# Patient Record
Sex: FEMALE | Race: WHITE | NOT HISPANIC OR LATINO | Employment: FULL TIME | RURAL
[De-identification: names, ages, dates, MRNs, and addresses within clinical notes are randomized per-mention and may not be internally consistent; named-entity substitution may affect disease eponyms.]

---

## 2021-07-14 DIAGNOSIS — D22.9 NEVUS: Primary | ICD-10-CM

## 2021-09-17 ENCOUNTER — OFFICE VISIT (OUTPATIENT)
Dept: DERMATOLOGY | Facility: CLINIC | Age: 56
End: 2021-09-17
Payer: COMMERCIAL

## 2021-09-17 VITALS — WEIGHT: 150 LBS | BODY MASS INDEX: 26.58 KG/M2 | HEIGHT: 63 IN | RESPIRATION RATE: 18 BRPM

## 2021-09-17 DIAGNOSIS — L82.1 SEBORRHEIC KERATOSES: ICD-10-CM

## 2021-09-17 DIAGNOSIS — D48.9 NEOPLASM OF UNCERTAIN BEHAVIOR: Primary | ICD-10-CM

## 2021-09-17 DIAGNOSIS — R21 RASH: ICD-10-CM

## 2021-09-17 DIAGNOSIS — D22.9 NEVUS: ICD-10-CM

## 2021-09-17 PROCEDURE — 3008F PR BODY MASS INDEX (BMI) DOCUMENTED: ICD-10-PCS | Mod: ,,, | Performed by: STUDENT IN AN ORGANIZED HEALTH CARE EDUCATION/TRAINING PROGRAM

## 2021-09-17 PROCEDURE — 4010F ACE/ARB THERAPY RXD/TAKEN: CPT | Mod: ,,, | Performed by: STUDENT IN AN ORGANIZED HEALTH CARE EDUCATION/TRAINING PROGRAM

## 2021-09-17 PROCEDURE — 4010F PR ACE/ARB THEARPY RXD/TAKEN: ICD-10-PCS | Mod: ,,, | Performed by: STUDENT IN AN ORGANIZED HEALTH CARE EDUCATION/TRAINING PROGRAM

## 2021-09-17 PROCEDURE — 11200 RMVL SKIN TAGS UP TO&INC 15: CPT | Mod: ,,, | Performed by: STUDENT IN AN ORGANIZED HEALTH CARE EDUCATION/TRAINING PROGRAM

## 2021-09-17 PROCEDURE — 1159F PR MEDICATION LIST DOCUMENTED IN MEDICAL RECORD: ICD-10-PCS | Mod: ,,, | Performed by: STUDENT IN AN ORGANIZED HEALTH CARE EDUCATION/TRAINING PROGRAM

## 2021-09-17 PROCEDURE — 88304 TISSUE EXAM BY PATHOLOGIST: CPT | Mod: 26,,, | Performed by: PATHOLOGY

## 2021-09-17 PROCEDURE — 87220 TISSUE EXAM FOR FUNGI: CPT | Mod: ,,, | Performed by: STUDENT IN AN ORGANIZED HEALTH CARE EDUCATION/TRAINING PROGRAM

## 2021-09-17 PROCEDURE — 99203 OFFICE O/P NEW LOW 30 MIN: CPT | Mod: 25,,, | Performed by: STUDENT IN AN ORGANIZED HEALTH CARE EDUCATION/TRAINING PROGRAM

## 2021-09-17 PROCEDURE — 87220 PR  TISSUE EXAM BY KOH: ICD-10-PCS | Mod: ,,, | Performed by: STUDENT IN AN ORGANIZED HEALTH CARE EDUCATION/TRAINING PROGRAM

## 2021-09-17 PROCEDURE — 88304 PATHOLOGY, DERMATOLOGY: ICD-10-PCS | Mod: 26,,, | Performed by: PATHOLOGY

## 2021-09-17 PROCEDURE — 11200 PR REMOVAL OF SKIN TAGS, UP TO 15: ICD-10-PCS | Mod: ,,, | Performed by: STUDENT IN AN ORGANIZED HEALTH CARE EDUCATION/TRAINING PROGRAM

## 2021-09-17 PROCEDURE — 3008F BODY MASS INDEX DOCD: CPT | Mod: ,,, | Performed by: STUDENT IN AN ORGANIZED HEALTH CARE EDUCATION/TRAINING PROGRAM

## 2021-09-17 PROCEDURE — 88304 TISSUE EXAM BY PATHOLOGIST: CPT | Mod: SUR | Performed by: STUDENT IN AN ORGANIZED HEALTH CARE EDUCATION/TRAINING PROGRAM

## 2021-09-17 PROCEDURE — 1159F MED LIST DOCD IN RCRD: CPT | Mod: ,,, | Performed by: STUDENT IN AN ORGANIZED HEALTH CARE EDUCATION/TRAINING PROGRAM

## 2021-09-17 PROCEDURE — 99203 PR OFFICE/OUTPT VISIT, NEW, LEVL III, 30-44 MIN: ICD-10-PCS | Mod: 25,,, | Performed by: STUDENT IN AN ORGANIZED HEALTH CARE EDUCATION/TRAINING PROGRAM

## 2021-09-17 PROCEDURE — 1160F PR REVIEW ALL MEDS BY PRESCRIBER/CLIN PHARMACIST DOCUMENTED: ICD-10-PCS | Mod: ,,, | Performed by: STUDENT IN AN ORGANIZED HEALTH CARE EDUCATION/TRAINING PROGRAM

## 2021-09-17 PROCEDURE — 1160F RVW MEDS BY RX/DR IN RCRD: CPT | Mod: ,,, | Performed by: STUDENT IN AN ORGANIZED HEALTH CARE EDUCATION/TRAINING PROGRAM

## 2021-09-17 RX ORDER — PROGESTERONE 100 MG/1
CAPSULE ORAL
COMMUNITY
Start: 2021-09-02 | End: 2022-05-05

## 2021-09-17 RX ORDER — MONTELUKAST SODIUM 10 MG/1
10 TABLET ORAL DAILY
COMMUNITY
Start: 2021-07-27

## 2021-09-17 RX ORDER — ZOLPIDEM TARTRATE 10 MG/1
TABLET ORAL
Status: ON HOLD | COMMUNITY
Start: 2021-09-14 | End: 2023-02-09 | Stop reason: HOSPADM

## 2021-09-17 RX ORDER — HYDROCORTISONE 25 MG/G
CREAM TOPICAL
COMMUNITY
Start: 2021-08-24 | End: 2023-04-24

## 2021-09-17 RX ORDER — TRIAMCINOLONE ACETONIDE 1 MG/G
OINTMENT TOPICAL 2 TIMES DAILY
Qty: 80 G | Refills: 5 | Status: ON HOLD | OUTPATIENT
Start: 2021-09-17 | End: 2023-02-09 | Stop reason: HOSPADM

## 2021-09-17 RX ORDER — PROPRANOLOL HYDROCHLORIDE 10 MG/1
TABLET ORAL
COMMUNITY
Start: 2021-09-13 | End: 2022-04-27

## 2021-09-17 RX ORDER — LISINOPRIL 30 MG/1
TABLET ORAL
COMMUNITY
Start: 2021-09-12 | End: 2022-05-26 | Stop reason: SDUPTHER

## 2021-09-17 RX ORDER — ESTRADIOL 0.5 MG/1
0.5 TABLET ORAL DAILY
COMMUNITY
Start: 2021-07-27 | End: 2022-05-05

## 2021-09-17 RX ORDER — HYDROCHLOROTHIAZIDE 12.5 MG/1
CAPSULE ORAL
COMMUNITY
Start: 2021-09-12 | End: 2022-05-05

## 2021-09-17 RX ORDER — GALCANEZUMAB 120 MG/ML
INJECTION, SOLUTION SUBCUTANEOUS
COMMUNITY
Start: 2021-09-02 | End: 2023-03-06

## 2021-09-17 RX ORDER — ONDANSETRON 4 MG/1
TABLET, ORALLY DISINTEGRATING ORAL
COMMUNITY
Start: 2021-06-08 | End: 2023-03-01 | Stop reason: SDUPTHER

## 2021-09-17 RX ORDER — RIMEGEPANT SULFATE 75 MG/75MG
TABLET, ORALLY DISINTEGRATING ORAL
COMMUNITY
Start: 2021-06-11 | End: 2023-03-06

## 2021-09-17 RX ORDER — DULOXETIN HYDROCHLORIDE 60 MG/1
CAPSULE, DELAYED RELEASE ORAL
COMMUNITY
Start: 2021-09-13

## 2021-09-17 RX ORDER — CETIRIZINE HYDROCHLORIDE 10 MG/1
10 TABLET ORAL DAILY
COMMUNITY
Start: 2021-08-24 | End: 2023-02-21 | Stop reason: SDUPTHER

## 2021-09-17 RX ORDER — ALPRAZOLAM 1 MG/1
TABLET ORAL
COMMUNITY
Start: 2021-09-14

## 2021-09-17 RX ORDER — ATENOLOL 100 MG/1
100 TABLET ORAL DAILY
COMMUNITY
Start: 2021-06-16 | End: 2022-04-27

## 2021-09-21 LAB
ESTROGEN SERPL-MCNC: NORMAL PG/ML
LAB AP GROSS DESCRIPTION: NORMAL
LAB AP LABORATORY NOTES: NORMAL
LAB AP SPEC A DDX: NORMAL
LAB AP SPEC A MORPHOLOGY: NORMAL
LAB AP SPEC A PROCEDURE: NORMAL
T3RU NFR SERPL: NORMAL %

## 2022-04-22 ENCOUNTER — HOSPITAL ENCOUNTER (EMERGENCY)
Facility: HOSPITAL | Age: 57
Discharge: ANOTHER HOSPITAL | End: 2022-04-23
Attending: FAMILY MEDICINE
Payer: COMMERCIAL

## 2022-04-22 DIAGNOSIS — R55 SYNCOPE: ICD-10-CM

## 2022-04-22 DIAGNOSIS — R79.89 ELEVATED TROPONIN: Primary | ICD-10-CM

## 2022-04-22 DIAGNOSIS — R07.9 CHEST PAIN: ICD-10-CM

## 2022-04-22 LAB
ALBUMIN SERPL BCP-MCNC: 3.9 G/DL (ref 3.5–5)
ALBUMIN/GLOB SERPL: 1.2 {RATIO}
ALP SERPL-CCNC: 82 U/L (ref 46–118)
ALT SERPL W P-5'-P-CCNC: 18 U/L (ref 13–56)
ANION GAP SERPL CALCULATED.3IONS-SCNC: 14 MMOL/L (ref 7–16)
AST SERPL W P-5'-P-CCNC: 15 U/L (ref 15–37)
BACTERIA #/AREA URNS HPF: ABNORMAL /HPF
BASOPHILS # BLD AUTO: 0.05 K/UL (ref 0–0.2)
BASOPHILS NFR BLD AUTO: 0.4 % (ref 0–1)
BILIRUB SERPL-MCNC: 0.1 MG/DL (ref 0–1.2)
BILIRUB UR QL STRIP: NEGATIVE
BUN SERPL-MCNC: 16 MG/DL (ref 7–18)
BUN/CREAT SERPL: 17 (ref 6–20)
CALCIUM SERPL-MCNC: 7.9 MG/DL (ref 8.5–10.1)
CHLORIDE SERPL-SCNC: 88 MMOL/L (ref 98–107)
CLARITY UR: CLEAR
CO2 SERPL-SCNC: 27 MMOL/L (ref 21–32)
COLOR UR: YELLOW
CREAT SERPL-MCNC: 0.96 MG/DL (ref 0.55–1.02)
DIFFERENTIAL METHOD BLD: ABNORMAL
EOSINOPHIL # BLD AUTO: 0.28 K/UL (ref 0–0.5)
EOSINOPHIL NFR BLD AUTO: 2.5 % (ref 1–4)
ERYTHROCYTE [DISTWIDTH] IN BLOOD BY AUTOMATED COUNT: 12.4 % (ref 11.5–14.5)
GLOBULIN SER-MCNC: 3.2 G/DL (ref 2–4)
GLUCOSE SERPL-MCNC: 100 MG/DL (ref 74–106)
GLUCOSE UR STRIP-MCNC: NEGATIVE MG/DL
HCT VFR BLD AUTO: 30.2 % (ref 38–47)
HGB BLD-MCNC: 10.4 G/DL (ref 12–16)
KETONES UR STRIP-SCNC: NEGATIVE MG/DL
LEUKOCYTE ESTERASE UR QL STRIP: ABNORMAL
LYMPHOCYTES # BLD AUTO: 3.37 K/UL (ref 1–4.8)
LYMPHOCYTES NFR BLD AUTO: 29.7 % (ref 27–41)
MCH RBC QN AUTO: 27.6 PG (ref 27–31)
MCHC RBC AUTO-ENTMCNC: 34.4 G/DL (ref 32–36)
MCV RBC AUTO: 80.1 FL (ref 80–96)
MONOCYTES # BLD AUTO: 0.82 K/UL (ref 0–0.8)
MONOCYTES NFR BLD AUTO: 7.2 % (ref 2–6)
MPC BLD CALC-MCNC: 8.8 FL (ref 9.4–12.4)
NEUTROPHILS # BLD AUTO: 6.82 K/UL (ref 1.8–7.7)
NEUTROPHILS NFR BLD AUTO: 60.2 % (ref 53–65)
NITRITE UR QL STRIP: NEGATIVE
NT-PROBNP SERPL-MCNC: 196 PG/ML (ref 1–125)
PH UR STRIP: 6 PH UNITS
PLATELET # BLD AUTO: 519 K/UL (ref 150–400)
POTASSIUM SERPL-SCNC: 3.5 MMOL/L (ref 3.5–5.1)
PROT SERPL-MCNC: 7.1 G/DL (ref 6.4–8.2)
PROT UR QL STRIP: ABNORMAL
RBC # BLD AUTO: 3.77 M/UL (ref 4.2–5.4)
RBC # UR STRIP: ABNORMAL /UL
RBC #/AREA URNS HPF: ABNORMAL /HPF
SODIUM SERPL-SCNC: 125 MMOL/L (ref 136–145)
SP GR UR STRIP: <=1.005
SQUAMOUS #/AREA URNS LPF: ABNORMAL /LPF
TROPONIN I SERPL HS-MCNC: 24.3 PG/ML
UROBILINOGEN UR STRIP-ACNC: 0.2 MG/DL
WBC # BLD AUTO: 11.34 K/UL (ref 4.5–11)
WBC #/AREA URNS HPF: ABNORMAL /HPF

## 2022-04-22 PROCEDURE — 93010 ELECTROCARDIOGRAM REPORT: CPT | Mod: ,,, | Performed by: INTERNAL MEDICINE

## 2022-04-22 PROCEDURE — 99285 PR EMERGENCY DEPT VISIT,LEVEL V: ICD-10-PCS | Mod: ,,, | Performed by: REGISTERED NURSE

## 2022-04-22 PROCEDURE — 99285 EMERGENCY DEPT VISIT HI MDM: CPT | Mod: ,,, | Performed by: REGISTERED NURSE

## 2022-04-22 PROCEDURE — 80053 COMPREHEN METABOLIC PANEL: CPT | Performed by: REGISTERED NURSE

## 2022-04-22 PROCEDURE — 84484 ASSAY OF TROPONIN QUANT: CPT | Performed by: REGISTERED NURSE

## 2022-04-22 PROCEDURE — 93005 ELECTROCARDIOGRAM TRACING: CPT

## 2022-04-22 PROCEDURE — 25000003 PHARM REV CODE 250: Performed by: REGISTERED NURSE

## 2022-04-22 PROCEDURE — 93010 EKG 12-LEAD: ICD-10-PCS | Mod: ,,, | Performed by: INTERNAL MEDICINE

## 2022-04-22 PROCEDURE — 81001 URINALYSIS AUTO W/SCOPE: CPT | Performed by: REGISTERED NURSE

## 2022-04-22 PROCEDURE — 96360 HYDRATION IV INFUSION INIT: CPT

## 2022-04-22 PROCEDURE — 85025 COMPLETE CBC W/AUTO DIFF WBC: CPT | Performed by: REGISTERED NURSE

## 2022-04-22 PROCEDURE — 36415 COLL VENOUS BLD VENIPUNCTURE: CPT | Performed by: REGISTERED NURSE

## 2022-04-22 PROCEDURE — 83880 ASSAY OF NATRIURETIC PEPTIDE: CPT | Performed by: REGISTERED NURSE

## 2022-04-22 PROCEDURE — 96372 THER/PROPH/DIAG INJ SC/IM: CPT

## 2022-04-22 PROCEDURE — 99285 EMERGENCY DEPT VISIT HI MDM: CPT | Mod: 25

## 2022-04-22 RX ADMIN — SODIUM CHLORIDE 500 ML: 9 INJECTION, SOLUTION INTRAVENOUS at 10:04

## 2022-04-23 ENCOUNTER — HOSPITAL ENCOUNTER (INPATIENT)
Facility: HOSPITAL | Age: 57
LOS: 2 days | Discharge: HOME | DRG: 287 | End: 2022-04-27
Attending: EMERGENCY MEDICINE | Admitting: INTERNAL MEDICINE
Payer: COMMERCIAL

## 2022-04-23 VITALS
HEIGHT: 63 IN | SYSTOLIC BLOOD PRESSURE: 139 MMHG | DIASTOLIC BLOOD PRESSURE: 97 MMHG | HEART RATE: 96 BPM | TEMPERATURE: 98 F | RESPIRATION RATE: 13 BRPM | WEIGHT: 140 LBS | BODY MASS INDEX: 24.8 KG/M2 | OXYGEN SATURATION: 100 %

## 2022-04-23 DIAGNOSIS — R07.9 CHEST PAIN: ICD-10-CM

## 2022-04-23 DIAGNOSIS — F41.9 ANXIETY DISORDER, UNSPECIFIED: ICD-10-CM

## 2022-04-23 DIAGNOSIS — R55 SYNCOPE AND COLLAPSE: ICD-10-CM

## 2022-04-23 DIAGNOSIS — R79.89 ELEVATED TROPONIN: ICD-10-CM

## 2022-04-23 DIAGNOSIS — I10 HYPERTENSION: ICD-10-CM

## 2022-04-23 DIAGNOSIS — R55 SYNCOPE, UNSPECIFIED SYNCOPE TYPE: Primary | ICD-10-CM

## 2022-04-23 LAB
AMPHET UR QL SCN: NEGATIVE
APTT PPP: 36.1 SECONDS (ref 25.2–37.3)
APTT PPP: >200 SECONDS (ref 25.2–37.3)
BARBITURATES UR QL SCN: NEGATIVE
BASOPHILS # BLD AUTO: 0.08 K/UL (ref 0–0.2)
BASOPHILS NFR BLD AUTO: 0.6 % (ref 0–1)
BENZODIAZ METAB UR QL SCN: NEGATIVE
CANNABINOIDS UR QL SCN: POSITIVE
CHOLEST SERPL-MCNC: 197 MG/DL (ref 0–200)
CHOLEST/HDLC SERPL: 2.3 {RATIO}
CK SERPL-CCNC: 582 U/L (ref 26–192)
COCAINE UR QL SCN: NEGATIVE
D DIMER PPP FEU-MCNC: 0.46 ΜG/ML (ref 0–0.47)
DIFFERENTIAL METHOD BLD: ABNORMAL
EOSINOPHIL # BLD AUTO: 0.12 K/UL (ref 0–0.5)
EOSINOPHIL NFR BLD AUTO: 0.9 % (ref 1–4)
ERYTHROCYTE [DISTWIDTH] IN BLOOD BY AUTOMATED COUNT: 12.5 % (ref 11.5–14.5)
FLUAV AG UPPER RESP QL IA.RAPID: NEGATIVE
FLUBV AG UPPER RESP QL IA.RAPID: NEGATIVE
GLUCOSE SERPL-MCNC: 116 MG/DL (ref 70–105)
HCT VFR BLD AUTO: 34.6 % (ref 38–47)
HDLC SERPL-MCNC: 85 MG/DL (ref 40–60)
HGB BLD-MCNC: 11.8 G/DL (ref 12–16)
IMM GRANULOCYTES # BLD AUTO: 0.06 K/UL (ref 0–0.04)
IMM GRANULOCYTES NFR BLD: 0.4 % (ref 0–0.4)
INR BLD: 0.99 (ref 0.9–1.1)
LACTATE SERPL-SCNC: 0.8 MMOL/L (ref 0.4–2)
LACTATE SERPL-SCNC: 3.5 MMOL/L (ref 0.4–2)
LDLC SERPL CALC-MCNC: 96 MG/DL
LDLC/HDLC SERPL: 1.1 {RATIO}
LYMPHOCYTES # BLD AUTO: 3.01 K/UL (ref 1–4.8)
LYMPHOCYTES NFR BLD AUTO: 21.5 % (ref 27–41)
MCH RBC QN AUTO: 27.4 PG (ref 27–31)
MCHC RBC AUTO-ENTMCNC: 34.1 G/DL (ref 32–36)
MCV RBC AUTO: 80.5 FL (ref 80–96)
MONOCYTES # BLD AUTO: 0.98 K/UL (ref 0–0.8)
MONOCYTES NFR BLD AUTO: 7 % (ref 2–6)
MPC BLD CALC-MCNC: 8.8 FL (ref 9.4–12.4)
NEUTROPHILS # BLD AUTO: 9.74 K/UL (ref 1.8–7.7)
NEUTROPHILS NFR BLD AUTO: 69.6 % (ref 53–65)
NONHDLC SERPL-MCNC: 112 MG/DL
NRBC # BLD AUTO: 0 X10E3/UL
NRBC, AUTO (.00): 0 %
NT-PROBNP SERPL-MCNC: 773 PG/ML (ref 1–125)
OPIATES UR QL SCN: NEGATIVE
PCP UR QL SCN: NEGATIVE
PLATELET # BLD AUTO: 584 K/UL (ref 150–400)
PROLACTIN SERPL-MCNC: 12.3 NG/ML
PROTHROMBIN TIME: 13.1 SECONDS (ref 11.7–14.7)
RBC # BLD AUTO: 4.3 M/UL (ref 4.2–5.4)
SARS-COV+SARS-COV-2 AG RESP QL IA.RAPID: NEGATIVE
TRIGL SERPL-MCNC: 82 MG/DL (ref 35–150)
TROPONIN I SERPL HS-MCNC: 71.6 PG/ML
TROPONIN I SERPL HS-MCNC: 80.1 PG/ML
TROPONIN I SERPL HS-MCNC: 84.2 PG/ML
VLDLC SERPL-MCNC: 16 MG/DL
WBC # BLD AUTO: 13.99 K/UL (ref 4.5–11)

## 2022-04-23 PROCEDURE — 96376 TX/PRO/DX INJ SAME DRUG ADON: CPT

## 2022-04-23 PROCEDURE — 80307 DRUG TEST PRSMV CHEM ANLYZR: CPT | Performed by: REGISTERED NURSE

## 2022-04-23 PROCEDURE — 63600175 PHARM REV CODE 636 W HCPCS: Performed by: REGISTERED NURSE

## 2022-04-23 PROCEDURE — 93005 ELECTROCARDIOGRAM TRACING: CPT

## 2022-04-23 PROCEDURE — G0378 HOSPITAL OBSERVATION PER HR: HCPCS

## 2022-04-23 PROCEDURE — 36415 COLL VENOUS BLD VENIPUNCTURE: CPT | Performed by: REGISTERED NURSE

## 2022-04-23 PROCEDURE — 63600175 PHARM REV CODE 636 W HCPCS

## 2022-04-23 PROCEDURE — 85025 COMPLETE CBC W/AUTO DIFF WBC: CPT

## 2022-04-23 PROCEDURE — 93010 ELECTROCARDIOGRAM REPORT: CPT | Mod: 77,,, | Performed by: HOSPITALIST

## 2022-04-23 PROCEDURE — 99283 EMERGENCY DEPT VISIT LOW MDM: CPT | Mod: ,,, | Performed by: EMERGENCY MEDICINE

## 2022-04-23 PROCEDURE — 25000003 PHARM REV CODE 250

## 2022-04-23 PROCEDURE — 82550 ASSAY OF CK (CPK): CPT

## 2022-04-23 PROCEDURE — 93010 EKG 12-LEAD: ICD-10-PCS | Mod: 77,,, | Performed by: INTERNAL MEDICINE

## 2022-04-23 PROCEDURE — 85378 FIBRIN DEGRADE SEMIQUANT: CPT

## 2022-04-23 PROCEDURE — 87428 SARSCOV & INF VIR A&B AG IA: CPT | Performed by: REGISTERED NURSE

## 2022-04-23 PROCEDURE — 25000003 PHARM REV CODE 250: Performed by: REGISTERED NURSE

## 2022-04-23 PROCEDURE — 25000003 PHARM REV CODE 250: Performed by: INTERNAL MEDICINE

## 2022-04-23 PROCEDURE — 80061 LIPID PANEL: CPT

## 2022-04-23 PROCEDURE — 96374 THER/PROPH/DIAG INJ IV PUSH: CPT

## 2022-04-23 PROCEDURE — 93010 EKG 12-LEAD: ICD-10-PCS | Mod: 77,,, | Performed by: HOSPITALIST

## 2022-04-23 PROCEDURE — 99223 PR INITIAL HOSPITAL CARE,LEVL III: ICD-10-PCS | Mod: GC,,, | Performed by: INTERNAL MEDICINE

## 2022-04-23 PROCEDURE — 83605 ASSAY OF LACTIC ACID: CPT | Mod: 91 | Performed by: INTERNAL MEDICINE

## 2022-04-23 PROCEDURE — 93010 ELECTROCARDIOGRAM REPORT: CPT | Mod: 77,,, | Performed by: INTERNAL MEDICINE

## 2022-04-23 PROCEDURE — 99283 PR EMERGENCY DEPT VISIT,LEVEL III: ICD-10-PCS | Mod: ,,, | Performed by: EMERGENCY MEDICINE

## 2022-04-23 PROCEDURE — 93010 ELECTROCARDIOGRAM REPORT: CPT | Mod: ,,, | Performed by: INTERNAL MEDICINE

## 2022-04-23 PROCEDURE — 83880 ASSAY OF NATRIURETIC PEPTIDE: CPT

## 2022-04-23 PROCEDURE — 93010 EKG 12-LEAD: ICD-10-PCS | Mod: ,,, | Performed by: INTERNAL MEDICINE

## 2022-04-23 PROCEDURE — 84484 ASSAY OF TROPONIN QUANT: CPT | Performed by: REGISTERED NURSE

## 2022-04-23 PROCEDURE — 85610 PROTHROMBIN TIME: CPT

## 2022-04-23 PROCEDURE — 99285 EMERGENCY DEPT VISIT HI MDM: CPT | Mod: 25 | Performed by: EMERGENCY MEDICINE

## 2022-04-23 PROCEDURE — 82962 GLUCOSE BLOOD TEST: CPT

## 2022-04-23 PROCEDURE — 99223 1ST HOSP IP/OBS HIGH 75: CPT | Mod: GC,,, | Performed by: INTERNAL MEDICINE

## 2022-04-23 PROCEDURE — 96375 TX/PRO/DX INJ NEW DRUG ADDON: CPT

## 2022-04-23 PROCEDURE — 84146 ASSAY OF PROLACTIN: CPT

## 2022-04-23 PROCEDURE — 85730 THROMBOPLASTIN TIME PARTIAL: CPT | Mod: 91 | Performed by: INTERNAL MEDICINE

## 2022-04-23 PROCEDURE — 85730 THROMBOPLASTIN TIME PARTIAL: CPT

## 2022-04-23 RX ORDER — CETIRIZINE HYDROCHLORIDE 10 MG/1
10 TABLET ORAL DAILY
Status: DISCONTINUED | OUTPATIENT
Start: 2022-04-23 | End: 2022-04-27 | Stop reason: HOSPADM

## 2022-04-23 RX ORDER — ASPIRIN 325 MG
325 TABLET ORAL
Status: COMPLETED | OUTPATIENT
Start: 2022-04-23 | End: 2022-04-23

## 2022-04-23 RX ORDER — METOPROLOL TARTRATE 25 MG/1
25 TABLET, FILM COATED ORAL EVERY 6 HOURS
Status: DISCONTINUED | OUTPATIENT
Start: 2022-04-23 | End: 2022-04-23

## 2022-04-23 RX ORDER — ZOLPIDEM TARTRATE 5 MG/1
10 TABLET ORAL NIGHTLY PRN
Status: DISCONTINUED | OUTPATIENT
Start: 2022-04-23 | End: 2022-04-27 | Stop reason: HOSPADM

## 2022-04-23 RX ORDER — ONDANSETRON 4 MG/1
8 TABLET, ORALLY DISINTEGRATING ORAL EVERY 8 HOURS PRN
Status: DISCONTINUED | OUTPATIENT
Start: 2022-04-23 | End: 2022-04-25

## 2022-04-23 RX ORDER — ENOXAPARIN SODIUM 100 MG/ML
1 INJECTION SUBCUTANEOUS ONCE
Status: COMPLETED | OUTPATIENT
Start: 2022-04-23 | End: 2022-04-23

## 2022-04-23 RX ORDER — HYDROCHLOROTHIAZIDE 12.5 MG/1
12.5 TABLET ORAL DAILY
Status: DISCONTINUED | OUTPATIENT
Start: 2022-04-23 | End: 2022-04-24

## 2022-04-23 RX ORDER — HEPARIN SODIUM,PORCINE/D5W 25000/250
0-40 INTRAVENOUS SOLUTION INTRAVENOUS CONTINUOUS
Status: DISCONTINUED | OUTPATIENT
Start: 2022-04-23 | End: 2022-04-25

## 2022-04-23 RX ORDER — ACETAMINOPHEN 500 MG
1000 TABLET ORAL EVERY 8 HOURS PRN
Status: DISCONTINUED | OUTPATIENT
Start: 2022-04-23 | End: 2022-04-25

## 2022-04-23 RX ORDER — IBUPROFEN 200 MG
16 TABLET ORAL
Status: DISCONTINUED | OUTPATIENT
Start: 2022-04-23 | End: 2022-04-27 | Stop reason: HOSPADM

## 2022-04-23 RX ORDER — GLUCAGON 1 MG
1 KIT INJECTION
Status: DISCONTINUED | OUTPATIENT
Start: 2022-04-23 | End: 2022-04-27 | Stop reason: HOSPADM

## 2022-04-23 RX ORDER — MORPHINE SULFATE 2 MG/ML
2 INJECTION, SOLUTION INTRAMUSCULAR; INTRAVENOUS EVERY 6 HOURS PRN
Status: DISCONTINUED | OUTPATIENT
Start: 2022-04-23 | End: 2022-04-27 | Stop reason: HOSPADM

## 2022-04-23 RX ORDER — ESTRADIOL 0.5 MG/1
0.5 TABLET ORAL DAILY
Status: DISCONTINUED | OUTPATIENT
Start: 2022-04-23 | End: 2022-04-27 | Stop reason: HOSPADM

## 2022-04-23 RX ORDER — LORAZEPAM 2 MG/ML
1 INJECTION INTRAMUSCULAR ONCE AS NEEDED
Status: COMPLETED | OUTPATIENT
Start: 2022-04-23 | End: 2022-04-23

## 2022-04-23 RX ORDER — IBUPROFEN 200 MG
24 TABLET ORAL
Status: DISCONTINUED | OUTPATIENT
Start: 2022-04-23 | End: 2022-04-27 | Stop reason: HOSPADM

## 2022-04-23 RX ORDER — LEVETIRACETAM 500 MG/5ML
1500 INJECTION, SOLUTION, CONCENTRATE INTRAVENOUS EVERY 12 HOURS
Status: DISCONTINUED | OUTPATIENT
Start: 2022-04-23 | End: 2022-04-24

## 2022-04-23 RX ORDER — PROGESTERONE 100 MG/1
100 CAPSULE ORAL DAILY
Status: DISCONTINUED | OUTPATIENT
Start: 2022-04-23 | End: 2022-04-27 | Stop reason: HOSPADM

## 2022-04-23 RX ORDER — SODIUM CHLORIDE 0.9 % (FLUSH) 0.9 %
10 SYRINGE (ML) INJECTION EVERY 12 HOURS PRN
Status: DISCONTINUED | OUTPATIENT
Start: 2022-04-23 | End: 2022-04-27 | Stop reason: HOSPADM

## 2022-04-23 RX ORDER — NAPROXEN SODIUM 220 MG/1
81 TABLET, FILM COATED ORAL DAILY
Status: DISCONTINUED | OUTPATIENT
Start: 2022-04-23 | End: 2022-04-27 | Stop reason: HOSPADM

## 2022-04-23 RX ORDER — DULOXETIN HYDROCHLORIDE 30 MG/1
60 CAPSULE, DELAYED RELEASE ORAL DAILY
Status: DISCONTINUED | OUTPATIENT
Start: 2022-04-23 | End: 2022-04-27 | Stop reason: HOSPADM

## 2022-04-23 RX ORDER — METOPROLOL TARTRATE 25 MG/1
25 TABLET, FILM COATED ORAL 2 TIMES DAILY
Status: DISCONTINUED | OUTPATIENT
Start: 2022-04-23 | End: 2022-04-24

## 2022-04-23 RX ORDER — ALPRAZOLAM 0.5 MG/1
1 TABLET ORAL NIGHTLY PRN
Status: DISCONTINUED | OUTPATIENT
Start: 2022-04-23 | End: 2022-04-25

## 2022-04-23 RX ORDER — NALOXONE HCL 0.4 MG/ML
0.02 VIAL (ML) INJECTION
Status: DISCONTINUED | OUTPATIENT
Start: 2022-04-23 | End: 2022-04-27 | Stop reason: HOSPADM

## 2022-04-23 RX ORDER — EZETIMIBE 10 MG/1
10 TABLET ORAL NIGHTLY
Status: DISCONTINUED | OUTPATIENT
Start: 2022-04-23 | End: 2022-04-27 | Stop reason: HOSPADM

## 2022-04-23 RX ORDER — MONTELUKAST SODIUM 10 MG/1
10 TABLET ORAL DAILY
Status: DISCONTINUED | OUTPATIENT
Start: 2022-04-23 | End: 2022-04-27 | Stop reason: HOSPADM

## 2022-04-23 RX ADMIN — HEPARIN SODIUM AND DEXTROSE 12 UNITS/KG/HR: 10000; 5 INJECTION INTRAVENOUS at 02:04

## 2022-04-23 RX ADMIN — HEPARIN SODIUM AND DEXTROSE 8 UNITS/KG/HR: 10000; 5 INJECTION INTRAVENOUS at 09:04

## 2022-04-23 RX ADMIN — ALPRAZOLAM 1 MG: 0.5 TABLET ORAL at 09:04

## 2022-04-23 RX ADMIN — ACETAMINOPHEN 1000 MG: 500 TABLET ORAL at 09:04

## 2022-04-23 RX ADMIN — ASPIRIN 325 MG ORAL TABLET 325 MG: 325 PILL ORAL at 06:04

## 2022-04-23 RX ADMIN — LORAZEPAM 1 MG: 2 INJECTION INTRAMUSCULAR; INTRAVENOUS at 05:04

## 2022-04-23 RX ADMIN — EZETIMIBE 10 MG: 10 TABLET ORAL at 09:04

## 2022-04-23 RX ADMIN — METOPROLOL TARTRATE 25 MG: 25 TABLET, FILM COATED ORAL at 09:04

## 2022-04-23 RX ADMIN — ENOXAPARIN SODIUM 60 MG: 60 INJECTION SUBCUTANEOUS at 01:04

## 2022-04-23 RX ADMIN — ASPIRIN 81 MG 81 MG: 81 TABLET ORAL at 06:04

## 2022-04-23 RX ADMIN — LEVETIRACETAM 1500 MG: 100 INJECTION, SOLUTION INTRAVENOUS at 09:04

## 2022-04-23 NOTE — ASSESSMENT & PLAN NOTE
- Trop I trend shows 24.3, 80.1, 84.2, 71.6 respectively  - Start Heparin drip with recurring aPTT  - Start Metoprolol 25mg q6h po  - Start aspirin 81mg qd  - Morphine 2mg IV q6h PRN chest pain  - Cardiology on board, appreciate their expertise -- Currently recommending medical management with Heparin, will follow up.  - Continuous telemetry   - Orthostatics pending  - O2 PRN  -- Patient reports allergy to statin, will inquire about reaction and add if indicated.

## 2022-04-23 NOTE — ASSESSMENT & PLAN NOTE
Patient reports 1 recent syncopal episode yesterday, and 1 prior approximately 1 year ago  - Carotid doppler pending  - D-dimer pending

## 2022-04-23 NOTE — SUBJECTIVE & OBJECTIVE
Past Medical History:   Diagnosis Date    Anxiety disorder, unspecified     Depression     Hypertension     Insomnia        History reviewed. No pertinent surgical history.    Review of patient's allergies indicates:   Allergen Reactions    Eggs [egg derived]     Mobic [meloxicam]     Penicillins     Statins-hmg-coa reductase inhibitors        Current Facility-Administered Medications on File Prior to Encounter   Medication    [COMPLETED] aspirin tablet 325 mg    [COMPLETED] enoxaparin injection 60 mg    [COMPLETED] sodium chloride 0.9% bolus 500 mL     Current Outpatient Medications on File Prior to Encounter   Medication Sig    ALPRAZolam (XANAX) 1 MG tablet     atenoloL (TENORMIN) 100 MG tablet Take 100 mg by mouth once daily. for 90 days    cetirizine (ZYRTEC) 10 MG tablet Take 10 mg by mouth once daily.    DULoxetine (CYMBALTA) 60 MG capsule     EMGALITY SYRINGE 120 mg/mL Syrg INJECT SUBCUTANEOUS 2 ML THE FIRST MONTH AND THEN 1 ML ONCE MONTHLLY    estradioL (ESTRACE) 0.5 MG tablet Take 0.5 mg by mouth once daily. for 90 days    hydroCHLOROthiazide (MICROZIDE) 12.5 mg capsule     hydrocortisone 2.5 % cream APPLY CREAM TO AFFECTED AREA TO AFFECTED AREA ONCE DAILY FOR 5 DAYS TO UPPER BILATERAL EXTREMITIES    lisinopriL (PRINIVIL,ZESTRIL) 30 MG tablet     montelukast (SINGULAIR) 10 mg tablet Take 10 mg by mouth once daily.    NURTEC 75 mg odt DISSOLVE 1 TABLET BY MOUTH ONCE DAILY AS NEEDED FOR HEADACHE    ondansetron (ZOFRAN-ODT) 4 MG TbDL DISSOLVE 1 TABLET ON TONGUE ONCE DAILY    progesterone (PROMETRIUM) 100 MG capsule TAKE 1 CAPSULE BY MOUTH ONCE DAILY AT BEDTIME EVERY CYCLE FOR 12 DAYS    propranoloL (INDERAL) 10 MG tablet     triamcinolone acetonide 0.1% (KENALOG) 0.1 % ointment Apply topically 2 (two) times daily.    zolpidem (AMBIEN) 10 mg Tab      Family History       Problem Relation (Age of Onset)    Heart disease Father    Melanoma Maternal Uncle          Tobacco Use    Smoking status: Never Smoker     Smokeless tobacco: Never Used   Substance and Sexual Activity    Alcohol use: Never    Drug use: Never    Sexual activity: Never     Review of Systems   Constitutional:  Negative for appetite change, chills, diaphoresis, fatigue and fever.   Respiratory:  Negative for cough, chest tightness, shortness of breath and wheezing.    Cardiovascular:  Negative for chest pain, palpitations and leg swelling.   Gastrointestinal:  Negative for abdominal distention, abdominal pain, constipation, diarrhea, nausea and vomiting.   Neurological:  Positive for dizziness, syncope and light-headedness. Negative for weakness.   Objective:     Vital Signs (Most Recent):  Temp: 98.4 °F (36.9 °C) (04/23/22 1114)  Pulse: 99 (04/23/22 1144)  Resp: 12 (04/23/22 1144)  BP: 135/89 (04/23/22 1144)  SpO2: 99 % (04/23/22 1114) Vital Signs (24h Range):  Temp:  [98 °F (36.7 °C)-98.4 °F (36.9 °C)] 98.4 °F (36.9 °C)  Pulse:  [] 99  Resp:  [12-20] 12  SpO2:  [95 %-100 %] 99 %  BP: (112-157)/() 135/89     Weight: 63.5 kg (140 lb)  Body mass index is 24.8 kg/m².    Physical Exam  Vitals and nursing note reviewed.   Constitutional:       General: She is not in acute distress.     Appearance: Normal appearance. She is not ill-appearing, toxic-appearing or diaphoretic.   HENT:      Head: Normocephalic and atraumatic.      Nose: Nose normal.      Mouth/Throat:      Mouth: Mucous membranes are moist.      Pharynx: Oropharynx is clear.   Eyes:      General: No scleral icterus.     Extraocular Movements: Extraocular movements intact.      Conjunctiva/sclera: Conjunctivae normal.      Pupils: Pupils are equal, round, and reactive to light.   Cardiovascular:      Rate and Rhythm: Normal rate and regular rhythm.      Pulses: Normal pulses.      Heart sounds: Normal heart sounds. No murmur heard.    No friction rub. No gallop.   Pulmonary:      Effort: Pulmonary effort is normal. No respiratory distress.      Breath sounds: Normal breath sounds. No  wheezing, rhonchi or rales.   Abdominal:      General: Abdomen is flat. Bowel sounds are normal. There is no distension.      Palpations: Abdomen is soft.      Tenderness: no abdominal tenderness There is no guarding or rebound.   Musculoskeletal:         General: Normal range of motion.      Right lower leg: No edema.      Left lower leg: No edema.   Skin:     General: Skin is warm and dry.      Coloration: Skin is not jaundiced.   Neurological:      General: No focal deficit present.      Mental Status: She is alert and oriented to person, place, and time. Mental status is at baseline.   Psychiatric:         Mood and Affect: Mood normal.         Behavior: Behavior normal.         CRANIAL NERVES     CN III, IV, VI   Pupils are equal, round, and reactive to light.     Significant Labs: All pertinent labs within the past 24 hours have been reviewed.  Recent Lab Results  (Last 5 results in the past 24 hours)        04/23/22  0617   04/23/22  0309   04/23/22  0203   04/23/22  0158   04/22/22  2350        Influenza B, Molecular     Negative           Benzodiazepines       Negative         Cocaine       Negative         BARBITURATES       Negative         Amphetamine       Negative         Cannabinoid Scrn, Ur       Positive         COVID-19 Ag     Negative           Influenza A     Negative           Opiate Scrn, Ur       Negative         Phencyclidine       Negative         Troponin I High Sensitivity 71.6   84.2       80.1                              Significant Imaging: I have reviewed all pertinent imaging results/findings within the past 24 hours.

## 2022-04-23 NOTE — H&P
"[facility] - Emergency Department  Hospital Medicine  History & Physical    Patient Name: Flory Chacon  MRN: [id number]  Patient Class: OP- Observation  Admission Date: 4/23/2022  Attending Physician: Prince Burgos MD   Primary Care Provider: Primary Doctor No         Patient information was obtained from patient, relative(s) and ER records.     Subjective:     Principal Problem:Syncope    Chief Complaint:   Chief Complaint   Patient presents with    Abnormal labs     Pt transferred from [facility] ER for elevated Troponin         HPI: Pt is a 55 yo female who was transferred from [place] ED to [place] ED with a cc of "elevated troponin." Pt reports a syncopal episode yesterday that occurred while she was at home. She states that she drove home from work, she was walking from her kitchen to another room when she turned around and had the syncopal episode. Patient states that she did not hit her head and that the fall was witnessed by a family member. She was subsequently taken to [place] ED where it was found that she had elevated troponins. Patient currently has no acute complaints, and denies and CP. SOB, or discomfort. Pt states that she has a negative personal cardiac history, but does have a positive family history of CAD and heart attack.     Significant findings in the ED include:  - Labs: WBC 11.34, Platelets 915, Na 125, proBNP 196. Trop I trend shows 24.3, 80.1, 84.2, 71.6 respectively.  - Images (as per radiology): CXR: Heart size normal. Lungs clear. No pneumothorax or pleural effusion.  - EKG shows: LBBB     Patient was subsequently admitted to the family medicine service for further evaluation and management.      Past Medical History:   Diagnosis Date    Anxiety disorder, unspecified     Depression     Hypertension     Insomnia        History reviewed. No pertinent surgical history.    Review of patient's allergies indicates:   Allergen Reactions    Eggs [egg derived]     Mobic [meloxicam]     " Penicillins     Statins-hmg-coa reductase inhibitors        Current Facility-Administered Medications on File Prior to Encounter   Medication    [COMPLETED] aspirin tablet 325 mg    [COMPLETED] enoxaparin injection 60 mg    [COMPLETED] sodium chloride 0.9% bolus 500 mL     Current Outpatient Medications on File Prior to Encounter   Medication Sig    ALPRAZolam (XANAX) 1 MG tablet     atenoloL (TENORMIN) 100 MG tablet Take 100 mg by mouth once daily. for 90 days    cetirizine (ZYRTEC) 10 MG tablet Take 10 mg by mouth once daily.    DULoxetine (CYMBALTA) 60 MG capsule     EMGALITY SYRINGE 120 mg/mL Syrg INJECT SUBCUTANEOUS 2 ML THE FIRST MONTH AND THEN 1 ML ONCE MONTHLLY    estradioL (ESTRACE) 0.5 MG tablet Take 0.5 mg by mouth once daily. for 90 days    hydroCHLOROthiazide (MICROZIDE) 12.5 mg capsule     hydrocortisone 2.5 % cream APPLY CREAM TO AFFECTED AREA TO AFFECTED AREA ONCE DAILY FOR 5 DAYS TO UPPER BILATERAL EXTREMITIES    lisinopriL (PRINIVIL,ZESTRIL) 30 MG tablet     montelukast (SINGULAIR) 10 mg tablet Take 10 mg by mouth once daily.    NURTEC 75 mg odt DISSOLVE 1 TABLET BY MOUTH ONCE DAILY AS NEEDED FOR HEADACHE    ondansetron (ZOFRAN-ODT) 4 MG TbDL DISSOLVE 1 TABLET ON TONGUE ONCE DAILY    progesterone (PROMETRIUM) 100 MG capsule TAKE 1 CAPSULE BY MOUTH ONCE DAILY AT BEDTIME EVERY CYCLE FOR 12 DAYS    propranoloL (INDERAL) 10 MG tablet     triamcinolone acetonide 0.1% (KENALOG) 0.1 % ointment Apply topically 2 (two) times daily.    zolpidem (AMBIEN) 10 mg Tab      Family History       Problem Relation (Age of Onset)    Heart disease Father    Melanoma Maternal Uncle          Tobacco Use    Smoking status: Never Smoker    Smokeless tobacco: Never Used   Substance and Sexual Activity    Alcohol use: Never    Drug use: Never    Sexual activity: Never     Review of Systems   Constitutional:  Negative for appetite change, chills, diaphoresis, fatigue and fever.   Respiratory:   Negative for cough, chest tightness, shortness of breath and wheezing.    Cardiovascular:  Negative for chest pain, palpitations and leg swelling.   Gastrointestinal:  Negative for abdominal distention, abdominal pain, constipation, diarrhea, nausea and vomiting.   Neurological:  Positive for dizziness, syncope and light-headedness. Negative for weakness.   Objective:     Vital Signs (Most Recent):  Temp: 98.4 °F (36.9 °C) (04/23/22 1114)  Pulse: 99 (04/23/22 1144)  Resp: 12 (04/23/22 1144)  BP: 135/89 (04/23/22 1144)  SpO2: 99 % (04/23/22 1114) Vital Signs (24h Range):  Temp:  [98 °F (36.7 °C)-98.4 °F (36.9 °C)] 98.4 °F (36.9 °C)  Pulse:  [] 99  Resp:  [12-20] 12  SpO2:  [95 %-100 %] 99 %  BP: (112-157)/() 135/89     Weight: 63.5 kg (140 lb)  Body mass index is 24.8 kg/m².    Physical Exam  Vitals and nursing note reviewed.   Constitutional:       General: She is not in acute distress.     Appearance: Normal appearance. She is not ill-appearing, toxic-appearing or diaphoretic.   HENT:      Head: Normocephalic and atraumatic.      Nose: Nose normal.      Mouth/Throat:      Mouth: Mucous membranes are moist.      Pharynx: Oropharynx is clear.   Eyes:      General: No scleral icterus.     Extraocular Movements: Extraocular movements intact.      Conjunctiva/sclera: Conjunctivae normal.      Pupils: Pupils are equal, round, and reactive to light.   Cardiovascular:      Rate and Rhythm: Normal rate and regular rhythm.      Pulses: Normal pulses.      Heart sounds: Normal heart sounds. No murmur heard.    No friction rub. No gallop.   Pulmonary:      Effort: Pulmonary effort is normal. No respiratory distress.      Breath sounds: Normal breath sounds. No wheezing, rhonchi or rales.   Abdominal:      General: Abdomen is flat. Bowel sounds are normal. There is no distension.      Palpations: Abdomen is soft.      Tenderness: no abdominal tenderness There is no guarding or rebound.   Musculoskeletal:          General: Normal range of motion.      Right lower leg: No edema.      Left lower leg: No edema.   Skin:     General: Skin is warm and dry.      Coloration: Skin is not jaundiced.   Neurological:      General: No focal deficit present.      Mental Status: She is alert and oriented to person, place, and time. Mental status is at baseline.   Psychiatric:         Mood and Affect: Mood normal.         Behavior: Behavior normal.         CRANIAL NERVES     CN III, IV, VI   Pupils are equal, round, and reactive to light.     Significant Labs: All pertinent labs within the past 24 hours have been reviewed.  Recent Lab Results  (Last 5 results in the past 24 hours)        04/23/22  0617   04/23/22  0309   04/23/22  0203   04/23/22  0158   04/22/22  2350        Influenza B, Molecular     Negative           Benzodiazepines       Negative         Cocaine       Negative         BARBITURATES       Negative         Amphetamine       Negative         Cannabinoid Scrn, Ur       Positive         COVID-19 Ag     Negative           Influenza A     Negative           Opiate Scrn, Ur       Negative         Phencyclidine       Negative         Troponin I High Sensitivity 71.6   84.2       80.1                              Significant Imaging: I have reviewed all pertinent imaging results/findings within the past 24 hours.    Assessment/Plan:     Anxiety disorder, unspecified  - Continue home Cymbalta 60mg po qd  - Continue home xanex 1mg po qhs prn  - Continue home ambien 10mg po qhs prn    Hypertension  Current BP is 135/89  - Continue home lisinopril 30mg po qd  - Continue home HCTZ 12.5 po qd  - Continue to monitor and reassess.      Syncope  Patient reports 1 recent syncopal episode yesterday, and 1 prior approximately 1 year ago  - Carotid doppler pending  - D-dimer pending        Elevated troponin  - Trop I trend shows 24.3, 80.1, 84.2, 71.6 respectively  - Start Heparin drip with recurring aPTT  - Start Metoprolol 25mg q6h po  -  Start aspirin 81mg qd  - Morphine 2mg IV q6h PRN chest pain  - Cardiology on board, appreciate their expertise -- Currently recommending medical management with Heparin, will follow up.  - Continuous telemetry   - Orthostatics pending  - O2 PRN  -- Patient reports allergy to statin, will inquire about reaction and add if indicated.      VTE Risk Mitigation (From admission, onward)         Ordered     heparin 25,000 units in dextrose 5% 250 mL (100 units/mL) infusion LOW INTENSITY nomogram - [facility]  Continuous        Question:  Begin at (in units/kg/hr)  Answer:  12    04/23/22 1327     heparin 25,000 units in dextrose 5% (100 units/ml) IV bolus from bag INITIAL BOLUS (max bolus 4000 units)  As needed (PRN)        Question:  Heparin Infusion Adjustment (DO NOT MODIFY ANSWER)  Answer:  \\[email or web address]\epic\Images\Pharmacy\HeparinInfusions\heparin LOW INTENSITY nomogram for [facility] [id number].pdf    04/23/22 1327     heparin 25,000 units in dextrose 5% (100 units/ml) IV bolus from bag - ADDITIONAL PRN BOLUS - 30 units/kg (max bolus 4000 units)  As needed (PRN)        Question:  Heparin Infusion Adjustment (DO NOT MODIFY ANSWER)  Answer:  \\[email or web address]\epic\Images\Pharmacy\HeparinInfusions\heparin LOW INTENSITY nomogram for [facility] [id number].pdf    04/23/22 1327     IP VTE LOW RISK PATIENT  Once         04/23/22 1237     Place sequential compression device  Until discontinued         04/23/22 1237                   Dung Chavez MD  Department of Hospital Medicine   [facility] - Emergency Department

## 2022-04-23 NOTE — CARE UPDATE
MRI head negative for acute processes. Last aPTT >200, Repeat aPTT at 10pm, restart heparin if indicated.

## 2022-04-23 NOTE — Clinical Note
35 ml of contrast were injected throughout the case. 65 mL of contrast was the total wasted during the case. 100 mL was the total amount used during the case.

## 2022-04-23 NOTE — ED PROVIDER NOTES
Encounter Date: 4/23/2022    SCRIBE #1 NOTE: I, Tiffany Murrieta, am scribing for, and in the presence of,  Prince Burgos MD. I have scribed the entire note.       History     Chief Complaint   Patient presents with    Abnormal labs     Pt transferred from [facility] ER for elevated Troponin      Patient is a 56 year old female who has been transferred to the emergency department from [facility] ED due to elevated troponin. Patient explains that she was mowing her lawn yesterday when she started to feel thirsty and lost consciousness. Patient assumed she was dehydrated and reported to [facility] ED for evaluation. Patient's troponin levels were found to be elevated, a second troponin was completed and evaluated as well. Patient has underwent 1 stress test in the past, denies any other cardiac related history. Patient has been transfer to our facilty for further evaluation and hospital admission.     The history is provided by the patient. No  was used.     Review of patient's allergies indicates:   Allergen Reactions    Eggs [egg derived]     Mobic [meloxicam]     Penicillins     Statins-hmg-coa reductase inhibitors      Past Medical History:   Diagnosis Date    Anxiety disorder, unspecified     Depression     Hypertension     Insomnia      History reviewed. No pertinent surgical history.  Family History   Problem Relation Age of Onset    Melanoma Maternal Uncle      Social History     Tobacco Use    Smoking status: Never Smoker    Smokeless tobacco: Never Used   Substance Use Topics    Alcohol use: Never    Drug use: Never     Review of Systems   Constitutional: Negative.    HENT: Negative.    Eyes: Negative.    Respiratory: Negative.    Cardiovascular: Negative.  Negative for chest pain.   Gastrointestinal: Negative.    Endocrine: Negative.    Genitourinary: Negative.    Musculoskeletal: Negative.    Skin: Negative.    Allergic/Immunologic: Negative.    Neurological: Positive for syncope.    Hematological: Negative.    Psychiatric/Behavioral: Negative.    All other systems reviewed and are negative.      Physical Exam     Initial Vitals [04/23/22 1114]   BP Pulse Resp Temp SpO2   (!) 148/113 105 16 98.4 °F (36.9 °C) 99 %      MAP       --         Physical Exam    Nursing note and vitals reviewed.  HENT:   Head: Normocephalic and atraumatic.   Mouth/Throat: Oropharynx is clear and moist.   Eyes: Pupils are equal, round, and reactive to light.   Neck: Neck supple.   Normal range of motion.  Cardiovascular: Normal rate and regular rhythm.   Pulmonary/Chest: Effort normal and breath sounds normal.   Abdominal: Abdomen is soft. She exhibits no distension.   Musculoskeletal:         General: Normal range of motion.      Cervical back: Normal range of motion and neck supple.     Neurological: She is alert.   Skin: Skin is warm. Capillary refill takes less than 2 seconds.   Psychiatric: She has a normal mood and affect.         ED Course   Procedures  Labs Reviewed - No data to display       Imaging Results    None          Medications - No data to display             Attending Attestation:           Physician Attestation for Scribe:  Physician Attestation Statement for Scribe #1: I, Prince Burgos MD, reviewed documentation, as scribed by Tiffany Murrieta in my presence, and it is both accurate and complete.                      Clinical Impression:   Final diagnoses:  [R55] Syncope, unspecified syncope type (Primary)  [R77.8] Elevated troponin          ED Disposition Condition    Observation               Prince Burgos MD  04/23/22 2410

## 2022-04-23 NOTE — ASSESSMENT & PLAN NOTE
- Continue home Cymbalta 60mg po qd  - Continue home xanex 1mg po qhs prn  - Continue home ambien 10mg po qhs prn

## 2022-04-23 NOTE — ED TRIAGE NOTES
55 YO FE TO ER WITH C/O SYNCOPAL EPISODE.  EMS WAS NOTIFIED  BY FAMILY MEMBER.  PT REPORTS SHE HAS BEEN FEELING WEAK ALL DAY AND ATTRIBUTED THAT TO WORKING A LOT THIS WEEK.  EMS REPORTS PT WAS ONLY OUT FOR A COUPLE OF MINUTES AND BY THE TIME THE GOT FLUIDS STARTED SHE WAS AWAKE AND FEELING BETTER.  PT IS ORIENTED TO P, P, AND TIME.  DENIES C/O OF PAIN OR DISCOMFORT.  RESP REG AND AND UNLABORED.

## 2022-04-23 NOTE — ED NOTES
"PT STATES THAT " I FEEL BETTER AND MY HEADACHE IS BETTER TOO." THE PATIENT WAS ASSISTED TO AND FROM BATHROOM AND NO C/O DIZZINESS.  "

## 2022-04-23 NOTE — ED PROVIDER NOTES
Encounter Date: 4/22/2022       History     Chief Complaint   Patient presents with    Loss of Consciousness     Flory Chacon is a 57 yo WF that presents per [facility] with c/o syncopal episode PTA.  Pt states she has worked a 55 hour week and she was talking to her mother and had a syncopal episode.  The last thing she remembered was talking to her mother and then woke up to the paramedic waking her up.  She denies hitting her head.  She denies SOB or CP.    The history is provided by the patient and a friend.     Review of patient's allergies indicates:   Allergen Reactions    Eggs [egg derived]     Mobic [meloxicam]     Penicillins     Statins-hmg-coa reductase inhibitors      Past Medical History:   Diagnosis Date    Anxiety disorder, unspecified     Depression     Hypertension     Insomnia      History reviewed. No pertinent surgical history.  Family History   Problem Relation Age of Onset    Melanoma Maternal Uncle      Social History     Tobacco Use    Smoking status: Never Smoker    Smokeless tobacco: Never Used   Substance Use Topics    Alcohol use: Never    Drug use: Never     Review of Systems   Constitutional: Positive for fatigue. Negative for diaphoresis.   HENT: Negative.    Eyes: Negative.    Respiratory: Negative for cough, chest tightness and shortness of breath.    Cardiovascular: Negative for chest pain, palpitations and leg swelling.   Gastrointestinal: Negative.    Endocrine: Negative.    Genitourinary: Negative.    Musculoskeletal: Negative.    Skin: Negative.    Neurological: Positive for dizziness, syncope and light-headedness. Negative for tremors, seizures, facial asymmetry, speech difficulty, weakness, numbness and headaches.   Hematological: Negative.    Psychiatric/Behavioral: Negative.        Physical Exam     Initial Vitals [04/22/22 2046]   BP Pulse Resp Temp SpO2   (!) 135/103 103 18 98 °F (36.7 °C) 100 %      MAP       --         Physical Exam    Constitutional:  She appears well-developed and well-nourished. She is not diaphoretic. No distress.   HENT:   Head: Normocephalic and atraumatic.   Nose: Nose normal.   Eyes: Conjunctivae and EOM are normal. Pupils are equal, round, and reactive to light. No scleral icterus.   Neck: Neck supple.   Normal range of motion.  Cardiovascular: Regular rhythm, normal heart sounds and intact distal pulses.   Pulmonary/Chest: Breath sounds normal.   Abdominal: Abdomen is soft. Bowel sounds are normal.   Musculoskeletal:         General: No tenderness or edema. Normal range of motion.      Cervical back: Normal range of motion and neck supple.     Neurological: She is alert and oriented to person, place, and time. GCS score is 15. GCS eye subscore is 4. GCS verbal subscore is 5. GCS motor subscore is 6.   Skin: Skin is warm and dry. Capillary refill takes less than 2 seconds.   Psychiatric: She has a normal mood and affect. Her behavior is normal. Judgment and thought content normal.         Medical Screening Exam   See Full Note    ED Course   Procedures  Labs Reviewed   COMPREHENSIVE METABOLIC PANEL - Abnormal; Notable for the following components:       Result Value    Sodium 125 (*)     Chloride 88 (*)     Calcium 7.9 (*)     All other components within normal limits   NT-PRO NATRIURETIC PEPTIDE - Abnormal; Notable for the following components:    ProBNP 196 (*)     All other components within normal limits   URINALYSIS, REFLEX TO URINE CULTURE - Abnormal; Notable for the following components:    Leukocytes, UA Trace (*)     Protein, UA Trace (*)     Blood, UA Trace-Intact (*)     All other components within normal limits   CBC WITH DIFFERENTIAL - Abnormal; Notable for the following components:    WBC 11.34 (*)     RBC 3.77 (*)     Hemoglobin 10.4 (*)     Hematocrit 30.2 (*)     Platelet Count 519 (*)     MPV 8.8 (*)     Monocytes % 7.2 (*)     Monocytes, Absolute 0.82 (*)     All other components within normal limits   URINALYSIS,  MICROSCOPIC - Abnormal; Notable for the following components:    Bacteria, UA Occasional (*)     Squamous Epithelial Cells, UA Few (*)     All other components within normal limits   TROPONIN I - Abnormal; Notable for the following components:    Troponin I High Sensitivity 80.1 (*)     All other components within normal limits   DRUG SCREEN, URINE (BEAKER) - Abnormal; Notable for the following components:    Cannabinoid, Urine Positive (*)     All other components within normal limits    Narrative:     The results of screening tests should be considered presumptive. Confirmatory testing is available upon request.    Cutoff Points:  PCP:         25ng/mL  AMPH:        500ng/mL  SKYE:        200ng/mL  STEPHAN:        200ng/mL  THC:         50ng/mL  JEREMY:         300ng/mL  OPI:         2000ng/mL   TROPONIN I - Abnormal; Notable for the following components:    Troponin I High Sensitivity 84.2 (*)     All other components within normal limits   TROPONIN I - Abnormal; Notable for the following components:    Troponin I High Sensitivity 71.6 (*)     All other components within normal limits   TROPONIN I - Normal   SARS-COV2 (COVID) W/ FLU ANTIGEN - Normal    Narrative:     Negative SARS-CoV results should not be used as the sole basis for treatment or patient management decisions; negative results should be considered in the context of a patient's recent exposures, history and the presene of clinical signs and symptoms consistent with COVID-19.  Negative results should be treated as presumptive and confirmed by molecular assay, if necessary for patient management.   CBC W/ AUTO DIFFERENTIAL    Narrative:     The following orders were created for panel order CBC auto differential.  Procedure                               Abnormality         Status                     ---------                               -----------         ------                     CBC with Differential[[id number]]        Abnormal            Final result                  Please view results for these tests on the individual orders.        ECG Results          EKG 12-lead (In process)  Result time 04/23/22 06:12:58    In process by Interface, Lab In [facility] (04/23/22 06:12:58)                 Narrative:    Test Reason : R77.8,    Vent. Rate : 098 BPM     Atrial Rate : 098 BPM     P-R Int : 180 ms          QRS Dur : 136 ms      QT Int : 378 ms       P-R-T Axes : 063 -17 122 degrees     QTc Int : 482 ms    Normal sinus rhythm  Nonspecific intraventricular block  T wave abnormality, consider lateral ischemia  Abnormal ECG  When compared with ECG of 23-APR-2022 03:00,  No significant change was found    Referred By: [email or web address]   SELF           Confirmed By:                              EKG 12-lead (In process)  Result time 04/23/22 03:03:02    In process by Interface, Lab In [facility] (04/23/22 03:03:02)                 Narrative:    Test Reason : R77.8,    Vent. Rate : 098 BPM     Atrial Rate : 098 BPM     P-R Int : 186 ms          QRS Dur : 138 ms      QT Int : 378 ms       P-R-T Axes : 056 -22 116 degrees     QTc Int : 482 ms    Normal sinus rhythm  Left bundle branch block  Abnormal ECG  When compared with ECG of 23-APR-2022 00:04,  Premature ventricular complexes are no longer Present    Referred By: [email or web address]   SELF           Confirmed By:                              EKG 12-lead (In process)  Result time 04/23/22 00:10:13    In process by Interface, Lab In [facility] (04/23/22 00:10:13)                 Narrative:    Test Reason : R55,    Vent. Rate : 101 BPM     Atrial Rate : 101 BPM     P-R Int : 186 ms          QRS Dur : 138 ms      QT Int : 372 ms       P-R-T Axes : 059 -43 101 degrees     QTc Int : 482 ms    Sinus tachycardia with occasional Premature ventricular complexes  Left axis deviation  Nonspecific intraventricular block  Cannot rule out Septal infarct ,age undetermined  Abnormal ECG  When compared with ECG of 22-APR-2022 20:55,  Premature ventricular complexes  are now Present    Referred By: [email or web address]   SELF           Confirmed By:                              EKG 12-lead (In process)  Result time 04/22/22 21:08:23    In process by Interface, Lab In [facility] (04/22/22 21:08:23)                 Narrative:    Test Reason : R07.9,    Vent. Rate : 101 BPM     Atrial Rate : 101 BPM     P-R Int : 182 ms          QRS Dur : 142 ms      QT Int : 376 ms       P-R-T Axes : 050 -30 115 degrees     QTc Int : 487 ms    Sinus tachycardia  Possible Left atrial enlargement  Left axis deviation  Left bundle branch block  Abnormal ECG  No previous ECGs available    Referred By: [email or web address]   SELF           Confirmed By:                             Imaging Results          X-Ray Chest AP Portable (Final result)  Result time 04/22/22 21:02:29    Final result by Herbert Fitzgerald MD (04/22/22 21:02:29)                 Impression:      No acute findings.      Electronically signed by: Herbert Fitzgerald  Date:    04/22/2022  Time:    21:02             Narrative:    EXAMINATION:  XR CHEST AP PORTABLE    CLINICAL HISTORY:  Chest Pain;    TECHNIQUE:  Single frontal view of the chest was performed.    COMPARISON:  None    FINDINGS:  Heart size normal. Lungs clear. No pneumothorax or pleural effusion.                                 Medications   sodium chloride 0.9% bolus 500 mL (0 mLs Intravenous Stopped 4/22/22 2314)   enoxaparin injection 60 mg (60 mg Subcutaneous Given 4/23/22 0148)   aspirin tablet 325 mg (325 mg Oral Given 4/23/22 0620)     Medical Decision Making:   ED Management:  -0020:  Spoke with Dr. Thibodeaux regarding pts case, labs, and EKG.  She agrees pt needs cardiology services.  They are holding pts in [place] ED.  Dr. Thibodeaux suggests to place a PFC, repeat a troponin and EKG in 3 hours, to give Lovenox 1mg/kg SQ, and to hold pt at [place] ED until a bed becomes available at [facility].  If pts condition changes will notify her.  -0026:  Pt requests to go to [place] due to her other providers being at [place].   Pt agrees that if [facility] has no beds she will go to [facility].  PFC placed for [facility].    -0256:  No beds available at [facility].  Pt is agreeable to go to [facility].  -0615:  Care transferred to Dr. Montoya    Addendum by Dr. Kenton Montoya:  I discussed this case with Dr. Hilton at [facility] who agreed to accept the patient.  Patient will be transferred to [facility] by EMS in stable condition.                   Clinical Impression:   Final diagnoses:  [R07.9] Chest pain  [R55] Syncope  [R77.8] Elevated troponin (Primary)          ED Disposition Condition    Transfer to Another Facility Stable              CHARANJIT Fonseca  04/22/22 2206       CHARANJIT Fonseca  04/23/22 0035       CHARANJIT Fonseca  04/23/22 0619       Kenton Montoya,   04/23/22 0910

## 2022-04-23 NOTE — ASSESSMENT & PLAN NOTE
Current BP is 135/89  - Continue home lisinopril 30mg po qd  - Continue home HCTZ 12.5 po qd  - Continue to monitor and reassess.

## 2022-04-23 NOTE — HPI
"Pt is a 57 yo female who was transferred from [place] ED to [place] ED with a cc of "elevated troponin." Pt reports a syncopal episode yesterday that occurred while she was at home. She states that she drove home from work, she was walking from her kitchen to another room when she turned around and had the syncopal episode. Patient states that she did not hit her head and that the fall was witnessed by a family member. She was subsequently taken to [place] ED where it was found that she had elevated troponins. Patient currently has no acute complaints, and denies and CP. SOB, or discomfort. Pt states that she has a negative personal cardiac history, but does have a positive family history of CAD and heart attack.     Significant findings in the ED include:  - Labs: WBC 11.34, Platelets 915, Na 125, proBNP 196. Trop I trend shows 24.3, 80.1, 84.2, 71.6 respectively.  - Images (as per radiology): CXR: Heart size normal. Lungs clear. No pneumothorax or pleural effusion.  - EKG shows: LBBB     Patient was subsequently admitted to the family medicine service for further evaluation and management.  "

## 2022-04-24 LAB
ALBUMIN SERPL BCP-MCNC: 4 G/DL (ref 3.5–5)
ALBUMIN/GLOB SERPL: 1.4 {RATIO}
ALP SERPL-CCNC: 106 U/L (ref 46–118)
ALT SERPL W P-5'-P-CCNC: 22 U/L (ref 13–56)
ANION GAP SERPL CALCULATED.3IONS-SCNC: 17 MMOL/L (ref 7–16)
APTT PPP: 35.4 SECONDS (ref 25.2–37.3)
APTT PPP: 37.7 SECONDS (ref 25.2–37.3)
APTT PPP: 38.9 SECONDS (ref 25.2–37.3)
AST SERPL W P-5'-P-CCNC: 25 U/L (ref 15–37)
AV INDEX (PROSTH): 0.67
AV VALVE AREA: 1.9 CM2
BASOPHILS # BLD AUTO: 0.12 K/UL (ref 0–0.2)
BASOPHILS NFR BLD AUTO: 1 % (ref 0–1)
BILIRUB SERPL-MCNC: 0.4 MG/DL (ref 0–1.2)
BSA FOR ECHO PROCEDURE: 1.65 M2
BUN SERPL-MCNC: 9 MG/DL (ref 7–18)
BUN/CREAT SERPL: 11 (ref 6–20)
CALCIUM SERPL-MCNC: 8.4 MG/DL (ref 8.5–10.1)
CHLORIDE SERPL-SCNC: 89 MMOL/L (ref 98–107)
CO2 SERPL-SCNC: 24 MMOL/L (ref 21–32)
CREAT SERPL-MCNC: 0.82 MG/DL (ref 0.55–1.02)
CV ECHO LV RWT: 0.47 CM
DIFFERENTIAL METHOD BLD: ABNORMAL
DOP CALC AO VTI: 26.82 CM
DOP CALC LVOT AREA: 2.8 CM2
DOP CALC LVOT DIAMETER: 1.9 CM
DOP CALC LVOT STROKE VOLUME: 51.07 CM3
DOP CALCLVOT PEAK VEL VTI: 18.02 CM
ECHO LV POSTERIOR WALL: 0.9 CM (ref 0.6–1.1)
EJECTION FRACTION: 40 %
EOSINOPHIL # BLD AUTO: 0.39 K/UL (ref 0–0.5)
EOSINOPHIL NFR BLD AUTO: 3.3 % (ref 1–4)
ERYTHROCYTE [DISTWIDTH] IN BLOOD BY AUTOMATED COUNT: 12.6 % (ref 11.5–14.5)
FRACTIONAL SHORTENING: 34 % (ref 28–44)
GLOBULIN SER-MCNC: 2.8 G/DL (ref 2–4)
GLUCOSE SERPL-MCNC: 77 MG/DL (ref 74–106)
HCT VFR BLD AUTO: 33.4 % (ref 38–47)
HGB BLD-MCNC: 10.9 G/DL (ref 12–16)
IMM GRANULOCYTES # BLD AUTO: 0.05 K/UL (ref 0–0.04)
IMM GRANULOCYTES NFR BLD: 0.4 % (ref 0–0.4)
INTERVENTRICULAR SEPTUM: 1.1 CM (ref 0.6–1.1)
IVC DIAMETER: 2.1 CM
LEFT ATRIUM SIZE: 3.6 CM
LEFT INTERNAL DIMENSION IN SYSTOLE: 2.5 CM (ref 2.1–4)
LEFT VENTRICLE MASS INDEX: 71 G/M2
LEFT VENTRICULAR INTERNAL DIMENSION IN DIASTOLE: 3.8 CM (ref 3.5–6)
LEFT VENTRICULAR MASS: 117.28 G
LYMPHOCYTES # BLD AUTO: 4.03 K/UL (ref 1–4.8)
LYMPHOCYTES NFR BLD AUTO: 33.8 % (ref 27–41)
MCH RBC QN AUTO: 26.5 PG (ref 27–31)
MCHC RBC AUTO-ENTMCNC: 32.6 G/DL (ref 32–36)
MCV RBC AUTO: 81.3 FL (ref 80–96)
MONOCYTES # BLD AUTO: 0.86 K/UL (ref 0–0.8)
MONOCYTES NFR BLD AUTO: 7.2 % (ref 2–6)
MPC BLD CALC-MCNC: 9.4 FL (ref 9.4–12.4)
NEUTROPHILS # BLD AUTO: 6.47 K/UL (ref 1.8–7.7)
NEUTROPHILS NFR BLD AUTO: 54.3 % (ref 53–65)
NRBC # BLD AUTO: 0 X10E3/UL
NRBC, AUTO (.00): 0 %
PLATELET # BLD AUTO: 535 K/UL (ref 150–400)
POTASSIUM SERPL-SCNC: 4.4 MMOL/L (ref 3.5–5.1)
PROT SERPL-MCNC: 6.8 G/DL (ref 6.4–8.2)
RA PRESSURE: 3 MMHG
RBC # BLD AUTO: 4.11 M/UL (ref 4.2–5.4)
SODIUM SERPL-SCNC: 126 MMOL/L (ref 136–145)
WBC # BLD AUTO: 11.92 K/UL (ref 4.5–11)

## 2022-04-24 PROCEDURE — 85730 THROMBOPLASTIN TIME PARTIAL: CPT

## 2022-04-24 PROCEDURE — 99232 SBSQ HOSP IP/OBS MODERATE 35: CPT | Mod: GC,,, | Performed by: INTERNAL MEDICINE

## 2022-04-24 PROCEDURE — 25500020 PHARM REV CODE 255: Performed by: INTERNAL MEDICINE

## 2022-04-24 PROCEDURE — 99223 1ST HOSP IP/OBS HIGH 75: CPT | Mod: 25,,, | Performed by: INTERNAL MEDICINE

## 2022-04-24 PROCEDURE — G0378 HOSPITAL OBSERVATION PER HR: HCPCS

## 2022-04-24 PROCEDURE — 94761 N-INVAS EAR/PLS OXIMETRY MLT: CPT

## 2022-04-24 PROCEDURE — 85730 THROMBOPLASTIN TIME PARTIAL: CPT | Mod: 91 | Performed by: INTERNAL MEDICINE

## 2022-04-24 PROCEDURE — 99223 PR INITIAL HOSPITAL CARE,LEVL III: ICD-10-PCS | Mod: 25,,, | Performed by: INTERNAL MEDICINE

## 2022-04-24 PROCEDURE — 96376 TX/PRO/DX INJ SAME DRUG ADON: CPT

## 2022-04-24 PROCEDURE — 85025 COMPLETE CBC W/AUTO DIFF WBC: CPT

## 2022-04-24 PROCEDURE — 36415 COLL VENOUS BLD VENIPUNCTURE: CPT | Performed by: INTERNAL MEDICINE

## 2022-04-24 PROCEDURE — 36415 COLL VENOUS BLD VENIPUNCTURE: CPT

## 2022-04-24 PROCEDURE — 99232 PR SUBSEQUENT HOSPITAL CARE,LEVL II: ICD-10-PCS | Mod: GC,,, | Performed by: INTERNAL MEDICINE

## 2022-04-24 PROCEDURE — 25000003 PHARM REV CODE 250: Performed by: INTERNAL MEDICINE

## 2022-04-24 PROCEDURE — 25000003 PHARM REV CODE 250

## 2022-04-24 PROCEDURE — 80053 COMPREHEN METABOLIC PANEL: CPT

## 2022-04-24 PROCEDURE — 63600175 PHARM REV CODE 636 W HCPCS

## 2022-04-24 RX ORDER — LISINOPRIL 40 MG/1
40 TABLET ORAL DAILY
Status: DISCONTINUED | OUTPATIENT
Start: 2022-04-25 | End: 2022-04-27 | Stop reason: HOSPADM

## 2022-04-24 RX ORDER — LISINOPRIL 10 MG/1
10 TABLET ORAL ONCE
Status: DISCONTINUED | OUTPATIENT
Start: 2022-04-24 | End: 2022-04-24

## 2022-04-24 RX ORDER — METOPROLOL SUCCINATE 50 MG/1
50 TABLET, EXTENDED RELEASE ORAL DAILY
Status: DISCONTINUED | OUTPATIENT
Start: 2022-04-24 | End: 2022-04-27 | Stop reason: HOSPADM

## 2022-04-24 RX ORDER — METOPROLOL TARTRATE 50 MG/1
50 TABLET ORAL 2 TIMES DAILY
Status: DISCONTINUED | OUTPATIENT
Start: 2022-04-24 | End: 2022-04-24

## 2022-04-24 RX ORDER — SODIUM CHLORIDE 9 MG/ML
INJECTION, SOLUTION INTRAVENOUS CONTINUOUS
Status: DISCONTINUED | OUTPATIENT
Start: 2022-04-24 | End: 2022-04-25

## 2022-04-24 RX ADMIN — EZETIMIBE 10 MG: 10 TABLET ORAL at 08:04

## 2022-04-24 RX ADMIN — ACETAMINOPHEN 1000 MG: 500 TABLET ORAL at 10:04

## 2022-04-24 RX ADMIN — SODIUM CHLORIDE: 9 INJECTION, SOLUTION INTRAVENOUS at 11:04

## 2022-04-24 RX ADMIN — ASPIRIN 81 MG 81 MG: 81 TABLET ORAL at 08:04

## 2022-04-24 RX ADMIN — MONTELUKAST 10 MG: 10 TABLET, FILM COATED ORAL at 08:04

## 2022-04-24 RX ADMIN — ESTRADIOL 0.5 MG: 0.5 TABLET ORAL at 08:04

## 2022-04-24 RX ADMIN — METOPROLOL SUCCINATE 50 MG: 50 TABLET, FILM COATED, EXTENDED RELEASE ORAL at 03:04

## 2022-04-24 RX ADMIN — HEPARIN SODIUM AND DEXTROSE 14 UNITS/KG/HR: 10000; 5 INJECTION INTRAVENOUS at 08:04

## 2022-04-24 RX ADMIN — HEPARIN SODIUM AND DEXTROSE 14 UNITS/KG/HR: 10000; 5 INJECTION INTRAVENOUS at 10:04

## 2022-04-24 RX ADMIN — ALPRAZOLAM 1 MG: 0.5 TABLET ORAL at 10:04

## 2022-04-24 RX ADMIN — DULOXETINE HYDROCHLORIDE 60 MG: 30 CAPSULE, DELAYED RELEASE ORAL at 08:04

## 2022-04-24 RX ADMIN — METOPROLOL TARTRATE 25 MG: 25 TABLET, FILM COATED ORAL at 08:04

## 2022-04-24 RX ADMIN — HEPARIN SODIUM AND DEXTROSE 11 UNITS/KG/HR: 10000; 5 INJECTION INTRAVENOUS at 06:04

## 2022-04-24 RX ADMIN — PROGESTERONE 100 MG: 100 CAPSULE ORAL at 08:04

## 2022-04-24 RX ADMIN — LISINOPRIL 30 MG: 20 TABLET ORAL at 08:04

## 2022-04-24 RX ADMIN — CETIRIZINE HYDROCHLORIDE 10 MG: 10 TABLET, FILM COATED ORAL at 08:04

## 2022-04-24 RX ADMIN — SODIUM CHLORIDE: 9 INJECTION, SOLUTION INTRAVENOUS at 07:04

## 2022-04-24 RX ADMIN — LEVETIRACETAM 1500 MG: 100 INJECTION, SOLUTION INTRAVENOUS at 08:04

## 2022-04-24 RX ADMIN — PERFLUTREN 1.5 ML: 6.52 INJECTION, SUSPENSION INTRAVENOUS at 10:04

## 2022-04-24 NOTE — NURSING
Orthostatic B/P done   0830 b/p115/81 p76 lying  0835 b/p 108/87 p79 sitting  0840 b/p 117/87 p83 standing

## 2022-04-24 NOTE — ASSESSMENT & PLAN NOTE
Trop I trend shows 24.3, 80.1, 84.2, 71.6 respectively  - Continue Heparin drip with recurring aPTT  - Continue Metoprolol 25mg q6h po  - Continue aspirin 81mg qd  - Continue ezetimibe 10mg po qd  - Morphine 2mg IV q6h PRN chest pain  - Cardiology on board, appreciate their expertise -- Currently recommending medical management with Heparin, will follow up.  - Continuous telemetry   - Orthostatics pending  - O2 PRN

## 2022-04-24 NOTE — PROGRESS NOTES
"[address] Medicine  Progress Note    Patient Name: Flory Chacon  MRN: [id number]  Patient Class: OP- Observation   Admission Date: 4/23/2022  Length of Stay: 0 days  Attending Physician: Jaida Huynh MD  Primary Care Provider: Primary Doctor No        Subjective:     Principal Problem:Syncope        HPI:  Pt is a 57 yo female who was transferred from [place] ED to [place] ED with a cc of "elevated troponin." Pt reports a syncopal episode yesterday that occurred while she was at home. She states that she drove home from work, she was walking from her kitchen to another room when she turned around and had the syncopal episode. Patient states that she did not hit her head and that the fall was witnessed by a family member. She was subsequently taken to [place] ED where it was found that she had elevated troponins. Patient currently has no acute complaints, and denies and CP. SOB, or discomfort. Pt states that she has a negative personal cardiac history, but does have a positive family history of CAD and heart attack.     Significant findings in the ED include:  - Labs: WBC 11.34, Platelets 915, Na 125, proBNP 196. Trop I trend shows 24.3, 80.1, 84.2, 71.6 respectively.  - Images (as per radiology): CXR: Heart size normal. Lungs clear. No pneumothorax or pleural effusion.  - EKG shows: LBBB     Patient was subsequently admitted to the family medicine service for further evaluation and management.      Overview/Hospital Course:  No notes on file    Interval History: Pt is awake, resting in bed. No acute complaints, no overnight events. MRI head and carotid dopplers yesterday grossly negative. EEG planned for today. Cardiology to see today, awaiting recommendations. HCTZ stopped 2/2 hyponatremia of 129. NaCl @ 125 cc/hr started.    Review of Systems   Constitutional:  Negative for appetite change, chills, diaphoresis, fatigue and fever.   Respiratory:  Negative for cough, chest " tightness, shortness of breath and wheezing.    Cardiovascular:  Negative for chest pain, palpitations and leg swelling.   Gastrointestinal:  Negative for abdominal distention, abdominal pain, constipation, diarrhea, nausea and vomiting.   Neurological:  Negative for dizziness, syncope, weakness and light-headedness.   Objective:     Vital Signs (Most Recent):  Temp: (!) 95.7 °F (35.4 °C) (04/24/22 0703)  Pulse: 76 (04/24/22 0703)  Resp: 18 (04/24/22 0703)  BP: (!) 148/96 (04/24/22 0703)  SpO2: 99 % (04/24/22 0703)   Vital Signs (24h Range):  Temp:  [95.7 °F (35.4 °C)-98.4 °F (36.9 °C)] 95.7 °F (35.4 °C)  Pulse:  [] 76  Resp:  [12-18] 18  SpO2:  [97 %-100 %] 99 %  BP: (105-149)/() 148/96     Weight: 62.2 kg (137 lb 2 oz)  Body mass index is 24.29 kg/m².  No intake or output data in the 24 hours ending 04/24/22 0843   Physical Exam  Vitals and nursing note reviewed.   Constitutional:       General: She is not in acute distress.     Appearance: Normal appearance. She is not ill-appearing, toxic-appearing or diaphoretic.   HENT:      Head: Normocephalic and atraumatic.      Nose: Nose normal.      Mouth/Throat:      Mouth: Mucous membranes are moist.      Pharynx: Oropharynx is clear.   Eyes:      General: No scleral icterus.     Extraocular Movements: Extraocular movements intact.      Conjunctiva/sclera: Conjunctivae normal.      Pupils: Pupils are equal, round, and reactive to light.   Cardiovascular:      Rate and Rhythm: Normal rate and regular rhythm.      Pulses: Normal pulses.      Heart sounds: Normal heart sounds. No murmur heard.    No friction rub. No gallop.   Pulmonary:      Effort: Pulmonary effort is normal. No respiratory distress.      Breath sounds: Normal breath sounds. No wheezing, rhonchi or rales.   Abdominal:      General: Abdomen is flat. Bowel sounds are normal. There is no distension.      Palpations: Abdomen is soft.      Tenderness: There is no abdominal tenderness. There is no  guarding or rebound.   Musculoskeletal:         General: Normal range of motion.      Right lower leg: No edema.      Left lower leg: No edema.   Skin:     General: Skin is warm and dry.      Coloration: Skin is not jaundiced.   Neurological:      General: No focal deficit present.      Mental Status: She is alert and oriented to person, place, and time. Mental status is at baseline.   Psychiatric:         Mood and Affect: Mood normal.         Behavior: Behavior normal.       Significant Labs: All pertinent labs within the past 24 hours have been reviewed.  Recent Lab Results  (Last 5 results in the past 24 hours)        04/24/22  0329   04/23/22 2056 04/23/22 2022 04/23/22 2006 04/23/22  1902        Albumin/Globulin Ratio 1.4               Albumin 4.0               Alkaline Phosphatase 106               ALT 22               Anion Gap 17               aPTT 37.7     36.1           AST 25               Baso # 0.12               Basophil % 1.0               BILIRUBIN TOTAL 0.4               BUN 9               BUN/CREAT RATIO 11               Calcium 8.4               Chloride 89               CO2 24               Creatinine 0.82               Differential Type Auto               eGFR if non  76               Eos # 0.39               Eosinophil % 3.3               Globulin, Total 2.8               Glucose 77               Hematocrit 33.4               Hemoglobin 10.9               Immature Grans (Abs) 0.05               Immature Granulocytes 0.4               Lactate, Jose F   0.8       3.5  Comment: A repeat order for Lactic Acid has been placed for collection in 2 hours.       Lymph # 4.03               Lymph % 33.8               MCH 26.5               MCHC 32.6               MCV 81.3               Mono # 0.86               Mono % 7.2               MPV 9.4               Neutrophils, Abs 6.47               Neutrophils Relative 54.3               nRBC 0.0               NUCLEATED RBC ABSOLUTE  0.00               Platelets 535               POC Glucose       116         Potassium 4.4               Prolactin         12.30  Comment: Men: 2.5 - 17.4 ng/mL    Women:   Non-Pregnant:  2.2 - 30.3 ng/mL  Pregnant:  8.1 - 347.6 ng/mL  Post-Menopausal: 0.7 - 17.4ng/mL       PROTEIN TOTAL 6.8               RBC 4.11               RDW 12.6               Sodium 126               WBC 11.92                                      Significant Imaging: I have reviewed all pertinent imaging results/findings within the past 24 hours.      Assessment/Plan:      * Syncope  Patient reports 1 recent syncopal episode yesterday, and 1 prior approximately 1 year ago  - Carotid doppler grossly negative  - D-dimer wnl  - MRI Head negative for acute processes        Anxiety disorder, unspecified  - Continue home Cymbalta 60mg po qd  - Continue home xanex 1mg po qhs prn  - Continue home ambien 10mg po qhs prn    Hypertension  Current BP is 148/96  - increase lisinopril to 40mg po qd  - Stop HCTZ 2/2 hyponatremia  - Continue to monitor and reassess.      Elevated troponin  Trop I trend shows 24.3, 80.1, 84.2, 71.6 respectively  - Continue Heparin drip with recurring aPTT  - Continue Metoprolol 25mg q6h po  - Continue aspirin 81mg qd  - Continue ezetimibe 10mg po qd  - Morphine 2mg IV q6h PRN chest pain  - Cardiology on board, appreciate their expertise -- Currently recommending medical management with Heparin, will follow up.  - Continuous telemetry   - Orthostatics pending  - O2 PRN      VTE Risk Mitigation (From admission, onward)         Ordered     heparin 25,000 units in dextrose 5% 250 mL (100 units/mL) infusion LOW INTENSITY nomogram - [facility]  Continuous        Question:  Begin at (in units/kg/hr)  Answer:  12    04/23/22 1327     heparin 25,000 units in dextrose 5% (100 units/ml) IV bolus from bag INITIAL BOLUS (max bolus 4000 units)  As needed (PRN)        Question:  Heparin Infusion Adjustment (DO NOT MODIFY ANSWER)  Answer:   [email or web address]\Images\Pharmacy\HeparinInfusions\heparin LOW INTENSITY nomogram for [facility] [id number]    04/23/22 1327     heparin 25,000 units in dextrose 5% (100 units/ml) IV bolus from bag - ADDITIONAL PRN BOLUS - 30 units/kg (max bolus 4000 units)  As needed (PRN)        Question:  Heparin Infusion Adjustment (DO NOT MODIFY ANSWER)  Answer:  [email or web address]\Images\Pharmacy\HeparinInfusions\heparin LOW INTENSITY nomogram for [facility] [id number]    04/23/22 1327     IP VTE LOW RISK PATIENT  Once         04/23/22 1237     Place sequential compression device  Until discontinued         04/23/22 1237                Discharge Planning   ESME:      Code Status: Full Code   Is the patient medically ready for discharge?:     Reason for patient still in hospital (select all that apply): Treatment                     Dung Chavez MD  Department of Hospital Medicine   [address]

## 2022-04-24 NOTE — ASSESSMENT & PLAN NOTE
Current BP is 148/96  - increase lisinopril to 40mg po qd  - Stop HCTZ 2/2 hyponatremia  - Continue to monitor and reassess.

## 2022-04-24 NOTE — ASSESSMENT & PLAN NOTE
Patient reports 1 recent syncopal episode yesterday, and 1 prior approximately 1 year ago  - Carotid doppler grossly negative  - D-dimer wnl  - MRI Head negative for acute processes       meconium staining

## 2022-04-24 NOTE — CONSULTS
[address] Telemetry  Cardiology  Consult Note    Patient Name: Flory Chacon  MRN: [id number]  Admission Date: 4/23/2022  Hospital Length of Stay: 0 days  Code Status: Full Code   Attending Provider: Jaida Huynh MD   Consulting Provider: Merlin Epperson MD  Primary Care Physician: Primary Doctor No  Principal Problem:Syncope    Patient information was obtained from patient and ER records.     Consults  Subjective:     Chief Complaint:  Syncope     HPI:   56-year-old female smoker with history of hypertension, amily history of premature CAD who presents to the hospital after an episode of non prodromal syncope.  Troponin was mildly elevated at 84, EKG showed left bundle-branch block.    Patient denies any preceding symptoms prior to her syncope.  She got out of her shower and she is walking to her mother's room and collapsed without any other symptoms, denies any chest pains, dyspnea or palpitations.  EMS was called and no report of any arrhythmias.    Echocardiogram done today shows mildly depressed LVEF of 40-45% with anterior wall hypokinesis.    Tele was reviewed which shows a 3 minute run of tachycardia at 150 beats per minute yesterday afternoon.  It can be ventricular tachycardia versus supraventricular tachycardia, unable to distinguish as she has an underlying left bundle-branch block.      Past Medical History:   Diagnosis Date    Anxiety disorder, unspecified     Depression     Hypertension     Insomnia        History reviewed. No pertinent surgical history.    Review of patient's allergies indicates:   Allergen Reactions    Eggs [egg derived]     Mobic [meloxicam]     Penicillins     Statins-hmg-coa reductase inhibitors        No current facility-administered medications on file prior to encounter.     Current Outpatient Medications on File Prior to Encounter   Medication Sig    ALPRAZolam (XANAX) 1 MG tablet     cetirizine (ZYRTEC) 10 MG tablet Take 10 mg by mouth  once daily.    estradioL (ESTRACE) 0.5 MG tablet Take 0.5 mg by mouth once daily. for 90 days    NURTEC 75 mg odt DISSOLVE 1 TABLET BY MOUTH ONCE DAILY AS NEEDED FOR HEADACHE    ondansetron (ZOFRAN-ODT) 4 MG TbDL DISSOLVE 1 TABLET ON TONGUE ONCE DAILY    progesterone (PROMETRIUM) 100 MG capsule TAKE 1 CAPSULE BY MOUTH ONCE DAILY AT BEDTIME EVERY CYCLE FOR 12 DAYS    atenoloL (TENORMIN) 100 MG tablet Take 100 mg by mouth once daily. for 90 days    DULoxetine (CYMBALTA) 60 MG capsule     EMGALITY SYRINGE 120 mg/mL Syrg INJECT SUBCUTANEOUS 2 ML THE FIRST MONTH AND THEN 1 ML ONCE MONTHLLY    hydroCHLOROthiazide (MICROZIDE) 12.5 mg capsule     hydrocortisone 2.5 % cream APPLY CREAM TO AFFECTED AREA TO AFFECTED AREA ONCE DAILY FOR 5 DAYS TO UPPER BILATERAL EXTREMITIES    lisinopriL (PRINIVIL,ZESTRIL) 30 MG tablet     montelukast (SINGULAIR) 10 mg tablet Take 10 mg by mouth once daily.    propranoloL (INDERAL) 10 MG tablet     triamcinolone acetonide 0.1% (KENALOG) 0.1 % ointment Apply topically 2 (two) times daily.    zolpidem (AMBIEN) 10 mg Tab      Family History     Problem Relation (Age of Onset)    Heart disease Father    Melanoma Maternal Uncle        Tobacco Use    Smoking status: Never Smoker    Smokeless tobacco: Never Used   Substance and Sexual Activity    Alcohol use: Never    Drug use: Never    Sexual activity: Never     ROS  Objective:     Vital Signs (Most Recent):  Temp: (!) 95.7 °F (35.4 °C) (04/24/22 0703)  Pulse: 83 (04/24/22 0840)  Resp: 18 (04/24/22 0703)  BP: 117/87 (04/24/22 0840)  SpO2: 99 % (04/24/22 0703) Vital Signs (24h Range):  Temp:  [95.7 °F (35.4 °C)-97.7 °F (36.5 °C)] 95.7 °F (35.4 °C)  Pulse:  [] 83  Resp:  [16-18] 18  SpO2:  [97 %-99 %] 99 %  BP: (105-149)/() 117/87     Weight: 62.2 kg (137 lb 2 oz)  Body mass index is 24.29 kg/m².    SpO2: 99 %  O2 Device (Oxygen Therapy): room air    No intake or output data in the 24 hours ending 04/24/22  1232    Lines/Drains/Airways     Peripheral Intravenous Line  Duration                Peripheral IV - Single Lumen 04/22/22 2050 20 G Anterior;Proximal;Right Forearm 1 day                Physical Exam   No murmurs  Lungs are clear to auscultate  No lower extremity edema    Significant Labs:   CMP   Recent Labs   Lab 04/22/22 2104 04/24/22  0329   * 126*   K 3.5 4.4   CL 88* 89*   CO2 27 24    77   BUN 16 9   CREATININE 0.96 0.82   CALCIUM 7.9* 8.4*   PROT 7.1 6.8   ALBUMIN 3.9 4.0   BILITOT 0.1 0.4   ALKPHOS 82 106   AST 15 25   ALT 18 22   ANIONGAP 14 17*   EGFRNONAA 63 76    and CBC   Recent Labs   Lab 04/22/22 2104 04/23/22  1543 04/24/22  0329   WBC 11.34* 13.99* 11.92*   HGB 10.4* 11.8* 10.9*   HCT 30.2* 34.6* 33.4*   * 584* 535*       Significant Imaging: Echocardiogram:   Transthoracic echo (TTE) complete ([facility] Only):   Results for orders placed or performed during the hospital encounter of 04/23/22   Echo   Result Value Ref Range    BSA 1.65 m2    IVC diameter 2.1 cm    EF 40 %    LVIDd 3.80 3.5 - 6.0 cm    IVS 1.10 0.6 - 1.1 cm    Posterior Wall 0.90 0.6 - 1.1 cm    LVIDs 2.50 2.1 - 4.0 cm    FS 34 28 - 44 %    LV mass 117.28 g    LA size 3.60 cm    Left Ventricle Relative Wall Thickness 0.47 cm    AV valve area 1.90 cm2    AV index (prosthetic) 0.67     LVOT diameter 1.90 cm    LVOT area 2.8 cm2    LVOT peak VTI 18.02 cm    Ao VTI 26.82 cm    LVOT stroke volume 51.07 cm3    LV Mass Index 71 g/m2    Right Atrial Pressure (from IVC) 3 mmHg    Narrative    · The left ventricle is normal in size with mild eccentric hypertrophy and   mildly decreased systolic function.  · The estimated ejection fraction is 40-45%. Anteroseptal wall is   moderately hypokinetic.  · Normal right ventricular size with normal right ventricular systolic   function.  · Mild mitral regurgitation.  · Normal central venous pressure (3 mmHg).        Assessment and Plan:     Active Diagnoses:    Diagnosis Date Noted  POA    PRINCIPAL PROBLEM:  Syncope [R55] 04/23/2022 Yes    Elevated troponin [R77.8] 04/23/2022 Yes    Hypertension [I10]  Unknown    Anxiety disorder, unspecified [F41.9]  Unknown      Problems Resolved During this Admission:       VTE Risk Mitigation (From admission, onward)         Ordered     heparin 25,000 units in dextrose 5% 250 mL (100 units/mL) infusion LOW INTENSITY nomogram - [facility]  Continuous        Question:  Begin at (in units/kg/hr)  Answer:  12    04/23/22 1327     heparin 25,000 units in dextrose 5% (100 units/ml) IV bolus from bag INITIAL BOLUS (max bolus 4000 units)  As needed (PRN)        Question:  Heparin Infusion Adjustment (DO NOT MODIFY ANSWER)  Answer:  \\[email or web address]\epic\Images\Pharmacy\HeparinInfusions\heparin LOW INTENSITY nomogram for [facility] [id number].pdf    04/23/22 1327     heparin 25,000 units in dextrose 5% (100 units/ml) IV bolus from bag - ADDITIONAL PRN BOLUS - 30 units/kg (max bolus 4000 units)  As needed (PRN)        Question:  Heparin Infusion Adjustment (DO NOT MODIFY ANSWER)  Answer:  \[email or web address]\epic\Images\Pharmacy\HeparinInfusions\heparin LOW INTENSITY nomogram for [facility] [id number].pdf    04/23/22 1327     IP VTE LOW RISK PATIENT  Once         04/23/22 1237     Place sequential compression device  Until discontinued         04/23/22 1237              # NSTEMI  Denies any chest pains, presented with a syncopal episode, non prodromal concerning for arrhythmic event.  Telemetry shows a 3 minute run of ventricular tachycardia versus SVT yesterday afternoon.  Heart rate was 150 beats per minute.  EKG shows left bundle branch block.  Troponin peaked at 84  Echocardiogram shows EF of 40-45% with mild anterior wall hypokinesis.    Recommend left heart catheterization for ischemic evaluation.  Continue heparin for now  Aspirin 81 mg daily  Switch to Metoprolol XL 50 mg daily    # syncope  Non prodromal  Telemetry shows arrhythmia, SVT versus VT    Recommend LifeVest  Will refer her to  EP as outpatient for further workup of arrhythmia, rule out inducible ventricular tachycardia versus SVT with left bundle ana block.    Thank you for your consult. I will follow-up with patient. Please contact us if you have any additional questions.    Merlin Epperson MD  Cardiology   [facility] - [address] [place] [address]

## 2022-04-24 NOTE — SUBJECTIVE & OBJECTIVE
Interval History: Pt is awake, resting in bed. No acute complaints, no overnight events. MRI head and carotid dopplers yesterday grossly negative. EEG planned for today. Cardiology to see today, awaiting recommendations. HCTZ stopped 2/2 hyponatremia of 129. NaCl @ 125 cc/hr started.    Review of Systems   Constitutional:  Negative for appetite change, chills, diaphoresis, fatigue and fever.   Respiratory:  Negative for cough, chest tightness, shortness of breath and wheezing.    Cardiovascular:  Negative for chest pain, palpitations and leg swelling.   Gastrointestinal:  Negative for abdominal distention, abdominal pain, constipation, diarrhea, nausea and vomiting.   Neurological:  Negative for dizziness, syncope, weakness and light-headedness.   Objective:     Vital Signs (Most Recent):  Temp: (!) 95.7 °F (35.4 °C) (04/24/22 0703)  Pulse: 76 (04/24/22 0703)  Resp: 18 (04/24/22 0703)  BP: (!) 148/96 (04/24/22 0703)  SpO2: 99 % (04/24/22 0703)   Vital Signs (24h Range):  Temp:  [95.7 °F (35.4 °C)-98.4 °F (36.9 °C)] 95.7 °F (35.4 °C)  Pulse:  [] 76  Resp:  [12-18] 18  SpO2:  [97 %-100 %] 99 %  BP: (105-149)/() 148/96     Weight: 62.2 kg (137 lb 2 oz)  Body mass index is 24.29 kg/m².  No intake or output data in the 24 hours ending 04/24/22 0843   Physical Exam  Vitals and nursing note reviewed.   Constitutional:       General: She is not in acute distress.     Appearance: Normal appearance. She is not ill-appearing, toxic-appearing or diaphoretic.   HENT:      Head: Normocephalic and atraumatic.      Nose: Nose normal.      Mouth/Throat:      Mouth: Mucous membranes are moist.      Pharynx: Oropharynx is clear.   Eyes:      General: No scleral icterus.     Extraocular Movements: Extraocular movements intact.      Conjunctiva/sclera: Conjunctivae normal.      Pupils: Pupils are equal, round, and reactive to light.   Cardiovascular:      Rate and Rhythm: Normal rate and regular rhythm.      Pulses: Normal  pulses.      Heart sounds: Normal heart sounds. No murmur heard.    No friction rub. No gallop.   Pulmonary:      Effort: Pulmonary effort is normal. No respiratory distress.      Breath sounds: Normal breath sounds. No wheezing, rhonchi or rales.   Abdominal:      General: Abdomen is flat. Bowel sounds are normal. There is no distension.      Palpations: Abdomen is soft.      Tenderness: There is no abdominal tenderness. There is no guarding or rebound.   Musculoskeletal:         General: Normal range of motion.      Right lower leg: No edema.      Left lower leg: No edema.   Skin:     General: Skin is warm and dry.      Coloration: Skin is not jaundiced.   Neurological:      General: No focal deficit present.      Mental Status: She is alert and oriented to person, place, and time. Mental status is at baseline.   Psychiatric:         Mood and Affect: Mood normal.         Behavior: Behavior normal.       Significant Labs: All pertinent labs within the past 24 hours have been reviewed.  Recent Lab Results  (Last 5 results in the past 24 hours)        04/24/22  0329   04/23/22 2056 04/23/22 2022 04/23/22 2006 04/23/22  1902        Albumin/Globulin Ratio 1.4               Albumin 4.0               Alkaline Phosphatase 106               ALT 22               Anion Gap 17               aPTT 37.7     36.1           AST 25               Baso # 0.12               Basophil % 1.0               BILIRUBIN TOTAL 0.4               BUN 9               BUN/CREAT RATIO 11               Calcium 8.4               Chloride 89               CO2 24               Creatinine 0.82               Differential Type Auto               eGFR if non  76               Eos # 0.39               Eosinophil % 3.3               Globulin, Total 2.8               Glucose 77               Hematocrit 33.4               Hemoglobin 10.9               Immature Grans (Abs) 0.05               Immature Granulocytes 0.4                Lactate, Jose F   0.8       3.5  Comment: A repeat order for Lactic Acid has been placed for collection in 2 hours.       Lymph # 4.03               Lymph % 33.8               MCH 26.5               MCHC 32.6               MCV 81.3               Mono # 0.86               Mono % 7.2               MPV 9.4               Neutrophils, Abs 6.47               Neutrophils Relative 54.3               nRBC 0.0               NUCLEATED RBC ABSOLUTE 0.00               Platelets 535               POC Glucose       116         Potassium 4.4               Prolactin         12.30  Comment: Men: 2.5 - 17.4 ng/mL    Women:   Non-Pregnant:  2.2 - 30.3 ng/mL  Pregnant:  8.1 - 347.6 ng/mL  Post-Menopausal: 0.7 - 17.4ng/mL       PROTEIN TOTAL 6.8               RBC 4.11               RDW 12.6               Sodium 126               WBC 11.92                                      Significant Imaging: I have reviewed all pertinent imaging results/findings within the past 24 hours.

## 2022-04-24 NOTE — H&P (VIEW-ONLY)
[address] Telemetry  Cardiology  Consult Note    Patient Name: Flory Chacon  MRN: [id number]  Admission Date: 4/23/2022  Hospital Length of Stay: 0 days  Code Status: Full Code   Attending Provider: Jaida Huynh MD   Consulting Provider: Merlin Epperson MD  Primary Care Physician: Primary Doctor No  Principal Problem:Syncope    Patient information was obtained from patient and ER records.     Consults  Subjective:     Chief Complaint:  Syncope     HPI:   56-year-old female smoker with history of hypertension, amily history of premature CAD who presents to the hospital after an episode of non prodromal syncope.  Troponin was mildly elevated at 84, EKG showed left bundle-branch block.    Patient denies any preceding symptoms prior to her syncope.  She got out of her shower and she is walking to her mother's room and collapsed without any other symptoms, denies any chest pains, dyspnea or palpitations.  EMS was called and no report of any arrhythmias.    Echocardiogram done today shows mildly depressed LVEF of 40-45% with anterior wall hypokinesis.    Tele was reviewed which shows a 3 minute run of tachycardia at 150 beats per minute yesterday afternoon.  It can be ventricular tachycardia versus supraventricular tachycardia, unable to distinguish as she has an underlying left bundle-branch block.      Past Medical History:   Diagnosis Date    Anxiety disorder, unspecified     Depression     Hypertension     Insomnia        History reviewed. No pertinent surgical history.    Review of patient's allergies indicates:   Allergen Reactions    Eggs [egg derived]     Mobic [meloxicam]     Penicillins     Statins-hmg-coa reductase inhibitors        No current facility-administered medications on file prior to encounter.     Current Outpatient Medications on File Prior to Encounter   Medication Sig    ALPRAZolam (XANAX) 1 MG tablet     cetirizine (ZYRTEC) 10 MG tablet Take 10 mg by mouth  once daily.    estradioL (ESTRACE) 0.5 MG tablet Take 0.5 mg by mouth once daily. for 90 days    NURTEC 75 mg odt DISSOLVE 1 TABLET BY MOUTH ONCE DAILY AS NEEDED FOR HEADACHE    ondansetron (ZOFRAN-ODT) 4 MG TbDL DISSOLVE 1 TABLET ON TONGUE ONCE DAILY    progesterone (PROMETRIUM) 100 MG capsule TAKE 1 CAPSULE BY MOUTH ONCE DAILY AT BEDTIME EVERY CYCLE FOR 12 DAYS    atenoloL (TENORMIN) 100 MG tablet Take 100 mg by mouth once daily. for 90 days    DULoxetine (CYMBALTA) 60 MG capsule     EMGALITY SYRINGE 120 mg/mL Syrg INJECT SUBCUTANEOUS 2 ML THE FIRST MONTH AND THEN 1 ML ONCE MONTHLLY    hydroCHLOROthiazide (MICROZIDE) 12.5 mg capsule     hydrocortisone 2.5 % cream APPLY CREAM TO AFFECTED AREA TO AFFECTED AREA ONCE DAILY FOR 5 DAYS TO UPPER BILATERAL EXTREMITIES    lisinopriL (PRINIVIL,ZESTRIL) 30 MG tablet     montelukast (SINGULAIR) 10 mg tablet Take 10 mg by mouth once daily.    propranoloL (INDERAL) 10 MG tablet     triamcinolone acetonide 0.1% (KENALOG) 0.1 % ointment Apply topically 2 (two) times daily.    zolpidem (AMBIEN) 10 mg Tab      Family History     Problem Relation (Age of Onset)    Heart disease Father    Melanoma Maternal Uncle        Tobacco Use    Smoking status: Never Smoker    Smokeless tobacco: Never Used   Substance and Sexual Activity    Alcohol use: Never    Drug use: Never    Sexual activity: Never     ROS  Objective:     Vital Signs (Most Recent):  Temp: (!) 95.7 °F (35.4 °C) (04/24/22 0703)  Pulse: 83 (04/24/22 0840)  Resp: 18 (04/24/22 0703)  BP: 117/87 (04/24/22 0840)  SpO2: 99 % (04/24/22 0703) Vital Signs (24h Range):  Temp:  [95.7 °F (35.4 °C)-97.7 °F (36.5 °C)] 95.7 °F (35.4 °C)  Pulse:  [] 83  Resp:  [16-18] 18  SpO2:  [97 %-99 %] 99 %  BP: (105-149)/() 117/87     Weight: 62.2 kg (137 lb 2 oz)  Body mass index is 24.29 kg/m².    SpO2: 99 %  O2 Device (Oxygen Therapy): room air    No intake or output data in the 24 hours ending 04/24/22  1232    Lines/Drains/Airways     Peripheral Intravenous Line  Duration                Peripheral IV - Single Lumen 04/22/22 2050 20 G Anterior;Proximal;Right Forearm 1 day                Physical Exam   No murmurs  Lungs are clear to auscultate  No lower extremity edema    Significant Labs:   CMP   Recent Labs   Lab 04/22/22 2104 04/24/22  0329   * 126*   K 3.5 4.4   CL 88* 89*   CO2 27 24    77   BUN 16 9   CREATININE 0.96 0.82   CALCIUM 7.9* 8.4*   PROT 7.1 6.8   ALBUMIN 3.9 4.0   BILITOT 0.1 0.4   ALKPHOS 82 106   AST 15 25   ALT 18 22   ANIONGAP 14 17*   EGFRNONAA 63 76    and CBC   Recent Labs   Lab 04/22/22 2104 04/23/22  1543 04/24/22  0329   WBC 11.34* 13.99* 11.92*   HGB 10.4* 11.8* 10.9*   HCT 30.2* 34.6* 33.4*   * 584* 535*       Significant Imaging: Echocardiogram:   Transthoracic echo (TTE) complete ([facility] Only):   Results for orders placed or performed during the hospital encounter of 04/23/22   Echo   Result Value Ref Range    BSA 1.65 m2    IVC diameter 2.1 cm    EF 40 %    LVIDd 3.80 3.5 - 6.0 cm    IVS 1.10 0.6 - 1.1 cm    Posterior Wall 0.90 0.6 - 1.1 cm    LVIDs 2.50 2.1 - 4.0 cm    FS 34 28 - 44 %    LV mass 117.28 g    LA size 3.60 cm    Left Ventricle Relative Wall Thickness 0.47 cm    AV valve area 1.90 cm2    AV index (prosthetic) 0.67     LVOT diameter 1.90 cm    LVOT area 2.8 cm2    LVOT peak VTI 18.02 cm    Ao VTI 26.82 cm    LVOT stroke volume 51.07 cm3    LV Mass Index 71 g/m2    Right Atrial Pressure (from IVC) 3 mmHg    Narrative    · The left ventricle is normal in size with mild eccentric hypertrophy and   mildly decreased systolic function.  · The estimated ejection fraction is 40-45%. Anteroseptal wall is   moderately hypokinetic.  · Normal right ventricular size with normal right ventricular systolic   function.  · Mild mitral regurgitation.  · Normal central venous pressure (3 mmHg).        Assessment and Plan:     Active Diagnoses:    Diagnosis Date Noted  POA    PRINCIPAL PROBLEM:  Syncope [R55] 04/23/2022 Yes    Elevated troponin [R77.8] 04/23/2022 Yes    Hypertension [I10]  Unknown    Anxiety disorder, unspecified [F41.9]  Unknown      Problems Resolved During this Admission:       VTE Risk Mitigation (From admission, onward)         Ordered     heparin 25,000 units in dextrose 5% 250 mL (100 units/mL) infusion LOW INTENSITY nomogram - [facility]  Continuous        Question:  Begin at (in units/kg/hr)  Answer:  12    04/23/22 1327     heparin 25,000 units in dextrose 5% (100 units/ml) IV bolus from bag INITIAL BOLUS (max bolus 4000 units)  As needed (PRN)        Question:  Heparin Infusion Adjustment (DO NOT MODIFY ANSWER)  Answer:  [email or web address]\epic\Images\Pharmacy\HeparinInfusions\heparin LOW INTENSITY nomogram for [facility] [id number].pdf    04/23/22 1327     heparin 25,000 units in dextrose 5% (100 units/ml) IV bolus from bag - ADDITIONAL PRN BOLUS - 30 units/kg (max bolus 4000 units)  As needed (PRN)        Question:  Heparin Infusion Adjustment (DO NOT MODIFY ANSWER)  Answer:  \[email or web address]\epic\Images\Pharmacy\HeparinInfusions\heparin LOW INTENSITY nomogram for [facility] [id number]    04/23/22 1327     IP VTE LOW RISK PATIENT  Once         04/23/22 1237     Place sequential compression device  Until discontinued         04/23/22 1237              # NSTEMI  Denies any chest pains, presented with a syncopal episode, non prodromal concerning for arrhythmic event.  Telemetry shows a 3 minute run of ventricular tachycardia versus SVT yesterday afternoon.  Heart rate was 150 beats per minute.  EKG shows left bundle branch block.  Troponin peaked at 84  Echocardiogram shows EF of 40-45% with mild anterior wall hypokinesis.    Recommend left heart catheterization for ischemic evaluation.  Continue heparin for now  Aspirin 81 mg daily  Switch to Metoprolol XL 50 mg daily    # syncope  Non prodromal  Telemetry shows arrhythmia, SVT versus VT    Recommend LifeVest  Will refer her to  EP as outpatient for further workup of arrhythmia, rule out inducible ventricular tachycardia versus SVT with left bundle ana block.    Thank you for your consult. I will follow-up with patient. Please contact us if you have any additional questions.    Merlin Epperson MD  Cardiology   [facility] - [address] [place] [address]

## 2022-04-25 LAB
ALBUMIN SERPL BCP-MCNC: 3.5 G/DL (ref 3.5–5)
ALBUMIN/GLOB SERPL: 1.5 {RATIO}
ALP SERPL-CCNC: 83 U/L (ref 46–118)
ALT SERPL W P-5'-P-CCNC: 20 U/L (ref 13–56)
ANION GAP SERPL CALCULATED.3IONS-SCNC: 18 MMOL/L (ref 7–16)
ANISOCYTOSIS BLD QL SMEAR: ABNORMAL
APTT PPP: 150.7 SECONDS (ref 25.2–37.3)
APTT PPP: 47.9 SECONDS (ref 25.2–37.3)
AST SERPL W P-5'-P-CCNC: 23 U/L (ref 15–37)
ATYPICAL LYMPHOCYTES: ABNORMAL
BASOPHILS # BLD AUTO: 0.11 K/UL (ref 0–0.2)
BASOPHILS NFR BLD AUTO: 1.1 % (ref 0–1)
BILIRUB SERPL-MCNC: 0.2 MG/DL (ref 0–1.2)
BUN SERPL-MCNC: 10 MG/DL (ref 7–18)
BUN/CREAT SERPL: 11 (ref 6–20)
CALCIUM SERPL-MCNC: 8.2 MG/DL (ref 8.5–10.1)
CHLORIDE SERPL-SCNC: 95 MMOL/L (ref 98–107)
CO2 SERPL-SCNC: 21 MMOL/L (ref 21–32)
CREAT SERPL-MCNC: 0.93 MG/DL (ref 0.55–1.02)
CRENATED CELLS: ABNORMAL
DIFFERENTIAL METHOD BLD: ABNORMAL
EOSINOPHIL # BLD AUTO: 0.45 K/UL (ref 0–0.5)
EOSINOPHIL NFR BLD AUTO: 4.3 % (ref 1–4)
EOSINOPHIL NFR BLD MANUAL: 5 % (ref 1–4)
ERYTHROCYTE [DISTWIDTH] IN BLOOD BY AUTOMATED COUNT: 12.5 % (ref 11.5–14.5)
GLOBULIN SER-MCNC: 2.3 G/DL (ref 2–4)
GLUCOSE SERPL-MCNC: 75 MG/DL (ref 74–106)
HCT VFR BLD AUTO: 28.9 % (ref 38–47)
HGB BLD-MCNC: 9.7 G/DL (ref 12–16)
IMM GRANULOCYTES # BLD AUTO: 0.03 K/UL (ref 0–0.04)
IMM GRANULOCYTES NFR BLD: 0.3 % (ref 0–0.4)
LYMPHOCYTES # BLD AUTO: 4.77 K/UL (ref 1–4.8)
LYMPHOCYTES NFR BLD AUTO: 46 % (ref 27–41)
LYMPHOCYTES NFR BLD MANUAL: 44 % (ref 27–41)
MCH RBC QN AUTO: 27.6 PG (ref 27–31)
MCHC RBC AUTO-ENTMCNC: 33.6 G/DL (ref 32–36)
MCV RBC AUTO: 82.3 FL (ref 80–96)
MONOCYTES # BLD AUTO: 0.63 K/UL (ref 0–0.8)
MONOCYTES NFR BLD AUTO: 6.1 % (ref 2–6)
MONOCYTES NFR BLD MANUAL: 3 % (ref 2–6)
MPC BLD CALC-MCNC: 9.6 FL (ref 9.4–12.4)
NEUTROPHILS # BLD AUTO: 4.39 K/UL (ref 1.8–7.7)
NEUTROPHILS NFR BLD AUTO: 42.2 % (ref 53–65)
NEUTS BAND NFR BLD MANUAL: 2 % (ref 1–5)
NEUTS SEG NFR BLD MANUAL: 46 % (ref 50–62)
NRBC # BLD AUTO: 0 X10E3/UL
NRBC, AUTO (.00): 0 %
PLATELET # BLD AUTO: 505 K/UL (ref 150–400)
PLATELET MORPHOLOGY: ABNORMAL
POLYCHROMASIA BLD QL SMEAR: ABNORMAL
POTASSIUM SERPL-SCNC: 4.2 MMOL/L (ref 3.5–5.1)
PROT SERPL-MCNC: 5.8 G/DL (ref 6.4–8.2)
RBC # BLD AUTO: 3.51 M/UL (ref 4.2–5.4)
SODIUM SERPL-SCNC: 130 MMOL/L (ref 136–145)
WBC # BLD AUTO: 10.38 K/UL (ref 4.5–11)

## 2022-04-25 PROCEDURE — 25000003 PHARM REV CODE 250: Performed by: INTERNAL MEDICINE

## 2022-04-25 PROCEDURE — 96376 TX/PRO/DX INJ SAME DRUG ADON: CPT

## 2022-04-25 PROCEDURE — 11000001 HC ACUTE MED/SURG PRIVATE ROOM

## 2022-04-25 PROCEDURE — 36415 COLL VENOUS BLD VENIPUNCTURE: CPT

## 2022-04-25 PROCEDURE — 93458 L HRT ARTERY/VENTRICLE ANGIO: CPT | Mod: 26,,, | Performed by: INTERNAL MEDICINE

## 2022-04-25 PROCEDURE — 36415 COLL VENOUS BLD VENIPUNCTURE: CPT | Performed by: INTERNAL MEDICINE

## 2022-04-25 PROCEDURE — 85025 COMPLETE CBC W/AUTO DIFF WBC: CPT

## 2022-04-25 PROCEDURE — 85730 THROMBOPLASTIN TIME PARTIAL: CPT

## 2022-04-25 PROCEDURE — 99232 PR SUBSEQUENT HOSPITAL CARE,LEVL II: ICD-10-PCS | Mod: GC,,, | Performed by: INTERNAL MEDICINE

## 2022-04-25 PROCEDURE — G0378 HOSPITAL OBSERVATION PER HR: HCPCS

## 2022-04-25 PROCEDURE — C1894 INTRO/SHEATH, NON-LASER: HCPCS | Performed by: INTERNAL MEDICINE

## 2022-04-25 PROCEDURE — 93458 L HRT ARTERY/VENTRICLE ANGIO: CPT | Performed by: INTERNAL MEDICINE

## 2022-04-25 PROCEDURE — C1760 CLOSURE DEV, VASC: HCPCS | Performed by: INTERNAL MEDICINE

## 2022-04-25 PROCEDURE — 93458 PR CATH PLACE/CORON ANGIO, IMG SUPER/INTERP,W LEFT HEART VENTRICULOGRAPHY: ICD-10-PCS | Mod: 26,,, | Performed by: INTERNAL MEDICINE

## 2022-04-25 PROCEDURE — 94761 N-INVAS EAR/PLS OXIMETRY MLT: CPT

## 2022-04-25 PROCEDURE — 27000080 OPTIME MED/SURG SUP & DEVICES GENERAL CLASSIFICATION: Performed by: INTERNAL MEDICINE

## 2022-04-25 PROCEDURE — 25500020 PHARM REV CODE 255: Performed by: INTERNAL MEDICINE

## 2022-04-25 PROCEDURE — 99232 SBSQ HOSP IP/OBS MODERATE 35: CPT | Mod: GC,,, | Performed by: INTERNAL MEDICINE

## 2022-04-25 PROCEDURE — 96361 HYDRATE IV INFUSION ADD-ON: CPT

## 2022-04-25 PROCEDURE — 63600175 PHARM REV CODE 636 W HCPCS

## 2022-04-25 PROCEDURE — 80053 COMPREHEN METABOLIC PANEL: CPT

## 2022-04-25 PROCEDURE — 27201423 OPTIME MED/SURG SUP & DEVICES STERILE SUPPLY: Performed by: INTERNAL MEDICINE

## 2022-04-25 PROCEDURE — 63600175 PHARM REV CODE 636 W HCPCS: Performed by: INTERNAL MEDICINE

## 2022-04-25 PROCEDURE — 99152 MOD SED SAME PHYS/QHP 5/>YRS: CPT | Performed by: INTERNAL MEDICINE

## 2022-04-25 PROCEDURE — 27100168 OPTIME MED/SURG SUP & DEVICES NON-STERILE SUPPLY: Performed by: INTERNAL MEDICINE

## 2022-04-25 PROCEDURE — 25000003 PHARM REV CODE 250

## 2022-04-25 PROCEDURE — 27800903 OPTIME MED/SURG SUP & DEVICES OTHER IMPLANTS: Performed by: INTERNAL MEDICINE

## 2022-04-25 PROCEDURE — 85730 THROMBOPLASTIN TIME PARTIAL: CPT | Performed by: INTERNAL MEDICINE

## 2022-04-25 RX ORDER — MIDAZOLAM HYDROCHLORIDE 5 MG/ML
INJECTION INTRAMUSCULAR; INTRAVENOUS
Status: DISCONTINUED | OUTPATIENT
Start: 2022-04-25 | End: 2022-04-25 | Stop reason: HOSPADM

## 2022-04-25 RX ORDER — HEPARIN SOD,PORCINE/0.9 % NACL 1000/500ML
INTRAVENOUS SOLUTION INTRAVENOUS
Status: DISCONTINUED | OUTPATIENT
Start: 2022-04-25 | End: 2022-04-25 | Stop reason: HOSPADM

## 2022-04-25 RX ORDER — FENTANYL CITRATE 50 UG/ML
INJECTION, SOLUTION INTRAMUSCULAR; INTRAVENOUS
Status: DISCONTINUED | OUTPATIENT
Start: 2022-04-25 | End: 2022-04-25 | Stop reason: HOSPADM

## 2022-04-25 RX ORDER — ONDANSETRON 4 MG/1
8 TABLET, ORALLY DISINTEGRATING ORAL EVERY 8 HOURS PRN
Status: DISCONTINUED | OUTPATIENT
Start: 2022-04-25 | End: 2022-04-27 | Stop reason: HOSPADM

## 2022-04-25 RX ORDER — LIDOCAINE HYDROCHLORIDE 10 MG/ML
INJECTION INFILTRATION; PERINEURAL
Status: DISCONTINUED | OUTPATIENT
Start: 2022-04-25 | End: 2022-04-25 | Stop reason: HOSPADM

## 2022-04-25 RX ORDER — LORAZEPAM 1 MG/1
1 TABLET ORAL EVERY 6 HOURS PRN
Status: DISCONTINUED | OUTPATIENT
Start: 2022-04-25 | End: 2022-04-27 | Stop reason: HOSPADM

## 2022-04-25 RX ORDER — ACETAMINOPHEN 325 MG/1
650 TABLET ORAL EVERY 4 HOURS PRN
Status: DISCONTINUED | OUTPATIENT
Start: 2022-04-25 | End: 2022-04-27 | Stop reason: HOSPADM

## 2022-04-25 RX ORDER — SODIUM CHLORIDE 450 MG/100ML
125 INJECTION, SOLUTION INTRAVENOUS CONTINUOUS
Status: DISPENSED | OUTPATIENT
Start: 2022-04-25 | End: 2022-04-25

## 2022-04-25 RX ADMIN — EZETIMIBE 10 MG: 10 TABLET ORAL at 09:04

## 2022-04-25 RX ADMIN — ZOLPIDEM TARTRATE 10 MG: 5 TABLET, COATED ORAL at 09:04

## 2022-04-25 RX ADMIN — DULOXETINE HYDROCHLORIDE 60 MG: 30 CAPSULE, DELAYED RELEASE ORAL at 11:04

## 2022-04-25 RX ADMIN — HEPARIN SODIUM AND DEXTROSE 17 UNITS/KG/HR: 10000; 5 INJECTION INTRAVENOUS at 06:04

## 2022-04-25 RX ADMIN — ESTRADIOL 0.5 MG: 0.5 TABLET ORAL at 11:04

## 2022-04-25 RX ADMIN — MONTELUKAST 10 MG: 10 TABLET, FILM COATED ORAL at 11:04

## 2022-04-25 RX ADMIN — PROGESTERONE 100 MG: 100 CAPSULE ORAL at 11:04

## 2022-04-25 RX ADMIN — SODIUM CHLORIDE: 9 INJECTION, SOLUTION INTRAVENOUS at 03:04

## 2022-04-25 RX ADMIN — CETIRIZINE HYDROCHLORIDE 10 MG: 10 TABLET, FILM COATED ORAL at 11:04

## 2022-04-25 NOTE — ASSESSMENT & PLAN NOTE
Patient reports 1 recent syncopal episode yesterday, and 1 prior approximately 1 year ago  - Carotid doppler grossly negative  - D-dimer wnl  - MRI Head negative for acute processes  - outpatient EP test  - outpatient neurologist referral to ensure no underlying seizure disorder

## 2022-04-25 NOTE — PLAN OF CARE
Problem: Adult Inpatient Plan of Care  Goal: Plan of Care Review  Outcome: Ongoing, Progressing  Goal: Patient-Specific Goal (Individualized)  Outcome: Ongoing, Progressing  Goal: Absence of Hospital-Acquired Illness or Injury  Outcome: Ongoing, Progressing  Goal: Optimal Comfort and Wellbeing  Outcome: Ongoing, Progressing  Goal: Readiness for Transition of Care  Outcome: Ongoing, Progressing     Problem: Fall Injury Risk  Goal: Absence of Fall and Fall-Related Injury  Outcome: Ongoing, Progressing     Problem: Anxiety  Goal: Anxiety Reduction or Resolution  Outcome: Ongoing, Progressing  Intervention: Promote Anxiety Reduction  Flowsheets (Taken 4/25/2022 9003)  Supportive Measures:   active listening utilized   self-care encouraged   relaxation techniques promoted

## 2022-04-25 NOTE — ASSESSMENT & PLAN NOTE
Trop I trend shows 24.3, 80.1, 84.2, 71.6 respectively  - [facility] today   - Continue Heparin drip with recurring aPTT  - Continue Metoprolol 25mg q6h po  - Continue aspirin 81mg qd  - Continue ezetimibe 10mg po qd  - Morphine 2mg IV q6h PRN chest pain  - Continuous telemetry   - Orthostatics normal  - O2 PRN

## 2022-04-25 NOTE — PLAN OF CARE
[facility] - [facility] Telemetry  Initial Discharge Assessment       Primary Care Provider: Primary Doctor No    Admission Diagnosis: Syncope and collapse [R55]  Elevated troponin [R77.8]  Chest pain [R07.9]  Syncope, unspecified syncope type [R55]    Admission Date: 4/23/2022  Expected Discharge Date:     Discharge Barriers Identified: None    Payor: BLUE CROSS [employer] / Plan: BC[employer] OF [employer] / Product Type: Commercial /     Extended Emergency Contact Information  Primary Emergency Contact: JUAN CARLOS THOMAS  Mobile Phone: [phone]  Relation: Mother  Preferred language: English   needed? No  Secondary Emergency Contact: MERCEDESMAMI  Mobile Phone: [phone]  Relation: Significant other  Preferred language: English   needed? No    Discharge Plan A: Home  Discharge Plan B: Home      [facility] [address] [place] [address], [place] - [address]  [address]  [place] [address]  Phone: [phone] Fax: [phone]      Initial Assessment (most recent)     Adult Discharge Assessment - 04/25/22 1509        Discharge Assessment    Assessment Type Discharge Planning Assessment     Source of Information patient     Lives With parent(s)     Do you expect to return to your current living situation? Yes     Current cognitive status: Alert/Oriented     Walking or Climbing Stairs Difficulty none     Dressing/Bathing Difficulty none     Equipment Currently Used at Home none     Do you currently have service(s) that help you manage your care at home? No     How do you get to doctors appointments? car, drives self     Are you on dialysis? No     Discharge Plan A Home     Discharge Plan B Home     DME Needed Upon Discharge  none     Discharge Plan discussed with: Patient     Discharge Barriers Identified None        Relationship/Environment    Name(s) of Who Lives With Patient juan carlos thomas, mother               SS rec'd consult for cardiac rehab and LifeVest. Spoke with pt who states  she lives with parents. Not current with HH and uses no DME. Plan at d/c to return home. Choice obtained for [facility] cardiac rehab and [facility] for LifeVest. Will fax referrals when all cardiac procedures documents available. SS following for d/c needs.

## 2022-04-25 NOTE — PLAN OF CARE
Problem: Adult Inpatient Plan of Care  Goal: Plan of Care Review  Outcome: Ongoing, Progressing  Goal: Patient-Specific Goal (Individualized)  Outcome: Ongoing, Progressing  Goal: Absence of Hospital-Acquired Illness or Injury  Outcome: Ongoing, Progressing  Goal: Optimal Comfort and Wellbeing  Outcome: Ongoing, Progressing  Goal: Readiness for Transition of Care  Outcome: Ongoing, Progressing     Problem: Fall Injury Risk  Goal: Absence of Fall and Fall-Related Injury  Outcome: Ongoing, Progressing     Problem: Anxiety  Goal: Anxiety Reduction or Resolution  Outcome: Ongoing, Progressing

## 2022-04-25 NOTE — INTERVAL H&P NOTE
The patient has been examined and the H&P has been reviewed:    I concur with the findings and no changes have occurred since H&P was written.    Procedure risks, benefits and alternative options discussed and understood by patient/family.          Active Hospital Problems    Diagnosis  POA    *Syncope [R55]  Yes    Elevated troponin [R77.8]  Yes    Hypertension [I10]  Unknown    Anxiety disorder, unspecified [F41.9]  Unknown      Resolved Hospital Problems   No resolved problems to display.

## 2022-04-25 NOTE — SUBJECTIVE & OBJECTIVE
Interval History: Patient resting comfortably in bed with no acute complaints. Due to get [facility] today.  consult put in for patint to gert life vest due to ventricular arrhythmia (sustained for at least 30 seconds) seen in hospital. Patient will need EP test and to see an outpatient neurologist once discharged to ensure no underlying seizure disorder.      Review of Systems   Constitutional:  Negative for appetite change, chills, diaphoresis, fatigue and fever.   Respiratory:  Negative for cough, chest tightness, shortness of breath and wheezing.    Cardiovascular:  Negative for chest pain, palpitations and leg swelling.   Gastrointestinal:  Negative for abdominal distention, abdominal pain, constipation, diarrhea, nausea and vomiting.   Neurological:  Negative for dizziness, syncope, weakness and light-headedness.   Objective:     Vital Signs (Most Recent):  Temp: 98.2 °F (36.8 °C) (04/25/22 0700)  Pulse: (!) 56 (04/25/22 0752)  Resp: 18 (04/25/22 0752)  BP: 135/84 (04/25/22 0700)  SpO2: 100 % (04/25/22 0752)   Vital Signs (24h Range):  Temp:  [97.6 °F (36.4 °C)-98.2 °F (36.8 °C)] 98.2 °F (36.8 °C)  Pulse:  [56-85] 56  Resp:  [16-18] 18  SpO2:  [97 %-100 %] 100 %  BP: (108-135)/(73-91) 135/84     Weight: 62.2 kg (137 lb 2 oz)  Body mass index is 24.29 kg/m².    Intake/Output Summary (Last 24 hours) at 4/25/2022 1052  Last data filed at 4/24/2022 1900  Gross per 24 hour   Intake 987.5 ml   Output --   Net 987.5 ml      Physical Exam  Vitals and nursing note reviewed.   Constitutional:       General: She is not in acute distress.     Appearance: Normal appearance. She is not ill-appearing, toxic-appearing or diaphoretic.   HENT:      Head: Normocephalic and atraumatic.      Nose: Nose normal.      Mouth/Throat:      Mouth: Mucous membranes are moist.      Pharynx: Oropharynx is clear.   Eyes:      General: No scleral icterus.     Extraocular Movements: Extraocular movements intact.      Conjunctiva/sclera:  Conjunctivae normal.      Pupils: Pupils are equal, round, and reactive to light.   Cardiovascular:      Rate and Rhythm: Normal rate and regular rhythm.      Pulses: Normal pulses.      Heart sounds: Normal heart sounds. No murmur heard.    No friction rub. No gallop.   Pulmonary:      Effort: Pulmonary effort is normal. No respiratory distress.      Breath sounds: Normal breath sounds. No wheezing, rhonchi or rales.   Abdominal:      General: Abdomen is flat. Bowel sounds are normal. There is no distension.      Palpations: Abdomen is soft.      Tenderness: There is no abdominal tenderness. There is no guarding or rebound.   Musculoskeletal:         General: Normal range of motion.      Right lower leg: No edema.      Left lower leg: No edema.   Skin:     General: Skin is warm and dry.      Coloration: Skin is not jaundiced.   Neurological:      General: No focal deficit present.      Mental Status: She is alert and oriented to person, place, and time. Mental status is at baseline.   Psychiatric:         Mood and Affect: Mood normal.         Behavior: Behavior normal.       Significant Labs: All pertinent labs within the past 24 hours have been reviewed.    Significant Imaging: I have reviewed all pertinent imaging results/findings within the past 24 hours.

## 2022-04-25 NOTE — PROGRESS NOTES
"[address] Medicine  Progress Note    Patient Name: Flory Chacon  MRN: [id number]  Patient Class: IP- Inpatient   Admission Date: 4/23/2022  Length of Stay: 0 days  Attending Physician: Jaida Huynh MD  Primary Care Provider: Primary Doctor No        Subjective:     Principal Problem:Syncope        HPI:  Pt is a 55 yo female who was transferred from [place] ED to [place] ED with a cc of "elevated troponin." Pt reports a syncopal episode yesterday that occurred while she was at home. She states that she drove home from work, she was walking from her kitchen to another room when she turned around and had the syncopal episode. Patient states that she did not hit her head and that the fall was witnessed by a family member. She was subsequently taken to [place] ED where it was found that she had elevated troponins. Patient currently has no acute complaints, and denies and CP. SOB, or discomfort. Pt states that she has a negative personal cardiac history, but does have a positive family history of CAD and heart attack.     Significant findings in the ED include:  - Labs: WBC 11.34, Platelets 915, Na 125, proBNP 196. Trop I trend shows 24.3, 80.1, 84.2, 71.6 respectively.  - Images (as per radiology): CXR: Heart size normal. Lungs clear. No pneumothorax or pleural effusion.  - EKG shows: LBBB     Patient was subsequently admitted to the family medicine service for further evaluation and management.      Overview/Hospital Course:  No notes on file    Interval History: Patient resting comfortably in bed with no acute complaints. Due to get [facility] today.  consult put in for patint to gert life vest due to ventricular arrhythmia (sustained for at least 30 seconds) seen in hospital. Patient will need EP test and to see an outpatient neurologist once discharged to ensure no underlying seizure disorder.      Review of Systems   Constitutional:  Negative for appetite change, chills, " diaphoresis, fatigue and fever.   Respiratory:  Negative for cough, chest tightness, shortness of breath and wheezing.    Cardiovascular:  Negative for chest pain, palpitations and leg swelling.   Gastrointestinal:  Negative for abdominal distention, abdominal pain, constipation, diarrhea, nausea and vomiting.   Neurological:  Negative for dizziness, syncope, weakness and light-headedness.   Objective:     Vital Signs (Most Recent):  Temp: 98.2 °F (36.8 °C) (04/25/22 0700)  Pulse: (!) 56 (04/25/22 0752)  Resp: 18 (04/25/22 0752)  BP: 135/84 (04/25/22 0700)  SpO2: 100 % (04/25/22 0752)   Vital Signs (24h Range):  Temp:  [97.6 °F (36.4 °C)-98.2 °F (36.8 °C)] 98.2 °F (36.8 °C)  Pulse:  [56-85] 56  Resp:  [16-18] 18  SpO2:  [97 %-100 %] 100 %  BP: (108-135)/(73-91) 135/84     Weight: 62.2 kg (137 lb 2 oz)  Body mass index is 24.29 kg/m².    Intake/Output Summary (Last 24 hours) at 4/25/2022 1052  Last data filed at 4/24/2022 1900  Gross per 24 hour   Intake 987.5 ml   Output --   Net 987.5 ml      Physical Exam  Vitals and nursing note reviewed.   Constitutional:       General: She is not in acute distress.     Appearance: Normal appearance. She is not ill-appearing, toxic-appearing or diaphoretic.   HENT:      Head: Normocephalic and atraumatic.      Nose: Nose normal.      Mouth/Throat:      Mouth: Mucous membranes are moist.      Pharynx: Oropharynx is clear.   Eyes:      General: No scleral icterus.     Extraocular Movements: Extraocular movements intact.      Conjunctiva/sclera: Conjunctivae normal.      Pupils: Pupils are equal, round, and reactive to light.   Cardiovascular:      Rate and Rhythm: Normal rate and regular rhythm.      Pulses: Normal pulses.      Heart sounds: Normal heart sounds. No murmur heard.    No friction rub. No gallop.   Pulmonary:      Effort: Pulmonary effort is normal. No respiratory distress.      Breath sounds: Normal breath sounds. No wheezing, rhonchi or rales.   Abdominal:       General: Abdomen is flat. Bowel sounds are normal. There is no distension.      Palpations: Abdomen is soft.      Tenderness: There is no abdominal tenderness. There is no guarding or rebound.   Musculoskeletal:         General: Normal range of motion.      Right lower leg: No edema.      Left lower leg: No edema.   Skin:     General: Skin is warm and dry.      Coloration: Skin is not jaundiced.   Neurological:      General: No focal deficit present.      Mental Status: She is alert and oriented to person, place, and time. Mental status is at baseline.   Psychiatric:         Mood and Affect: Mood normal.         Behavior: Behavior normal.       Significant Labs: All pertinent labs within the past 24 hours have been reviewed.    Significant Imaging: I have reviewed all pertinent imaging results/findings within the past 24 hours.      Assessment/Plan:      * Syncope  Patient reports 1 recent syncopal episode yesterday, and 1 prior approximately 1 year ago  - Carotid doppler grossly negative  - D-dimer wnl  - MRI Head negative for acute processes  - outpatient EP test  - outpatient neurologist referral to ensure no underlying seizure disorder         Elevated troponin  Trop I trend shows 24.3, 80.1, 84.2, 71.6 respectively  - [facility] today   - Continue Heparin drip with recurring aPTT  - Continue Metoprolol 25mg q6h po  - Continue aspirin 81mg qd  - Continue ezetimibe 10mg po qd  - Morphine 2mg IV q6h PRN chest pain  - Continuous telemetry   - Orthostatics normal  - O2 PRN    Anxiety disorder, unspecified  - Continue home Cymbalta 60mg po qd  - Continue home xanex 1mg po qhs prn  - Continue home ambien 10mg po qhs prn    Hypertension    - continue lisinopril to 40mg po qd        VTE Risk Mitigation (From admission, onward)         Ordered     heparin 25,000 units in dextrose 5% 250 mL (100 units/mL) infusion LOW INTENSITY nomogram - [facility]  Continuous        Question:  Begin at (in units/kg/hr)  Answer:  12 04/23/22 6559      heparin 25,000 units in dextrose 5% (100 units/ml) IV bolus from bag INITIAL BOLUS (max bolus 4000 units)  As needed (PRN)        Question:  Heparin Infusion Adjustment (DO NOT MODIFY ANSWER)  Answer:  \\[email or web address]\epic\Images\Pharmacy\HeparinInfusions\heparin LOW INTENSITY nomogram for [facility] [id number].pdf    04/23/22 1327     heparin 25,000 units in dextrose 5% (100 units/ml) IV bolus from bag - ADDITIONAL PRN BOLUS - 30 units/kg (max bolus 4000 units)  As needed (PRN)        Question:  Heparin Infusion Adjustment (DO NOT MODIFY ANSWER)  Answer:  \\[email or web address]\epic\Images\Pharmacy\HeparinInfusions\heparin LOW INTENSITY nomogram for [facility] [id number].pdf    04/23/22 1327     IP VTE LOW RISK PATIENT  Once         04/23/22 1237     Place sequential compression device  Until discontinued         04/23/22 1237                Discharge Planning   ESME:      Code Status: Full Code   Is the patient medically ready for discharge?:     Reason for patient still in hospital (select all that apply): Treatment                     Tami Campuzano MD  Department of Hospital Medicine   [address]

## 2022-04-26 LAB
ALBUMIN SERPL BCP-MCNC: 3.4 G/DL (ref 3.5–5)
ALBUMIN/GLOB SERPL: 1.1 {RATIO}
ALP SERPL-CCNC: 93 U/L (ref 46–118)
ALT SERPL W P-5'-P-CCNC: 19 U/L (ref 13–56)
ANION GAP SERPL CALCULATED.3IONS-SCNC: 16 MMOL/L (ref 7–16)
AST SERPL W P-5'-P-CCNC: 21 U/L (ref 15–37)
BASOPHILS # BLD AUTO: 0.1 K/UL (ref 0–0.2)
BASOPHILS NFR BLD AUTO: 1.1 % (ref 0–1)
BILIRUB SERPL-MCNC: 0.2 MG/DL (ref 0–1.2)
BUN SERPL-MCNC: 8 MG/DL (ref 7–18)
BUN/CREAT SERPL: 11 (ref 6–20)
CALCIUM SERPL-MCNC: 8.5 MG/DL (ref 8.5–10.1)
CHLORIDE SERPL-SCNC: 100 MMOL/L (ref 98–107)
CO2 SERPL-SCNC: 22 MMOL/L (ref 21–32)
CREAT SERPL-MCNC: 0.76 MG/DL (ref 0.55–1.02)
DIFFERENTIAL METHOD BLD: ABNORMAL
EOSINOPHIL # BLD AUTO: 0.5 K/UL (ref 0–0.5)
EOSINOPHIL NFR BLD AUTO: 5.4 % (ref 1–4)
ERYTHROCYTE [DISTWIDTH] IN BLOOD BY AUTOMATED COUNT: 12.7 % (ref 11.5–14.5)
GLOBULIN SER-MCNC: 3 G/DL (ref 2–4)
GLUCOSE SERPL-MCNC: 79 MG/DL (ref 74–106)
HCT VFR BLD AUTO: 28.3 % (ref 38–47)
HGB BLD-MCNC: 9.8 G/DL (ref 12–16)
IMM GRANULOCYTES # BLD AUTO: 0.02 K/UL (ref 0–0.04)
IMM GRANULOCYTES NFR BLD: 0.2 % (ref 0–0.4)
LYMPHOCYTES # BLD AUTO: 3.02 K/UL (ref 1–4.8)
LYMPHOCYTES NFR BLD AUTO: 32.9 % (ref 27–41)
MCH RBC QN AUTO: 28.1 PG (ref 27–31)
MCHC RBC AUTO-ENTMCNC: 34.6 G/DL (ref 32–36)
MCV RBC AUTO: 81.1 FL (ref 80–96)
MONOCYTES # BLD AUTO: 0.62 K/UL (ref 0–0.8)
MONOCYTES NFR BLD AUTO: 6.8 % (ref 2–6)
MPC BLD CALC-MCNC: 9.7 FL (ref 9.4–12.4)
NEUTROPHILS # BLD AUTO: 4.92 K/UL (ref 1.8–7.7)
NEUTROPHILS NFR BLD AUTO: 53.6 % (ref 53–65)
NRBC # BLD AUTO: 0 X10E3/UL
NRBC, AUTO (.00): 0 %
PLATELET # BLD AUTO: 484 K/UL (ref 150–400)
POTASSIUM SERPL-SCNC: 4.5 MMOL/L (ref 3.5–5.1)
PROT SERPL-MCNC: 6.4 G/DL (ref 6.4–8.2)
RBC # BLD AUTO: 3.49 M/UL (ref 4.2–5.4)
SODIUM SERPL-SCNC: 133 MMOL/L (ref 136–145)
WBC # BLD AUTO: 9.18 K/UL (ref 4.5–11)

## 2022-04-26 PROCEDURE — 95813 EEG EXTND MNTR 61-119 MIN: CPT

## 2022-04-26 PROCEDURE — 25000003 PHARM REV CODE 250: Performed by: INTERNAL MEDICINE

## 2022-04-26 PROCEDURE — 25000003 PHARM REV CODE 250

## 2022-04-26 PROCEDURE — 99232 SBSQ HOSP IP/OBS MODERATE 35: CPT | Mod: ,,, | Performed by: INTERNAL MEDICINE

## 2022-04-26 PROCEDURE — 99232 PR SUBSEQUENT HOSPITAL CARE,LEVL II: ICD-10-PCS | Mod: ,,, | Performed by: INTERNAL MEDICINE

## 2022-04-26 PROCEDURE — 80053 COMPREHEN METABOLIC PANEL: CPT

## 2022-04-26 PROCEDURE — 85025 COMPLETE CBC W/AUTO DIFF WBC: CPT

## 2022-04-26 PROCEDURE — 11000001 HC ACUTE MED/SURG PRIVATE ROOM

## 2022-04-26 PROCEDURE — 94761 N-INVAS EAR/PLS OXIMETRY MLT: CPT

## 2022-04-26 PROCEDURE — 36415 COLL VENOUS BLD VENIPUNCTURE: CPT

## 2022-04-26 PROCEDURE — 99900035 HC TECH TIME PER 15 MIN (STAT)

## 2022-04-26 RX ORDER — HYDROCODONE BITARTRATE AND ACETAMINOPHEN 5; 325 MG/1; MG/1
1 TABLET ORAL ONCE
Status: COMPLETED | OUTPATIENT
Start: 2022-04-26 | End: 2022-04-26

## 2022-04-26 RX ADMIN — METOPROLOL SUCCINATE 50 MG: 50 TABLET, FILM COATED, EXTENDED RELEASE ORAL at 08:04

## 2022-04-26 RX ADMIN — EZETIMIBE 10 MG: 10 TABLET ORAL at 08:04

## 2022-04-26 RX ADMIN — MONTELUKAST 10 MG: 10 TABLET, FILM COATED ORAL at 08:04

## 2022-04-26 RX ADMIN — CETIRIZINE HYDROCHLORIDE 10 MG: 10 TABLET, FILM COATED ORAL at 08:04

## 2022-04-26 RX ADMIN — ESTRADIOL 0.5 MG: 0.5 TABLET ORAL at 08:04

## 2022-04-26 RX ADMIN — LORAZEPAM 1 MG: 1 TABLET ORAL at 10:04

## 2022-04-26 RX ADMIN — DULOXETINE HYDROCHLORIDE 60 MG: 30 CAPSULE, DELAYED RELEASE ORAL at 08:04

## 2022-04-26 RX ADMIN — PROGESTERONE 100 MG: 100 CAPSULE ORAL at 08:04

## 2022-04-26 RX ADMIN — ACETAMINOPHEN 650 MG: 325 TABLET ORAL at 08:04

## 2022-04-26 RX ADMIN — HYDROCODONE BITARTRATE AND ACETAMINOPHEN 1 TABLET: 5; 325 TABLET ORAL at 11:04

## 2022-04-26 RX ADMIN — ASPIRIN 81 MG 81 MG: 81 TABLET ORAL at 08:04

## 2022-04-26 RX ADMIN — LISINOPRIL 40 MG: 40 TABLET ORAL at 08:04

## 2022-04-26 RX ADMIN — ZOLPIDEM TARTRATE 10 MG: 5 TABLET, COATED ORAL at 08:04

## 2022-04-26 NOTE — NURSING
Life place on pt by Rep and she explained to pt. Pt appears calm and relaxed no display of anxiety.

## 2022-04-26 NOTE — NURSING
Pt sitting up in bed ion cell phone appears happy , smiling no distress noted currently in the room alone will continue to monitor

## 2022-04-26 NOTE — NURSING
"Making rounds to check on pt and pt darlyn was in the room going through her purse. Introduced myself and informed him that pt was on the unit for an EEG.i left the room and within 5 min he was at the nursing station inquiring about the EEG and " how long it takes and the purpose of it". Then pt asked if I could come to the room he had something he wanted to "show" me. Pt had a small tin container that was about the size if a contacts lense case, which in it he had 4 pills two which he stated were Xanax and the other two he wasn't sure what they were and he wanted to leave them in the room. I informed him that he could not leave the medication in the room in an container without the original bottle to identify the medication and that the pt cannot take any medication with out the dr approval while in the hospital. He was understanding.and put the medication in his pocket.   "

## 2022-04-26 NOTE — PROGRESS NOTES
"[address] Medicine  Progress Note    Patient Name: Flory Chacon  MRN: [id number]  Patient Class: IP- Inpatient   Admission Date: 4/23/2022  Length of Stay: 1 days  Attending Physician: Jaida Huynh MD  Primary Care Provider: Primary Doctor No        Subjective:     Principal Problem:Syncope        HPI:  Pt is a 57 yo female who was transferred from [place] ED to [place] ED with a cc of "elevated troponin." Pt reports a syncopal episode yesterday that occurred while she was at home. She states that she drove home from work, she was walking from her kitchen to another room when she turned around and had the syncopal episode. Patient states that she did not hit her head and that the fall was witnessed by a family member. She was subsequently taken to [place] ED where it was found that she had elevated troponins. Patient currently has no acute complaints, and denies and CP. SOB, or discomfort. Pt states that she has a negative personal cardiac history, but does have a positive family history of CAD and heart attack.     Significant findings in the ED include:  - Labs: WBC 11.34, Platelets 915, Na 125, proBNP 196. Trop I trend shows 24.3, 80.1, 84.2, 71.6 respectively.  - Images (as per radiology): CXR: Heart size normal. Lungs clear. No pneumothorax or pleural effusion.  - EKG shows: LBBB     Patient was subsequently admitted to the family medicine service for further evaluation and management.      Overview/Hospital Course:  No notes on file    Interval History: Patient up and walking around room with no acute complaints. No overnight events reported. LHC yesterday revealed no coronary artery blockages, and maximizing medical management only was recommended. Patient has been ordered a LifeVest to wear in case of arrhythmia until outpatient Cardiology workout to investigate cardiac arrhythmias can take place.     Review of Systems   Constitutional:  Negative for appetite change, " chills, diaphoresis, fatigue and fever.   Respiratory:  Negative for cough, chest tightness, shortness of breath and wheezing.    Cardiovascular:  Negative for chest pain, palpitations and leg swelling.   Gastrointestinal:  Negative for abdominal distention, abdominal pain, constipation, diarrhea, nausea and vomiting.   Neurological:  Negative for dizziness, syncope, weakness and light-headedness.   Objective:     Vital Signs (Most Recent):  Temp: 97.8 °F (36.6 °C) (04/26/22 0700)  Pulse: 104 (04/26/22 0700)  Resp: 18 (04/26/22 0700)  BP: 124/89 (04/26/22 0700)  SpO2: 98 % (04/26/22 0809) Vital Signs (24h Range):  Temp:  [97.8 °F (36.6 °C)-98.1 °F (36.7 °C)] 97.8 °F (36.6 °C)  Pulse:  [] 104  Resp:  [16-18] 18  SpO2:  [95 %-98 %] 98 %  BP: (124-146)/(81-93) 124/89     Weight: 66.1 kg (145 lb 11.6 oz)  Body mass index is 25.81 kg/m².    Intake/Output Summary (Last 24 hours) at 4/26/2022 1013  Last data filed at 4/25/2022 2351  Gross per 24 hour   Intake 420 ml   Output --   Net 420 ml      Physical Exam  Vitals and nursing note reviewed.   Constitutional:       General: She is not in acute distress.     Appearance: Normal appearance. She is not ill-appearing, toxic-appearing or diaphoretic.   HENT:      Head: Normocephalic and atraumatic.      Nose: Nose normal.      Mouth/Throat:      Mouth: Mucous membranes are moist.      Pharynx: Oropharynx is clear.   Eyes:      General: No scleral icterus.     Extraocular Movements: Extraocular movements intact.      Conjunctiva/sclera: Conjunctivae normal.      Pupils: Pupils are equal, round, and reactive to light.   Cardiovascular:      Rate and Rhythm: Normal rate and regular rhythm.      Pulses: Normal pulses.      Heart sounds: Normal heart sounds. No murmur heard.    No friction rub. No gallop.   Pulmonary:      Effort: Pulmonary effort is normal. No respiratory distress.      Breath sounds: Normal breath sounds. No wheezing, rhonchi or rales.   Abdominal:       General: Abdomen is flat. Bowel sounds are normal. There is no distension.      Palpations: Abdomen is soft.      Tenderness: There is no abdominal tenderness. There is no guarding or rebound.   Musculoskeletal:         General: Normal range of motion.      Right lower leg: No edema.      Left lower leg: No edema.   Skin:     General: Skin is warm and dry.      Coloration: Skin is not jaundiced.   Neurological:      General: No focal deficit present.      Mental Status: She is alert and oriented to person, place, and time. Mental status is at baseline.   Psychiatric:         Mood and Affect: Mood normal.         Behavior: Behavior normal.       Significant Labs: All pertinent labs within the past 24 hours have been reviewed.    Significant Imaging: I have reviewed all pertinent imaging results/findings within the past 24 hours.      Assessment/Plan:      * Syncope  Patient reports 1 recent syncopal episode prior to day of admission, and 1 prior approximately 1 year ago  - Carotid doppler grossly negative  - D-dimer wnl  - MRI Head negative for acute processes  - outpatient EP test  - outpatient neurologist referral to ensure no underlying seizure disorder         Elevated troponin  - Normal [facility] on 04/25  - Continue to maximize medical management   - continue current medications once discharged     Anxiety disorder, unspecified  - Continue home Cymbalta 60mg po qd  - Continue home xanex 1mg po qhs prn  - Continue home ambien 10mg po qhs prn    Hypertension    - continue lisinopril to 40mg po qd          VTE Risk Mitigation (From admission, onward)         Ordered     IP VTE LOW RISK PATIENT  Once         04/23/22 1237     Place sequential compression device  Until discontinued         04/23/22 1237                Discharge Planning   ESME:      Code Status: Full Code   Is the patient medically ready for discharge?:     Reason for patient still in hospital (select all that apply): Treatment  Discharge Plan A: Home                   Tami Campuzano MD  Department of Hospital Medicine   [facility] - [facility]

## 2022-04-26 NOTE — ASSESSMENT & PLAN NOTE
Patient reports 1 recent syncopal episode prior to day of admission, and 1 prior approximately 1 year ago  - Carotid doppler grossly negative  - D-dimer wnl  - MRI Head negative for acute processes  - outpatient EP test  - outpatient neurologist referral to ensure no underlying seizure disorder

## 2022-04-26 NOTE — ASSESSMENT & PLAN NOTE
- Normal LHC on 04/25  - Continue to maximize medical management   - continue current medications once discharged

## 2022-04-26 NOTE — SUBJECTIVE & OBJECTIVE
Interval History: Patient up and walking around room with no acute complaints. No overnight events reported. LHC yesterday revealed no coronary artery blockages, and maximizing medical management only was recommended. Patient has been ordered a LifeVest to wear in case of arrhythmia until outpatient Cardiology workout to investigate cardiac arrhythmias can take place.     Review of Systems   Constitutional:  Negative for appetite change, chills, diaphoresis, fatigue and fever.   Respiratory:  Negative for cough, chest tightness, shortness of breath and wheezing.    Cardiovascular:  Negative for chest pain, palpitations and leg swelling.   Gastrointestinal:  Negative for abdominal distention, abdominal pain, constipation, diarrhea, nausea and vomiting.   Neurological:  Negative for dizziness, syncope, weakness and light-headedness.   Objective:     Vital Signs (Most Recent):  Temp: 97.8 °F (36.6 °C) (04/26/22 0700)  Pulse: 104 (04/26/22 0700)  Resp: 18 (04/26/22 0700)  BP: 124/89 (04/26/22 0700)  SpO2: 98 % (04/26/22 0809) Vital Signs (24h Range):  Temp:  [97.8 °F (36.6 °C)-98.1 °F (36.7 °C)] 97.8 °F (36.6 °C)  Pulse:  [] 104  Resp:  [16-18] 18  SpO2:  [95 %-98 %] 98 %  BP: (124-146)/(81-93) 124/89     Weight: 66.1 kg (145 lb 11.6 oz)  Body mass index is 25.81 kg/m².    Intake/Output Summary (Last 24 hours) at 4/26/2022 1013  Last data filed at 4/25/2022 2351  Gross per 24 hour   Intake 420 ml   Output --   Net 420 ml      Physical Exam  Vitals and nursing note reviewed.   Constitutional:       General: She is not in acute distress.     Appearance: Normal appearance. She is not ill-appearing, toxic-appearing or diaphoretic.   HENT:      Head: Normocephalic and atraumatic.      Nose: Nose normal.      Mouth/Throat:      Mouth: Mucous membranes are moist.      Pharynx: Oropharynx is clear.   Eyes:      General: No scleral icterus.     Extraocular Movements: Extraocular movements intact.      Conjunctiva/sclera:  Conjunctivae normal.      Pupils: Pupils are equal, round, and reactive to light.   Cardiovascular:      Rate and Rhythm: Normal rate and regular rhythm.      Pulses: Normal pulses.      Heart sounds: Normal heart sounds. No murmur heard.    No friction rub. No gallop.   Pulmonary:      Effort: Pulmonary effort is normal. No respiratory distress.      Breath sounds: Normal breath sounds. No wheezing, rhonchi or rales.   Abdominal:      General: Abdomen is flat. Bowel sounds are normal. There is no distension.      Palpations: Abdomen is soft.      Tenderness: There is no abdominal tenderness. There is no guarding or rebound.   Musculoskeletal:         General: Normal range of motion.      Right lower leg: No edema.      Left lower leg: No edema.   Skin:     General: Skin is warm and dry.      Coloration: Skin is not jaundiced.   Neurological:      General: No focal deficit present.      Mental Status: She is alert and oriented to person, place, and time. Mental status is at baseline.   Psychiatric:         Mood and Affect: Mood normal.         Behavior: Behavior normal.       Significant Labs: All pertinent labs within the past 24 hours have been reviewed.    Significant Imaging: I have reviewed all pertinent imaging results/findings within the past 24 hours.

## 2022-04-26 NOTE — PROGRESS NOTES
Pt can be discharged from cardiology standpoint once Life Vest is placed. Pt to f/u with Dr. Jovani Cordova (electrophysiologist) in [place] for evaluation for SVT.

## 2022-04-26 NOTE — PLAN OF CARE
Order for [employer] LifeVest faxed and called to Bart with [employer] ([phone]). SS following.    Called Bart with [facility] to f/u on LifeVest order. Left message to call SS back.

## 2022-04-26 NOTE — NURSING
Walks in  Pt room while rounding and pt crying her eyes out., she stated that she was having a panic attack as her dad was in the hospital at [place] having a pacemaker placed. Pt was Given Ativan 1mg p.o and gave pt comforting words to help calm her. Left pt sitting semi fowlers in bed quiet no complaints will continue to monitor.

## 2022-04-26 NOTE — PLAN OF CARE
Problem: Adult Inpatient Plan of Care  Goal: Plan of Care Review  Outcome: Ongoing, Progressing  Flowsheets (Taken 4/26/2022 6838)  Plan of Care Reviewed With: patient  Goal: Absence of Hospital-Acquired Illness or Injury  Outcome: Ongoing, Progressing  Goal: Optimal Comfort and Wellbeing  Outcome: Ongoing, Progressing  Goal: Readiness for Transition of Care  Outcome: Ongoing, Progressing     Problem: Fall Injury Risk  Goal: Absence of Fall and Fall-Related Injury  Outcome: Ongoing, Progressing     Problem: Anxiety  Goal: Anxiety Reduction or Resolution  Outcome: Ongoing, Progressing   Pt cooperative with ongoing POC

## 2022-04-27 ENCOUNTER — TELEPHONE (OUTPATIENT)
Dept: CARDIOLOGY | Facility: CLINIC | Age: 57
End: 2022-04-27
Payer: COMMERCIAL

## 2022-04-27 VITALS
HEART RATE: 78 BPM | BODY MASS INDEX: 25.78 KG/M2 | SYSTOLIC BLOOD PRESSURE: 114 MMHG | RESPIRATION RATE: 18 BRPM | OXYGEN SATURATION: 100 % | DIASTOLIC BLOOD PRESSURE: 85 MMHG | WEIGHT: 145.5 LBS | HEIGHT: 63 IN | TEMPERATURE: 98 F

## 2022-04-27 PROBLEM — R55 SYNCOPE: Status: RESOLVED | Noted: 2022-04-23 | Resolved: 2022-04-27

## 2022-04-27 PROBLEM — R79.89 ELEVATED TROPONIN: Status: RESOLVED | Noted: 2022-04-23 | Resolved: 2022-04-27

## 2022-04-27 LAB
ALBUMIN SERPL BCP-MCNC: 3.6 G/DL (ref 3.5–5)
ALBUMIN/GLOB SERPL: 1.4 {RATIO}
ALP SERPL-CCNC: 93 U/L (ref 46–118)
ALT SERPL W P-5'-P-CCNC: 21 U/L (ref 13–56)
ANION GAP SERPL CALCULATED.3IONS-SCNC: 16 MMOL/L (ref 7–16)
AST SERPL W P-5'-P-CCNC: 17 U/L (ref 15–37)
BASOPHILS # BLD AUTO: 0.08 K/UL (ref 0–0.2)
BASOPHILS NFR BLD AUTO: 0.8 % (ref 0–1)
BILIRUB SERPL-MCNC: 0.2 MG/DL (ref 0–1.2)
BUN SERPL-MCNC: 11 MG/DL (ref 7–18)
BUN/CREAT SERPL: 11 (ref 6–20)
CALCIUM SERPL-MCNC: 8 MG/DL (ref 8.5–10.1)
CHLORIDE SERPL-SCNC: 94 MMOL/L (ref 98–107)
CO2 SERPL-SCNC: 24 MMOL/L (ref 21–32)
CREAT SERPL-MCNC: 1 MG/DL (ref 0.55–1.02)
DIFFERENTIAL METHOD BLD: ABNORMAL
EOSINOPHIL # BLD AUTO: 0.51 K/UL (ref 0–0.5)
EOSINOPHIL NFR BLD AUTO: 4.8 % (ref 1–4)
ERYTHROCYTE [DISTWIDTH] IN BLOOD BY AUTOMATED COUNT: 12.7 % (ref 11.5–14.5)
GLOBULIN SER-MCNC: 2.6 G/DL (ref 2–4)
GLUCOSE SERPL-MCNC: 82 MG/DL (ref 74–106)
HCT VFR BLD AUTO: 28.2 % (ref 38–47)
HGB BLD-MCNC: 9.3 G/DL (ref 12–16)
IMM GRANULOCYTES # BLD AUTO: 0.04 K/UL (ref 0–0.04)
IMM GRANULOCYTES NFR BLD: 0.4 % (ref 0–0.4)
LYMPHOCYTES # BLD AUTO: 3.6 K/UL (ref 1–4.8)
LYMPHOCYTES NFR BLD AUTO: 33.9 % (ref 27–41)
MCH RBC QN AUTO: 27.6 PG (ref 27–31)
MCHC RBC AUTO-ENTMCNC: 33 G/DL (ref 32–36)
MCV RBC AUTO: 83.7 FL (ref 80–96)
MONOCYTES # BLD AUTO: 0.71 K/UL (ref 0–0.8)
MONOCYTES NFR BLD AUTO: 6.7 % (ref 2–6)
MPC BLD CALC-MCNC: 9.4 FL (ref 9.4–12.4)
NEUTROPHILS # BLD AUTO: 5.68 K/UL (ref 1.8–7.7)
NEUTROPHILS NFR BLD AUTO: 53.4 % (ref 53–65)
NRBC # BLD AUTO: 0 X10E3/UL
NRBC, AUTO (.00): 0 %
PLATELET # BLD AUTO: 477 K/UL (ref 150–400)
POTASSIUM SERPL-SCNC: 4.3 MMOL/L (ref 3.5–5.1)
PROT SERPL-MCNC: 6.2 G/DL (ref 6.4–8.2)
RBC # BLD AUTO: 3.37 M/UL (ref 4.2–5.4)
SODIUM SERPL-SCNC: 130 MMOL/L (ref 136–145)
WBC # BLD AUTO: 10.62 K/UL (ref 4.5–11)

## 2022-04-27 PROCEDURE — 99239 HOSP IP/OBS DSCHRG MGMT >30: CPT | Mod: ,,, | Performed by: INTERNAL MEDICINE

## 2022-04-27 PROCEDURE — 36415 COLL VENOUS BLD VENIPUNCTURE: CPT

## 2022-04-27 PROCEDURE — 80053 COMPREHEN METABOLIC PANEL: CPT

## 2022-04-27 PROCEDURE — 99239 PR HOSPITAL DISCHARGE DAY,>30 MIN: ICD-10-PCS | Mod: ,,, | Performed by: INTERNAL MEDICINE

## 2022-04-27 PROCEDURE — 25000003 PHARM REV CODE 250

## 2022-04-27 PROCEDURE — 85025 COMPLETE CBC W/AUTO DIFF WBC: CPT

## 2022-04-27 RX ORDER — EZETIMIBE 10 MG/1
10 TABLET ORAL NIGHTLY
Qty: 90 TABLET | Refills: 3 | Status: SHIPPED | OUTPATIENT
Start: 2022-04-27 | End: 2023-02-21 | Stop reason: SDUPTHER

## 2022-04-27 RX ORDER — METOPROLOL SUCCINATE 50 MG/1
50 TABLET, EXTENDED RELEASE ORAL DAILY
Qty: 30 TABLET | Refills: 0 | Status: SHIPPED | OUTPATIENT
Start: 2022-04-28 | End: 2022-05-05

## 2022-04-27 RX ORDER — NAPROXEN SODIUM 220 MG/1
81 TABLET, FILM COATED ORAL DAILY
Qty: 30 TABLET | Refills: 0 | Status: SHIPPED | OUTPATIENT
Start: 2022-04-28 | End: 2023-12-04

## 2022-04-27 RX ADMIN — MONTELUKAST 10 MG: 10 TABLET, FILM COATED ORAL at 08:04

## 2022-04-27 RX ADMIN — ESTRADIOL 0.5 MG: 0.5 TABLET ORAL at 08:04

## 2022-04-27 RX ADMIN — METOPROLOL SUCCINATE 50 MG: 50 TABLET, FILM COATED, EXTENDED RELEASE ORAL at 08:04

## 2022-04-27 RX ADMIN — DULOXETINE HYDROCHLORIDE 60 MG: 30 CAPSULE, DELAYED RELEASE ORAL at 08:04

## 2022-04-27 RX ADMIN — LISINOPRIL 40 MG: 40 TABLET ORAL at 08:04

## 2022-04-27 RX ADMIN — ASPIRIN 81 MG 81 MG: 81 TABLET ORAL at 08:04

## 2022-04-27 RX ADMIN — CETIRIZINE HYDROCHLORIDE 10 MG: 10 TABLET, FILM COATED ORAL at 08:04

## 2022-04-27 RX ADMIN — PROGESTERONE 100 MG: 100 CAPSULE ORAL at 08:04

## 2022-04-27 NOTE — HOSPITAL COURSE
56-year-old female smoker with history of hypertension, family history of premature CAD presented to the hospital after an episode of non prodromal syncope.  Troponin was mildly elevated at 84, EKG showed left bundle-branch block. Patient denied any preceding symptoms prior to her syncope.  She got out of her shower was walking to her mother's room and collapsed without any other symptoms, denied any chest pains, dyspnea or palpitations. EMS was called and no report of any arrhythmias.  Echocardiogram done showed mildly depressed LVEF of 40-45% with anterior wall hypokinesis.  Tele was reviewed which showed a 3 minute run of tachycardia at 150 beats per minute yesterday afternoon.  It can be ventricular tachycardia versus supraventricular tachycardia, unable to distinguish as she has an underlying left bundle-branch block.  A LHC was performed on 04/25 which did not reveal any significant CAD and no vessel blockage. Cardiology recommended maximizing medical management. They did recommend that the patient wear a life vest until an underlying arrhythmia can be ruled out in an outpatient EP test. The patient will also see a outpatient Neurologist to rule out an underlying seizure disorder as there cause for her syncope.

## 2022-04-27 NOTE — PLAN OF CARE
Spoke with Bart at [facility] who states pt was fitted yesterday evening for LifeVest.

## 2022-04-27 NOTE — PLAN OF CARE
[facility] - [address] [place] [facility] Tele[facility]  Discharge Final Note    Primary Care Provider: Tami Campuzano MD    Expected Discharge Date: 4/27/2022    Final Discharge Note (most recent)     Final Note - 04/27/22 1247        Final Note    Assessment Type Final Discharge Note     Anticipated Discharge Disposition Home or Self Care        Post-Acute Status    Post-Acute Authorization HME;Other     HME Status Set-up Complete/Auth obtained     Other Status See Comments   cardiac rehab    Discharge Delays None known at this time                 Important Message from Medicare             Contact Info     Tami Campuzano MD   Specialty: Family Medicine   Relationship: PCP - General    [address] [facility] [address]   Phone: [phone]       Next Steps: Schedule an appointment as soon as possible for a visit    Instructions: As needed    Jovani Cordova MD   Specialty: Internal Medicine    [address] [address]   Phone: [phone]       Next Steps: Schedule an appointment as soon as possible for a visit    ALEA Mcconnell   Specialty: Neurology, Emergency Medicine    [address] [place] [id number]   Phone: [phone]       Next Steps: Follow up      pt d/c home with LifeVest from [facility] and cardiac rehab at [facility]. D/c summary faxed. No further needs.

## 2022-04-27 NOTE — PLAN OF CARE
Problem: Adult Inpatient Plan of Care  Goal: Plan of Care Review  Outcome: Met  Goal: Patient-Specific Goal (Individualized)  Outcome: Met  Goal: Absence of Hospital-Acquired Illness or Injury  Outcome: Met  Goal: Optimal Comfort and Wellbeing  Outcome: Met  Goal: Readiness for Transition of Care  Outcome: Met     Problem: Fall Injury Risk  Goal: Absence of Fall and Fall-Related Injury  Outcome: Met     Problem: Anxiety  Goal: Anxiety Reduction or Resolution  Outcome: Met

## 2022-05-05 ENCOUNTER — OFFICE VISIT (OUTPATIENT)
Dept: FAMILY MEDICINE | Facility: CLINIC | Age: 57
End: 2022-05-05
Payer: COMMERCIAL

## 2022-05-05 VITALS
DIASTOLIC BLOOD PRESSURE: 92 MMHG | OXYGEN SATURATION: 95 % | WEIGHT: 138 LBS | BODY MASS INDEX: 24.45 KG/M2 | TEMPERATURE: 98 F | SYSTOLIC BLOOD PRESSURE: 137 MMHG | HEART RATE: 94 BPM | HEIGHT: 63 IN

## 2022-05-05 DIAGNOSIS — N95.2 ATROPHIC VAGINITIS: ICD-10-CM

## 2022-05-05 DIAGNOSIS — I10 HYPERTENSION, UNSPECIFIED TYPE: Primary | ICD-10-CM

## 2022-05-05 DIAGNOSIS — G43.109 COMPLICATED MIGRAINE: ICD-10-CM

## 2022-05-05 DIAGNOSIS — F41.9 ANXIETY DISORDER, UNSPECIFIED TYPE: ICD-10-CM

## 2022-05-05 DIAGNOSIS — R55 SYNCOPE, UNSPECIFIED SYNCOPE TYPE: ICD-10-CM

## 2022-05-05 LAB
ALBUMIN SERPL BCP-MCNC: 4.4 G/DL (ref 3.5–5)
ALBUMIN/GLOB SERPL: 1.2 {RATIO}
ALP SERPL-CCNC: 134 U/L (ref 46–118)
ALT SERPL W P-5'-P-CCNC: 23 U/L (ref 13–56)
ANION GAP SERPL CALCULATED.3IONS-SCNC: 17 MMOL/L (ref 7–16)
AST SERPL W P-5'-P-CCNC: 18 U/L (ref 15–37)
BILIRUB SERPL-MCNC: 0.9 MG/DL (ref 0–1.2)
BUN SERPL-MCNC: 15 MG/DL (ref 7–18)
BUN/CREAT SERPL: 12 (ref 6–20)
CALCIUM SERPL-MCNC: 9.5 MG/DL (ref 8.5–10.1)
CHLORIDE SERPL-SCNC: 90 MMOL/L (ref 98–107)
CO2 SERPL-SCNC: 27 MMOL/L (ref 21–32)
CREAT SERPL-MCNC: 1.22 MG/DL (ref 0.55–1.02)
GLOBULIN SER-MCNC: 3.6 G/DL (ref 2–4)
GLUCOSE SERPL-MCNC: 89 MG/DL (ref 74–106)
POTASSIUM SERPL-SCNC: 5.6 MMOL/L (ref 3.5–5.1)
PROT SERPL-MCNC: 8 G/DL (ref 6.4–8.2)
SODIUM SERPL-SCNC: 128 MMOL/L (ref 136–145)

## 2022-05-05 PROCEDURE — 1111F DSCHRG MED/CURRENT MED MERGE: CPT | Mod: GC,,, | Performed by: FAMILY MEDICINE

## 2022-05-05 PROCEDURE — 80053 COMPREHEN METABOLIC PANEL: CPT | Mod: ,,, | Performed by: CLINICAL MEDICAL LABORATORY

## 2022-05-05 PROCEDURE — 99213 PR OFFICE/OUTPT VISIT, EST, LEVL III, 20-29 MIN: ICD-10-PCS | Mod: GC,,, | Performed by: FAMILY MEDICINE

## 2022-05-05 PROCEDURE — 1111F PR DISCHARGE MEDS RECONCILED W/ CURRENT OUTPATIENT MED LIST: ICD-10-PCS | Mod: GC,,, | Performed by: FAMILY MEDICINE

## 2022-05-05 PROCEDURE — 99213 OFFICE O/P EST LOW 20 MIN: CPT | Mod: GC,,, | Performed by: FAMILY MEDICINE

## 2022-05-05 PROCEDURE — 80053 COMPREHENSIVE METABOLIC PANEL: ICD-10-PCS | Mod: ,,, | Performed by: CLINICAL MEDICAL LABORATORY

## 2022-05-05 RX ORDER — ARIPIPRAZOLE 2 MG/1
2 TABLET ORAL DAILY
COMMUNITY
Start: 2022-04-29

## 2022-05-05 RX ORDER — METOPROLOL SUCCINATE 50 MG/1
50 TABLET, EXTENDED RELEASE ORAL DAILY
Qty: 30 TABLET | Refills: 11 | Status: SHIPPED | OUTPATIENT
Start: 2022-05-05 | End: 2022-06-13 | Stop reason: SDUPTHER

## 2022-05-05 RX ORDER — METOPROLOL SUCCINATE 100 MG/1
100 TABLET, EXTENDED RELEASE ORAL DAILY
Qty: 30 TABLET | Refills: 0 | Status: SHIPPED | OUTPATIENT
Start: 2022-05-05 | End: 2022-05-05

## 2022-05-05 NOTE — PROGRESS NOTES
Subjective:       Patient ID: Flory Chacon is a 56 y.o. female.    Chief Complaint: Hospital Follow Up, Neck Pain, and Migraine    Flory Chacon is a 56 y.o. female presenting to [facility] for post hospital follow up. Patient  has a past medical history of Anxiety disorder, unspecified, Syncope, Depression, Hypertension, and Insomnia. She was recently hospitalized at [facility] for a syncopal episode. A detailed cardiac work up revealed possible SVTs and patient has been referred to an Electrophysiologist in [place]. There was also concern for possible seizure   Patient has no acute complaints today. She is scheduled to see cardiology today and has an upcoming appointment for Neurological evaluations.   She has a Life Vest for systolic heart failure of EF 40-45%. Patient has not experienced any syncopal ep(isodes since discharge.         Current Outpatient Medications:     ABILIFY 2 mg Tab, Take 2 mg by mouth once daily., Disp: , Rfl:     ALPRAZolam (XANAX) 1 MG tablet, , Disp: , Rfl:     aspirin 81 MG Chew, Take 1 tablet (81 mg total) by mouth once daily., Disp: 30 tablet, Rfl: 0    cetirizine (ZYRTEC) 10 MG tablet, Take 10 mg by mouth once daily., Disp: , Rfl:     DULoxetine (CYMBALTA) 60 MG capsule, , Disp: , Rfl:     EMGALITY SYRINGE 120 mg/mL Syrg, INJECT SUBCUTANEOUS 2 ML THE FIRST MONTH AND THEN 1 ML ONCE MONTHLLY, Disp: , Rfl:     ezetimibe (ZETIA) 10 mg tablet, Take 1 tablet (10 mg total) by mouth every evening., Disp: 90 tablet, Rfl: 3    lisinopriL (PRINIVIL,ZESTRIL) 30 MG tablet, , Disp: , Rfl:     montelukast (SINGULAIR) 10 mg tablet, Take 10 mg by mouth once daily., Disp: , Rfl:     NURTEC 75 mg odt, DISSOLVE 1 TABLET BY MOUTH ONCE DAILY AS NEEDED FOR HEADACHE, Disp: , Rfl:     ondansetron (ZOFRAN-ODT) 4 MG TbDL, DISSOLVE 1 TABLET ON TONGUE ONCE DAILY, Disp: , Rfl:     zolpidem (AMBIEN) 10 mg Tab, , Disp: , Rfl:     estradioL (ESTRACE) 0.01 % (0.1 mg/gram) vaginal cream, Place 2 g vaginally  once daily., Disp: 60 g, Rfl: 11    hydrocortisone 2.5 % cream, APPLY CREAM TO AFFECTED AREA TO AFFECTED AREA ONCE DAILY FOR 5 DAYS TO UPPER BILATERAL EXTREMITIES, Disp: , Rfl:     metoprolol succinate (TOPROL-XL) 50 MG 24 hr tablet, Take 1 tablet (50 mg total) by mouth once daily., Disp: 30 tablet, Rfl: 11    triamcinolone acetonide 0.1% (KENALOG) 0.1 % ointment, Apply topically 2 (two) times daily. (Patient not taking: Reported on 5/5/2022), Disp: 80 g, Rfl: 5    Review of patient's allergies indicates:   Allergen Reactions    Eggs [egg derived]     Mobic [meloxicam]     Penicillins     Statins-hmg-coa reductase inhibitors        Past Medical History:   Diagnosis Date    Anxiety disorder, unspecified     Depression     Hypertension     Insomnia        Past Surgical History:   Procedure Laterality Date    LEFT HEART CATHETERIZATION Left 4/25/2022    Procedure: Left heart cath;  Surgeon: Malachi Cormier DO;  Location: [facility] CATH LAB;  Service: Cardiology;  Laterality: Left;       Family History   Problem Relation Age of Onset    Heart disease Father     Melanoma Maternal Uncle        Social History     Tobacco Use    Smoking status: Former Smoker    Smokeless tobacco: Never Used   Substance Use Topics    Alcohol use: Never    Drug use: Never       Review of Systems   Constitutional: Negative for activity change, appetite change, chills, diaphoresis, fatigue, fever and unexpected weight change.   HENT: Negative for nasal congestion, ear discharge, ear pain, postnasal drip and rhinorrhea.    Eyes: Negative for pain, discharge, redness and itching.   Respiratory: Negative for cough, chest tightness, shortness of breath and wheezing.    Cardiovascular: Negative for chest pain, palpitations, leg swelling and claudication.   Gastrointestinal: Negative for abdominal pain, blood in stool, change in bowel habit, constipation, diarrhea, nausea, vomiting and change in bowel habit.   Endocrine: Negative  for cold intolerance and heat intolerance.   Genitourinary: Negative for decreased urine volume, difficulty urinating, dysuria, enuresis, flank pain, frequency, hematuria and urgency.   Musculoskeletal: Negative for joint swelling, leg pain, myalgias and neck pain.   Integumentary:  Negative for color change and rash.   Neurological: Negative for dizziness, seizures, syncope, speech difficulty, weakness, light-headedness, numbness and headaches.   Psychiatric/Behavioral: Negative for agitation, behavioral problems, confusion, dysphoric mood and sleep disturbance. The patient is not nervous/anxious.          Current Medications:   Medication List with Changes/Refills   New Medications    ESTRADIOL (ESTRACE) 0.01 % (0.1 MG/GRAM) VAGINAL CREAM    Place 2 g vaginally once daily.       Start Date: 5/9/2022  End Date: 5/9/2023    METOPROLOL SUCCINATE (TOPROL-XL) 50 MG 24 HR TABLET    Take 1 tablet (50 mg total) by mouth once daily.       Start Date: 5/5/2022  End Date: 5/5/2023   Current Medications    ABILIFY 2 MG TAB    Take 2 mg by mouth once daily.       Start Date: 4/29/2022 End Date: --    ALPRAZOLAM (XANAX) 1 MG TABLET           Start Date: 9/14/2021 End Date: --    ASPIRIN 81 MG CHEW    Take 1 tablet (81 mg total) by mouth once daily.       Start Date: 4/28/2022 End Date: 5/28/2022    CETIRIZINE (ZYRTEC) 10 MG TABLET    Take 10 mg by mouth once daily.       Start Date: 8/24/2021 End Date: --    DULOXETINE (CYMBALTA) 60 MG CAPSULE           Start Date: 9/13/2021 End Date: --    EMGALITY SYRINGE 120 MG/ML SYRG    INJECT SUBCUTANEOUS 2 ML THE FIRST MONTH AND THEN 1 ML ONCE MONTHLLY       Start Date: 9/2/2021  End Date: --    EZETIMIBE (ZETIA) 10 MG TABLET    Take 1 tablet (10 mg total) by mouth every evening.       Start Date: 4/27/2022 End Date: 4/27/2023    HYDROCORTISONE 2.5 % CREAM    APPLY CREAM TO AFFECTED AREA TO AFFECTED AREA ONCE DAILY FOR 5 DAYS TO UPPER BILATERAL EXTREMITIES       Start Date: 8/24/2021 End  "Date: --    LISINOPRIL (PRINIVIL,ZESTRIL) 30 MG TABLET           Start Date: 9/12/2021 End Date: --    MONTELUKAST (SINGULAIR) 10 MG TABLET    Take 10 mg by mouth once daily.       Start Date: 7/27/2021 End Date: --    NURTEC 75 MG ODT    DISSOLVE 1 TABLET BY MOUTH ONCE DAILY AS NEEDED FOR HEADACHE       Start Date: 6/11/2021 End Date: --    ONDANSETRON (ZOFRAN-ODT) 4 MG TBDL    DISSOLVE 1 TABLET ON TONGUE ONCE DAILY       Start Date: 6/8/2021  End Date: --    TRIAMCINOLONE ACETONIDE 0.1% (KENALOG) 0.1 % OINTMENT    Apply topically 2 (two) times daily.       Start Date: 9/17/2021 End Date: --    ZOLPIDEM (AMBIEN) 10 MG TAB           Start Date: 9/14/2021 End Date: --   Discontinued Medications    ESTRADIOL (ESTRACE) 0.5 MG TABLET    Take 0.5 mg by mouth once daily. for 90 days       Start Date: 7/27/2021 End Date: 5/5/2022    HYDROCHLOROTHIAZIDE (MICROZIDE) 12.5 MG CAPSULE           Start Date: 9/12/2021 End Date: 5/5/2022    METOPROLOL SUCCINATE (TOPROL-XL) 50 MG 24 HR TABLET    Take 1 tablet (50 mg total) by mouth once daily.       Start Date: 4/28/2022 End Date: 5/5/2022    PROGESTERONE (PROMETRIUM) 100 MG CAPSULE    TAKE 1 CAPSULE BY MOUTH ONCE DAILY AT BEDTIME EVERY CYCLE FOR 12 DAYS       Start Date: 9/2/2021  End Date: 5/5/2022            Objective:        Vitals:    05/05/22 1327   BP: (!) 137/92   Pulse: 94   Temp: 97.7 °F (36.5 °C)   TempSrc: Temporal   SpO2: 95%   Weight: 62.6 kg (138 lb)   Height: 5' 3" (1.6 m)       Physical Exam  Vitals and nursing note reviewed.   Constitutional:       General: She is not in acute distress.     Appearance: Normal appearance. She is normal weight.   HENT:      Head: Normocephalic and atraumatic.      Right Ear: Ear canal and external ear normal. There is no impacted cerumen.      Left Ear: Ear canal and external ear normal. There is no impacted cerumen.      Nose: Nose normal. No congestion or rhinorrhea.      Mouth/Throat:      Mouth: Mucous membranes are moist.      " Pharynx: Oropharynx is clear. No oropharyngeal exudate or posterior oropharyngeal erythema.   Eyes:      General: No scleral icterus.        Right eye: No discharge.         Left eye: No discharge.      Conjunctiva/sclera: Conjunctivae normal.      Pupils: Pupils are equal, round, and reactive to light.   Cardiovascular:      Rate and Rhythm: Normal rate and regular rhythm.      Pulses: Normal pulses.      Heart sounds: Normal heart sounds. No murmur heard.    No gallop.   Pulmonary:      Effort: Pulmonary effort is normal. No respiratory distress.      Breath sounds: Normal breath sounds. No wheezing, rhonchi or rales.   Abdominal:      General: Abdomen is flat. Bowel sounds are normal. There is no distension.      Palpations: Abdomen is soft.      Tenderness: There is no abdominal tenderness. There is no guarding or rebound.   Musculoskeletal:         General: No deformity.      Cervical back: Normal range of motion and neck supple. No rigidity.      Right lower leg: No edema.      Left lower leg: No edema.   Lymphadenopathy:      Cervical: No cervical adenopathy.   Skin:     General: Skin is warm.      Coloration: Skin is not jaundiced or pale.      Findings: No bruising, erythema, lesion or rash.   Neurological:      General: No focal deficit present.      Mental Status: She is alert and oriented to person, place, and time. Mental status is at baseline.      Motor: No weakness.   Psychiatric:         Mood and Affect: Mood normal.         Behavior: Behavior normal.               Lab Results   Component Value Date    WBC 10.62 04/27/2022    HGB 9.3 (L) 04/27/2022    HCT 28.2 (L) 04/27/2022     (H) 04/27/2022    CHOL 197 04/23/2022    TRIG 82 04/23/2022    HDL 85 (H) 04/23/2022    ALT 23 05/05/2022    AST 18 05/05/2022     (L) 05/05/2022    K 5.6 (H) 05/05/2022    CL 90 (L) 05/05/2022    CREATININE 1.22 (H) 05/05/2022    BUN 15 05/05/2022    CO2 27 05/05/2022    INR 0.99 04/23/2022      Assessment:        1. Hypertension, unspecified type    2. Atrophic vaginitis    3. Anxiety disorder, unspecified type    4. Complicated migraine    5. Syncope, unspecified syncope type        Plan:         Problem List Items Addressed This Visit        Neuro    Complicated migraine     Wean off estradiol  Patient would like to stop progesterone as well. Explained patient might experience withdrawal bleeding with stopping progesterone.    Continue migraine therapy as prescribed              Psychiatric    Anxiety disorder, unspecified     Patient is on Cymbalta, xanax and Abilify per cecille              Cardiac/Vascular    Hypertension - Primary    Relevant Medications    metoprolol succinate (TOPROL-XL) 50 MG 24 hr tablet    Other Relevant Orders    Comprehensive Metabolic Panel (Completed)       Renal/    Atrophic vaginitis     Stop HRT for atrophic vaginitis   Start Topical estrogens           Relevant Medications    estradioL (ESTRACE) 0.01 % (0.1 mg/gram) vaginal cream       Other    Syncope     Acute work up wnl in the hospital besides episodes of SVT  Patient is scheduled to see Neurology and EP Cardiology for further evaluation.                   Follow up in about 3 months (around 8/5/2022).    Jade Epperson MD     Instructed patient that if symptoms fail to improve or worsen patient should seek immediate medical attention or report to the nearest emergency department. Patient expressed verbal agreement and understanding to this plan of care.

## 2022-05-09 PROBLEM — G43.109 COMPLICATED MIGRAINE: Status: ACTIVE | Noted: 2022-05-09

## 2022-05-09 RX ORDER — ESTRADIOL 0.1 MG/G
2 CREAM VAGINAL DAILY
Qty: 60 G | Refills: 11 | Status: SHIPPED | OUTPATIENT
Start: 2022-05-09 | End: 2024-03-04

## 2022-05-09 NOTE — ASSESSMENT & PLAN NOTE
Wean off estradiol  Patient would like to stop progesterone as well. Explained patient might experience withdrawal bleeding with stopping progesterone.    Continue migraine therapy as prescribed

## 2022-05-09 NOTE — ASSESSMENT & PLAN NOTE
Acute work up wnl in the hospital besides episodes of SVT  Patient is scheduled to see Neurology and EP Cardiology for further evaluation.

## 2022-05-26 DIAGNOSIS — F41.9 ANXIETY DISORDER, UNSPECIFIED TYPE: Primary | ICD-10-CM

## 2022-05-26 RX ORDER — LISINOPRIL 20 MG/1
20 TABLET ORAL DAILY
Qty: 90 TABLET | Refills: 3 | Status: ON HOLD | OUTPATIENT
Start: 2022-05-26 | End: 2023-02-09 | Stop reason: HOSPADM

## 2022-07-18 PROCEDURE — 85610 PROTHROMBIN TIME: CPT | Performed by: INTERNAL MEDICINE

## 2022-07-25 ENCOUNTER — OFFICE VISIT (OUTPATIENT)
Dept: NEUROLOGY | Facility: CLINIC | Age: 57
End: 2022-07-25
Payer: COMMERCIAL

## 2022-07-25 VITALS
BODY MASS INDEX: 24.45 KG/M2 | RESPIRATION RATE: 18 BRPM | OXYGEN SATURATION: 97 % | HEART RATE: 90 BPM | DIASTOLIC BLOOD PRESSURE: 72 MMHG | SYSTOLIC BLOOD PRESSURE: 136 MMHG | WEIGHT: 138 LBS | HEIGHT: 63 IN

## 2022-07-25 DIAGNOSIS — R55 SYNCOPE, UNSPECIFIED SYNCOPE TYPE: ICD-10-CM

## 2022-07-25 PROCEDURE — 1159F MED LIST DOCD IN RCRD: CPT | Mod: ,,, | Performed by: NURSE PRACTITIONER

## 2022-07-25 PROCEDURE — 4010F PR ACE/ARB THEARPY RXD/TAKEN: ICD-10-PCS | Mod: ,,, | Performed by: NURSE PRACTITIONER

## 2022-07-25 PROCEDURE — 4010F ACE/ARB THERAPY RXD/TAKEN: CPT | Mod: ,,, | Performed by: NURSE PRACTITIONER

## 2022-07-25 PROCEDURE — 3008F BODY MASS INDEX DOCD: CPT | Mod: ,,, | Performed by: NURSE PRACTITIONER

## 2022-07-25 PROCEDURE — 1160F RVW MEDS BY RX/DR IN RCRD: CPT | Mod: ,,, | Performed by: NURSE PRACTITIONER

## 2022-07-25 PROCEDURE — 3078F DIAST BP <80 MM HG: CPT | Mod: ,,, | Performed by: NURSE PRACTITIONER

## 2022-07-25 PROCEDURE — 99203 OFFICE O/P NEW LOW 30 MIN: CPT | Mod: S$PBB,,, | Performed by: NURSE PRACTITIONER

## 2022-07-25 PROCEDURE — 3078F PR MOST RECENT DIASTOLIC BLOOD PRESSURE < 80 MM HG: ICD-10-PCS | Mod: ,,, | Performed by: NURSE PRACTITIONER

## 2022-07-25 PROCEDURE — 3075F SYST BP GE 130 - 139MM HG: CPT | Mod: ,,, | Performed by: NURSE PRACTITIONER

## 2022-07-25 PROCEDURE — 3075F PR MOST RECENT SYSTOLIC BLOOD PRESS GE 130-139MM HG: ICD-10-PCS | Mod: ,,, | Performed by: NURSE PRACTITIONER

## 2022-07-25 PROCEDURE — 99215 OFFICE O/P EST HI 40 MIN: CPT | Mod: PBBFAC | Performed by: NURSE PRACTITIONER

## 2022-07-25 PROCEDURE — 99203 PR OFFICE/OUTPT VISIT, NEW, LEVL III, 30-44 MIN: ICD-10-PCS | Mod: S$PBB,,, | Performed by: NURSE PRACTITIONER

## 2022-07-25 PROCEDURE — 1159F PR MEDICATION LIST DOCUMENTED IN MEDICAL RECORD: ICD-10-PCS | Mod: ,,, | Performed by: NURSE PRACTITIONER

## 2022-07-25 PROCEDURE — 3008F PR BODY MASS INDEX (BMI) DOCUMENTED: ICD-10-PCS | Mod: ,,, | Performed by: NURSE PRACTITIONER

## 2022-07-25 PROCEDURE — 1160F PR REVIEW ALL MEDS BY PRESCRIBER/CLIN PHARMACIST DOCUMENTED: ICD-10-PCS | Mod: ,,, | Performed by: NURSE PRACTITIONER

## 2022-07-25 NOTE — PROGRESS NOTES
Subjective:       Patient ID: Flory Chacon is a 56 y.o. female     Chief Complaint:    Chief Complaint   Patient presents with    Referral    Pre Syncope        Allergies:  Eggs [egg derived], Mobic [meloxicam], Penicillins, and Statins-hmg-coa reductase inhibitors    Current Medications:    Outpatient Encounter Medications as of 7/25/2022   Medication Sig Dispense Refill    ABILIFY 2 mg Tab Take 2 mg by mouth once daily.      ALPRAZolam (XANAX) 1 MG tablet       cetirizine (ZYRTEC) 10 MG tablet Take 10 mg by mouth once daily.      DULoxetine (CYMBALTA) 60 MG capsule       EMGALITY SYRINGE 120 mg/mL Syrg INJECT SUBCUTANEOUS 2 ML THE FIRST MONTH AND THEN 1 ML ONCE MONTHLLY      estradioL (ESTRACE) 0.01 % (0.1 mg/gram) vaginal cream Place 2 g vaginally once daily. 60 g 11    ezetimibe (ZETIA) 10 mg tablet Take 1 tablet (10 mg total) by mouth every evening. 90 tablet 3    hydrocortisone 2.5 % cream APPLY CREAM TO AFFECTED AREA TO AFFECTED AREA ONCE DAILY FOR 5 DAYS TO UPPER BILATERAL EXTREMITIES      metoprolol succinate (TOPROL-XL) 50 MG 24 hr tablet Take 1 tablet (50 mg total) by mouth once daily. 90 tablet 0    montelukast (SINGULAIR) 10 mg tablet Take 10 mg by mouth once daily.      NURTEC 75 mg odt DISSOLVE 1 TABLET BY MOUTH ONCE DAILY AS NEEDED FOR HEADACHE      ondansetron (ZOFRAN-ODT) 4 MG TbDL DISSOLVE 1 TABLET ON TONGUE ONCE DAILY      zolpidem (AMBIEN) 10 mg Tab       aspirin 81 MG Chew Take 1 tablet (81 mg total) by mouth once daily. 30 tablet 0    lisinopriL (PRINIVIL,ZESTRIL) 20 MG tablet Take 1 tablet (20 mg total) by mouth once daily. (Patient not taking: Reported on 7/25/2022) 90 tablet 3    triamcinolone acetonide 0.1% (KENALOG) 0.1 % ointment Apply topically 2 (two) times daily. (Patient not taking: No sig reported) 80 g 5    [DISCONTINUED] atenoloL (TENORMIN) 100 MG tablet Take 100 mg by mouth once daily. for 90 days      [DISCONTINUED] propranoloL (INDERAL) 10 MG tablet         No facility-administered encounter medications on file as of 7/25/2022.       History of Present Illness  57 y/o female new referral to neurology for reported syncope.    Was actually admitted to hospital in April 2022 with reported syncope, there is EEG noted done in April of 2022 noted negative.  MRI brain at that time did not indicate acute abnormalities, nor did carotid doppler report stenosis.  Her monitoring at that time was concerning for SVT, she is following with cardiology at [facility],  She actually found with SVT, and has now had pacemaker placed as this was felt to be the cause of her syncope events.  Since its placement she denies further events of weakness or syncope.    I reviewed her prior workup.  DIsucssed option of in home VEEG, but she declines currently though if any further events she is certainly open to having this done.    Denies other complaints.         Review of Systems  Review of Systems   Constitutional: Negative for diaphoresis and fever.   HENT: Negative for congestion, hearing loss and tinnitus.    Eyes: Negative for blurred vision, double vision, photophobia, discharge and redness.   Respiratory: Negative for cough and shortness of breath.    Cardiovascular: Negative for chest pain.   Gastrointestinal: Negative for abdominal pain, nausea and vomiting.   Musculoskeletal: Negative for back pain, joint pain, myalgias and neck pain.   Skin: Negative for itching and rash.   Neurological: Positive for loss of consciousness. Negative for dizziness, tremors, sensory change, speech change, focal weakness, seizures, weakness and headaches.   Psychiatric/Behavioral: Negative for depression, hallucinations and memory loss. The patient does not have insomnia.    All other systems reviewed and are negative.     Objective:     NEUROLOGICAL EXAMINATION:     MENTAL STATUS   Oriented to person, place, and time.   Registration: recalls 3 of 3 objects. Recall at 5 minutes: recalls 3 of 3  objects.   Attention: normal. Concentration: normal.   Speech: speech is normal   Level of consciousness: alert  Knowledge: good and consistent with education.   Normal comprehension.     CRANIAL NERVES     CN II   Visual fields full to confrontation.   Visual acuity: normal  Right visual field deficit: none  Left visual field deficit: none     CN III, IV, VI   Pupils are equal, round, and reactive to light.  Extraocular motions are normal.   Right pupil: Size: 3 mm. Shape: regular. Reactivity: brisk. Consensual response: intact. Accommodation: intact.   Left pupil: Size: 3 mm. Shape: regular. Reactivity: brisk. Consensual response: intact. Accommodation: intact.   CN III: no CN III palsy  CN VI: no CN VI palsy  Nystagmus: none   Diplopia: none  Upgaze: normal  Downgaze: normal  Conjugate gaze: present  Vestibulo-ocular reflex: present    CN V   Facial sensation intact.   Right facial sensation deficit: none  Left facial sensation deficit: none  Right corneal reflex: normal  Left corneal reflex: normal    CN VII   Facial expression full, symmetric.   Right facial weakness: none  Left facial weakness: none  Right taste: normal  Left taste: normal    CN VIII   CN VIII normal.   Hearing: intact    CN IX, X   CN IX normal.   CN X normal.   Palate: symmetric    CN XI   CN XI normal.   Right sternocleidomastoid strength: normal  Left sternocleidomastoid strength: normal  Right trapezius strength: normal  Left trapezius strength: normal    CN XII   CN XII normal.   Tongue: not atrophic  Fasciculations: absent  Tongue deviation: none    MOTOR EXAM   Muscle bulk: normal  Overall muscle tone: normal  Right arm tone: normal  Left arm tone: normal  Right arm pronator drift: absent  Left arm pronator drift: absent  Right leg tone: normal  Left leg tone: normal    Strength   Right neck flexion: 5/5  Left neck flexion: 5/5  Right neck extension: 5/5  Left neck extension: 5/5  Right deltoid: 5/5  Left deltoid: 5/5  Right biceps:  5/5  Left biceps: 5/5  Right triceps: 5/5  Left triceps: 5/5  Right wrist flexion: 5/5  Left wrist flexion: 5/5  Right wrist extension: 5/5  Left wrist extension: /5  Right interossei: 5/5  Left interossei: 5/5  Right iliopsoas:   Left iliopsoas:   Right quadriceps:   Left quadriceps:   Right hamstrin/5  Left hamstrin/5  Right anterior tibial:   Left anterior tibial:   Right posterior tibial:   Left posterior tibial:   Right gastroc:   Left gastroc:     REFLEXES     Reflexes   Right brachioradialis: 2+  Left brachioradialis: 2+  Right biceps: 2+  Left biceps: 2+  Right triceps: 2+  Left triceps: 2+  Right patellar: 2+  Left patellar: 2+  Right achilles: 2+  Left achilles: 2+  Right plantar: normal  Left plantar: normal  Right Otero: absent  Left Otero: absent  Right ankle clonus: absent  Left ankle clonus: absent  Right pendular knee jerk: absent  Left pendular knee jerk: absent    SENSORY EXAM   Light touch normal.   Right arm light touch: normal  Left arm light touch: normal  Right leg light touch: normal  Left leg light touch: normal  Vibration normal.   Right arm vibration: normal  Left arm vibration: normal  Right leg vibration: normal  Left leg vibration: normal  Proprioception normal.   Right arm proprioception: normal  Left arm proprioception: normal  Right leg proprioception: normal  Left leg proprioception: normal  Pinprick normal.   Right arm pinprick: normal  Left arm pinprick: normal  Right leg pinprick: normal  Left leg pinprick: normal  Graphesthesia: normal  Romberg: negative  Stereognosis: normal    GAIT AND COORDINATION     Gait  Gait: normal     Coordination   Finger to nose coordination: normal  Heel to shin coordination: normal  Tandem walking coordination: normal    Tremor   Resting tremor: absent  Intention tremor: absent  Action tremor: absent       Physical Exam  Vitals and nursing note reviewed.   Constitutional:       Appearance: Normal  appearance.   HENT:      Head: Normocephalic.   Eyes:      Extraocular Movements: Extraocular movements intact and EOM normal.      Pupils: Pupils are equal, round, and reactive to light.   Cardiovascular:      Rate and Rhythm: Normal rate and regular rhythm.   Pulmonary:      Effort: Pulmonary effort is normal.      Breath sounds: Normal breath sounds.   Musculoskeletal:         General: No swelling or tenderness. Normal range of motion.      Cervical back: Normal range of motion and neck supple.      Right lower leg: No edema.      Left lower leg: No edema.   Skin:     General: Skin is warm and dry.      Coloration: Skin is not jaundiced.      Findings: No rash.   Neurological:      General: No focal deficit present.      Mental Status: She is alert and oriented to person, place, and time.      GCS: GCS eye subscore is 4. GCS verbal subscore is 5. GCS motor subscore is 6.      Cranial Nerves: No cranial nerve deficit.      Sensory: No sensory deficit.      Motor: Motor function is intact. No weakness.      Coordination: Coordination is intact. Coordination normal. Finger-Nose-Finger Test, Heel to Shin Test and Romberg Test normal.      Gait: Gait is intact. Gait and tandem walk normal.      Deep Tendon Reflexes: Reflexes normal.      Reflex Scores:       Tricep reflexes are 2+ on the right side and 2+ on the left side.       Bicep reflexes are 2+ on the right side and 2+ on the left side.       Brachioradialis reflexes are 2+ on the right side and 2+ on the left side.       Patellar reflexes are 2+ on the right side and 2+ on the left side.       Achilles reflexes are 2+ on the right side and 2+ on the left side.  Psychiatric:         Mood and Affect: Mood normal.         Speech: Speech normal.         Behavior: Behavior normal.          Assessment:     Problem List Items Addressed This Visit        Other    Syncope           Primary Diagnosis and ICD10  No primary diagnosis found.    Plan:     Patient  Instructions   1. Continue followup with cardiology  2. Notify clinic if any further syncope events      There are no discontinued medications.    Requested Prescriptions      No prescriptions requested or ordered in this encounter       No orders of the defined types were placed in this encounter.

## 2022-08-02 ENCOUNTER — OFFICE VISIT (OUTPATIENT)
Dept: CARDIOLOGY | Facility: CLINIC | Age: 57
End: 2022-08-02
Payer: COMMERCIAL

## 2022-08-02 VITALS
HEART RATE: 96 BPM | HEIGHT: 63 IN | DIASTOLIC BLOOD PRESSURE: 100 MMHG | BODY MASS INDEX: 22.86 KG/M2 | WEIGHT: 129 LBS | SYSTOLIC BLOOD PRESSURE: 132 MMHG | OXYGEN SATURATION: 99 %

## 2022-08-02 DIAGNOSIS — R00.0 TACHYCARDIA: Primary | ICD-10-CM

## 2022-08-02 DIAGNOSIS — F41.9 ANXIETY DISORDER, UNSPECIFIED TYPE: ICD-10-CM

## 2022-08-02 DIAGNOSIS — R00.2 INTERMITTENT PALPITATIONS: ICD-10-CM

## 2022-08-02 PROCEDURE — 3080F DIAST BP >= 90 MM HG: CPT | Mod: ,,, | Performed by: INTERNAL MEDICINE

## 2022-08-02 PROCEDURE — 93010 EKG 12-LEAD: ICD-10-PCS | Mod: S$PBB,,, | Performed by: INTERNAL MEDICINE

## 2022-08-02 PROCEDURE — 3075F SYST BP GE 130 - 139MM HG: CPT | Mod: ,,, | Performed by: INTERNAL MEDICINE

## 2022-08-02 PROCEDURE — 99215 OFFICE O/P EST HI 40 MIN: CPT | Mod: PBBFAC | Performed by: INTERNAL MEDICINE

## 2022-08-02 PROCEDURE — 3008F PR BODY MASS INDEX (BMI) DOCUMENTED: ICD-10-PCS | Mod: ,,, | Performed by: INTERNAL MEDICINE

## 2022-08-02 PROCEDURE — 99214 PR OFFICE/OUTPT VISIT, EST, LEVL IV, 30-39 MIN: ICD-10-PCS | Mod: S$PBB,,, | Performed by: INTERNAL MEDICINE

## 2022-08-02 PROCEDURE — 1159F PR MEDICATION LIST DOCUMENTED IN MEDICAL RECORD: ICD-10-PCS | Mod: ,,, | Performed by: INTERNAL MEDICINE

## 2022-08-02 PROCEDURE — 3080F PR MOST RECENT DIASTOLIC BLOOD PRESSURE >= 90 MM HG: ICD-10-PCS | Mod: ,,, | Performed by: INTERNAL MEDICINE

## 2022-08-02 PROCEDURE — 4010F ACE/ARB THERAPY RXD/TAKEN: CPT | Mod: ,,, | Performed by: INTERNAL MEDICINE

## 2022-08-02 PROCEDURE — 1159F MED LIST DOCD IN RCRD: CPT | Mod: ,,, | Performed by: INTERNAL MEDICINE

## 2022-08-02 PROCEDURE — 1160F RVW MEDS BY RX/DR IN RCRD: CPT | Mod: ,,, | Performed by: INTERNAL MEDICINE

## 2022-08-02 PROCEDURE — 93005 ELECTROCARDIOGRAM TRACING: CPT | Mod: PBBFAC | Performed by: INTERNAL MEDICINE

## 2022-08-02 PROCEDURE — 3008F BODY MASS INDEX DOCD: CPT | Mod: ,,, | Performed by: INTERNAL MEDICINE

## 2022-08-02 PROCEDURE — 93010 ELECTROCARDIOGRAM REPORT: CPT | Mod: S$PBB,,, | Performed by: INTERNAL MEDICINE

## 2022-08-02 PROCEDURE — 1160F PR REVIEW ALL MEDS BY PRESCRIBER/CLIN PHARMACIST DOCUMENTED: ICD-10-PCS | Mod: ,,, | Performed by: INTERNAL MEDICINE

## 2022-08-02 PROCEDURE — 4010F PR ACE/ARB THEARPY RXD/TAKEN: ICD-10-PCS | Mod: ,,, | Performed by: INTERNAL MEDICINE

## 2022-08-02 PROCEDURE — 99214 OFFICE O/P EST MOD 30 MIN: CPT | Mod: S$PBB,,, | Performed by: INTERNAL MEDICINE

## 2022-08-02 PROCEDURE — 3075F PR MOST RECENT SYSTOLIC BLOOD PRESS GE 130-139MM HG: ICD-10-PCS | Mod: ,,, | Performed by: INTERNAL MEDICINE

## 2022-08-02 RX ORDER — SACUBITRIL AND VALSARTAN 24; 26 MG/1; MG/1
1 TABLET, FILM COATED ORAL 2 TIMES DAILY
COMMUNITY
End: 2024-01-02 | Stop reason: SDUPTHER

## 2022-08-02 NOTE — PROGRESS NOTES
Cardiology Clinic Note:    PCP: Tami Campuzano MD    REFERRING PHYSICIAN: Tami Campuzano MD    CHIEF COMPLAINT: Palpitations     HISTORY OF PRESENT ILLNESS:  Flory Chacon is a 56 y.o. female who presents for evaluation  S/p ablation and pacemaker, SVT     Patient states she feels better following ablation, palpitations have resolved, has follow up with Dr. Cordova tomorrow.  Denies light headedness or dizziness. Blood pressure is elevated in clinic. Pt states she monitors blood pressure at home and has been controlled.   She states has bump under left arm       Review of Systems   Constitutional: Negative for diaphoresis, malaise/fatigue, night sweats and weight gain.   HENT: Negative for congestion, ear pain, hearing loss, nosebleeds and sore throat.    Eyes: Negative for blurred vision, double vision, pain, photophobia and visual disturbance.   Cardiovascular: Positive for palpitations and syncope. Negative for chest pain, claudication, dyspnea on exertion, irregular heartbeat, leg swelling, near-syncope and orthopnea.   Respiratory: Negative for cough, shortness of breath, sleep disturbances due to breathing, snoring and wheezing.    Endocrine: Negative for cold intolerance, heat intolerance, polydipsia, polyphagia and polyuria.   Hematologic/Lymphatic: Negative for bleeding problem. Does not bruise/bleed easily.   Skin: Negative for dry skin, flushing, itching, rash and skin cancer.   Musculoskeletal: Negative for arthritis, back pain, falls, joint pain, muscle cramps, muscle weakness and myalgias.   Gastrointestinal: Negative for abdominal pain, change in bowel habit, constipation, diarrhea, dysphagia, heartburn, nausea and vomiting.   Genitourinary: Negative for bladder incontinence, dysuria, flank pain, frequency and nocturia.   Neurological: Negative for dizziness, focal weakness, headaches, light-headedness, loss of balance, numbness, paresthesias and seizures.   Psychiatric/Behavioral: Negative for  depression, memory loss and substance abuse. The patient is not nervous/anxious.    Allergic/Immunologic: Negative for environmental allergies.          PAST MEDICAL HISTORY:  Past Medical History:   Diagnosis Date    Anxiety disorder, unspecified     Depression     Hypertension     Insomnia        PAST SURGICAL HISTORY:  Past Surgical History:   Procedure Laterality Date    LEFT HEART CATHETERIZATION Left 4/25/2022    Procedure: Left heart cath;  Surgeon: Malachi Cormier DO;  Location: [facility] CATH LAB;  Service: Cardiology;  Laterality: Left;       SOCIAL HISTORY:  Social History     Socioeconomic History    Marital status:    Tobacco Use    Smoking status: Former Smoker    Smokeless tobacco: Never Used   Substance and Sexual Activity    Alcohol use: Never    Drug use: Never    Sexual activity: Never       FAMILY HISTORY:  Family History   Problem Relation Age of Onset    Heart disease Father     Melanoma Maternal Uncle        ALLERGIES:  Allergies as of 08/02/2022 - Reviewed 08/02/2022   Allergen Reaction Noted    Eggs [egg derived]  09/17/2021    Mobic [meloxicam]  09/17/2021    Penicillins  09/17/2021    Statins-hmg-coa reductase inhibitors  09/17/2021         MEDICATIONS:  Current Outpatient Medications on File Prior to Visit   Medication Sig Dispense Refill    ABILIFY 2 mg Tab Take 2 mg by mouth once daily.      aspirin 81 MG Chew Take 1 tablet (81 mg total) by mouth once daily. 30 tablet 0    DULoxetine (CYMBALTA) 60 MG capsule       ezetimibe (ZETIA) 10 mg tablet Take 1 tablet (10 mg total) by mouth every evening. 90 tablet 3    metoprolol succinate (TOPROL-XL) 50 MG 24 hr tablet Take 1 tablet (50 mg total) by mouth once daily. 90 tablet 0    montelukast (SINGULAIR) 10 mg tablet Take 10 mg by mouth once daily.      ondansetron (ZOFRAN-ODT) 4 MG TbDL DISSOLVE 1 TABLET ON TONGUE ONCE DAILY      sacubitriL-valsartan (ENTRESTO) 24-26 mg per tablet Take 1 tablet by mouth 2  "(two) times daily.      zolpidem (AMBIEN) 10 mg Tab       ALPRAZolam (XANAX) 1 MG tablet       cetirizine (ZYRTEC) 10 MG tablet Take 10 mg by mouth once daily.      EMGALITY SYRINGE 120 mg/mL Syrg INJECT SUBCUTANEOUS 2 ML THE FIRST MONTH AND THEN 1 ML ONCE MONTHLLY      estradioL (ESTRACE) 0.01 % (0.1 mg/gram) vaginal cream Place 2 g vaginally once daily. 60 g 11    hydrocortisone 2.5 % cream APPLY CREAM TO AFFECTED AREA TO AFFECTED AREA ONCE DAILY FOR 5 DAYS TO UPPER BILATERAL EXTREMITIES      lisinopriL (PRINIVIL,ZESTRIL) 20 MG tablet Take 1 tablet (20 mg total) by mouth once daily. (Patient not taking: Reported on 7/25/2022) 90 tablet 3    NURTEC 75 mg odt DISSOLVE 1 TABLET BY MOUTH ONCE DAILY AS NEEDED FOR HEADACHE      triamcinolone acetonide 0.1% (KENALOG) 0.1 % ointment Apply topically 2 (two) times daily. (Patient not taking: No sig reported) 80 g 5    [DISCONTINUED] atenoloL (TENORMIN) 100 MG tablet Take 100 mg by mouth once daily. for 90 days      [DISCONTINUED] propranoloL (INDERAL) 10 MG tablet        No current facility-administered medications on file prior to visit.          PHYSICAL EXAM:  Blood pressure (!) 132/100, pulse 96, height 5' 3" (1.6 m), weight 58.5 kg (129 lb), SpO2 99 %.  Wt Readings from Last 3 Encounters:   08/02/22 58.5 kg (129 lb)   07/25/22 62.6 kg (138 lb)   05/05/22 62.6 kg (138 lb)      Body mass index is 22.85 kg/m².    Physical Exam  Vitals and nursing note reviewed.   Constitutional:       Appearance: Normal appearance. She is normal weight.   HENT:      Head: Normocephalic and atraumatic.      Right Ear: External ear normal.      Left Ear: External ear normal.   Eyes:      General: No scleral icterus.        Right eye: No discharge.         Left eye: No discharge.      Extraocular Movements: Extraocular movements intact.      Conjunctiva/sclera: Conjunctivae normal.      Pupils: Pupils are equal, round, and reactive to light.   Cardiovascular:      Rate and Rhythm: " Normal rate and regular rhythm.      Pulses: Normal pulses.      Heart sounds: Normal heart sounds. No murmur heard.    No friction rub. No gallop.   Pulmonary:      Effort: Pulmonary effort is normal.      Breath sounds: Normal breath sounds. No wheezing, rhonchi or rales.   Chest:      Chest wall: No tenderness.   Abdominal:      General: Abdomen is flat. Bowel sounds are normal. There is no distension.      Palpations: Abdomen is soft.      Tenderness: There is no abdominal tenderness. There is no guarding or rebound.   Musculoskeletal:         General: No swelling or tenderness. Normal range of motion.      Cervical back: Normal range of motion and neck supple.   Skin:     General: Skin is warm and dry.      Findings: No erythema or rash.      Comments: No redness around pacemaker incision   Tender cyst under left arm    Neurological:      General: No focal deficit present.      Mental Status: She is alert and oriented to person, place, and time.      Cranial Nerves: No cranial nerve deficit.      Motor: No weakness.      Gait: Gait normal.   Psychiatric:         Mood and Affect: Mood normal.         Behavior: Behavior normal.         Thought Content: Thought content normal.         Judgment: Judgment normal.          LABS REVIEWED:  Lab Results   Component Value Date    WBC 9.49 07/18/2022    RBC 4.05 (L) 07/18/2022    HGB 10.6 (L) 07/18/2022    HCT 31.3 (L) 07/18/2022    MCV 77.3 (L) 07/18/2022    MCH 26.2 (L) 07/18/2022    MCHC 33.9 07/18/2022    RDW 13.2 07/18/2022     (H) 07/18/2022    MPV 10.0 07/18/2022    NRBC 0.0 07/18/2022    INR 0.93 07/18/2022     Lab Results   Component Value Date     (L) 07/18/2022    K 3.6 07/18/2022    CL 96 (L) 07/18/2022    CO2 26 07/18/2022    BUN 9 07/18/2022     Lab Results   Component Value Date     (H) 04/23/2022    AST 14 (L) 07/18/2022    ALT 19 07/18/2022     Lab Results   Component Value Date    GLU 98 07/18/2022     Lab Results   Component Value  Date    CHOL 197 04/23/2022    HDL 85 (H) 04/23/2022    TRIG 82 04/23/2022    CHOLHDL 2.3 04/23/2022       CARDIAC STUDIES REVIEWED:  EKG  8/2/22 -  AV dual paced rhythm.             4/23/22 - Sinus rhythm. Possible left anterior fascicular block.  Incomplete LBBB                           Possible anteroseptal infarct - age undetermined                           Possible left ventricular hypertrophy                           Lateral T wave abnormality  may be due to the hypertrophy and/or ischemia   ECHO 4/24/22 -  The left ventricle is normal in size with mild eccentric hypertrophy and mildly decreased systolic function.  EF 40-45%.                               Anteroseptal wall is moderately hypokinetic.                                Normal right ventricular size with normal right ventricular systolic function.  LHC 4/25/22 - coronary arteries were normal..    Implantation of a BiVentricular Pacemaker  7/20/22 - Dr. Cordova   Ablation 7/20/22     OTHER IMAGING STUDIES REVIEWED:    ASSESSMENT:   Tachycardia  -     EKG 12-lead; Future          PLAN:  1. SVT - post ablation, feels much better,   2. Hypertension - controlled at home.  3. Anxiety - has improved with control of palpitations  4. Dilated nonischemic cardiomyopathy, post Biventricular pacer implant at [facility]

## 2022-08-03 PROBLEM — R00.2 INTERMITTENT PALPITATIONS: Status: ACTIVE | Noted: 2022-08-03

## 2022-08-03 PROBLEM — R00.0 TACHYCARDIA: Status: ACTIVE | Noted: 2022-08-03

## 2023-01-30 ENCOUNTER — HOSPITAL ENCOUNTER (INPATIENT)
Facility: HOSPITAL | Age: 58
LOS: 10 days | Discharge: HOME | DRG: 643 | End: 2023-02-09
Attending: FAMILY MEDICINE | Admitting: FAMILY MEDICINE
Payer: COMMERCIAL

## 2023-01-30 DIAGNOSIS — E22.2 SIADH (SYNDROME OF INAPPROPRIATE ADH PRODUCTION): ICD-10-CM

## 2023-01-30 DIAGNOSIS — E86.0 DEHYDRATION WITH HYPONATREMIA: ICD-10-CM

## 2023-01-30 DIAGNOSIS — F41.9 ANXIETY DISORDER, UNSPECIFIED TYPE: ICD-10-CM

## 2023-01-30 DIAGNOSIS — E87.1 ACUTE HYPONATREMIA: Primary | ICD-10-CM

## 2023-01-30 DIAGNOSIS — R07.9 CHEST PAIN: ICD-10-CM

## 2023-01-30 DIAGNOSIS — G93.41 ENCEPHALOPATHY, METABOLIC: ICD-10-CM

## 2023-01-30 DIAGNOSIS — R89.9 ABNORMAL LABORATORY TEST: ICD-10-CM

## 2023-01-30 DIAGNOSIS — E87.1 DEHYDRATION WITH HYPONATREMIA: ICD-10-CM

## 2023-01-30 DIAGNOSIS — R42 DIZZINESS: ICD-10-CM

## 2023-01-30 DIAGNOSIS — I42.8 NONISCHEMIC CARDIOMYOPATHY: ICD-10-CM

## 2023-01-30 DIAGNOSIS — I47.20 V-TACH: ICD-10-CM

## 2023-01-30 DIAGNOSIS — E87.6 ACUTE HYPOKALEMIA: ICD-10-CM

## 2023-01-30 DIAGNOSIS — R11.2 NAUSEA AND VOMITING, UNSPECIFIED VOMITING TYPE: ICD-10-CM

## 2023-01-30 LAB
ALBUMIN SERPL BCP-MCNC: 5 G/DL (ref 3.5–5)
ALBUMIN/GLOB SERPL: 1.5 {RATIO}
ALP SERPL-CCNC: 145 U/L (ref 46–118)
ALT SERPL W P-5'-P-CCNC: 29 U/L (ref 13–56)
AMYLASE SERPL-CCNC: 65 U/L (ref 25–115)
ANION GAP SERPL CALCULATED.3IONS-SCNC: 10 MMOL/L (ref 7–16)
ANION GAP SERPL CALCULATED.3IONS-SCNC: 14 MMOL/L (ref 7–16)
AST SERPL W P-5'-P-CCNC: 49 U/L (ref 15–37)
BACTERIA #/AREA URNS HPF: ABNORMAL /HPF
BASOPHILS # BLD AUTO: 0.05 K/UL (ref 0–0.2)
BASOPHILS NFR BLD AUTO: 0.4 % (ref 0–1)
BILIRUB SERPL-MCNC: 0.9 MG/DL (ref ?–1.2)
BILIRUB UR QL STRIP: NEGATIVE
BUN SERPL-MCNC: 10 MG/DL (ref 7–18)
BUN SERPL-MCNC: 11 MG/DL (ref 7–18)
BUN/CREAT SERPL: 11 (ref 6–20)
BUN/CREAT SERPL: 11 (ref 6–20)
CALCIUM SERPL-MCNC: 8.5 MG/DL (ref 8.5–10.1)
CALCIUM SERPL-MCNC: 9.7 MG/DL (ref 8.5–10.1)
CHLORIDE SERPL-SCNC: 64 MMOL/L (ref 98–107)
CHLORIDE SERPL-SCNC: 70 MMOL/L (ref 98–107)
CLARITY UR: CLEAR
CO2 SERPL-SCNC: 28 MMOL/L (ref 21–32)
CO2 SERPL-SCNC: 29 MMOL/L (ref 21–32)
COLOR UR: ABNORMAL
CREAT SERPL-MCNC: 0.93 MG/DL (ref 0.55–1.02)
CREAT SERPL-MCNC: 0.97 MG/DL (ref 0.55–1.02)
DIFFERENTIAL METHOD BLD: ABNORMAL
EGFR (NO RACE VARIABLE) (RUSH/TITUS): 67 ML/MIN/1.73M²
EGFR (NO RACE VARIABLE) (RUSH/TITUS): 71 ML/MIN/1.73M²
EOSINOPHIL # BLD AUTO: 0.06 K/UL (ref 0–0.5)
EOSINOPHIL NFR BLD AUTO: 0.5 % (ref 1–4)
ERYTHROCYTE [DISTWIDTH] IN BLOOD BY AUTOMATED COUNT: 13.2 % (ref 11.5–14.5)
GLOBULIN SER-MCNC: 3.4 G/DL (ref 2–4)
GLUCOSE SERPL-MCNC: 118 MG/DL (ref 74–106)
GLUCOSE SERPL-MCNC: 119 MG/DL (ref 74–106)
GLUCOSE UR STRIP-MCNC: NORMAL MG/DL
HCT VFR BLD AUTO: 39.4 % (ref 38–47)
HGB BLD-MCNC: 14.7 G/DL (ref 12–16)
HYALINE CASTS #/AREA URNS LPF: ABNORMAL /LPF
IMM GRANULOCYTES # BLD AUTO: 0.07 K/UL (ref 0–0.04)
IMM GRANULOCYTES NFR BLD: 0.5 % (ref 0–0.4)
KETONES UR STRIP-SCNC: NEGATIVE MG/DL
LEUKOCYTE ESTERASE UR QL STRIP: ABNORMAL
LIPASE SERPL-CCNC: 138 U/L (ref 73–393)
LYMPHOCYTES # BLD AUTO: 2.41 K/UL (ref 1–4.8)
LYMPHOCYTES NFR BLD AUTO: 18.4 % (ref 27–41)
MAGNESIUM SERPL-MCNC: 0.9 MG/DL (ref 1.7–2.3)
MCH RBC QN AUTO: 27.6 PG (ref 27–31)
MCHC RBC AUTO-ENTMCNC: 37.3 G/DL (ref 32–36)
MCV RBC AUTO: 74.1 FL (ref 80–96)
MICROCYTES BLD QL SMEAR: ABNORMAL
MONOCYTES # BLD AUTO: 0.94 K/UL (ref 0–0.8)
MONOCYTES NFR BLD AUTO: 7.2 % (ref 2–6)
MPC BLD CALC-MCNC: 9.1 FL (ref 9.4–12.4)
MUCOUS THREADS #/AREA URNS HPF: ABNORMAL /HPF
NEUTROPHILS # BLD AUTO: 9.6 K/UL (ref 1.8–7.7)
NEUTROPHILS NFR BLD AUTO: 73 % (ref 53–65)
NITRITE UR QL STRIP: NEGATIVE
NRBC # BLD AUTO: 0 X10E3/UL
NRBC, AUTO (.00): 0 %
PH UR STRIP: 7.5 PH UNITS
PLATELET # BLD AUTO: 520 K/UL (ref 150–400)
PLATELET MORPHOLOGY: ABNORMAL
POTASSIUM SERPL-SCNC: 2.6 MMOL/L (ref 3.5–5.1)
POTASSIUM SERPL-SCNC: 3 MMOL/L (ref 3.5–5.1)
PROT SERPL-MCNC: 8.4 G/DL (ref 6.4–8.2)
PROT UR QL STRIP: 300
RBC # BLD AUTO: 5.32 M/UL (ref 4.2–5.4)
RBC # UR STRIP: ABNORMAL /UL
RBC #/AREA URNS HPF: ABNORMAL /HPF
SODIUM SERPL-SCNC: 103 MMOL/L (ref 136–145)
SODIUM SERPL-SCNC: 106 MMOL/L (ref 136–145)
SP GR UR STRIP: 1.01
SQUAMOUS #/AREA URNS LPF: ABNORMAL /LPF
TRICHOMONAS #/AREA URNS HPF: ABNORMAL /HPF
UROBILINOGEN UR STRIP-ACNC: NORMAL MG/DL
WBC # BLD AUTO: 13.13 K/UL (ref 4.5–11)
WBC #/AREA URNS HPF: ABNORMAL /HPF
YEAST #/AREA URNS HPF: ABNORMAL /HPF

## 2023-01-30 PROCEDURE — 99223 PR INITIAL HOSPITAL CARE,LEVL III: ICD-10-PCS | Mod: ,,, | Performed by: HOSPITALIST

## 2023-01-30 PROCEDURE — 25000003 PHARM REV CODE 250: Performed by: HOSPITALIST

## 2023-01-30 PROCEDURE — 63600175 PHARM REV CODE 636 W HCPCS: Performed by: HOSPITALIST

## 2023-01-30 PROCEDURE — 99285 EMERGENCY DEPT VISIT HI MDM: CPT

## 2023-01-30 PROCEDURE — 25000003 PHARM REV CODE 250: Performed by: NURSE PRACTITIONER

## 2023-01-30 PROCEDURE — 99223 1ST HOSP IP/OBS HIGH 75: CPT | Mod: ,,, | Performed by: HOSPITALIST

## 2023-01-30 PROCEDURE — 11000001 HC ACUTE MED/SURG PRIVATE ROOM

## 2023-01-30 PROCEDURE — 93010 EKG 12-LEAD: ICD-10-PCS | Mod: ,,, | Performed by: HOSPITALIST

## 2023-01-30 PROCEDURE — 83735 ASSAY OF MAGNESIUM: CPT | Performed by: NURSE PRACTITIONER

## 2023-01-30 PROCEDURE — 99285 PR EMERGENCY DEPT VISIT,LEVEL V: ICD-10-PCS | Mod: ,,, | Performed by: NURSE PRACTITIONER

## 2023-01-30 PROCEDURE — 82150 ASSAY OF AMYLASE: CPT | Performed by: NURSE PRACTITIONER

## 2023-01-30 PROCEDURE — 80053 COMPREHEN METABOLIC PANEL: CPT | Performed by: NURSE PRACTITIONER

## 2023-01-30 PROCEDURE — 93010 ELECTROCARDIOGRAM REPORT: CPT | Mod: ,,, | Performed by: HOSPITALIST

## 2023-01-30 PROCEDURE — 85025 COMPLETE CBC W/AUTO DIFF WBC: CPT | Performed by: NURSE PRACTITIONER

## 2023-01-30 PROCEDURE — 63600175 PHARM REV CODE 636 W HCPCS: Performed by: NURSE PRACTITIONER

## 2023-01-30 PROCEDURE — 93005 ELECTROCARDIOGRAM TRACING: CPT

## 2023-01-30 PROCEDURE — 99285 EMERGENCY DEPT VISIT HI MDM: CPT | Mod: ,,, | Performed by: NURSE PRACTITIONER

## 2023-01-30 PROCEDURE — 96361 HYDRATE IV INFUSION ADD-ON: CPT

## 2023-01-30 PROCEDURE — 80048 BASIC METABOLIC PNL TOTAL CA: CPT | Mod: XB | Performed by: NURSE PRACTITIONER

## 2023-01-30 PROCEDURE — 81001 URINALYSIS AUTO W/SCOPE: CPT | Performed by: NURSE PRACTITIONER

## 2023-01-30 PROCEDURE — 96374 THER/PROPH/DIAG INJ IV PUSH: CPT

## 2023-01-30 PROCEDURE — 83690 ASSAY OF LIPASE: CPT | Performed by: NURSE PRACTITIONER

## 2023-01-30 RX ORDER — SIMETHICONE 80 MG
1 TABLET,CHEWABLE ORAL 3 TIMES DAILY PRN
Status: DISCONTINUED | OUTPATIENT
Start: 2023-01-30 | End: 2023-02-09 | Stop reason: HOSPADM

## 2023-01-30 RX ORDER — BISACODYL 5 MG
10 TABLET, DELAYED RELEASE (ENTERIC COATED) ORAL DAILY PRN
Status: DISCONTINUED | OUTPATIENT
Start: 2023-01-30 | End: 2023-02-09 | Stop reason: HOSPADM

## 2023-01-30 RX ORDER — GUAIFENESIN/DEXTROMETHORPHAN 100-10MG/5
10 SYRUP ORAL EVERY 6 HOURS PRN
Status: DISCONTINUED | OUTPATIENT
Start: 2023-01-30 | End: 2023-02-09 | Stop reason: HOSPADM

## 2023-01-30 RX ORDER — ACETAMINOPHEN 500 MG
1000 TABLET ORAL EVERY 6 HOURS PRN
Status: DISCONTINUED | OUTPATIENT
Start: 2023-01-30 | End: 2023-02-09 | Stop reason: HOSPADM

## 2023-01-30 RX ORDER — ONDANSETRON 2 MG/ML
4 INJECTION INTRAMUSCULAR; INTRAVENOUS
Status: COMPLETED | OUTPATIENT
Start: 2023-01-30 | End: 2023-01-30

## 2023-01-30 RX ORDER — SODIUM CHLORIDE 9 MG/ML
INJECTION, SOLUTION INTRAVENOUS CONTINUOUS
Status: DISCONTINUED | OUTPATIENT
Start: 2023-01-30 | End: 2023-01-31

## 2023-01-30 RX ORDER — ONDANSETRON 2 MG/ML
8 INJECTION INTRAMUSCULAR; INTRAVENOUS EVERY 6 HOURS PRN
Status: DISCONTINUED | OUTPATIENT
Start: 2023-01-30 | End: 2023-02-09 | Stop reason: HOSPADM

## 2023-01-30 RX ORDER — TRAZODONE HYDROCHLORIDE 50 MG/1
50 TABLET ORAL NIGHTLY PRN
Status: DISCONTINUED | OUTPATIENT
Start: 2023-01-30 | End: 2023-02-09 | Stop reason: HOSPADM

## 2023-01-30 RX ADMIN — SODIUM CHLORIDE 125 ML/HR: 9 INJECTION, SOLUTION INTRAVENOUS at 10:01

## 2023-01-30 RX ADMIN — ONDANSETRON 4 MG: 2 INJECTION INTRAMUSCULAR; INTRAVENOUS at 07:01

## 2023-01-30 RX ADMIN — SODIUM CHLORIDE 1000 ML: 9 INJECTION, SOLUTION INTRAVENOUS at 07:01

## 2023-01-30 RX ADMIN — ONDANSETRON HYDROCHLORIDE 8 MG: 2 SOLUTION INTRAMUSCULAR; INTRAVENOUS at 11:01

## 2023-01-31 PROBLEM — I51.9 HEART DISEASE: Status: ACTIVE | Noted: 2023-01-31

## 2023-01-31 PROBLEM — Z95.0 PACEMAKER: Status: ACTIVE | Noted: 2023-01-31

## 2023-01-31 PROBLEM — I42.8 NONISCHEMIC CARDIOMYOPATHY: Status: ACTIVE | Noted: 2023-01-31

## 2023-01-31 PROBLEM — E86.0 DEHYDRATION WITH HYPONATREMIA: Status: ACTIVE | Noted: 2023-01-31

## 2023-01-31 PROBLEM — E87.8 ELECTROLYTE ABNORMALITY: Status: ACTIVE | Noted: 2023-01-31

## 2023-01-31 PROBLEM — R11.2 NAUSEA AND VOMITING: Status: ACTIVE | Noted: 2023-01-31

## 2023-01-31 PROBLEM — E87.1 DEHYDRATION WITH HYPONATREMIA: Status: ACTIVE | Noted: 2023-01-31

## 2023-01-31 PROBLEM — I42.9 CARDIOMYOPATHY: Status: ACTIVE | Noted: 2023-01-31

## 2023-01-31 PROBLEM — E87.1 ACUTE HYPONATREMIA: Status: ACTIVE | Noted: 2023-01-31

## 2023-01-31 PROBLEM — E87.6 ACUTE HYPOKALEMIA: Status: ACTIVE | Noted: 2023-01-31

## 2023-01-31 LAB
ANION GAP SERPL CALCULATED.3IONS-SCNC: 15 MMOL/L (ref 7–16)
ANION GAP SERPL CALCULATED.3IONS-SCNC: 16 MMOL/L (ref 7–16)
ANION GAP SERPL CALCULATED.3IONS-SCNC: 17 MMOL/L (ref 7–16)
APTT PPP: 34.8 SECONDS (ref 25.2–37.3)
BASOPHILS # BLD AUTO: 0.05 K/UL (ref 0–0.2)
BASOPHILS NFR BLD AUTO: 0.5 % (ref 0–1)
BUN SERPL-MCNC: 12 MG/DL (ref 7–18)
BUN SERPL-MCNC: 9 MG/DL (ref 7–18)
BUN SERPL-MCNC: 9 MG/DL (ref 7–18)
BUN/CREAT SERPL: 10 (ref 6–20)
BUN/CREAT SERPL: 13 (ref 6–20)
BUN/CREAT SERPL: 9 (ref 6–20)
CALCIUM SERPL-MCNC: 8.1 MG/DL (ref 8.5–10.1)
CALCIUM SERPL-MCNC: 8.6 MG/DL (ref 8.5–10.1)
CALCIUM SERPL-MCNC: 8.6 MG/DL (ref 8.5–10.1)
CHLORIDE SERPL-SCNC: 77 MMOL/L (ref 98–107)
CHLORIDE SERPL-SCNC: 80 MMOL/L (ref 98–107)
CHLORIDE SERPL-SCNC: 82 MMOL/L (ref 98–107)
CO2 SERPL-SCNC: 25 MMOL/L (ref 21–32)
CO2 SERPL-SCNC: 25 MMOL/L (ref 21–32)
CO2 SERPL-SCNC: 26 MMOL/L (ref 21–32)
CREAT SERPL-MCNC: 0.87 MG/DL (ref 0.55–1.02)
CREAT SERPL-MCNC: 0.92 MG/DL (ref 0.55–1.02)
CREAT SERPL-MCNC: 0.98 MG/DL (ref 0.55–1.02)
DIFFERENTIAL METHOD BLD: ABNORMAL
EGFR (NO RACE VARIABLE) (RUSH/TITUS): 67 ML/MIN/1.73M²
EGFR (NO RACE VARIABLE) (RUSH/TITUS): 72 ML/MIN/1.73M²
EGFR (NO RACE VARIABLE) (RUSH/TITUS): 77 ML/MIN/1.73M²
EOSINOPHIL # BLD AUTO: 0.06 K/UL (ref 0–0.5)
EOSINOPHIL NFR BLD AUTO: 0.6 % (ref 1–4)
ERYTHROCYTE [DISTWIDTH] IN BLOOD BY AUTOMATED COUNT: 13.8 % (ref 11.5–14.5)
GLUCOSE SERPL-MCNC: 109 MG/DL (ref 74–106)
GLUCOSE SERPL-MCNC: 111 MG/DL (ref 74–106)
GLUCOSE SERPL-MCNC: 99 MG/DL (ref 74–106)
HCT VFR BLD AUTO: 32.7 % (ref 38–47)
HGB BLD-MCNC: 12.3 G/DL (ref 12–16)
IMM GRANULOCYTES # BLD AUTO: 0.09 K/UL (ref 0–0.04)
IMM GRANULOCYTES NFR BLD: 0.9 % (ref 0–0.4)
INR BLD: 0.96
LYMPHOCYTES # BLD AUTO: 1.92 K/UL (ref 1–4.8)
LYMPHOCYTES NFR BLD AUTO: 20.1 % (ref 27–41)
MCH RBC QN AUTO: 28.6 PG (ref 27–31)
MCHC RBC AUTO-ENTMCNC: 37.6 G/DL (ref 32–36)
MCV RBC AUTO: 76 FL (ref 80–96)
MONOCYTES # BLD AUTO: 0.91 K/UL (ref 0–0.8)
MONOCYTES NFR BLD AUTO: 9.5 % (ref 2–6)
MPC BLD CALC-MCNC: 9.3 FL (ref 9.4–12.4)
NEUTROPHILS # BLD AUTO: 6.52 K/UL (ref 1.8–7.7)
NEUTROPHILS NFR BLD AUTO: 68.4 % (ref 53–65)
NRBC # BLD AUTO: 0.02 X10E3/UL
NRBC, AUTO (.00): 0.2 %
OSMOLALITY SERPL: 235 MOSM/KG (ref 280–301)
OSMOLALITY UR: 116 MOSM/KG (ref 50–1400)
PLATELET # BLD AUTO: 451 K/UL (ref 150–400)
PLATELET MORPHOLOGY: ABNORMAL
POTASSIUM SERPL-SCNC: 3 MMOL/L (ref 3.5–5.1)
POTASSIUM SERPL-SCNC: 3.6 MMOL/L (ref 3.5–5.1)
POTASSIUM SERPL-SCNC: 3.6 MMOL/L (ref 3.5–5.1)
PROTHROMBIN TIME: 12.4 SECONDS (ref 11.7–14.7)
RBC # BLD AUTO: 4.3 M/UL (ref 4.2–5.4)
RBC MORPH BLD: NORMAL
SODIUM SERPL-SCNC: 115 MMOL/L (ref 136–145)
SODIUM SERPL-SCNC: 118 MMOL/L (ref 136–145)
SODIUM SERPL-SCNC: 119 MMOL/L (ref 136–145)
SODIUM UR-SCNC: 35 MMOL/L (ref 40–220)
WBC # BLD AUTO: 9.55 K/UL (ref 4.5–11)

## 2023-01-31 PROCEDURE — 84300 ASSAY OF URINE SODIUM: CPT | Performed by: HOSPITALIST

## 2023-01-31 PROCEDURE — 83935 ASSAY OF URINE OSMOLALITY: CPT | Performed by: HOSPITALIST

## 2023-01-31 PROCEDURE — C9113 INJ PANTOPRAZOLE SODIUM, VIA: HCPCS

## 2023-01-31 PROCEDURE — 80048 BASIC METABOLIC PNL TOTAL CA: CPT | Performed by: HOSPITALIST

## 2023-01-31 PROCEDURE — 85730 THROMBOPLASTIN TIME PARTIAL: CPT | Performed by: HOSPITALIST

## 2023-01-31 PROCEDURE — 99232 PR SUBSEQUENT HOSPITAL CARE,LEVL II: ICD-10-PCS | Mod: GC,,, | Performed by: FAMILY MEDICINE

## 2023-01-31 PROCEDURE — 83930 ASSAY OF BLOOD OSMOLALITY: CPT | Performed by: HOSPITALIST

## 2023-01-31 PROCEDURE — 63600175 PHARM REV CODE 636 W HCPCS

## 2023-01-31 PROCEDURE — 99232 SBSQ HOSP IP/OBS MODERATE 35: CPT | Mod: GC,,, | Performed by: FAMILY MEDICINE

## 2023-01-31 PROCEDURE — 82533 TOTAL CORTISOL: CPT | Performed by: HOSPITALIST

## 2023-01-31 PROCEDURE — 25000003 PHARM REV CODE 250

## 2023-01-31 PROCEDURE — 11000001 HC ACUTE MED/SURG PRIVATE ROOM

## 2023-01-31 PROCEDURE — 85025 COMPLETE CBC W/AUTO DIFF WBC: CPT | Performed by: HOSPITALIST

## 2023-01-31 PROCEDURE — 63600175 PHARM REV CODE 636 W HCPCS: Performed by: HOSPITALIST

## 2023-01-31 PROCEDURE — 25000003 PHARM REV CODE 250: Performed by: HOSPITALIST

## 2023-01-31 PROCEDURE — 80048 BASIC METABOLIC PNL TOTAL CA: CPT

## 2023-01-31 RX ORDER — PANTOPRAZOLE SODIUM 40 MG/10ML
40 INJECTION, POWDER, LYOPHILIZED, FOR SOLUTION INTRAVENOUS DAILY
Status: DISCONTINUED | OUTPATIENT
Start: 2023-01-31 | End: 2023-02-09 | Stop reason: HOSPADM

## 2023-01-31 RX ORDER — EZETIMIBE 10 MG/1
10 TABLET ORAL NIGHTLY
Status: DISCONTINUED | OUTPATIENT
Start: 2023-01-31 | End: 2023-02-09 | Stop reason: HOSPADM

## 2023-01-31 RX ORDER — SODIUM CHLORIDE 0.9 % (FLUSH) 0.9 %
10 SYRINGE (ML) INJECTION EVERY 12 HOURS PRN
Status: DISCONTINUED | OUTPATIENT
Start: 2023-01-31 | End: 2023-02-09 | Stop reason: HOSPADM

## 2023-01-31 RX ORDER — DESMOPRESSIN ACETATE 4 UG/ML
1 INJECTION, SOLUTION INTRAVENOUS; SUBCUTANEOUS ONCE
Status: DISCONTINUED | OUTPATIENT
Start: 2023-01-31 | End: 2023-01-31

## 2023-01-31 RX ORDER — DEXTROSE MONOHYDRATE 50 MG/ML
INJECTION, SOLUTION INTRAVENOUS CONTINUOUS
Status: DISCONTINUED | OUTPATIENT
Start: 2023-01-31 | End: 2023-02-01

## 2023-01-31 RX ORDER — DESMOPRESSIN ACETATE 4 UG/ML
2 INJECTION, SOLUTION INTRAVENOUS; SUBCUTANEOUS ONCE
Status: COMPLETED | OUTPATIENT
Start: 2023-01-31 | End: 2023-01-31

## 2023-01-31 RX ORDER — POTASSIUM CHLORIDE 20 MEQ/1
40 TABLET, EXTENDED RELEASE ORAL 2 TIMES DAILY
Status: COMPLETED | OUTPATIENT
Start: 2023-01-31 | End: 2023-01-31

## 2023-01-31 RX ORDER — MAGNESIUM SULFATE HEPTAHYDRATE 40 MG/ML
4 INJECTION, SOLUTION INTRAVENOUS ONCE
Status: COMPLETED | OUTPATIENT
Start: 2023-01-31 | End: 2023-01-31

## 2023-01-31 RX ORDER — METOPROLOL SUCCINATE 50 MG/1
50 TABLET, EXTENDED RELEASE ORAL DAILY
Status: DISCONTINUED | OUTPATIENT
Start: 2023-01-31 | End: 2023-02-04

## 2023-01-31 RX ORDER — ENOXAPARIN SODIUM 100 MG/ML
40 INJECTION SUBCUTANEOUS EVERY 24 HOURS
Status: DISCONTINUED | OUTPATIENT
Start: 2023-01-31 | End: 2023-02-09 | Stop reason: HOSPADM

## 2023-01-31 RX ORDER — NALOXONE HCL 0.4 MG/ML
0.02 VIAL (ML) INJECTION
Status: DISCONTINUED | OUTPATIENT
Start: 2023-01-31 | End: 2023-02-09 | Stop reason: HOSPADM

## 2023-01-31 RX ORDER — POTASSIUM CHLORIDE 7.45 MG/ML
40 INJECTION INTRAVENOUS ONCE
Status: COMPLETED | OUTPATIENT
Start: 2023-01-31 | End: 2023-01-31

## 2023-01-31 RX ADMIN — POTASSIUM CHLORIDE 40 MEQ: 1500 TABLET, EXTENDED RELEASE ORAL at 08:01

## 2023-01-31 RX ADMIN — SODIUM CHLORIDE: 9 INJECTION, SOLUTION INTRAVENOUS at 01:01

## 2023-01-31 RX ADMIN — METOPROLOL SUCCINATE 50 MG: 50 TABLET, EXTENDED RELEASE ORAL at 02:01

## 2023-01-31 RX ADMIN — DEXTROSE MONOHYDRATE: 50 INJECTION, SOLUTION INTRAVENOUS at 12:01

## 2023-01-31 RX ADMIN — MAGNESIUM SULFATE HEPTAHYDRATE 4 G: 40 INJECTION, SOLUTION INTRAVENOUS at 03:01

## 2023-01-31 RX ADMIN — POTASSIUM CHLORIDE 40 MEQ: 1500 TABLET, EXTENDED RELEASE ORAL at 10:01

## 2023-01-31 RX ADMIN — DESMOPRESSIN ACETATE 2 MCG: 4 SOLUTION INTRAVENOUS at 06:01

## 2023-01-31 RX ADMIN — SACUBITRIL AND VALSARTAN 1 TABLET: 24; 26 TABLET, FILM COATED ORAL at 08:01

## 2023-01-31 RX ADMIN — PANTOPRAZOLE SODIUM 40 MG: 40 INJECTION, POWDER, FOR SOLUTION INTRAVENOUS at 03:01

## 2023-01-31 RX ADMIN — DEXTROSE MONOHYDRATE: 50 INJECTION, SOLUTION INTRAVENOUS at 10:01

## 2023-01-31 RX ADMIN — ONDANSETRON HYDROCHLORIDE 8 MG: 2 SOLUTION INTRAMUSCULAR; INTRAVENOUS at 01:01

## 2023-01-31 RX ADMIN — POTASSIUM CHLORIDE 40 MEQ: 7.46 INJECTION, SOLUTION INTRAVENOUS at 03:01

## 2023-01-31 RX ADMIN — ONDANSETRON HYDROCHLORIDE 8 MG: 2 SOLUTION INTRAMUSCULAR; INTRAVENOUS at 03:01

## 2023-01-31 RX ADMIN — TRAZODONE HYDROCHLORIDE 50 MG: 50 TABLET ORAL at 08:01

## 2023-01-31 RX ADMIN — ACETAMINOPHEN 1000 MG: 500 TABLET ORAL at 03:01

## 2023-01-31 RX ADMIN — ENOXAPARIN SODIUM 40 MG: 40 INJECTION SUBCUTANEOUS at 04:01

## 2023-01-31 RX ADMIN — EZETIMIBE 10 MG: 10 TABLET ORAL at 08:01

## 2023-01-31 RX ADMIN — EZETIMIBE 10 MG: 10 TABLET ORAL at 03:01

## 2023-01-31 NOTE — SUBJECTIVE & OBJECTIVE
Past Medical History:   Diagnosis Date    Acute hypokalemia     Anxiety disorder, unspecified     Depression     Hypertension     Insomnia        Past Surgical History:   Procedure Laterality Date    FRACTURE SURGERY      LEFT HEART CATHETERIZATION Left 04/25/2022    Procedure: Left heart cath;  Surgeon: Malachi Cormier DO;  Location: [facility] CATH LAB;  Service: Cardiology;  Laterality: Left;       Review of patient's allergies indicates:   Allergen Reactions    Meloxicam Swelling    Eggs [egg derived]     Statins-hmg-coa reductase inhibitors     Penicillins Rash       No current facility-administered medications on file prior to encounter.     Current Outpatient Medications on File Prior to Encounter   Medication Sig    ABILIFY 2 mg Tab Take 2 mg by mouth once daily.    ALPRAZolam (XANAX) 1 MG tablet     aspirin 81 MG Chew Take 1 tablet (81 mg total) by mouth once daily.    cetirizine (ZYRTEC) 10 MG tablet Take 10 mg by mouth once daily.    DULoxetine (CYMBALTA) 60 MG capsule     EMGALITY SYRINGE 120 mg/mL Syrg INJECT SUBCUTANEOUS 2 ML THE FIRST MONTH AND THEN 1 ML ONCE MONTHLLY    estradioL (ESTRACE) 0.01 % (0.1 mg/gram) vaginal cream Place 2 g vaginally once daily.    ezetimibe (ZETIA) 10 mg tablet Take 1 tablet (10 mg total) by mouth every evening.    hydrocortisone 2.5 % cream APPLY CREAM TO AFFECTED AREA TO AFFECTED AREA ONCE DAILY FOR 5 DAYS TO UPPER BILATERAL EXTREMITIES    lisinopriL (PRINIVIL,ZESTRIL) 20 MG tablet Take 1 tablet (20 mg total) by mouth once daily. (Patient not taking: Reported on 7/25/2022)    metoprolol succinate (TOPROL-XL) 50 MG 24 hr tablet Take 1 tablet (50 mg total) by mouth once daily.    montelukast (SINGULAIR) 10 mg tablet Take 10 mg by mouth once daily.    NURTEC 75 mg odt DISSOLVE 1 TABLET BY MOUTH ONCE DAILY AS NEEDED FOR HEADACHE    ondansetron (ZOFRAN-ODT) 4 MG TbDL DISSOLVE 1 TABLET ON TONGUE ONCE DAILY    sacubitriL-valsartan (ENTRESTO) 24-26 mg per tablet Take 1 tablet by  mouth 2 (two) times daily.    triamcinolone acetonide 0.1% (KENALOG) 0.1 % ointment Apply topically 2 (two) times daily. (Patient not taking: No sig reported)    zolpidem (AMBIEN) 10 mg Tab     [DISCONTINUED] atenoloL (TENORMIN) 100 MG tablet Take 100 mg by mouth once daily. for 90 days    [DISCONTINUED] propranoloL (INDERAL) 10 MG tablet      Family History       Problem Relation (Age of Onset)    Heart disease Father    Melanoma Maternal Uncle          Tobacco Use    Smoking status: Former     Packs/day: 1.00     Years: 20.00     Pack years: 20.00     Types: Cigarettes     Quit date: 2013     Years since quitting: 10.0    Smokeless tobacco: Never   Substance and Sexual Activity    Alcohol use: Not Currently     Comment: socially in the past    Drug use: Never    Sexual activity: Yes     Partners: Male     Review of Systems   Constitutional:  Positive for activity change, appetite change and fatigue. Negative for chills and fever.   HENT:  Negative for congestion and trouble swallowing.    Respiratory:  Negative for cough, chest tightness, shortness of breath, wheezing and stridor.    Cardiovascular:  Negative for chest pain, palpitations and leg swelling.   Gastrointestinal:  Positive for nausea and vomiting. Negative for abdominal distention, abdominal pain, anal bleeding, blood in stool, constipation and diarrhea.   Genitourinary:  Negative for difficulty urinating.   Musculoskeletal:  Negative for arthralgias.   Skin:  Negative for color change, pallor, rash and wound.   Neurological:  Positive for weakness. Negative for dizziness, tremors, syncope and headaches.   Hematological:  Negative for adenopathy.   Psychiatric/Behavioral:  Negative for agitation.    Objective:     Vital Signs (Most Recent):  Temp: 97.7 °F (36.5 °C) (01/31/23 0052)  Pulse: 78 (01/31/23 0052)  Resp: 18 (01/31/23 0052)  BP: (!) 145/103 (01/31/23 0052)  SpO2: 96 % (01/31/23 0052)   Vital Signs (24h Range):  Temp:  [97.6 °F (36.4 °C)-97.7  °F (36.5 °C)] 97.7 °F (36.5 °C)  Pulse:  [75-82] 78  Resp:  [10-18] 18  SpO2:  [96 %-100 %] 96 %  BP: (132-165)/() 145/103     Weight: 66.7 kg (147 lb)  Body mass index is 26.04 kg/m².    Physical Exam  Vitals and nursing note reviewed.   Constitutional:       General: She is not in acute distress.     Appearance: Normal appearance. She is normal weight. She is not ill-appearing or toxic-appearing.   HENT:      Head: Normocephalic.      Right Ear: External ear normal.      Left Ear: External ear normal.      Nose: Nose normal. No congestion or rhinorrhea.      Mouth/Throat:      Mouth: Mucous membranes are dry.   Eyes:      General:         Right eye: No discharge.         Left eye: No discharge.      Conjunctiva/sclera: Conjunctivae normal.   Cardiovascular:      Rate and Rhythm: Normal rate and regular rhythm.      Pulses: Normal pulses.      Heart sounds: Normal heart sounds. No murmur heard.    No friction rub.   Pulmonary:      Effort: Pulmonary effort is normal. No respiratory distress.      Breath sounds: No wheezing.   Abdominal:      General: Abdomen is flat. Bowel sounds are normal. There is no distension.      Palpations: Abdomen is soft.      Tenderness: There is no abdominal tenderness. There is no guarding.   Musculoskeletal:         General: No swelling or tenderness.      Right lower leg: No edema.      Left lower leg: No edema.   Skin:     General: Skin is warm.      Coloration: Skin is not jaundiced.      Findings: No bruising or lesion.   Neurological:      Mental Status: She is alert and oriented to person, place, and time.   Psychiatric:         Behavior: Behavior normal.           Significant Labs: All pertinent labs within the past 24 hours have been reviewed.    Significant Imaging: I have reviewed all pertinent imaging results/findings within the past 24 hours.

## 2023-01-31 NOTE — PROGRESS NOTES
[facility] Hospital Medicine  Progress Note    Patient Name: Flory Chacon  MRN: [id number]  Patient Class: IP- Inpatient   Admission Date: 1/30/2023  Length of Stay: 1 days  Attending Physician: Horace Dee Jr., MD  Primary Care Provider: OTTO Coe        Subjective:     Principal Problem:Dehydration with hyponatremia        HPI:  57 year old female presented to the ED for N/V and electrolytes abnormalities. Patient has been feeling nausea and vomiting multiple times a day starting Sunday afternoon. She has vomited 10-20 times since Sunday and started feeling weak and fatigue today therefore went to a clinic and after doing lab work she was notified that she should go to the ED due to electrolyte abnormalities and very low sodium. She reports her step son who she has been in close contact with has had the stomach virus and has been experiencing diarrhea. .Patient reports heartburn due to vomiting, she reports no blood in the vomit and denies fever, chills, diarrhea, abdominal pain, chest pain and dizziness.      PMHx includes depression and anxiety on multiple different psych medications. Patient reports she used to have episodes of dizziness and syncope therefore ended up getting a pacemaker. She used to be a patient of Dr. Epperson and will start seeing Dr. Pompa for the very first time in a week. Patient also has HTN.     ED workup:  Sodium of 106, potassium of 2.6 with Cl of 70  Bicarb of 29 with anion gap of 10  Mg of 0.9  Cr is 0.98 with GFR of 71  No ketones in the urine   WBC 13.13        Overview/Hospital Course:  No notes on file    Interval History: Patient AAOX3 this am. Patient states she feels better this morning. She still feels weak and lightheaded but improved from last night. Repeat Na 115. Normal saline infusion changed to D5W. We will continue to monitor BMP.    Review of Systems   Constitutional:  Positive for fatigue. Negative for fever.    HENT:  Negative for congestion, facial swelling, hearing loss, nosebleeds, sinus pressure, sneezing and sore throat.    Eyes:  Negative for pain, discharge and itching.   Respiratory:  Negative for cough and shortness of breath.    Cardiovascular:  Negative for leg swelling.   Gastrointestinal:  Positive for nausea and vomiting. Negative for abdominal distention, abdominal pain, anal bleeding and diarrhea.   Endocrine: Negative for cold intolerance and polydipsia.   Genitourinary:  Negative for difficulty urinating, dyspareunia and dysuria.   Musculoskeletal: Negative.    Skin: Negative.    Allergic/Immunologic: Negative.    Neurological:  Positive for light-headedness.   Psychiatric/Behavioral: Negative.     Objective:     Vital Signs (Most Recent):  Temp: 98.8 °F (37.1 °C) (01/31/23 1214)  Pulse: 86 (01/31/23 1214)  Resp: 19 (01/31/23 1214)  BP: (!) 140/98 (01/31/23 1214)  SpO2: 97 % (01/31/23 1214)   Vital Signs (24h Range):  Temp:  [97.6 °F (36.4 °C)-98.8 °F (37.1 °C)] 98.8 °F (37.1 °C)  Pulse:  [60-86] 86  Resp:  [10-19] 19  SpO2:  [96 %-100 %] 97 %  BP: ()/() 140/98     Weight: 66.7 kg (147 lb)  Body mass index is 26.04 kg/m².    Intake/Output Summary (Last 24 hours) at 1/31/2023 1331  Last data filed at 1/30/2023 2019  Gross per 24 hour   Intake 1000 ml   Output --   Net 1000 ml      Physical Exam  Constitutional:       Appearance: She is normal weight.   HENT:      Head: Normocephalic.      Right Ear: Tympanic membrane normal.      Left Ear: Tympanic membrane normal.      Nose: Nose normal.      Mouth/Throat:      Mouth: Mucous membranes are moist.      Pharynx: Oropharynx is clear.   Eyes:      Conjunctiva/sclera: Conjunctivae normal.      Pupils: Pupils are equal, round, and reactive to light.   Cardiovascular:      Rate and Rhythm: Normal rate and regular rhythm.      Pulses: Normal pulses.      Heart sounds: Normal heart sounds.   Pulmonary:      Effort: Pulmonary effort is normal.       Breath sounds: Normal breath sounds.   Abdominal:      General: Abdomen is flat. Bowel sounds are normal.   Musculoskeletal:         General: Normal range of motion.      Cervical back: Normal range of motion.   Skin:     General: Skin is warm and dry.      Capillary Refill: Capillary refill takes less than 2 seconds.   Neurological:      Mental Status: She is alert and oriented to person, place, and time.   Psychiatric:         Mood and Affect: Mood normal.       Significant Labs: All pertinent labs within the past 24 hours have been reviewed.  BMP:   Recent Labs   Lab 01/30/23 2014 01/31/23  0703   * 109*   * 115*   K 2.6* 3.0*   CL 70* 77*   CO2 29 25   BUN 10 9   CREATININE 0.93 0.87   CALCIUM 8.5 8.6   MG 0.9*  --      CBC:   Recent Labs   Lab 01/30/23  1913 01/31/23  0703   WBC 13.13* 9.55   HGB 14.7 12.3   HCT 39.4 32.7*   * 451*       Significant Imaging: I have reviewed all pertinent imaging results/findings within the past 24 hours.      Assessment/Plan:      * Dehydration with hyponatremia  Sodium of 106- on admission  Patient has Depression and anxiety  She denies ever taking lithium  Patient started taking Abilify a year ago, She started taking cymbalta 2 years ago, She has been taking Xanax and Ambien for many years      Nonischemic cardiomyopathy  Holding home entresto and Toprol XL    [facility] 4/25/22 - coronary arteries were normal..     Implantation of a BiVentricular Pacemaker  7/20/22 - Dr. Cordova at [place]   Hx of SVT s/p Ablation 7/20/22     Echo 4/23/22:   The left ventricle is normal in size with mild eccentric hypertrophy and mildly decreased systolic function.   The estimated ejection fraction is 40-45%. Anteroseptal wall is moderately hypokinetic.   Normal right ventricular size with normal right ventricular systolic function.   Mild mitral regurgitation.   Normal central venous pressure (3 mmHg).      Electrolyte abnormality    Sodium of 106, potassium of 2.6  with Cl of 70  Bicarb of 29 with anion gap of 10  Mg of 0.9    KCL 40 mEq IV given  Mag sulfat 4 g IV given  Patient is on Normal saline wth rate of 100 cc/hr  BMP pending     1/31/23    Na increase to 115. NS infusion discontinued  D5W @ 100cc/hr started because Na increasing too fast  BMP ordered       Complicated migraine  Patient used to take Nurtec and emgality for migraine but she has not taken them for a while   Currently she does not have any headache       Anxiety disorder, unspecified    Holding home medications for now     Hypertension  Currently well controlled  Continue Entresto    Toprol XL   Patient has Lisinopril listed as home meds and she mentioned she does not take the medication. It was brought up to her that Entresto must not be taken with Lisinopril.Lisinopril must be discontinued from the pharmacy list upon discharge         VTE Risk Mitigation (From admission, onward)         Ordered     enoxaparin injection 40 mg  Daily         01/31/23 0748     IP VTE LOW RISK PATIENT  Once         01/31/23 0708     Place sequential compression device  Until discontinued         01/31/23 0708                Discharge Planning   ESME:      Code Status: Full Code   Is the patient medically ready for discharge?:     Reason for patient still in hospital (select all that apply): Treatment                     Saroj Pineda MD  Department of Hospital Medicine   [facility] - [facility]

## 2023-01-31 NOTE — ASSESSMENT & PLAN NOTE
Currently well controlled  Continue Entresto    Toprol XL   Patient has Lisinopril listed as home meds and she mentioned she does not take the medication. It was brought up to her that Entresto must not be taken with Lisinopril.Lisinopril must be discontinued from the pharmacy list upon discharge

## 2023-01-31 NOTE — ASSESSMENT & PLAN NOTE
Sodium of 106- gently replacing it with normal saline 100 cc/hr  Glucose of 118  Unfortunately diagnostic labs were not done in the clinic or ED prior to start of IV fluids  KUB was not done until patient was admitted to the hospital and it is pending   Pending labs at 7 am include BMP, CBC, cortisol, Serum and urine osmolality, Random sodium urine.     Patient denies polydipsia or pica of ice  Patient has Depression and anxiety  She denies ever taking lithium  Patient started taking Abilify a year ago, She started taking cymbalta 2 years ago, She has been taking Xanax and Ambien for many years.    Patient reports she has never been told she has low sodium or electrolyte abnormalities- in fact 6 months ago on lab she had semi normal electrolytes.

## 2023-01-31 NOTE — ASSESSMENT & PLAN NOTE
Sodium of 106- on admission  Patient has Depression and anxiety  She denies ever taking lithium  Patient started taking Abilify a year ago, She started taking cymbalta 2 years ago, She has been taking Xanax and Ambien for many years

## 2023-01-31 NOTE — HPI
57 year old female presented to the ED for N/V and electrolytes abnormalities. Patient has been feeling nausea and vomiting multiple times a day starting Sunday afternoon. She has vomited 10-20 times since Sunday and started feeling weak and fatigue today therefore went to a clinic and after doing lab work she was notified that she should go to the ED due to electrolyte abnormalities and very low sodium. She reports her step son who she has been in close contact with has had the stomach virus and has been experiencing diarrhea. .Patient reports heartburn due to vomiting, she reports no blood in the vomit and denies fever, chills, diarrhea, abdominal pain, chest pain and dizziness.      PMHx includes depression and anxiety on multiple different psych medications. Patient reports she used to have episodes of dizziness and syncope therefore ended up getting a pacemaker. She used to be a patient of Dr. Epperson and will start seeing Dr. Pompa for the very first time in a week. Patient also has HTN.     ED workup:  Sodium of 106, potassium of 2.6 with Cl of 70  Bicarb of 29 with anion gap of 10  Mg of 0.9  Cr is 0.98 with GFR of 71  No ketones in the urine   WBC 13.13

## 2023-01-31 NOTE — PLAN OF CARE
Problem: Adult Inpatient Plan of Care  Goal: Plan of Care Review  Outcome: Ongoing, Progressing  Flowsheets (Taken 1/31/2023 0210)  Plan of Care Reviewed With: patient  Goal: Optimal Comfort and Wellbeing  Outcome: Ongoing, Progressing  Intervention: Monitor Pain and Promote Comfort  Flowsheets (Taken 1/31/2023 0210)  Pain Management Interventions:   pain management plan reviewed with patient/caregiver   pillow support provided   position adjusted   relaxation techniques promoted   quiet environment facilitated  Intervention: Provide Person-Centered Care  Flowsheets (Taken 1/31/2023 0210)  Trust Relationship/Rapport:   care explained   choices provided   emotional support provided   empathic listening provided   questions answered   questions encouraged   reassurance provided   thoughts/feelings acknowledged

## 2023-01-31 NOTE — SUBJECTIVE & OBJECTIVE
Interval History: Patient AAOX3 this am. Patient states she feels better this morning. She still feels weak and lightheaded but improved from last night. Repeat Na 115. Normal saline infusion changed to D5W. We will continue to monitor BMP.    Review of Systems   Constitutional:  Positive for fatigue. Negative for fever.   HENT:  Negative for congestion, facial swelling, hearing loss, nosebleeds, sinus pressure, sneezing and sore throat.    Eyes:  Negative for pain, discharge and itching.   Respiratory:  Negative for cough and shortness of breath.    Cardiovascular:  Negative for leg swelling.   Gastrointestinal:  Positive for nausea and vomiting. Negative for abdominal distention, abdominal pain, anal bleeding and diarrhea.   Endocrine: Negative for cold intolerance and polydipsia.   Genitourinary:  Negative for difficulty urinating, dyspareunia and dysuria.   Musculoskeletal: Negative.    Skin: Negative.    Allergic/Immunologic: Negative.    Neurological:  Positive for light-headedness.   Psychiatric/Behavioral: Negative.     Objective:     Vital Signs (Most Recent):  Temp: 98.8 °F (37.1 °C) (01/31/23 1214)  Pulse: 86 (01/31/23 1214)  Resp: 19 (01/31/23 1214)  BP: (!) 140/98 (01/31/23 1214)  SpO2: 97 % (01/31/23 1214)   Vital Signs (24h Range):  Temp:  [97.6 °F (36.4 °C)-98.8 °F (37.1 °C)] 98.8 °F (37.1 °C)  Pulse:  [60-86] 86  Resp:  [10-19] 19  SpO2:  [96 %-100 %] 97 %  BP: ()/() 140/98     Weight: 66.7 kg (147 lb)  Body mass index is 26.04 kg/m².    Intake/Output Summary (Last 24 hours) at 1/31/2023 1331  Last data filed at 1/30/2023 2019  Gross per 24 hour   Intake 1000 ml   Output --   Net 1000 ml      Physical Exam  Constitutional:       Appearance: She is normal weight.   HENT:      Head: Normocephalic.      Right Ear: Tympanic membrane normal.      Left Ear: Tympanic membrane normal.      Nose: Nose normal.      Mouth/Throat:      Mouth: Mucous membranes are moist.      Pharynx: Oropharynx is  clear.   Eyes:      Conjunctiva/sclera: Conjunctivae normal.      Pupils: Pupils are equal, round, and reactive to light.   Cardiovascular:      Rate and Rhythm: Normal rate and regular rhythm.      Pulses: Normal pulses.      Heart sounds: Normal heart sounds.   Pulmonary:      Effort: Pulmonary effort is normal.      Breath sounds: Normal breath sounds.   Abdominal:      General: Abdomen is flat. Bowel sounds are normal.   Musculoskeletal:         General: Normal range of motion.      Cervical back: Normal range of motion.   Skin:     General: Skin is warm and dry.      Capillary Refill: Capillary refill takes less than 2 seconds.   Neurological:      Mental Status: She is alert and oriented to person, place, and time.   Psychiatric:         Mood and Affect: Mood normal.       Significant Labs: All pertinent labs within the past 24 hours have been reviewed.  BMP:   Recent Labs   Lab 01/30/23 2014 01/31/23  0703   * 109*   * 115*   K 2.6* 3.0*   CL 70* 77*   CO2 29 25   BUN 10 9   CREATININE 0.93 0.87   CALCIUM 8.5 8.6   MG 0.9*  --      CBC:   Recent Labs   Lab 01/30/23 1913 01/31/23  0703   WBC 13.13* 9.55   HGB 14.7 12.3   HCT 39.4 32.7*   * 451*       Significant Imaging: I have reviewed all pertinent imaging results/findings within the past 24 hours.

## 2023-01-31 NOTE — ASSESSMENT & PLAN NOTE
Sodium of 106, potassium of 2.6 with Cl of 70  Bicarb of 29 with anion gap of 10  Mg of 0.9    KCL 40 mEq IV given  Mag sulfat 4 g IV given  Patient is on Normal saline wth rate of 100 cc/hr  BMP pending     1/31/23    Na increase to 115. NS infusion discontinued  D5W @ 100cc/hr started because Na increasing too fast  BMP ordered

## 2023-01-31 NOTE — H&P
[facility] Medicine  History & Physical    Patient Name: Flory Chacon  MRN: [id number]  Patient Class: IP- Inpatient  Admission Date: 1/30/2023  Attending Physician: Horace Dee Jr., MD   Primary Care Provider: OTTO Coe         Patient information was obtained from patient, past medical records and ER records.     Subjective:     Principal Problem:Dehydration with hyponatremia    Chief Complaint:   Chief Complaint   Patient presents with    Vomiting    Shortness of Breath        HPI: 57 year old female presented to the ED for N/V and electrolytes abnormalities. Patient has been feeling nausea and vomiting multiple times a day starting Sunday afternoon. She has vomited 10-20 times since Sunday and started feeling weak and fatigue today therefore went to a clinic and after doing lab work she was notified that she should go to the ED due to electrolyte abnormalities and very low sodium. She reports her step son who she has been in close contact with has had the stomach virus and has been experiencing diarrhea. .Patient reports heartburn due to vomiting, she reports no blood in the vomit and denies fever, chills, diarrhea, abdominal pain, chest pain and dizziness.      PMHx includes depression and anxiety on multiple different psych medications. Patient reports she used to have episodes of dizziness and syncope therefore ended up getting a pacemaker. She used to be a patient of Dr. Epperson and will start seeing Dr. Pompa for the very first time in a week. Patient also has HTN.     ED workup:  Sodium of 106, potassium of 2.6 with Cl of 70  Bicarb of 29 with anion gap of 10  Mg of 0.9  Cr is 0.98 with GFR of 71  No ketones in the urine   WBC 13.13        Past Medical History:   Diagnosis Date    Acute hypokalemia     Anxiety disorder, unspecified     Depression     Hypertension     Insomnia        Past Surgical History:   Procedure Laterality Date     FRACTURE SURGERY      LEFT HEART CATHETERIZATION Left 04/25/2022    Procedure: Left heart cath;  Surgeon: Malachi Cormier DO;  Location: [facility] CATH LAB;  Service: Cardiology;  Laterality: Left;       Review of patient's allergies indicates:   Allergen Reactions    Meloxicam Swelling    Eggs [egg derived]     Statins-hmg-coa reductase inhibitors     Penicillins Rash       No current facility-administered medications on file prior to encounter.     Current Outpatient Medications on File Prior to Encounter   Medication Sig    ABILIFY 2 mg Tab Take 2 mg by mouth once daily.    ALPRAZolam (XANAX) 1 MG tablet     aspirin 81 MG Chew Take 1 tablet (81 mg total) by mouth once daily.    cetirizine (ZYRTEC) 10 MG tablet Take 10 mg by mouth once daily.    DULoxetine (CYMBALTA) 60 MG capsule     EMGALITY SYRINGE 120 mg/mL Syrg INJECT SUBCUTANEOUS 2 ML THE FIRST MONTH AND THEN 1 ML ONCE MONTHLLY    estradioL (ESTRACE) 0.01 % (0.1 mg/gram) vaginal cream Place 2 g vaginally once daily.    ezetimibe (ZETIA) 10 mg tablet Take 1 tablet (10 mg total) by mouth every evening.    hydrocortisone 2.5 % cream APPLY CREAM TO AFFECTED AREA TO AFFECTED AREA ONCE DAILY FOR 5 DAYS TO UPPER BILATERAL EXTREMITIES    lisinopriL (PRINIVIL,ZESTRIL) 20 MG tablet Take 1 tablet (20 mg total) by mouth once daily. (Patient not taking: Reported on 7/25/2022)    metoprolol succinate (TOPROL-XL) 50 MG 24 hr tablet Take 1 tablet (50 mg total) by mouth once daily.    montelukast (SINGULAIR) 10 mg tablet Take 10 mg by mouth once daily.    NURTEC 75 mg odt DISSOLVE 1 TABLET BY MOUTH ONCE DAILY AS NEEDED FOR HEADACHE    ondansetron (ZOFRAN-ODT) 4 MG TbDL DISSOLVE 1 TABLET ON TONGUE ONCE DAILY    sacubitriL-valsartan (ENTRESTO) 24-26 mg per tablet Take 1 tablet by mouth 2 (two) times daily.    triamcinolone acetonide 0.1% (KENALOG) 0.1 % ointment Apply topically 2 (two) times daily. (Patient not taking: No sig reported)    zolpidem  (AMBIEN) 10 mg Tab     [DISCONTINUED] atenoloL (TENORMIN) 100 MG tablet Take 100 mg by mouth once daily. for 90 days    [DISCONTINUED] propranoloL (INDERAL) 10 MG tablet      Family History       Problem Relation (Age of Onset)    Heart disease Father    Melanoma Maternal Uncle          Tobacco Use    Smoking status: Former     Packs/day: 1.00     Years: 20.00     Pack years: 20.00     Types: Cigarettes     Quit date: 2013     Years since quitting: 10.0    Smokeless tobacco: Never   Substance and Sexual Activity    Alcohol use: Not Currently     Comment: socially in the past    Drug use: Never    Sexual activity: Yes     Partners: Male     Review of Systems   Constitutional:  Positive for activity change, appetite change and fatigue. Negative for chills and fever.   HENT:  Negative for congestion and trouble swallowing.    Respiratory:  Negative for cough, chest tightness, shortness of breath, wheezing and stridor.    Cardiovascular:  Negative for chest pain, palpitations and leg swelling.   Gastrointestinal:  Positive for nausea and vomiting. Negative for abdominal distention, abdominal pain, anal bleeding, blood in stool, constipation and diarrhea.   Genitourinary:  Negative for difficulty urinating.   Musculoskeletal:  Negative for arthralgias.   Skin:  Negative for color change, pallor, rash and wound.   Neurological:  Positive for weakness. Negative for dizziness, tremors, syncope and headaches.   Hematological:  Negative for adenopathy.   Psychiatric/Behavioral:  Negative for agitation.    Objective:     Vital Signs (Most Recent):  Temp: 97.7 °F (36.5 °C) (01/31/23 0052)  Pulse: 78 (01/31/23 0052)  Resp: 18 (01/31/23 0052)  BP: (!) 145/103 (01/31/23 0052)  SpO2: 96 % (01/31/23 0052)   Vital Signs (24h Range):  Temp:  [97.6 °F (36.4 °C)-97.7 °F (36.5 °C)] 97.7 °F (36.5 °C)  Pulse:  [75-82] 78  Resp:  [10-18] 18  SpO2:  [96 %-100 %] 96 %  BP: (132-165)/() 145/103     Weight: 66.7 kg (147 lb)  Body  mass index is 26.04 kg/m².    Physical Exam  Vitals and nursing note reviewed.   Constitutional:       General: She is not in acute distress.     Appearance: Normal appearance. She is normal weight. She is not ill-appearing or toxic-appearing.   HENT:      Head: Normocephalic.      Right Ear: External ear normal.      Left Ear: External ear normal.      Nose: Nose normal. No congestion or rhinorrhea.      Mouth/Throat:      Mouth: Mucous membranes are dry.   Eyes:      General:         Right eye: No discharge.         Left eye: No discharge.      Conjunctiva/sclera: Conjunctivae normal.   Cardiovascular:      Rate and Rhythm: Normal rate and regular rhythm.      Pulses: Normal pulses.      Heart sounds: Normal heart sounds. No murmur heard.    No friction rub.   Pulmonary:      Effort: Pulmonary effort is normal. No respiratory distress.      Breath sounds: No wheezing.   Abdominal:      General: Abdomen is flat. Bowel sounds are normal. There is no distension.      Palpations: Abdomen is soft.      Tenderness: There is no abdominal tenderness. There is no guarding.   Musculoskeletal:         General: No swelling or tenderness.      Right lower leg: No edema.      Left lower leg: No edema.   Skin:     General: Skin is warm.      Coloration: Skin is not jaundiced.      Findings: No bruising or lesion.   Neurological:      Mental Status: She is alert and oriented to person, place, and time.   Psychiatric:         Behavior: Behavior normal.           Significant Labs: All pertinent labs within the past 24 hours have been reviewed.    Significant Imaging: I have reviewed all pertinent imaging results/findings within the past 24 hours.    Assessment/Plan:     * Dehydration with hyponatremia  Sodium of 106- gently replacing it with normal saline 100 cc/hr  Glucose of 118  Unfortunately diagnostic labs were not done in the clinic or ED prior to start of IV fluids  KUB was not done until patient was admitted to the hospital  and it is pending   Pending labs at 7 am include BMP, CBC, cortisol, Serum and urine osmolality, Random sodium urine.     Patient denies polydipsia or pica of ice  Patient has Depression and anxiety  She denies ever taking lithium  Patient started taking Abilify a year ago, She started taking cymbalta 2 years ago, She has been taking Xanax and Ambien for many years.    Patient reports she has never been told she has low sodium or electrolyte abnormalities- in fact 6 months ago on lab she had semi normal electrolytes.       Electrolyte abnormality    Sodium of 106, potassium of 2.6 with Cl of 70  Bicarb of 29 with anion gap of 10  Mg of 0.9    KCL 40 mEq IV given  Mag sulfat 4 g IV given  Patient is on Normal saline wth rate of 100 cc/hr  BMP pending       Hypertension  Currently well controlled  Holding home Entresto and Toprol XL for now  Patient has Lisinopril listed as home meds and she mentioned she does not take the medication. It was brought up to her that Entresto must not be taken with Lisinopril.Lisinopril must be discontinued from the pharmacy list upon discharge       Nonischemic cardiomyopathy  Holding home entresto and Toprol XL    [facility] 4/25/22 - coronary arteries were normal..     Implantation of a BiVentricular Pacemaker  7/20/22 - Dr. Cordova at [place]   Hx of SVT s/p Ablation 7/20/22     Echo 4/23/22:   The left ventricle is normal in size with mild eccentric hypertrophy and mildly decreased systolic function.   The estimated ejection fraction is 40-45%. Anteroseptal wall is moderately hypokinetic.   Normal right ventricular size with normal right ventricular systolic function.   Mild mitral regurgitation.   Normal central venous pressure (3 mmHg).      Anxiety disorder, unspecified    Holding home medications for now     Complicated migraine  Patient used to take Nurtec and emgality for migraine but she has not taken them for a while   Currently she does not have any headache         VTE  Risk Mitigation (From admission, onward)    None             Tha Ansari MD  Department of Hospital Medicine   [facility] - [facility]

## 2023-01-31 NOTE — ASSESSMENT & PLAN NOTE
Sodium of 106, potassium of 2.6 with Cl of 70  Bicarb of 29 with anion gap of 10  Mg of 0.9    KCL 40 mEq IV given  Mag sulfat 4 g IV given  Patient is on Normal saline wth rate of 100 cc/hr  BMP pending

## 2023-01-31 NOTE — ASSESSMENT & PLAN NOTE
Patient used to take Nurtec and emgality for migraine but she has not taken them for a while   Currently she does not have any headache

## 2023-01-31 NOTE — ASSESSMENT & PLAN NOTE
Holding home entresto and Toprol XL    [facility] 4/25/22 - coronary arteries were normal..     Implantation of a BiVentricular Pacemaker  7/20/22 - Dr. Cordova at [place]   Hx of SVT s/p Ablation 7/20/22     Echo 4/23/22:   The left ventricle is normal in size with mild eccentric hypertrophy and mildly decreased systolic function.   The estimated ejection fraction is 40-45%. Anteroseptal wall is moderately hypokinetic.   Normal right ventricular size with normal right ventricular systolic function.   Mild mitral regurgitation.   Normal central venous pressure (3 mmHg).

## 2023-01-31 NOTE — ASSESSMENT & PLAN NOTE
Currently well controlled  Holding home Entresto and Toprol XL for now  Patient has Lisinopril listed as home meds and she mentioned she does not take the medication. It was brought up to her that Entresto must not be taken with Lisinopril.Lisinopril must be discontinued from the pharmacy list upon discharge

## 2023-01-31 NOTE — ED PROVIDER NOTES
Encounter Date: 1/30/2023       History     Chief Complaint   Patient presents with    Vomiting    Shortness of Breath     Patient presents to the ER with family member.  Patient states she was referred from [facility] for elevated blood pressure and nausea and vomiting.  Patient states she is had nausea and vomiting for the last 24 hours.  She states she picked up a stomach virus from her stepson.  She denies diarrhea.  She reports feeling weak and dehydrated.  She denies chest pain or shortness of breath.  She reports her last bowel movement as yesterday morning with no blood or mucus in the stool.  She denies fever.  No dysuria or urgency.      The history is provided by the patient and a relative. No  was used.   Review of patient's allergies indicates:   Allergen Reactions    Eggs [egg derived]     Mobic [meloxicam]     Penicillins     Statins-hmg-coa reductase inhibitors      Past Medical History:   Diagnosis Date    Anxiety disorder, unspecified     Depression     Hypertension     Insomnia      Past Surgical History:   Procedure Laterality Date    LEFT HEART CATHETERIZATION Left 4/25/2022    Procedure: Left heart cath;  Surgeon: Malachi Cormier DO;  Location: [facility] CATH LAB;  Service: Cardiology;  Laterality: Left;     Family History   Problem Relation Age of Onset    Heart disease Father     Melanoma Maternal Uncle      Social History     Tobacco Use    Smoking status: Former    Smokeless tobacco: Never   Substance Use Topics    Alcohol use: Never    Drug use: Never     Review of Systems   Constitutional:  Positive for activity change, appetite change and fatigue.   Gastrointestinal:  Positive for nausea and vomiting.   Neurological:  Positive for dizziness, weakness and headaches.   All other systems reviewed and are negative.    Physical Exam     Initial Vitals [01/30/23 1839]   BP Pulse Resp Temp SpO2   (!) 153/104 77 16 97.6 °F (36.4 °C) 100 %      MAP       --         Physical  Exam    Nursing note and vitals reviewed.  Constitutional: She appears well-developed and well-nourished. She is cooperative. She has a sickly appearance.   HENT:   Head: Normocephalic.   Right Ear: Hearing, tympanic membrane, external ear and ear canal normal.   Left Ear: Hearing, tympanic membrane, external ear and ear canal normal.   Nose: Nose normal.   Mouth/Throat: Uvula is midline and oropharynx is clear and moist. Mucous membranes are dry.   Eyes: Conjunctivae and EOM are normal. Pupils are equal, round, and reactive to light.   Neck: Neck supple.   Normal range of motion.  Cardiovascular:  Normal rate, regular rhythm, normal heart sounds and intact distal pulses.           Pulmonary/Chest: Breath sounds normal.   Abdominal: Abdomen is soft. Bowel sounds are normal.   Musculoskeletal:         General: Normal range of motion.      Cervical back: Normal range of motion and neck supple.     Neurological: She is alert and oriented to person, place, and time. She has normal strength. GCS score is 15. GCS eye subscore is 4. GCS verbal subscore is 5. GCS motor subscore is 6.   Skin: Skin is warm and dry. Capillary refill takes less than 2 seconds.   Psychiatric: She has a normal mood and affect. Her behavior is normal. Judgment and thought content normal.       Medical Screening Exam   See Full Note    ED Course   Procedures  Labs Reviewed   COMPREHENSIVE METABOLIC PANEL - Abnormal; Notable for the following components:       Result Value    Sodium 103 (*)     Potassium 3.0 (*)     Chloride 64 (*)     Glucose 119 (*)     Total Protein 8.4 (*)     Alk Phos 145 (*)     AST 49 (*)     All other components within normal limits   CBC WITH DIFFERENTIAL - Abnormal; Notable for the following components:    WBC 13.13 (*)     MCV 74.1 (*)     MCHC 37.3 (*)     Platelet Count 520 (*)     MPV 9.1 (*)     Neutrophils % 73.0 (*)     Lymphocytes % 18.4 (*)     Monocytes % 7.2 (*)     Eosinophils % 0.5 (*)     Immature  Granulocytes % 0.5 (*)     Neutrophils, Abs 9.60 (*)     Monocytes, Absolute 0.94 (*)     Immature Granulocytes, Absolute 0.07 (*)     All other components within normal limits   BASIC METABOLIC PANEL - Abnormal; Notable for the following components:    Sodium 106 (*)     Potassium 2.6 (*)     Chloride 70 (*)     Glucose 118 (*)     All other components within normal limits   MAGNESIUM - Abnormal; Notable for the following components:    Magnesium 0.9 (*)     All other components within normal limits   CBC MORPHOLOGY - Abnormal; Notable for the following components:    Platelet Morphology Increased (*)     All other components within normal limits   URINALYSIS, REFLEX TO URINE CULTURE - Abnormal; Notable for the following components:    Leukocytes, UA Small (*)     Protein,  (*)     Blood, UA Trace (*)     All other components within normal limits   URINALYSIS, MICROSCOPIC - Abnormal; Notable for the following components:    Bacteria, UA Occasional (*)     Squamous Epithelial Cells, UA Few (*)     Mucus, UA Moderate (*)     Hyaline Casts, UA 0-2 (*)     All other components within normal limits   AMYLASE - Normal   LIPASE - Normal   CBC W/ AUTO DIFFERENTIAL    Narrative:     The following orders were created for panel order CBC auto differential.  Procedure                               Abnormality         Status                     ---------                               -----------         ------                     CBC with Differential[[id number]]        Abnormal            Final result               Manual Differential[[id number]]                                                           Please view results for these tests on the individual orders.   EXTRA TUBES    Narrative:     The following orders were created for panel order EXTRA TUBES.  Procedure                               Abnormality         Status                     ---------                               -----------         ------                      Light Green Top Hold[[id number]]                             In process                 Lavender Top Hold[[id number]]                                In process                   Please view results for these tests on the individual orders.   LIGHT GREEN TOP HOLD   LAVENDER TOP HOLD   SARS-COV-2 RNA AMPLIFICATION, QUAL          Imaging Results    None          Medications   acetaminophen tablet 1,000 mg (has no administration in time range)   bisacodyL EC tablet 10 mg (has no administration in time range)   dextromethorphan-guaiFENesin  mg/5 ml liquid 10 mL (has no administration in time range)   ondansetron injection 8 mg (has no administration in time range)   simethicone chewable tablet 80 mg (has no administration in time range)   traZODone tablet 50 mg (has no administration in time range)   sodium chloride 0.9% bolus 1,000 mL 1,000 mL (0 mLs Intravenous Stopped 1/30/23 2019)   ondansetron injection 4 mg (4 mg Intravenous Given 1/30/23 1915)     Medical Decision Making:   History:   Old Records Summarized: records from clinic visits.       <> Summary of Records: Attempt to review office visit from [facility] today.  An unable to visualize note.  Previous lab work was reviewed.  Initial Assessment:   Patient presents to the ER with family member.  Patient states she was referred from [facility] for elevated blood pressure and nausea and vomiting.  Patient states she is had nausea and vomiting for the last 24 hours.  She states she picked up a stomach virus from her stepson.  She denies diarrhea.  She reports feeling weak and dehydrated.  She denies chest pain or shortness of breath.  She reports her last bowel movement as yesterday morning with no blood or mucus in the stool.  She denies fever.  No dysuria or urgency.  Differential Diagnosis:   COVID flu  Viral gastroenteritis  Dehydration    Clinical Tests:   Lab Tests: Ordered  ED Management:  CBC white blood count 13.1, H&H 14.7 and 39.4  CMP  sodium 106, potassium 2.6, glucose 118--labs repeated sodium 103, potassium 3.0  Amylase 65  Lipase 138  Urine positive for leukocytes and protein  EKG ventricular pacing noted with a rate of 75 beats per minute (pacemaker rhythm)    Initiate IV  1 L normal saline bolus  4 mg and Zofran and IV to treat nausea and vomiting  Discussed patient and her history with Dr. Burgos, who also assisted in medical management of this patient.            Other:   I have discussed this case with another health care provider.       <> Summary of the Discussion: 2127 Dr. Gallagher consulted for admission.                 Clinical Impression:   Final diagnoses:  [R89.9] Abnormal laboratory test  [E87.1] Acute hyponatremia (Primary)  [E87.6] Acute hypokalemia  [R11.2] Nausea and vomiting, unspecified vomiting type        ED Disposition Condition    Admit                 ALEA Ward  01/30/23 3866

## 2023-02-01 LAB
ANION GAP SERPL CALCULATED.3IONS-SCNC: 16 MMOL/L (ref 7–16)
ANION GAP SERPL CALCULATED.3IONS-SCNC: 17 MMOL/L (ref 7–16)
ANION GAP SERPL CALCULATED.3IONS-SCNC: 17 MMOL/L (ref 7–16)
ANION GAP SERPL CALCULATED.3IONS-SCNC: 19 MMOL/L (ref 7–16)
BASOPHILS # BLD AUTO: 0.07 K/UL (ref 0–0.2)
BASOPHILS NFR BLD AUTO: 0.6 % (ref 0–1)
BUN SERPL-MCNC: 5 MG/DL (ref 7–18)
BUN SERPL-MCNC: 6 MG/DL (ref 7–18)
BUN SERPL-MCNC: 6 MG/DL (ref 7–18)
BUN SERPL-MCNC: 8 MG/DL (ref 7–18)
BUN/CREAT SERPL: 11 (ref 6–20)
BUN/CREAT SERPL: 7 (ref 6–20)
BUN/CREAT SERPL: 8 (ref 6–20)
BUN/CREAT SERPL: 9 (ref 6–20)
CALCIUM SERPL-MCNC: 8.3 MG/DL (ref 8.5–10.1)
CALCIUM SERPL-MCNC: 8.6 MG/DL (ref 8.5–10.1)
CALCIUM SERPL-MCNC: 8.7 MG/DL (ref 8.5–10.1)
CALCIUM SERPL-MCNC: 8.7 MG/DL (ref 8.5–10.1)
CHLORIDE SERPL-SCNC: 67 MMOL/L (ref 98–107)
CHLORIDE SERPL-SCNC: 67 MMOL/L (ref 98–107)
CHLORIDE SERPL-SCNC: 69 MMOL/L (ref 98–107)
CHLORIDE SERPL-SCNC: 69 MMOL/L (ref 98–107)
CO2 SERPL-SCNC: 20 MMOL/L (ref 21–32)
CO2 SERPL-SCNC: 21 MMOL/L (ref 21–32)
CO2 SERPL-SCNC: 22 MMOL/L (ref 21–32)
CO2 SERPL-SCNC: 22 MMOL/L (ref 21–32)
CORTIS SERPL-MCNC: 38.6 ΜG/DL
CREAT SERPL-MCNC: 0.53 MG/DL (ref 0.55–1.02)
CREAT SERPL-MCNC: 0.7 MG/DL (ref 0.55–1.02)
CREAT SERPL-MCNC: 0.71 MG/DL (ref 0.55–1.02)
CREAT SERPL-MCNC: 0.81 MG/DL (ref 0.55–1.02)
CREAT UR-MCNC: 23 MG/DL (ref 28–219)
DIFFERENTIAL METHOD BLD: ABNORMAL
EGFR (NO RACE VARIABLE) (RUSH/TITUS): 100 ML/MIN/1.73M²
EGFR (NO RACE VARIABLE) (RUSH/TITUS): 107 ML/MIN/1.73M²
EGFR (NO RACE VARIABLE) (RUSH/TITUS): 84 ML/MIN/1.73M²
EGFR (NO RACE VARIABLE) (RUSH/TITUS): 98 ML/MIN/1.73M²
EOSINOPHIL # BLD AUTO: 0.3 K/UL (ref 0–0.5)
EOSINOPHIL NFR BLD AUTO: 2.4 % (ref 1–4)
ERYTHROCYTE [DISTWIDTH] IN BLOOD BY AUTOMATED COUNT: 13.2 % (ref 11.5–14.5)
GLUCOSE SERPL-MCNC: 113 MG/DL (ref 74–106)
GLUCOSE SERPL-MCNC: 130 MG/DL (ref 74–106)
GLUCOSE SERPL-MCNC: 136 MG/DL (ref 70–105)
GLUCOSE SERPL-MCNC: 136 MG/DL (ref 74–106)
GLUCOSE SERPL-MCNC: 139 MG/DL (ref 74–106)
HCT VFR BLD AUTO: 33 % (ref 38–47)
HGB BLD-MCNC: 12.2 G/DL (ref 12–16)
IMM GRANULOCYTES # BLD AUTO: 0.08 K/UL (ref 0–0.04)
IMM GRANULOCYTES NFR BLD: 0.7 % (ref 0–0.4)
LYMPHOCYTES # BLD AUTO: 3.8 K/UL (ref 1–4.8)
LYMPHOCYTES NFR BLD AUTO: 31 % (ref 27–41)
MAGNESIUM SERPL-MCNC: 1.4 MG/DL (ref 1.7–2.3)
MAGNESIUM SERPL-MCNC: 1.9 MG/DL (ref 1.7–2.3)
MCH RBC QN AUTO: 27.7 PG (ref 27–31)
MCHC RBC AUTO-ENTMCNC: 37 G/DL (ref 32–36)
MCV RBC AUTO: 74.8 FL (ref 80–96)
MICROCYTES BLD QL SMEAR: ABNORMAL
MONOCYTES # BLD AUTO: 1.19 K/UL (ref 0–0.8)
MONOCYTES NFR BLD AUTO: 9.7 % (ref 2–6)
MPC BLD CALC-MCNC: 9.2 FL (ref 9.4–12.4)
NEUTROPHILS # BLD AUTO: 6.82 K/UL (ref 1.8–7.7)
NEUTROPHILS NFR BLD AUTO: 55.6 % (ref 53–65)
NRBC # BLD AUTO: 0 X10E3/UL
NRBC, AUTO (.00): 0 %
OSMOLALITY SERPL: 210 MOSM/KG (ref 280–301)
OSMOLALITY UR: 392 MOSM/KG (ref 50–1400)
PLATELET # BLD AUTO: 408 K/UL (ref 150–400)
PLATELET MORPHOLOGY: ABNORMAL
POTASSIUM SERPL-SCNC: 2.9 MMOL/L (ref 3.5–5.1)
POTASSIUM SERPL-SCNC: 3 MMOL/L (ref 3.5–5.1)
POTASSIUM SERPL-SCNC: 3.1 MMOL/L (ref 3.5–5.1)
POTASSIUM SERPL-SCNC: 3.4 MMOL/L (ref 3.5–5.1)
RBC # BLD AUTO: 4.41 M/UL (ref 4.2–5.4)
SARS-COV-2 RDRP RESP QL NAA+PROBE: NEGATIVE
SODIUM SERPL-SCNC: 103 MMOL/L (ref 136–145)
SODIUM SERPL-SCNC: 104 MMOL/L (ref 136–145)
SODIUM SERPL-SCNC: 105 MMOL/L (ref 136–145)
SODIUM UR-SCNC: 115 MMOL/L (ref 40–220)
TSH SERPL DL<=0.005 MIU/L-ACNC: 0.22 UIU/ML (ref 0.36–3.74)
WBC # BLD AUTO: 12.26 K/UL (ref 4.5–11)

## 2023-02-01 PROCEDURE — 63600175 PHARM REV CODE 636 W HCPCS

## 2023-02-01 PROCEDURE — 11000001 HC ACUTE MED/SURG PRIVATE ROOM

## 2023-02-01 PROCEDURE — 84295 ASSAY OF SERUM SODIUM: CPT

## 2023-02-01 PROCEDURE — 25000003 PHARM REV CODE 250: Performed by: HOSPITALIST

## 2023-02-01 PROCEDURE — 99232 PR SUBSEQUENT HOSPITAL CARE,LEVL II: ICD-10-PCS | Mod: GC,,, | Performed by: FAMILY MEDICINE

## 2023-02-01 PROCEDURE — 83930 ASSAY OF BLOOD OSMOLALITY: CPT

## 2023-02-01 PROCEDURE — 94761 N-INVAS EAR/PLS OXIMETRY MLT: CPT

## 2023-02-01 PROCEDURE — 83935 ASSAY OF URINE OSMOLALITY: CPT

## 2023-02-01 PROCEDURE — 93010 EKG 12-LEAD: ICD-10-PCS | Mod: 76,,, | Performed by: STUDENT IN AN ORGANIZED HEALTH CARE EDUCATION/TRAINING PROGRAM

## 2023-02-01 PROCEDURE — 80048 BASIC METABOLIC PNL TOTAL CA: CPT | Performed by: INTERNAL MEDICINE

## 2023-02-01 PROCEDURE — 93005 ELECTROCARDIOGRAM TRACING: CPT

## 2023-02-01 PROCEDURE — 82962 GLUCOSE BLOOD TEST: CPT

## 2023-02-01 PROCEDURE — 84443 ASSAY THYROID STIM HORMONE: CPT

## 2023-02-01 PROCEDURE — 83735 ASSAY OF MAGNESIUM: CPT

## 2023-02-01 PROCEDURE — C9113 INJ PANTOPRAZOLE SODIUM, VIA: HCPCS

## 2023-02-01 PROCEDURE — 93010 ELECTROCARDIOGRAM REPORT: CPT | Mod: 76,,, | Performed by: STUDENT IN AN ORGANIZED HEALTH CARE EDUCATION/TRAINING PROGRAM

## 2023-02-01 PROCEDURE — 25000003 PHARM REV CODE 250: Performed by: INTERNAL MEDICINE

## 2023-02-01 PROCEDURE — 93010 ELECTROCARDIOGRAM REPORT: CPT | Mod: ,,, | Performed by: STUDENT IN AN ORGANIZED HEALTH CARE EDUCATION/TRAINING PROGRAM

## 2023-02-01 PROCEDURE — 82570 ASSAY OF URINE CREATININE: CPT

## 2023-02-01 PROCEDURE — 80048 BASIC METABOLIC PNL TOTAL CA: CPT

## 2023-02-01 PROCEDURE — 85025 COMPLETE CBC W/AUTO DIFF WBC: CPT

## 2023-02-01 PROCEDURE — 84300 ASSAY OF URINE SODIUM: CPT

## 2023-02-01 PROCEDURE — 63600175 PHARM REV CODE 636 W HCPCS: Performed by: INTERNAL MEDICINE

## 2023-02-01 PROCEDURE — 99232 SBSQ HOSP IP/OBS MODERATE 35: CPT | Mod: GC,,, | Performed by: FAMILY MEDICINE

## 2023-02-01 PROCEDURE — 87635 SARS-COV-2 COVID-19 AMP PRB: CPT

## 2023-02-01 PROCEDURE — 25000003 PHARM REV CODE 250

## 2023-02-01 PROCEDURE — 63600175 PHARM REV CODE 636 W HCPCS: Performed by: HOSPITALIST

## 2023-02-01 RX ORDER — SODIUM CHLORIDE 9 MG/ML
INJECTION, SOLUTION INTRAVENOUS CONTINUOUS
Status: DISCONTINUED | OUTPATIENT
Start: 2023-02-01 | End: 2023-02-01

## 2023-02-01 RX ORDER — POTASSIUM CHLORIDE 20 MEQ/1
40 TABLET, EXTENDED RELEASE ORAL 2 TIMES DAILY
Status: DISCONTINUED | OUTPATIENT
Start: 2023-02-01 | End: 2023-02-02

## 2023-02-01 RX ORDER — MAGNESIUM SULFATE HEPTAHYDRATE 40 MG/ML
2 INJECTION, SOLUTION INTRAVENOUS ONCE
Status: DISCONTINUED | OUTPATIENT
Start: 2023-02-01 | End: 2023-02-01

## 2023-02-01 RX ORDER — HYDRALAZINE HYDROCHLORIDE 25 MG/1
25 TABLET, FILM COATED ORAL EVERY 8 HOURS PRN
Status: DISCONTINUED | OUTPATIENT
Start: 2023-02-01 | End: 2023-02-06

## 2023-02-01 RX ORDER — POTASSIUM CHLORIDE 7.45 MG/ML
10 INJECTION INTRAVENOUS ONCE
Status: COMPLETED | OUTPATIENT
Start: 2023-02-01 | End: 2023-02-01

## 2023-02-01 RX ORDER — 3% SODIUM CHLORIDE 3 G/100ML
40 INJECTION, SOLUTION INTRAVENOUS CONTINUOUS
Status: DISPENSED | OUTPATIENT
Start: 2023-02-01 | End: 2023-02-01

## 2023-02-01 RX ORDER — POTASSIUM CHLORIDE 20 MEQ/1
20 TABLET, EXTENDED RELEASE ORAL 2 TIMES DAILY
Status: DISCONTINUED | OUTPATIENT
Start: 2023-02-01 | End: 2023-02-01

## 2023-02-01 RX ORDER — 3% SODIUM CHLORIDE 3 G/100ML
50 INJECTION, SOLUTION INTRAVENOUS CONTINUOUS
Status: DISCONTINUED | OUTPATIENT
Start: 2023-02-01 | End: 2023-02-02

## 2023-02-01 RX ORDER — MAGNESIUM SULFATE HEPTAHYDRATE 40 MG/ML
2 INJECTION, SOLUTION INTRAVENOUS ONCE
Status: COMPLETED | OUTPATIENT
Start: 2023-02-01 | End: 2023-02-01

## 2023-02-01 RX ORDER — POTASSIUM CHLORIDE 7.45 MG/ML
40 INJECTION INTRAVENOUS ONCE
Status: COMPLETED | OUTPATIENT
Start: 2023-02-01 | End: 2023-02-01

## 2023-02-01 RX ORDER — POTASSIUM CHLORIDE 20 MEQ/1
40 TABLET, EXTENDED RELEASE ORAL 2 TIMES DAILY
Status: DISCONTINUED | OUTPATIENT
Start: 2023-02-01 | End: 2023-02-01

## 2023-02-01 RX ADMIN — POTASSIUM CHLORIDE 20 MEQ: 1500 TABLET, EXTENDED RELEASE ORAL at 09:02

## 2023-02-01 RX ADMIN — SODIUM CHLORIDE 40 ML/HR: 3 INJECTION, SOLUTION INTRAVENOUS at 03:02

## 2023-02-01 RX ADMIN — SODIUM CHLORIDE 50 ML/HR: 3 INJECTION, SOLUTION INTRAVENOUS at 08:02

## 2023-02-01 RX ADMIN — EZETIMIBE 10 MG: 10 TABLET ORAL at 09:02

## 2023-02-01 RX ADMIN — POTASSIUM CHLORIDE 40 MEQ: 7.46 INJECTION, SOLUTION INTRAVENOUS at 11:02

## 2023-02-01 RX ADMIN — METOPROLOL SUCCINATE 50 MG: 50 TABLET, EXTENDED RELEASE ORAL at 09:02

## 2023-02-01 RX ADMIN — ENOXAPARIN SODIUM 40 MG: 40 INJECTION SUBCUTANEOUS at 04:02

## 2023-02-01 RX ADMIN — SACUBITRIL AND VALSARTAN 1 TABLET: 24; 26 TABLET, FILM COATED ORAL at 09:02

## 2023-02-01 RX ADMIN — SODIUM CHLORIDE: 9 INJECTION, SOLUTION INTRAVENOUS at 09:02

## 2023-02-01 RX ADMIN — PANTOPRAZOLE SODIUM 40 MG: 40 INJECTION, POWDER, FOR SOLUTION INTRAVENOUS at 09:02

## 2023-02-01 RX ADMIN — MAGNESIUM SULFATE HEPTAHYDRATE 2 G: 40 INJECTION, SOLUTION INTRAVENOUS at 10:02

## 2023-02-01 RX ADMIN — POTASSIUM CHLORIDE 10 MEQ: 7.46 INJECTION, SOLUTION INTRAVENOUS at 02:02

## 2023-02-01 RX ADMIN — ONDANSETRON HYDROCHLORIDE 8 MG: 2 SOLUTION INTRAMUSCULAR; INTRAVENOUS at 03:02

## 2023-02-01 RX ADMIN — POTASSIUM CHLORIDE 40 MEQ: 1500 TABLET, EXTENDED RELEASE ORAL at 07:02

## 2023-02-01 NOTE — NURSING
PT had a 3% sodium bag hung I scanned for Dr Pineda who  hung and ran the fluids on pt. Pt nurse is aware but was at lunch at the time.

## 2023-02-01 NOTE — NURSING
Pt family mother was in hallway asked if she needed assistant pt mother started crying saying she needed to talk to someone because her daughter does not look right. I has passed her nurse in the hallaway as she had just left the pt room as she was headed to check on her other pt.I went in the room to assess pt to make sure pt was stable.pt was alert and oriented and stated that she was nauseated and that her nurse had just given her something for nausea. I informed pt mother that pt was stable and appear ok and that I would go get pt nurse to come back to the room. Pt nurse was back in the hallway and I relayed information to her and she went to go check on pt again.

## 2023-02-01 NOTE — PLAN OF CARE
[facility]  Initial Discharge Assessment       Primary Care Provider: OTTO Coe    Admission Diagnosis: Acute hyponatremia [E87.1]  Acute hypokalemia [E87.6]  Abnormal laboratory test [R89.9]  Nausea and vomiting, unspecified vomiting type [R11.2]    Admission Date: 1/30/2023  Expected Discharge Date:     Discharge Barriers Identified: None    Payor: BLUE CROSS [facility] / Plan: [employer]OYEES / Product Type: Commercial /     Extended Emergency Contact Information  Primary Emergency Contact: CORINE SHOEMAKER  Mobile Phone: [phone]  Relation: Mother  Preferred language: English   needed? No  Secondary Emergency Contact: MAMI LONG  Mobile Phone: [phone]  Relation: Significant other  Preferred language: English   needed? No    Discharge Plan A: Home  Discharge Plan B: Home      [facility] [address] [place] [address] - [address]  [address] [facility] [place] [address]  Phone: [phone] Fax: [phone]    [facility] [address] [place] [address]  [address] [place] [address]  Phone: [phone] Fax: [phone]      Initial Assessment (most recent)       Adult Discharge Assessment - 02/01/23 1425          Discharge Assessment    Assessment Type Discharge Planning Assessment     Confirmed/corrected address, phone number and insurance Yes     Source of Information patient     Communicated ESME with patient/caregiver Date not available/Unable to determine     People in Home alone     Do you expect to return to your current living situation? Yes     Do you have help at home or someone to help you manage your care at home? Yes     Prior to hospitilization cognitive status: Unable to Assess     Equipment Currently Used at Home none     Patient currently being followed by outpatient case management? No     Do you currently have service(s) that help you manage your care at home? No     Do you have  prescription coverage? Yes     Coverage bcbs ms     How do you get to doctors appointments? car, drives self     Discharge Plan A Home     Discharge Plan B Home     DME Needed Upon Discharge  none     Discharge Plan discussed with: Patient     Discharge Barriers Identified None                      Pt lives at home and plans to return at d/c. No d/c needs stated at this time. Pt with bcbs of ms but not showing in portal. Ss added to portal but not showing at this time. Following.

## 2023-02-01 NOTE — PROGRESS NOTES
[facility] Hospital Medicine  Progress Note    Patient Name: Flory Chacon  MRN: [id number]  Patient Class: IP- Inpatient   Admission Date: 1/30/2023  Length of Stay: 2 days  Attending Physician: Horace Dee Jr., MD  Primary Care Provider: OTTO Coe        Subjective:     Principal Problem:Dehydration with hyponatremia        HPI:  57 year old female presented to the ED for N/V and electrolytes abnormalities. Patient has been feeling nausea and vomiting multiple times a day starting Sunday afternoon. She has vomited 10-20 times since Sunday and started feeling weak and fatigue today therefore went to a clinic and after doing lab work she was notified that she should go to the ED due to electrolyte abnormalities and very low sodium. She reports her step son who she has been in close contact with has had the stomach virus and has been experiencing diarrhea. .Patient reports heartburn due to vomiting, she reports no blood in the vomit and denies fever, chills, diarrhea, abdominal pain, chest pain and dizziness.      PMHx includes depression and anxiety on multiple different psych medications. Patient reports she used to have episodes of dizziness and syncope therefore ended up getting a pacemaker. She used to be a patient of Dr. Epperson and will start seeing Dr. Pompa for the very first time in a week. Patient also has HTN.     ED workup:  Sodium of 106, potassium of 2.6 with Cl of 70  Bicarb of 29 with anion gap of 10  Mg of 0.9  Cr is 0.98 with GFR of 71  No ketones in the urine   WBC 13.13        Overview/Hospital Course:  No notes on file    Interval History: Patient stated overnight she felt nauseous and was given Zofran. Patient stated she feels a little worse today. Repeat BMP this am shows Na of 103 down from 118. D5W stopped Normal saline infusion restarted.    Review of Systems   Constitutional:  Positive for fatigue. Negative for fever.   HENT:  Negative  for congestion, facial swelling, hearing loss, nosebleeds, sinus pressure, sneezing and sore throat.    Eyes:  Negative for pain, discharge and itching.   Respiratory:  Negative for cough and shortness of breath.    Cardiovascular:  Negative for leg swelling.   Gastrointestinal:  Positive for nausea and vomiting. Negative for abdominal distention, abdominal pain, anal bleeding and diarrhea.   Endocrine: Negative for cold intolerance and polydipsia.   Genitourinary:  Negative for difficulty urinating, dyspareunia and dysuria.   Musculoskeletal: Negative.    Skin: Negative.    Allergic/Immunologic: Negative.    Neurological:  Positive for light-headedness.   Psychiatric/Behavioral: Negative.     Objective:     Vital Signs (Most Recent):  Temp: 98.2 °F (36.8 °C) (02/01/23 1600)  Pulse: 95 (02/01/23 1600)  Resp: 18 (02/01/23 1600)  BP: (!) 162/108 (reported to nurse Ana) (02/01/23 1600)  SpO2: 95 % (02/01/23 1600)   Vital Signs (24h Range):  Temp:  [97.4 °F (36.3 °C)-98.2 °F (36.8 °C)] 98.2 °F (36.8 °C)  Pulse:  [69-95] 95  Resp:  [18-19] 18  SpO2:  [95 %-100 %] 95 %  BP: (125-162)/() 162/108     Weight: 68.4 kg (150 lb 12.7 oz)  Body mass index is 26.71 kg/m².  No intake or output data in the 24 hours ending 02/01/23 1621   Physical Exam  Constitutional:       Appearance: She is normal weight.   HENT:      Head: Normocephalic.      Right Ear: Tympanic membrane normal.      Left Ear: Tympanic membrane normal.      Nose: Nose normal.      Mouth/Throat:      Mouth: Mucous membranes are dry.      Pharynx: Oropharynx is clear.   Eyes:      Conjunctiva/sclera: Conjunctivae normal.      Pupils: Pupils are equal, round, and reactive to light.   Cardiovascular:      Rate and Rhythm: Normal rate and regular rhythm.      Pulses: Normal pulses.      Heart sounds: Normal heart sounds.   Pulmonary:      Effort: Pulmonary effort is normal.      Breath sounds: Normal breath sounds.   Abdominal:      General: Abdomen is flat.  Bowel sounds are normal.   Musculoskeletal:         General: Normal range of motion.      Cervical back: Normal range of motion.   Skin:     General: Skin is warm and dry.      Capillary Refill: Capillary refill takes less than 2 seconds.   Neurological:      Mental Status: She is alert and oriented to person, place, and time.   Psychiatric:         Mood and Affect: Mood normal.         Behavior: Behavior normal.       Significant Labs: All pertinent labs within the past 24 hours have been reviewed.  BMP:   Recent Labs   Lab 02/01/23  1137   *   *   K 3.1*   CL 67*   CO2 22   BUN 6*   CREATININE 0.71   CALCIUM 8.7   MG 1.9     CBC:   Recent Labs   Lab 01/30/23  1913 01/31/23  0703 02/01/23  0646   WBC 13.13* 9.55 12.26*   HGB 14.7 12.3 12.2   HCT 39.4 32.7* 33.0*   * 451* 408*       Significant Imaging: I have reviewed all pertinent imaging results/findings within the past 24 hours.      Assessment/Plan:      * Dehydration with hyponatremia  Sodium of 106- on admission  Patient has Depression and anxiety  She denies ever taking lithium  Patient started taking Abilify a year ago, She started taking cymbalta 2 years ago, She has been taking Xanax and Ambien for many years    2/1/23  D5W Discontinue  NS infusion restarted   BMP Q4hrs will continue to monitor  Zofran for nausea    Nonischemic cardiomyopathy  Holding home entresto and Toprol XL    [facility] 4/25/22 - coronary arteries were normal..     Implantation of a BiVentricular Pacemaker  7/20/22 - Dr. Cordova at [place]   Hx of SVT s/p Ablation 7/20/22     Echo 4/23/22:   The left ventricle is normal in size with mild eccentric hypertrophy and mildly decreased systolic function.   The estimated ejection fraction is 40-45%. Anteroseptal wall is moderately hypokinetic.   Normal right ventricular size with normal right ventricular systolic function.   Mild mitral regurgitation.   Normal central venous pressure (3 mmHg).      Electrolyte  abnormality    Sodium of 106, potassium of 2.6 with Cl of 70  Bicarb of 29 with anion gap of 10  Mg of 0.9    KCL 40 mEq IV given  Mag sulfat 4 g IV given  Patient is on Normal saline wth rate of 100 cc/hr  BMP pending     1/31/23    Na increase to 115. NS infusion discontinued  D5W @ 100cc/hr started because Na increasing too fast  BMP ordered       Complicated migraine  Patient used to take Nurtec and emgality for migraine but she has not taken them for a while   Currently she does not have any headache       Anxiety disorder, unspecified    Holding home medications for now     Hypertension  Currently well controlled  Continue Entresto    Toprol XL   Patient has Lisinopril listed as home meds and she mentioned she does not take the medication. It was brought up to her that Entresto must not be taken with Lisinopril.Lisinopril must be discontinued from the pharmacy list upon discharge         VTE Risk Mitigation (From admission, onward)         Ordered     enoxaparin injection 40 mg  Daily         01/31/23 0748     IP VTE LOW RISK PATIENT  Once         01/31/23 0708     Place sequential compression device  Until discontinued         01/31/23 0708                Discharge Planning   ESME:      Code Status: Full Code   Is the patient medically ready for discharge?:     Reason for patient still in hospital (select all that apply): Treatment  Discharge Plan A: Home                  Saroj Pineda MD  Department of Hospital Medicine   [facility]

## 2023-02-01 NOTE — ASSESSMENT & PLAN NOTE
Sodium of 106- on admission  Patient has Depression and anxiety  She denies ever taking lithium  Patient started taking Abilify a year ago, She started taking cymbalta 2 years ago, She has been taking Xanax and Ambien for many years    2/1/23  D5W Discontinue  NS infusion restarted   BMP Q4hrs will continue to monitor  Zofran for nausea

## 2023-02-01 NOTE — SUBJECTIVE & OBJECTIVE
Interval History: Patient stated overnight she felt nauseous and was given Zofran. Patient stated she feels a little worse today. Repeat BMP this am shows Na of 103 down from 118. D5W stopped Normal saline infusion restarted.    Review of Systems   Constitutional:  Positive for fatigue. Negative for fever.   HENT:  Negative for congestion, facial swelling, hearing loss, nosebleeds, sinus pressure, sneezing and sore throat.    Eyes:  Negative for pain, discharge and itching.   Respiratory:  Negative for cough and shortness of breath.    Cardiovascular:  Negative for leg swelling.   Gastrointestinal:  Positive for nausea and vomiting. Negative for abdominal distention, abdominal pain, anal bleeding and diarrhea.   Endocrine: Negative for cold intolerance and polydipsia.   Genitourinary:  Negative for difficulty urinating, dyspareunia and dysuria.   Musculoskeletal: Negative.    Skin: Negative.    Allergic/Immunologic: Negative.    Neurological:  Positive for light-headedness.   Psychiatric/Behavioral: Negative.     Objective:     Vital Signs (Most Recent):  Temp: 98.2 °F (36.8 °C) (02/01/23 1600)  Pulse: 95 (02/01/23 1600)  Resp: 18 (02/01/23 1600)  BP: (!) 162/108 (reported to nurse Ana) (02/01/23 1600)  SpO2: 95 % (02/01/23 1600)   Vital Signs (24h Range):  Temp:  [97.4 °F (36.3 °C)-98.2 °F (36.8 °C)] 98.2 °F (36.8 °C)  Pulse:  [69-95] 95  Resp:  [18-19] 18  SpO2:  [95 %-100 %] 95 %  BP: (125-162)/() 162/108     Weight: 68.4 kg (150 lb 12.7 oz)  Body mass index is 26.71 kg/m².  No intake or output data in the 24 hours ending 02/01/23 1621   Physical Exam  Constitutional:       Appearance: She is normal weight.   HENT:      Head: Normocephalic.      Right Ear: Tympanic membrane normal.      Left Ear: Tympanic membrane normal.      Nose: Nose normal.      Mouth/Throat:      Mouth: Mucous membranes are dry.      Pharynx: Oropharynx is clear.   Eyes:      Conjunctiva/sclera: Conjunctivae normal.      Pupils:  Pupils are equal, round, and reactive to light.   Cardiovascular:      Rate and Rhythm: Normal rate and regular rhythm.      Pulses: Normal pulses.      Heart sounds: Normal heart sounds.   Pulmonary:      Effort: Pulmonary effort is normal.      Breath sounds: Normal breath sounds.   Abdominal:      General: Abdomen is flat. Bowel sounds are normal.   Musculoskeletal:         General: Normal range of motion.      Cervical back: Normal range of motion.   Skin:     General: Skin is warm and dry.      Capillary Refill: Capillary refill takes less than 2 seconds.   Neurological:      Mental Status: She is alert and oriented to person, place, and time.   Psychiatric:         Mood and Affect: Mood normal.         Behavior: Behavior normal.       Significant Labs: All pertinent labs within the past 24 hours have been reviewed.  BMP:   Recent Labs   Lab 02/01/23  1137   *   *   K 3.1*   CL 67*   CO2 22   BUN 6*   CREATININE 0.71   CALCIUM 8.7   MG 1.9     CBC:   Recent Labs   Lab 01/30/23  1913 01/31/23  0703 02/01/23  0646   WBC 13.13* 9.55 12.26*   HGB 14.7 12.3 12.2   HCT 39.4 32.7* 33.0*   * 451* 408*       Significant Imaging: I have reviewed all pertinent imaging results/findings within the past 24 hours.

## 2023-02-02 LAB
ANION GAP SERPL CALCULATED.3IONS-SCNC: 11 MMOL/L (ref 7–16)
ANION GAP SERPL CALCULATED.3IONS-SCNC: 13 MMOL/L (ref 7–16)
ANION GAP SERPL CALCULATED.3IONS-SCNC: 13 MMOL/L (ref 7–16)
ANION GAP SERPL CALCULATED.3IONS-SCNC: 15 MMOL/L (ref 7–16)
ANION GAP SERPL CALCULATED.3IONS-SCNC: 16 MMOL/L (ref 7–16)
ANION GAP SERPL CALCULATED.3IONS-SCNC: 16 MMOL/L (ref 7–16)
ANION GAP SERPL CALCULATED.3IONS-SCNC: 17 MMOL/L (ref 7–16)
ANION GAP SERPL CALCULATED.3IONS-SCNC: 18 MMOL/L (ref 7–16)
BASOPHILS # BLD AUTO: 0.05 K/UL (ref 0–0.2)
BASOPHILS NFR BLD AUTO: 0.2 % (ref 0–1)
BUN SERPL-MCNC: 5 MG/DL (ref 7–18)
BUN SERPL-MCNC: 6 MG/DL (ref 7–18)
BUN SERPL-MCNC: 8 MG/DL (ref 7–18)
BUN SERPL-MCNC: 9 MG/DL (ref 7–18)
BUN/CREAT SERPL: 10 (ref 6–20)
BUN/CREAT SERPL: 10 (ref 6–20)
BUN/CREAT SERPL: 11 (ref 6–20)
BUN/CREAT SERPL: 6 (ref 6–20)
BUN/CREAT SERPL: 7 (ref 6–20)
BUN/CREAT SERPL: 9 (ref 6–20)
BUN/CREAT SERPL: 9 (ref 6–20)
CALCIUM SERPL-MCNC: 8.3 MG/DL (ref 8.5–10.1)
CALCIUM SERPL-MCNC: 8.5 MG/DL (ref 8.5–10.1)
CALCIUM SERPL-MCNC: 8.6 MG/DL (ref 8.5–10.1)
CALCIUM SERPL-MCNC: 8.6 MG/DL (ref 8.5–10.1)
CALCIUM SERPL-MCNC: 8.7 MG/DL (ref 8.5–10.1)
CALCIUM SERPL-MCNC: 8.8 MG/DL (ref 8.5–10.1)
CALCIUM SERPL-MCNC: 8.9 MG/DL (ref 8.5–10.1)
CALCIUM SERPL-MCNC: 8.9 MG/DL (ref 8.5–10.1)
CALCIUM SERPL-MCNC: 9 MG/DL (ref 8.5–10.1)
CALCIUM SERPL-MCNC: 9 MG/DL (ref 8.5–10.1)
CHLORIDE SERPL-SCNC: 71 MMOL/L (ref 98–107)
CHLORIDE SERPL-SCNC: 74 MMOL/L (ref 98–107)
CHLORIDE SERPL-SCNC: 74 MMOL/L (ref 98–107)
CHLORIDE SERPL-SCNC: 75 MMOL/L (ref 98–107)
CHLORIDE SERPL-SCNC: 75 MMOL/L (ref 98–107)
CHLORIDE SERPL-SCNC: 76 MMOL/L (ref 98–107)
CHLORIDE SERPL-SCNC: 77 MMOL/L (ref 98–107)
CO2 SERPL-SCNC: 21 MMOL/L (ref 21–32)
CO2 SERPL-SCNC: 22 MMOL/L (ref 21–32)
CO2 SERPL-SCNC: 24 MMOL/L (ref 21–32)
CO2 SERPL-SCNC: 25 MMOL/L (ref 21–32)
CO2 SERPL-SCNC: 26 MMOL/L (ref 21–32)
CREAT SERPL-MCNC: 0.57 MG/DL (ref 0.55–1.02)
CREAT SERPL-MCNC: 0.66 MG/DL (ref 0.55–1.02)
CREAT SERPL-MCNC: 0.7 MG/DL (ref 0.55–1.02)
CREAT SERPL-MCNC: 0.74 MG/DL (ref 0.55–1.02)
CREAT SERPL-MCNC: 0.79 MG/DL (ref 0.55–1.02)
CREAT SERPL-MCNC: 0.81 MG/DL (ref 0.55–1.02)
CREAT SERPL-MCNC: 0.83 MG/DL (ref 0.55–1.02)
CREAT SERPL-MCNC: 0.83 MG/DL (ref 0.55–1.02)
CREAT SERPL-MCNC: 0.84 MG/DL (ref 0.55–1.02)
CREAT SERPL-MCNC: 0.87 MG/DL (ref 0.55–1.02)
CREAT SERPL-MCNC: 0.89 MG/DL (ref 0.55–1.02)
CREAT SERPL-MCNC: 0.96 MG/DL (ref 0.55–1.02)
DIFFERENTIAL METHOD BLD: ABNORMAL
EGFR (NO RACE VARIABLE) (RUSH/TITUS): 100 ML/MIN/1.73M²
EGFR (NO RACE VARIABLE) (RUSH/TITUS): 101 ML/MIN/1.73M²
EGFR (NO RACE VARIABLE) (RUSH/TITUS): 105 ML/MIN/1.73M²
EGFR (NO RACE VARIABLE) (RUSH/TITUS): 68 ML/MIN/1.73M²
EGFR (NO RACE VARIABLE) (RUSH/TITUS): 75 ML/MIN/1.73M²
EGFR (NO RACE VARIABLE) (RUSH/TITUS): 77 ML/MIN/1.73M²
EGFR (NO RACE VARIABLE) (RUSH/TITUS): 80 ML/MIN/1.73M²
EGFR (NO RACE VARIABLE) (RUSH/TITUS): 81 ML/MIN/1.73M²
EGFR (NO RACE VARIABLE) (RUSH/TITUS): 81 ML/MIN/1.73M²
EGFR (NO RACE VARIABLE) (RUSH/TITUS): 84 ML/MIN/1.73M²
EGFR (NO RACE VARIABLE) (RUSH/TITUS): 86 ML/MIN/1.73M²
EGFR (NO RACE VARIABLE) (RUSH/TITUS): 93 ML/MIN/1.73M²
EOSINOPHIL # BLD AUTO: 0 K/UL (ref 0–0.5)
EOSINOPHIL NFR BLD AUTO: 0 % (ref 1–4)
ERYTHROCYTE [DISTWIDTH] IN BLOOD BY AUTOMATED COUNT: 13.9 % (ref 11.5–14.5)
GLUCOSE SERPL-MCNC: 100 MG/DL (ref 74–106)
GLUCOSE SERPL-MCNC: 100 MG/DL (ref 74–106)
GLUCOSE SERPL-MCNC: 107 MG/DL (ref 74–106)
GLUCOSE SERPL-MCNC: 108 MG/DL (ref 74–106)
GLUCOSE SERPL-MCNC: 109 MG/DL (ref 74–106)
GLUCOSE SERPL-MCNC: 116 MG/DL (ref 74–106)
GLUCOSE SERPL-MCNC: 121 MG/DL (ref 74–106)
GLUCOSE SERPL-MCNC: 129 MG/DL (ref 74–106)
GLUCOSE SERPL-MCNC: 129 MG/DL (ref 74–106)
GLUCOSE SERPL-MCNC: 144 MG/DL (ref 74–106)
GLUCOSE SERPL-MCNC: 145 MG/DL (ref 74–106)
GLUCOSE SERPL-MCNC: 157 MG/DL (ref 74–106)
HCT VFR BLD AUTO: 37.5 % (ref 38–47)
HGB BLD-MCNC: 13.7 G/DL (ref 12–16)
IMM GRANULOCYTES # BLD AUTO: 0.16 K/UL (ref 0–0.04)
IMM GRANULOCYTES NFR BLD: 0.7 % (ref 0–0.4)
LYMPHOCYTES # BLD AUTO: 1.42 K/UL (ref 1–4.8)
LYMPHOCYTES NFR BLD AUTO: 6.4 % (ref 27–41)
MCH RBC QN AUTO: 28.4 PG (ref 27–31)
MCHC RBC AUTO-ENTMCNC: 36.5 G/DL (ref 32–36)
MCV RBC AUTO: 77.6 FL (ref 80–96)
MONOCYTES # BLD AUTO: 1.46 K/UL (ref 0–0.8)
MONOCYTES NFR BLD AUTO: 6.6 % (ref 2–6)
MPC BLD CALC-MCNC: 9.4 FL (ref 9.4–12.4)
NEUTROPHILS # BLD AUTO: 18.93 K/UL (ref 1.8–7.7)
NEUTROPHILS NFR BLD AUTO: 86.1 % (ref 53–65)
NRBC # BLD AUTO: 0 X10E3/UL
NRBC, AUTO (.00): 0 %
PLATELET # BLD AUTO: 350 K/UL (ref 150–400)
POTASSIUM SERPL-SCNC: 2.7 MMOL/L (ref 3.5–5.1)
POTASSIUM SERPL-SCNC: 3.3 MMOL/L (ref 3.5–5.1)
POTASSIUM SERPL-SCNC: 3.7 MMOL/L (ref 3.5–5.1)
POTASSIUM SERPL-SCNC: 3.8 MMOL/L (ref 3.5–5.1)
POTASSIUM SERPL-SCNC: 3.9 MMOL/L (ref 3.5–5.1)
POTASSIUM SERPL-SCNC: 3.9 MMOL/L (ref 3.5–5.1)
POTASSIUM SERPL-SCNC: 4 MMOL/L (ref 3.5–5.1)
POTASSIUM SERPL-SCNC: 4.1 MMOL/L (ref 3.5–5.1)
POTASSIUM SERPL-SCNC: 4.2 MMOL/L (ref 3.5–5.1)
POTASSIUM SERPL-SCNC: 4.3 MMOL/L (ref 3.5–5.1)
RBC # BLD AUTO: 4.83 M/UL (ref 4.2–5.4)
SODIUM SERPL-SCNC: 107 MMOL/L (ref 136–145)
SODIUM SERPL-SCNC: 108 MMOL/L (ref 136–145)
SODIUM SERPL-SCNC: 109 MMOL/L (ref 136–145)
SODIUM SERPL-SCNC: 109 MMOL/L (ref 136–145)
SODIUM SERPL-SCNC: 110 MMOL/L (ref 136–145)
SODIUM SERPL-SCNC: 111 MMOL/L (ref 136–145)
SODIUM SERPL-SCNC: 111 MMOL/L (ref 136–145)
T4 FREE SERPL-MCNC: 1.34 NG/DL (ref 0.76–1.46)
WBC # BLD AUTO: 22.02 K/UL (ref 4.5–11)

## 2023-02-02 PROCEDURE — 63600175 PHARM REV CODE 636 W HCPCS: Performed by: INTERNAL MEDICINE

## 2023-02-02 PROCEDURE — 11000001 HC ACUTE MED/SURG PRIVATE ROOM

## 2023-02-02 PROCEDURE — 63600175 PHARM REV CODE 636 W HCPCS

## 2023-02-02 PROCEDURE — 85025 COMPLETE CBC W/AUTO DIFF WBC: CPT

## 2023-02-02 PROCEDURE — 25000003 PHARM REV CODE 250: Performed by: FAMILY MEDICINE

## 2023-02-02 PROCEDURE — 99232 SBSQ HOSP IP/OBS MODERATE 35: CPT | Mod: GC,,, | Performed by: FAMILY MEDICINE

## 2023-02-02 PROCEDURE — 63600175 PHARM REV CODE 636 W HCPCS: Performed by: HOSPITALIST

## 2023-02-02 PROCEDURE — 80048 BASIC METABOLIC PNL TOTAL CA: CPT | Performed by: INTERNAL MEDICINE

## 2023-02-02 PROCEDURE — 84439 ASSAY OF FREE THYROXINE: CPT | Performed by: FAMILY MEDICINE

## 2023-02-02 PROCEDURE — C9113 INJ PANTOPRAZOLE SODIUM, VIA: HCPCS

## 2023-02-02 PROCEDURE — 25000003 PHARM REV CODE 250: Performed by: HOSPITALIST

## 2023-02-02 PROCEDURE — 25000003 PHARM REV CODE 250

## 2023-02-02 PROCEDURE — 94761 N-INVAS EAR/PLS OXIMETRY MLT: CPT

## 2023-02-02 PROCEDURE — 99232 PR SUBSEQUENT HOSPITAL CARE,LEVL II: ICD-10-PCS | Mod: GC,,, | Performed by: FAMILY MEDICINE

## 2023-02-02 RX ORDER — POTASSIUM CHLORIDE 20 MEQ/1
40 TABLET, EXTENDED RELEASE ORAL 2 TIMES DAILY
Status: DISCONTINUED | OUTPATIENT
Start: 2023-02-02 | End: 2023-02-02

## 2023-02-02 RX ORDER — 3% SODIUM CHLORIDE 3 G/100ML
45 INJECTION, SOLUTION INTRAVENOUS CONTINUOUS
Status: DISPENSED | OUTPATIENT
Start: 2023-02-02 | End: 2023-02-02

## 2023-02-02 RX ORDER — POTASSIUM CHLORIDE 20 MEQ/1
40 TABLET, EXTENDED RELEASE ORAL ONCE
Status: COMPLETED | OUTPATIENT
Start: 2023-02-02 | End: 2023-02-02

## 2023-02-02 RX ORDER — PROCHLORPERAZINE EDISYLATE 5 MG/ML
5 INJECTION INTRAMUSCULAR; INTRAVENOUS EVERY 4 HOURS PRN
Status: DISCONTINUED | OUTPATIENT
Start: 2023-02-02 | End: 2023-02-09 | Stop reason: HOSPADM

## 2023-02-02 RX ADMIN — METOPROLOL SUCCINATE 50 MG: 50 TABLET, EXTENDED RELEASE ORAL at 08:02

## 2023-02-02 RX ADMIN — TRAZODONE HYDROCHLORIDE 50 MG: 50 TABLET ORAL at 08:02

## 2023-02-02 RX ADMIN — ONDANSETRON HYDROCHLORIDE 8 MG: 2 SOLUTION INTRAMUSCULAR; INTRAVENOUS at 03:02

## 2023-02-02 RX ADMIN — SACUBITRIL AND VALSARTAN 1 TABLET: 24; 26 TABLET, FILM COATED ORAL at 08:02

## 2023-02-02 RX ADMIN — PANTOPRAZOLE SODIUM 40 MG: 40 INJECTION, POWDER, FOR SOLUTION INTRAVENOUS at 09:02

## 2023-02-02 RX ADMIN — EZETIMIBE 10 MG: 10 TABLET ORAL at 08:02

## 2023-02-02 RX ADMIN — ENOXAPARIN SODIUM 40 MG: 40 INJECTION SUBCUTANEOUS at 05:02

## 2023-02-02 RX ADMIN — PROCHLORPERAZINE EDISYLATE 5 MG: 5 INJECTION INTRAMUSCULAR; INTRAVENOUS at 08:02

## 2023-02-02 RX ADMIN — POTASSIUM CHLORIDE 40 MEQ: 1500 TABLET, EXTENDED RELEASE ORAL at 12:02

## 2023-02-02 RX ADMIN — SODIUM CHLORIDE 45 ML/HR: 3 INJECTION, SOLUTION INTRAVENOUS at 08:02

## 2023-02-02 NOTE — SUBJECTIVE & OBJECTIVE
Interval History: Patient states she in not nauseated this morning. No distress noted. Patient had many questions about her condition today and was educated. Na improving.    Review of Systems   Constitutional:  Positive for fatigue. Negative for fever.   HENT:  Negative for congestion, facial swelling, hearing loss, nosebleeds, sinus pressure, sneezing and sore throat.    Eyes:  Negative for pain, discharge and itching.   Respiratory:  Negative for cough and shortness of breath.    Cardiovascular:  Negative for leg swelling.   Gastrointestinal:  Negative for abdominal distention, abdominal pain, anal bleeding, diarrhea, nausea and vomiting.   Endocrine: Negative for cold intolerance and polydipsia.   Genitourinary:  Negative for difficulty urinating, dyspareunia and dysuria.   Musculoskeletal: Negative.    Skin: Negative.    Allergic/Immunologic: Negative.    Neurological:  Negative for light-headedness.   Psychiatric/Behavioral: Negative.     Objective:     Vital Signs (Most Recent):  Temp: 97.1 °F (36.2 °C) (02/02/23 1200)  Pulse: 91 (02/02/23 1200)  Resp: 18 (02/02/23 1200)  BP: (!) 137/95 (reported to nurse TERESA) (02/02/23 1200)  SpO2: 96 % (02/02/23 1200)   Vital Signs (24h Range):  Temp:  [97.1 °F (36.2 °C)-99.5 °F (37.5 °C)] 97.1 °F (36.2 °C)  Pulse:  [] 91  Resp:  [18] 18  SpO2:  [93 %-98 %] 96 %  BP: (127-166)/() 137/95     Weight: 65.2 kg (143 lb 11.8 oz)  Body mass index is 25.46 kg/m².    Intake/Output Summary (Last 24 hours) at 2/2/2023 1301  Last data filed at 2/1/2023 1800  Gross per 24 hour   Intake --   Output 400 ml   Net -400 ml      Physical Exam  Constitutional:       Appearance: She is normal weight.   HENT:      Head: Normocephalic.      Right Ear: Tympanic membrane normal.      Left Ear: Tympanic membrane normal.      Nose: Nose normal.      Mouth/Throat:      Mouth: Mucous membranes are dry.      Pharynx: Oropharynx is clear.   Eyes:      Conjunctiva/sclera: Conjunctivae normal.       Pupils: Pupils are equal, round, and reactive to light.   Cardiovascular:      Rate and Rhythm: Normal rate and regular rhythm.      Pulses: Normal pulses.      Heart sounds: Normal heart sounds.   Pulmonary:      Effort: Pulmonary effort is normal.      Breath sounds: Normal breath sounds.   Abdominal:      General: Abdomen is flat. Bowel sounds are normal.   Musculoskeletal:         General: Normal range of motion.      Cervical back: Normal range of motion.   Skin:     General: Skin is warm and dry.      Capillary Refill: Capillary refill takes less than 2 seconds.   Neurological:      Mental Status: She is alert and oriented to person, place, and time.   Psychiatric:         Mood and Affect: Mood normal.         Behavior: Behavior normal.       Significant Labs: All pertinent labs within the past 24 hours have been reviewed.  BMP:   Recent Labs   Lab 02/01/23  1137 02/01/23  1739 02/02/23  1204   *   < > 145*   *   < > 110*   K 3.1*   < > 3.3*   CL 67*   < > 76*   CO2 22   < > 24   BUN 6*   < > 6*   CREATININE 0.71   < > 0.87   CALCIUM 8.7   < > 8.7   MG 1.9  --   --     < > = values in this interval not displayed.     CBC:   Recent Labs   Lab 02/01/23  0646 02/02/23  0453   WBC 12.26* 22.02*   HGB 12.2 13.7   HCT 33.0* 37.5*   * 350       Significant Imaging: I have reviewed all pertinent imaging results/findings within the past 24 hours.

## 2023-02-02 NOTE — ASSESSMENT & PLAN NOTE
Sodium of 106- on admission  Patient has Depression and anxiety  She denies ever taking lithium  Patient started taking Abilify a year ago, She started taking cymbalta 2 years ago, She has been taking Xanax and Ambien for many years    2/1/23  D5W Discontinue  NS infusion restarted   BMP Q4hrs will continue to monitor  Zofran for nausea    2/2/23  3 percent normal saline 80 ml given 2/1/23.  Na improved overnight. Will continue to monitor

## 2023-02-02 NOTE — PROGRESS NOTES
[facility] Hospital Medicine  Progress Note    Patient Name: Flory Chacon  MRN: [id number]  Patient Class: IP- Inpatient   Admission Date: 1/30/2023  Length of Stay: 3 days  Attending Physician: Horace Dee Jr., MD  Primary Care Provider: OTTO Coe        Subjective:     Principal Problem:Dehydration with hyponatremia        HPI:  57 year old female presented to the ED for N/V and electrolytes abnormalities. Patient has been feeling nausea and vomiting multiple times a day starting Sunday afternoon. She has vomited 10-20 times since Sunday and started feeling weak and fatigue today therefore went to a clinic and after doing lab work she was notified that she should go to the ED due to electrolyte abnormalities and very low sodium. She reports her step son who she has been in close contact with has had the stomach virus and has been experiencing diarrhea. .Patient reports heartburn due to vomiting, she reports no blood in the vomit and denies fever, chills, diarrhea, abdominal pain, chest pain and dizziness.      PMHx includes depression and anxiety on multiple different psych medications. Patient reports she used to have episodes of dizziness and syncope therefore ended up getting a pacemaker. She used to be a patient of Dr. Epperson and will start seeing Dr. Pompa for the very first time in a week. Patient also has HTN.     ED workup:  Sodium of 106, potassium of 2.6 with Cl of 70  Bicarb of 29 with anion gap of 10  Mg of 0.9  Cr is 0.98 with GFR of 71  No ketones in the urine   WBC 13.13        Overview/Hospital Course:  No notes on file    Interval History: Patient states she in not nauseated this morning. No distress noted. Patient had many questions about her condition today and was educated. Na improving.    Review of Systems   Constitutional:  Positive for fatigue. Negative for fever.   HENT:  Negative for congestion, facial swelling, hearing loss,  nosebleeds, sinus pressure, sneezing and sore throat.    Eyes:  Negative for pain, discharge and itching.   Respiratory:  Negative for cough and shortness of breath.    Cardiovascular:  Negative for leg swelling.   Gastrointestinal:  Negative for abdominal distention, abdominal pain, anal bleeding, diarrhea, nausea and vomiting.   Endocrine: Negative for cold intolerance and polydipsia.   Genitourinary:  Negative for difficulty urinating, dyspareunia and dysuria.   Musculoskeletal: Negative.    Skin: Negative.    Allergic/Immunologic: Negative.    Neurological:  Negative for light-headedness.   Psychiatric/Behavioral: Negative.     Objective:     Vital Signs (Most Recent):  Temp: 97.1 °F (36.2 °C) (02/02/23 1200)  Pulse: 91 (02/02/23 1200)  Resp: 18 (02/02/23 1200)  BP: (!) 137/95 (reported to nurse TERESA) (02/02/23 1200)  SpO2: 96 % (02/02/23 1200)   Vital Signs (24h Range):  Temp:  [97.1 °F (36.2 °C)-99.5 °F (37.5 °C)] 97.1 °F (36.2 °C)  Pulse:  [] 91  Resp:  [18] 18  SpO2:  [93 %-98 %] 96 %  BP: (127-166)/() 137/95     Weight: 65.2 kg (143 lb 11.8 oz)  Body mass index is 25.46 kg/m².    Intake/Output Summary (Last 24 hours) at 2/2/2023 1301  Last data filed at 2/1/2023 1800  Gross per 24 hour   Intake --   Output 400 ml   Net -400 ml      Physical Exam  Constitutional:       Appearance: She is normal weight.   HENT:      Head: Normocephalic.      Right Ear: Tympanic membrane normal.      Left Ear: Tympanic membrane normal.      Nose: Nose normal.      Mouth/Throat:      Mouth: Mucous membranes are dry.      Pharynx: Oropharynx is clear.   Eyes:      Conjunctiva/sclera: Conjunctivae normal.      Pupils: Pupils are equal, round, and reactive to light.   Cardiovascular:      Rate and Rhythm: Normal rate and regular rhythm.      Pulses: Normal pulses.      Heart sounds: Normal heart sounds.   Pulmonary:      Effort: Pulmonary effort is normal.      Breath sounds: Normal breath sounds.   Abdominal:       General: Abdomen is flat. Bowel sounds are normal.   Musculoskeletal:         General: Normal range of motion.      Cervical back: Normal range of motion.   Skin:     General: Skin is warm and dry.      Capillary Refill: Capillary refill takes less than 2 seconds.   Neurological:      Mental Status: She is alert and oriented to person, place, and time.   Psychiatric:         Mood and Affect: Mood normal.         Behavior: Behavior normal.       Significant Labs: All pertinent labs within the past 24 hours have been reviewed.  BMP:   Recent Labs   Lab 02/01/23  1137 02/01/23  1739 02/02/23  1204   *   < > 145*   *   < > 110*   K 3.1*   < > 3.3*   CL 67*   < > 76*   CO2 22   < > 24   BUN 6*   < > 6*   CREATININE 0.71   < > 0.87   CALCIUM 8.7   < > 8.7   MG 1.9  --   --     < > = values in this interval not displayed.     CBC:   Recent Labs   Lab 02/01/23  0646 02/02/23  0453   WBC 12.26* 22.02*   HGB 12.2 13.7   HCT 33.0* 37.5*   * 350       Significant Imaging: I have reviewed all pertinent imaging results/findings within the past 24 hours.      Assessment/Plan:      * Dehydration with hyponatremia  Sodium of 106- on admission  Patient has Depression and anxiety  She denies ever taking lithium  Patient started taking Abilify a year ago, She started taking cymbalta 2 years ago, She has been taking Xanax and Ambien for many years    2/1/23  D5W Discontinue  NS infusion restarted   BMP Q4hrs will continue to monitor  Zofran for nausea    2/2/23  3 percent normal saline 80 ml given 2/1/23.  Na improved overnight. Will continue to monitor    Nonischemic cardiomyopathy  Holding home entresto and Toprol XL    [facility] 4/25/22 - coronary arteries were normal..     Implantation of a BiVentricular Pacemaker  7/20/22 - Dr. Cordova at [place]   Hx of SVT s/p Ablation 7/20/22     Echo 4/23/22:   The left ventricle is normal in size with mild eccentric hypertrophy and mildly decreased systolic function.   The  estimated ejection fraction is 40-45%. Anteroseptal wall is moderately hypokinetic.   Normal right ventricular size with normal right ventricular systolic function.   Mild mitral regurgitation.   Normal central venous pressure (3 mmHg).      Electrolyte abnormality    Sodium of 106, potassium of 2.6 with Cl of 70  Bicarb of 29 with anion gap of 10  Mg of 0.9    KCL 40 mEq IV given  Mag sulfat 4 g IV given  Patient is on Normal saline wth rate of 100 cc/hr  BMP pending     1/31/23    Na increase to 115. NS infusion discontinued  D5W @ 100cc/hr started because Na increasing too fast  BMP ordered       Complicated migraine  Patient used to take Nurtec and emgality for migraine but she has not taken them for a while   Currently she does not have any headache       Anxiety disorder, unspecified    Holding home medications for now     Hypertension  Currently well controlled  Continue Entresto    Toprol XL   Patient has Lisinopril listed as home meds and she mentioned she does not take the medication. It was brought up to her that Entresto must not be taken with Lisinopril.Lisinopril must be discontinued from the pharmacy list upon discharge         VTE Risk Mitigation (From admission, onward)         Ordered     enoxaparin injection 40 mg  Daily         01/31/23 0748     IP VTE LOW RISK PATIENT  Once         01/31/23 0708     Place sequential compression device  Until discontinued         01/31/23 0708                Discharge Planning   ESME:      Code Status: Full Code   Is the patient medically ready for discharge?:     Reason for patient still in hospital (select all that apply): Treatment  Discharge Plan A: Home                  Saroj Pineda MD  Department of Hospital Medicine   [facility]

## 2023-02-03 PROBLEM — I47.10 PAROXYSMAL SVT (SUPRAVENTRICULAR TACHYCARDIA): Status: ACTIVE | Noted: 2023-02-03

## 2023-02-03 PROBLEM — E22.2 SIADH (SYNDROME OF INAPPROPRIATE ADH PRODUCTION): Status: ACTIVE | Noted: 2023-02-03

## 2023-02-03 LAB
ANION GAP SERPL CALCULATED.3IONS-SCNC: 12 MMOL/L (ref 7–16)
ANION GAP SERPL CALCULATED.3IONS-SCNC: 13 MMOL/L (ref 7–16)
ANION GAP SERPL CALCULATED.3IONS-SCNC: 14 MMOL/L (ref 7–16)
ANION GAP SERPL CALCULATED.3IONS-SCNC: 14 MMOL/L (ref 7–16)
ANION GAP SERPL CALCULATED.3IONS-SCNC: 15 MMOL/L (ref 7–16)
ANION GAP SERPL CALCULATED.3IONS-SCNC: 15 MMOL/L (ref 7–16)
ANION GAP SERPL CALCULATED.3IONS-SCNC: 16 MMOL/L (ref 7–16)
AORTIC ROOT ANNULUS: 2.2 CM
AORTIC VALVE CUSP SEPERATION: 1.9 CM
AV INDEX (PROSTH): 0.9
AV MEAN GRADIENT: 6 MMHG
AV PEAK GRADIENT: 8 MMHG
AV VALVE AREA: 2.56 CM2
AV VELOCITY RATIO: 0.79
BASOPHILS # BLD AUTO: 0.04 K/UL (ref 0–0.2)
BASOPHILS NFR BLD AUTO: 0.3 % (ref 0–1)
BSA FOR ECHO PROCEDURE: 1.72 M2
BUN SERPL-MCNC: 8 MG/DL (ref 7–18)
BUN SERPL-MCNC: 9 MG/DL (ref 7–18)
BUN/CREAT SERPL: 10 (ref 6–20)
BUN/CREAT SERPL: 11 (ref 6–20)
BUN/CREAT SERPL: 11 (ref 6–20)
BUN/CREAT SERPL: 9 (ref 6–20)
CALCIUM SERPL-MCNC: 8.7 MG/DL (ref 8.5–10.1)
CALCIUM SERPL-MCNC: 8.9 MG/DL (ref 8.5–10.1)
CALCIUM SERPL-MCNC: 8.9 MG/DL (ref 8.5–10.1)
CALCIUM SERPL-MCNC: 9 MG/DL (ref 8.5–10.1)
CALCIUM SERPL-MCNC: 9.2 MG/DL (ref 8.5–10.1)
CALCIUM SERPL-MCNC: 9.3 MG/DL (ref 8.5–10.1)
CALCIUM SERPL-MCNC: 9.4 MG/DL (ref 8.5–10.1)
CHLORIDE SERPL-SCNC: 77 MMOL/L (ref 98–107)
CHLORIDE SERPL-SCNC: 80 MMOL/L (ref 98–107)
CHLORIDE SERPL-SCNC: 80 MMOL/L (ref 98–107)
CHLORIDE SERPL-SCNC: 82 MMOL/L (ref 98–107)
CHLORIDE SERPL-SCNC: 88 MMOL/L (ref 98–107)
CHLORIDE SERPL-SCNC: 89 MMOL/L (ref 98–107)
CHLORIDE SERPL-SCNC: 89 MMOL/L (ref 98–107)
CHLORIDE UR-SCNC: 98 MMOL/L
CO2 SERPL-SCNC: 21 MMOL/L (ref 21–32)
CO2 SERPL-SCNC: 23 MMOL/L (ref 21–32)
CO2 SERPL-SCNC: 24 MMOL/L (ref 21–32)
CO2 SERPL-SCNC: 24 MMOL/L (ref 21–32)
CO2 SERPL-SCNC: 25 MMOL/L (ref 21–32)
CO2 SERPL-SCNC: 25 MMOL/L (ref 21–32)
CO2 SERPL-SCNC: 27 MMOL/L (ref 21–32)
CREAT SERPL-MCNC: 0.82 MG/DL (ref 0.55–1.02)
CREAT SERPL-MCNC: 0.85 MG/DL (ref 0.55–1.02)
CREAT SERPL-MCNC: 0.85 MG/DL (ref 0.55–1.02)
CREAT SERPL-MCNC: 0.91 MG/DL (ref 0.55–1.02)
CREAT SERPL-MCNC: 0.97 MG/DL (ref 0.55–1.02)
CREAT SERPL-MCNC: 0.98 MG/DL (ref 0.55–1.02)
CREAT SERPL-MCNC: 0.98 MG/DL (ref 0.55–1.02)
CV ECHO LV RWT: 0.6 CM
DIFFERENTIAL METHOD BLD: ABNORMAL
DOP CALC AO PEAK VEL: 1.4 M/S
DOP CALC AO VTI: 21 CM
DOP CALC LVOT AREA: 2.8 CM2
DOP CALC LVOT DIAMETER: 1.9 CM
DOP CALC LVOT PEAK VEL: 1.1 M/S
DOP CALC LVOT STROKE VOLUME: 53.84 CM3
DOP CALCLVOT PEAK VEL VTI: 19 CM
E WAVE DECELERATION TIME: 192 MSEC
ECHO EF ESTIMATED: 60 %
ECHO LV POSTERIOR WALL: 0.97 CM (ref 0.6–1.1)
EGFR (NO RACE VARIABLE) (RUSH/TITUS): 67 ML/MIN/1.73M²
EGFR (NO RACE VARIABLE) (RUSH/TITUS): 73 ML/MIN/1.73M²
EGFR (NO RACE VARIABLE) (RUSH/TITUS): 79 ML/MIN/1.73M²
EGFR (NO RACE VARIABLE) (RUSH/TITUS): 79 ML/MIN/1.73M²
EGFR (NO RACE VARIABLE) (RUSH/TITUS): 83 ML/MIN/1.73M²
EJECTION FRACTION: 60 %
EOSINOPHIL # BLD AUTO: 0.03 K/UL (ref 0–0.5)
EOSINOPHIL NFR BLD AUTO: 0.2 % (ref 1–4)
ERYTHROCYTE [DISTWIDTH] IN BLOOD BY AUTOMATED COUNT: 13.3 % (ref 11.5–14.5)
FRACTIONAL SHORTENING: 21 % (ref 28–44)
GLUCOSE SERPL-MCNC: 102 MG/DL (ref 74–106)
GLUCOSE SERPL-MCNC: 105 MG/DL (ref 74–106)
GLUCOSE SERPL-MCNC: 106 MG/DL (ref 74–106)
GLUCOSE SERPL-MCNC: 115 MG/DL (ref 74–106)
GLUCOSE SERPL-MCNC: 130 MG/DL (ref 74–106)
HCT VFR BLD AUTO: 35.6 % (ref 38–47)
HGB BLD-MCNC: 13 G/DL (ref 12–16)
IMM GRANULOCYTES # BLD AUTO: 0.06 K/UL (ref 0–0.04)
IMM GRANULOCYTES NFR BLD: 0.4 % (ref 0–0.4)
INTERVENTRICULAR SEPTUM: 0.97 CM (ref 0.6–1.1)
IVC OSTIUM: 1.3 CM
LEFT ATRIUM SIZE: 2.3 CM
LEFT INTERNAL DIMENSION IN SYSTOLE: 2.57 CM (ref 2.1–4)
LEFT VENTRICLE DIASTOLIC VOLUME INDEX: 25.48 ML/M2
LEFT VENTRICLE DIASTOLIC VOLUME: 42.8 ML
LEFT VENTRICLE MASS INDEX: 53 G/M2
LEFT VENTRICLE SYSTOLIC VOLUME INDEX: 14.2 ML/M2
LEFT VENTRICLE SYSTOLIC VOLUME: 23.9 ML
LEFT VENTRICULAR INTERNAL DIMENSION IN DIASTOLE: 3.26 CM (ref 3.5–6)
LEFT VENTRICULAR MASS: 88.76 G
LVOT MG: 3 MMHG
LYMPHOCYTES # BLD AUTO: 1.94 K/UL (ref 1–4.8)
LYMPHOCYTES NFR BLD AUTO: 13.7 % (ref 27–41)
LYMPHOCYTES NFR BLD MANUAL: 15 % (ref 27–41)
MAGNESIUM SERPL-MCNC: 1.6 MG/DL (ref 1.7–2.3)
MAGNESIUM SERPL-MCNC: 2.8 MG/DL (ref 1.7–2.3)
MCH RBC QN AUTO: 28 PG (ref 27–31)
MCHC RBC AUTO-ENTMCNC: 36.5 G/DL (ref 32–36)
MCV RBC AUTO: 76.6 FL (ref 80–96)
MICROCYTES BLD QL SMEAR: ABNORMAL
MONOCYTES # BLD AUTO: 0.57 K/UL (ref 0–0.8)
MONOCYTES NFR BLD AUTO: 4 % (ref 2–6)
MONOCYTES NFR BLD MANUAL: 2 % (ref 2–6)
MPC BLD CALC-MCNC: 9.4 FL (ref 9.4–12.4)
MV PEAK E VEL: 0.49 M/S
NEUTROPHILS # BLD AUTO: 11.52 K/UL (ref 1.8–7.7)
NEUTROPHILS NFR BLD AUTO: 81.4 % (ref 53–65)
NEUTS SEG NFR BLD MANUAL: 83 % (ref 50–62)
NRBC # BLD AUTO: 0 X10E3/UL
NRBC, AUTO (.00): 0 %
OSMOLALITY UR: 327 MOSM/KG (ref 50–1400)
PISA TR MAX VEL: 2.5 M/S
PLATELET # BLD AUTO: 356 K/UL (ref 150–400)
PLATELET MORPHOLOGY: ABNORMAL
POTASSIUM SERPL-SCNC: 3.1 MMOL/L (ref 3.5–5.1)
POTASSIUM SERPL-SCNC: 3.3 MMOL/L (ref 3.5–5.1)
POTASSIUM SERPL-SCNC: 3.4 MMOL/L (ref 3.5–5.1)
POTASSIUM SERPL-SCNC: 3.5 MMOL/L (ref 3.5–5.1)
POTASSIUM SERPL-SCNC: 3.6 MMOL/L (ref 3.5–5.1)
POTASSIUM SERPL-SCNC: 3.8 MMOL/L (ref 3.5–5.1)
POTASSIUM SERPL-SCNC: 4.1 MMOL/L (ref 3.5–5.1)
POTASSIUM UR-SCNC: 39 MMOL/L (ref 25–125)
RA MAJOR: 2.9 CM
RA PRESSURE: 3 MMHG
RBC # BLD AUTO: 4.65 M/UL (ref 4.2–5.4)
RIGHT VENTRICULAR END-DIASTOLIC DIMENSION: 1.6 CM
SODIUM SERPL-SCNC: 109 MMOL/L (ref 136–145)
SODIUM SERPL-SCNC: 113 MMOL/L (ref 136–145)
SODIUM SERPL-SCNC: 115 MMOL/L (ref 136–145)
SODIUM SERPL-SCNC: 118 MMOL/L (ref 136–145)
SODIUM SERPL-SCNC: 121 MMOL/L (ref 136–145)
SODIUM SERPL-SCNC: 124 MMOL/L (ref 136–145)
SODIUM SERPL-SCNC: 124 MMOL/L (ref 136–145)
SODIUM SERPL-SCNC: 125 MMOL/L (ref 136–145)
SODIUM UR-SCNC: 62 MMOL/L (ref 40–220)
TR MAX PG: 25 MMHG
TRICUSPID ANNULAR PLANE SYSTOLIC EXCURSION: 1.4 CM
TROPONIN I SERPL HS-MCNC: 30.7 PG/ML
TV REST PULMONARY ARTERY PRESSURE: 28 MMHG
URATE SERPL-MCNC: 5.7 MG/DL (ref 2.6–6)
WBC # BLD AUTO: 14.16 K/UL (ref 4.5–11)

## 2023-02-03 PROCEDURE — 63600175 PHARM REV CODE 636 W HCPCS

## 2023-02-03 PROCEDURE — 84295 ASSAY OF SERUM SODIUM: CPT | Performed by: STUDENT IN AN ORGANIZED HEALTH CARE EDUCATION/TRAINING PROGRAM

## 2023-02-03 PROCEDURE — 83735 ASSAY OF MAGNESIUM: CPT

## 2023-02-03 PROCEDURE — S0166 INJ OLANZAPINE 2.5MG: HCPCS

## 2023-02-03 PROCEDURE — 82436 ASSAY OF URINE CHLORIDE: CPT | Performed by: INTERNAL MEDICINE

## 2023-02-03 PROCEDURE — 25000003 PHARM REV CODE 250

## 2023-02-03 PROCEDURE — 83935 ASSAY OF URINE OSMOLALITY: CPT | Performed by: STUDENT IN AN ORGANIZED HEALTH CARE EDUCATION/TRAINING PROGRAM

## 2023-02-03 PROCEDURE — 99233 SBSQ HOSP IP/OBS HIGH 50: CPT | Mod: GC,,, | Performed by: STUDENT IN AN ORGANIZED HEALTH CARE EDUCATION/TRAINING PROGRAM

## 2023-02-03 PROCEDURE — 93005 ELECTROCARDIOGRAM TRACING: CPT

## 2023-02-03 PROCEDURE — 85025 COMPLETE CBC W/AUTO DIFF WBC: CPT

## 2023-02-03 PROCEDURE — 25000003 PHARM REV CODE 250: Performed by: STUDENT IN AN ORGANIZED HEALTH CARE EDUCATION/TRAINING PROGRAM

## 2023-02-03 PROCEDURE — 84133 ASSAY OF URINE POTASSIUM: CPT | Performed by: INTERNAL MEDICINE

## 2023-02-03 PROCEDURE — 99223 PR INITIAL HOSPITAL CARE,LEVL III: ICD-10-PCS | Mod: ,,, | Performed by: INTERNAL MEDICINE

## 2023-02-03 PROCEDURE — 20000000 HC ICU ROOM

## 2023-02-03 PROCEDURE — 93010 EKG 12-LEAD: ICD-10-PCS | Mod: ,,, | Performed by: INTERNAL MEDICINE

## 2023-02-03 PROCEDURE — 63600175 PHARM REV CODE 636 W HCPCS: Performed by: INTERNAL MEDICINE

## 2023-02-03 PROCEDURE — 80048 BASIC METABOLIC PNL TOTAL CA: CPT

## 2023-02-03 PROCEDURE — 99233 PR SUBSEQUENT HOSPITAL CARE,LEVL III: ICD-10-PCS | Mod: GC,,, | Performed by: STUDENT IN AN ORGANIZED HEALTH CARE EDUCATION/TRAINING PROGRAM

## 2023-02-03 PROCEDURE — 84550 ASSAY OF BLOOD/URIC ACID: CPT | Performed by: INTERNAL MEDICINE

## 2023-02-03 PROCEDURE — 99223 1ST HOSP IP/OBS HIGH 75: CPT | Mod: ,,, | Performed by: STUDENT IN AN ORGANIZED HEALTH CARE EDUCATION/TRAINING PROGRAM

## 2023-02-03 PROCEDURE — 99223 PR INITIAL HOSPITAL CARE,LEVL III: ICD-10-PCS | Mod: ,,, | Performed by: STUDENT IN AN ORGANIZED HEALTH CARE EDUCATION/TRAINING PROGRAM

## 2023-02-03 PROCEDURE — 99223 1ST HOSP IP/OBS HIGH 75: CPT | Mod: ,,, | Performed by: INTERNAL MEDICINE

## 2023-02-03 PROCEDURE — 25000003 PHARM REV CODE 250: Performed by: HOSPITALIST

## 2023-02-03 PROCEDURE — 84484 ASSAY OF TROPONIN QUANT: CPT

## 2023-02-03 PROCEDURE — 93010 ELECTROCARDIOGRAM REPORT: CPT | Mod: ,,, | Performed by: INTERNAL MEDICINE

## 2023-02-03 PROCEDURE — C9113 INJ PANTOPRAZOLE SODIUM, VIA: HCPCS

## 2023-02-03 PROCEDURE — 80048 BASIC METABOLIC PNL TOTAL CA: CPT | Performed by: INTERNAL MEDICINE

## 2023-02-03 PROCEDURE — 84300 ASSAY OF URINE SODIUM: CPT | Performed by: INTERNAL MEDICINE

## 2023-02-03 RX ORDER — POTASSIUM CHLORIDE 20 MEQ/1
40 TABLET, EXTENDED RELEASE ORAL 2 TIMES DAILY
Status: COMPLETED | OUTPATIENT
Start: 2023-02-03 | End: 2023-02-03

## 2023-02-03 RX ORDER — OLANZAPINE 10 MG/2ML
5 INJECTION, POWDER, FOR SOLUTION INTRAMUSCULAR ONCE AS NEEDED
Status: COMPLETED | OUTPATIENT
Start: 2023-02-03 | End: 2023-02-03

## 2023-02-03 RX ORDER — MAGNESIUM SULFATE HEPTAHYDRATE 40 MG/ML
2 INJECTION, SOLUTION INTRAVENOUS ONCE
Status: COMPLETED | OUTPATIENT
Start: 2023-02-03 | End: 2023-02-03

## 2023-02-03 RX ORDER — MUPIROCIN 20 MG/G
OINTMENT TOPICAL 2 TIMES DAILY
Status: DISCONTINUED | OUTPATIENT
Start: 2023-02-03 | End: 2023-02-08

## 2023-02-03 RX ORDER — DESMOPRESSIN ACETATE 4 UG/ML
1 INJECTION, SOLUTION INTRAVENOUS; SUBCUTANEOUS EVERY 6 HOURS
Status: DISPENSED | OUTPATIENT
Start: 2023-02-03 | End: 2023-02-04

## 2023-02-03 RX ADMIN — AMIODARONE HYDROCHLORIDE 1 MG/MIN: 50 INJECTION, SOLUTION INTRAVENOUS at 05:02

## 2023-02-03 RX ADMIN — EZETIMIBE 10 MG: 10 TABLET ORAL at 09:02

## 2023-02-03 RX ADMIN — POTASSIUM CHLORIDE 40 MEQ: 1500 TABLET, EXTENDED RELEASE ORAL at 09:02

## 2023-02-03 RX ADMIN — METOPROLOL SUCCINATE 50 MG: 50 TABLET, EXTENDED RELEASE ORAL at 09:02

## 2023-02-03 RX ADMIN — SACUBITRIL AND VALSARTAN 1 TABLET: 24; 26 TABLET, FILM COATED ORAL at 09:02

## 2023-02-03 RX ADMIN — AMIODARONE HYDROCHLORIDE 0.5 MG/MIN: 50 INJECTION, SOLUTION INTRAVENOUS at 03:02

## 2023-02-03 RX ADMIN — AMIODARONE HYDROCHLORIDE 0.5 MG/MIN: 50 INJECTION, SOLUTION INTRAVENOUS at 09:02

## 2023-02-03 RX ADMIN — OLANZAPINE 5 MG: 10 INJECTION, POWDER, FOR SOLUTION INTRAMUSCULAR at 04:02

## 2023-02-03 RX ADMIN — DEXTROSE MONOHYDRATE 150 MG: 50 INJECTION, SOLUTION INTRAVENOUS at 04:02

## 2023-02-03 RX ADMIN — MAGNESIUM SULFATE HEPTAHYDRATE 2 G: 40 INJECTION, SOLUTION INTRAVENOUS at 09:02

## 2023-02-03 RX ADMIN — OLANZAPINE 5 MG: 10 INJECTION, POWDER, FOR SOLUTION INTRAMUSCULAR at 02:02

## 2023-02-03 RX ADMIN — DESMOPRESSIN ACETATE 1 MCG: 4 SOLUTION INTRAVENOUS at 05:02

## 2023-02-03 RX ADMIN — PANTOPRAZOLE SODIUM 40 MG: 40 INJECTION, POWDER, FOR SOLUTION INTRAVENOUS at 09:02

## 2023-02-03 RX ADMIN — MUPIROCIN: 20 OINTMENT TOPICAL at 09:02

## 2023-02-03 RX ADMIN — ENOXAPARIN SODIUM 40 MG: 40 INJECTION SUBCUTANEOUS at 05:02

## 2023-02-03 NOTE — PROGRESS NOTES
[facility] - [address] [place] [address] Medicine  Progress Note    Patient Name: Flory Chacon  MRN: [id number]  Patient Class: IP- Inpatient   Admission Date: 1/30/2023  Length of Stay: 4 days  Attending Physician: Michael Olmstead DO  Primary Care Provider: OTTO Coe        Subjective:     Principal Problem:Dehydration with hyponatremia        HPI:  57 year old female presented to the ED for N/V and electrolytes abnormalities. Patient has been feeling nausea and vomiting multiple times a day starting Sunday afternoon. She has vomited 10-20 times since Sunday and started feeling weak and fatigue today therefore went to a clinic and after doing lab work she was notified that she should go to the ED due to electrolyte abnormalities and very low sodium. She reports her step son who she has been in close contact with has had the stomach virus and has been experiencing diarrhea. .Patient reports heartburn due to vomiting, she reports no blood in the vomit and denies fever, chills, diarrhea, abdominal pain, chest pain and dizziness.      PMHx includes depression and anxiety on multiple different psych medications. Patient reports she used to have episodes of dizziness and syncope therefore ended up getting a pacemaker. She used to be a patient of Dr. Epperson and will start seeing Dr. Pompa for the very first time in a week. Patient also has HTN.     ED workup:  Sodium of 106, potassium of 2.6 with Cl of 70  Bicarb of 29 with anion gap of 10  Mg of 0.9  Cr is 0.98 with GFR of 71  No ketones in the urine   WBC 13.13        Overview/Hospital Course:    1/31/23 Patient AAOX3 this am. Patient states she feels better this morning. She still feels weak and lightheaded but improved from last night. Repeat Na 115. Normal saline infusion changed to D5W. We will continue to monitor BMP.  Patients Na increased to 118 and from 103 over 14 hours. Patient given IVP desmopressin.    2/1/23 The next am patients  Na was down to 110 and eventually Na become 103 again. Patient had become more diaphoretic and confused. 3 percent NS at 40 cc/hr given over 2 hours and Na was rechecked. Patient Na slightly improved. Urine studies were repeated.   2/2/23: Patient more alert but confused at this time. Na increased back to 110. Recheck of NA showed patient to still be 110. Patient given 3 percent Na 45 cc/hr over two hours.   2/3/23: Patient confused this am her NA increased to 118 overnight. Patient has a run of Vtach overnight she was started on amiodarone and EKG and Echo ordered. Patient pending transfer to ICU. Nephrology Consulted.      Interval History: Patient pleasantly confused this am. Patient has to be reoriented to time and situation. Overnight patient had a few episodes of delirium and removing clothing. Patient Na improving this am.  Nephrology Consulted. Patient has a run of Vtach last night and was started on an Amiodarone drip. Patient will be transferred to ICU today  Review of Systems   Constitutional:  Positive for fatigue. Negative for fever.   HENT:  Negative for congestion, facial swelling, hearing loss, nosebleeds, sinus pressure, sneezing and sore throat.    Eyes:  Negative for pain, discharge and itching.   Respiratory:  Negative for cough and shortness of breath.    Cardiovascular:  Negative for leg swelling.   Gastrointestinal:  Negative for abdominal distention, abdominal pain, anal bleeding, diarrhea, nausea and vomiting.   Endocrine: Negative for cold intolerance and polydipsia.   Genitourinary:  Negative for difficulty urinating, dyspareunia and dysuria.   Musculoskeletal: Negative.    Skin: Negative.    Allergic/Immunologic: Negative.    Neurological:  Negative for light-headedness.   Psychiatric/Behavioral:  Positive for confusion and sleep disturbance.    Objective:     Vital Signs (Most Recent):  Temp: 97.5 °F (36.4 °C) (02/03/23 0655)  Pulse: 94 (02/03/23 0655)  Resp: 19 (02/03/23 0655)  BP:  123/75 (02/03/23 0655)  SpO2: 96 % (02/03/23 0655) Vital Signs (24h Range):  Temp:  [97.1 °F (36.2 °C)-98.4 °F (36.9 °C)] 97.5 °F (36.4 °C)  Pulse:  [] 94  Resp:  [18-19] 19  SpO2:  [96 %-98 %] 96 %  BP: (123-146)/(75-97) 123/75     Weight: 65.2 kg (143 lb 11.8 oz)  Body mass index is 25.46 kg/m².  No intake or output data in the 24 hours ending 02/03/23 0927   Physical Exam  Constitutional:       Appearance: She is normal weight.   HENT:      Head: Normocephalic.      Right Ear: Tympanic membrane normal.      Left Ear: Tympanic membrane normal.      Nose: Nose normal.      Mouth/Throat:      Mouth: Mucous membranes are dry.      Pharynx: Oropharynx is clear.   Eyes:      Conjunctiva/sclera: Conjunctivae normal.      Pupils: Pupils are equal, round, and reactive to light.   Cardiovascular:      Rate and Rhythm: Normal rate and regular rhythm.      Pulses: Normal pulses.      Heart sounds: Normal heart sounds.   Pulmonary:      Effort: Pulmonary effort is normal.      Breath sounds: Normal breath sounds.   Abdominal:      General: Abdomen is flat. Bowel sounds are normal.   Musculoskeletal:         General: Normal range of motion.      Cervical back: Normal range of motion.   Skin:     General: Skin is warm and dry.      Capillary Refill: Capillary refill takes less than 2 seconds.   Neurological:      Mental Status: She is alert. She is disoriented.   Psychiatric:         Mood and Affect: Mood normal.         Behavior: Behavior normal.       Significant Labs: All pertinent labs within the past 24 hours have been reviewed.  BMP:   Recent Labs   Lab 02/03/23  0536      *   K 3.1*   CL 82*   CO2 25   BUN 9   CREATININE 0.97   CALCIUM 9.2   MG 1.6*     CBC:   Recent Labs   Lab 02/02/23  0453   WBC 22.02*   HGB 13.7   HCT 37.5*          Significant Imaging: I have reviewed all pertinent imaging results/findings within the past 24 hours.      Assessment/Plan:      * Dehydration with  hyponatremia  Sodium of 106- on admission  Patient has Depression and anxiety  She denies ever taking lithium  Patient started taking Abilify a year ago, She started taking cymbalta 2 years ago, She has been taking Xanax and Ambien for many years    2/1/23  D5W Discontinue  NS infusion restarted   BMP Q4hrs will continue to monitor  Zofran for nausea    2/2/23  3 percent normal saline 80 ml given 2/1/23.  Na improved overnight. Will continue to monitor    2/3/23  3 percent NS 90 ml over 2 hours given   Na improved. Patient more confused this am  BMP Q4hr  Consult Nephrology.  Transfer to ICU    Nonischemic cardiomyopathy  Holding home entresto and Toprol XL    C 4/25/22 - coronary arteries were normal..     Implantation of a BiVentricular Pacemaker  7/20/22 - Dr. Cordova at [place]   Hx of SVT s/p Ablation 7/20/22     Echo 4/23/22:   The left ventricle is normal in size with mild eccentric hypertrophy and mildly decreased systolic function.   The estimated ejection fraction is 40-45%. Anteroseptal wall is moderately hypokinetic.   Normal right ventricular size with normal right ventricular systolic function.   Mild mitral regurgitation.   Normal central venous pressure (3 mmHg).    2/3/33  Abnormal Heart rate over night. Vtach on monitor. Amiodarone drip started.    Electrolyte abnormality    Sodium of 106, potassium of 2.6 with Cl of 70  Bicarb of 29 with anion gap of 10  Mg of 0.9    KCL 40 mEq IV given  Mag sulfat 4 g IV given  Patient is on Normal saline wth rate of 100 cc/hr  BMP pending     1/31/23    Na increase to 115. NS infusion discontinued  D5W @ 100cc/hr started because Na increasing too fast  BMP ordered       Complicated migraine  Patient used to take Nurtec and emgality for migraine but she has not taken them for a while   Currently she does not have any headache       Anxiety disorder, unspecified    Holding home medications for now     Hypertension  Currently well controlled  Continue  Entresto    Toprol XL         VTE Risk Mitigation (From admission, onward)         Ordered     enoxaparin injection 40 mg  Daily         01/31/23 0748     IP VTE LOW RISK PATIENT  Once         01/31/23 0708     Place sequential compression device  Until discontinued         01/31/23 0708                Discharge Planning   ESME:      Code Status: Full Code   Is the patient medically ready for discharge?:     Reason for patient still in hospital (select all that apply): Treatment  Discharge Plan A: Home                  Saroj Pineda MD  Department of Hospital Medicine   [facility] - [facility] [address] [facility] Telemetry

## 2023-02-03 NOTE — HOSPITAL COURSE
1/31/23 Patient AAOX3 this am. Patient states she feels better this morning. She still feels weak and lightheaded but improved from last night. Repeat Na 115. Normal saline infusion changed to D5W. We will continue to monitor BMP.  Patients Na increased to 118 and from 103 over 14 hours. Patient given IVP desmopressin.    2/1/23 The next am patients Na was down to 110 and eventually Na become 103 again. Patient had become more diaphoretic and confused. 3 percent NS at 40 cc/hr given over 2 hours and Na was rechecked. Patient Na slightly improved. Urine studies were repeated.   2/2/23: Patient more alert but confused at this time. Na increased back to 110. Recheck of NA showed patient to still be 110. Patient given 3 percent Na 45 cc/hr over two hours.   2/3/23: Patient confused this am her NA increased to 118 overnight. Patient has a run of Vtach overnight she was started on amiodarone and EKG and Echo ordered. Patient pending transfer to ICU. Nephrology Consulted.  2/4-2/6 Patient in ICU started on continuous 3 percent NS and Na followed closely. Urine and Serum Osmolarity improved and patient transitioned to 2G NS tablets Na continued to improved and patient transferred to floor. Patient restarted home Abilify and cymbalta.   2/7-2/8: Patient still slightly confused so we decided to continue to watch until she reached baseline mentation prior to admission. 2/9/23 patient reached mental baseline and labs were WNL. Patient is deemed stable for discharge and reached maximum benefit for this admission.    Patient is to follow up with PCP in 1 week for hospital follow up, medication optimization, and cbc, bmp recheck. Patient is to follow up with Nephrology in 2 weeks for renal follow up. Patient is to follow up with Dr Pompa cardiology in 3 weeks for SVT. Patient told if symptoms worsen to return to nearest ED. Patient and family member at bedside verbalized understanding.

## 2023-02-03 NOTE — SUBJECTIVE & OBJECTIVE
Interval History: Patient pleasantly confused this am. Patient has to be reoriented to time and situation. Overnight patient had a few episodes of delirium and removing clothing. Patient Na improving this am.  Nephrology Consulted. Patient has a run of Vtach last night and was started on an Amiodarone drip. Patient will be transferred to ICU today  Review of Systems   Constitutional:  Positive for fatigue. Negative for fever.   HENT:  Negative for congestion, facial swelling, hearing loss, nosebleeds, sinus pressure, sneezing and sore throat.    Eyes:  Negative for pain, discharge and itching.   Respiratory:  Negative for cough and shortness of breath.    Cardiovascular:  Negative for leg swelling.   Gastrointestinal:  Negative for abdominal distention, abdominal pain, anal bleeding, diarrhea, nausea and vomiting.   Endocrine: Negative for cold intolerance and polydipsia.   Genitourinary:  Negative for difficulty urinating, dyspareunia and dysuria.   Musculoskeletal: Negative.    Skin: Negative.    Allergic/Immunologic: Negative.    Neurological:  Negative for light-headedness.   Psychiatric/Behavioral:  Positive for confusion and sleep disturbance.    Objective:     Vital Signs (Most Recent):  Temp: 97.5 °F (36.4 °C) (02/03/23 0655)  Pulse: 94 (02/03/23 0655)  Resp: 19 (02/03/23 0655)  BP: 123/75 (02/03/23 0655)  SpO2: 96 % (02/03/23 0655) Vital Signs (24h Range):  Temp:  [97.1 °F (36.2 °C)-98.4 °F (36.9 °C)] 97.5 °F (36.4 °C)  Pulse:  [] 94  Resp:  [18-19] 19  SpO2:  [96 %-98 %] 96 %  BP: (123-146)/(75-97) 123/75     Weight: 65.2 kg (143 lb 11.8 oz)  Body mass index is 25.46 kg/m².  No intake or output data in the 24 hours ending 02/03/23 0927   Physical Exam  Constitutional:       Appearance: She is normal weight.   HENT:      Head: Normocephalic.      Right Ear: Tympanic membrane normal.      Left Ear: Tympanic membrane normal.      Nose: Nose normal.      Mouth/Throat:      Mouth: Mucous membranes are  dry.      Pharynx: Oropharynx is clear.   Eyes:      Conjunctiva/sclera: Conjunctivae normal.      Pupils: Pupils are equal, round, and reactive to light.   Cardiovascular:      Rate and Rhythm: Normal rate and regular rhythm.      Pulses: Normal pulses.      Heart sounds: Normal heart sounds.   Pulmonary:      Effort: Pulmonary effort is normal.      Breath sounds: Normal breath sounds.   Abdominal:      General: Abdomen is flat. Bowel sounds are normal.   Musculoskeletal:         General: Normal range of motion.      Cervical back: Normal range of motion.   Skin:     General: Skin is warm and dry.      Capillary Refill: Capillary refill takes less than 2 seconds.   Neurological:      Mental Status: She is alert. She is disoriented.   Psychiatric:         Mood and Affect: Mood normal.         Behavior: Behavior normal.       Significant Labs: All pertinent labs within the past 24 hours have been reviewed.  BMP:   Recent Labs   Lab 02/03/23  0536      *   K 3.1*   CL 82*   CO2 25   BUN 9   CREATININE 0.97   CALCIUM 9.2   MG 1.6*     CBC:   Recent Labs   Lab 02/02/23  0453   WBC 22.02*   HGB 13.7   HCT 37.5*          Significant Imaging: I have reviewed all pertinent imaging results/findings within the past 24 hours.

## 2023-02-03 NOTE — ASSESSMENT & PLAN NOTE
Holding home entresto and Toprol XL    [facility] 4/25/22 - coronary arteries were normal..     Implantation of a BiVentricular Pacemaker  7/20/22 - Dr. Cordova at [place]   Hx of SVT s/p Ablation 7/20/22     Echo 4/23/22:   The left ventricle is normal in size with mild eccentric hypertrophy and mildly decreased systolic function.   The estimated ejection fraction is 40-45%. Anteroseptal wall is moderately hypokinetic.   Normal right ventricular size with normal right ventricular systolic function.   Mild mitral regurgitation.   Normal central venous pressure (3 mmHg).    2/3/33  Abnormal Heart rate over night. Vtach on monitor. Amiodarone drip started.

## 2023-02-03 NOTE — ASSESSMENT & PLAN NOTE
- Na+, K+ and mag all low admission (Na+ was 104 on admission, 121 today)  - has received PO and IV replacement with some improvement  - primary team and nephrology managing

## 2023-02-03 NOTE — HPI
57 year old female presented to the ED for N/V and electrolytes abnormalities. Patient has been feeling nausea and vomiting multiple times a day starting Sunday afternoon. She has vomited 10-20 times since Sunday and started feeling weak and fatigue today therefore went to a clinic and after doing lab work she was notified that she should go to the ED due to electrolyte abnormalities and very low sodium. She reports her step son who she has been in close contact with has had the stomach virus and has been experiencing diarrhea. .Patient reports heartburn due to vomiting, she reports no blood in the vomit and denies fever, chills, diarrhea, abdominal pain, chest pain and dizziness.       PMHx includes depression and anxiety on multiple different psych medications. Patient reports she used to have episodes of dizziness and syncope therefore ended up getting a pacemaker. She used to be a patient of Dr. Epperson and will start seeing Dr. Pompa for the very first time in a week. Patient also has HTN.      ED workup:  Sodium of 106, potassium of 2.6 with Cl of 70  Bicarb of 29 with anion gap of 10  Mg of 0.9  Cr is 0.98 with GFR of 71  No ketones in the urine   WBC 13.13    02/03/23: Cardiology consulted for runs of nonsustained vtach. PMHx of dilated nonischemic cardiomyopathy s/p BiV PPM, SVT s/p ablation by Dr. Cordova in [place]. Echo today shows improved EF of 60%, was previously 40-45% in April 2022. Has been on entresto. On exam, pt is disoriented (oriented to person only) with signs of delirium. She does not have any complaints and denies chest pain or SOB.

## 2023-02-03 NOTE — CARE UPDATE
Nurse reports patient has been having V tach runs. Telemetry was contacted and events/episodes were evaluated. Patient seems to have non sustained V tach runs.   Amiodarone bolus and infusion was started.   Echo was ordered  Cardiology consulted     Patient has been experiencing some delirium as nurse reported earlier today. Upon checking on her patient she did not recognize me despite the fact I was the physician admitting the patient a few nights ago. She said I am the doctor who did surgery on her and gave her a pacemaker. She is oriented but not at her baseline. Has been trying to get out of the bed and pulling lines. She had a fall earlier per nurse. Ordered Olanzepine 5 mg IM with not much effect on the patient. Will continue monitoring her and act accordingly.

## 2023-02-03 NOTE — CONSULTS
[facility] Consult History and Physical   Patient Name: Flory Chacon  MRN: [id number]  Age: 57 y.o.  : 1965  Time:  10:44 AM  Admission Date: 2023    Consulted for:  SAL  Consulted by: Dr. Olmstead    HPI:   Flory Chacon is a 58 yo WF with medical history significant for Anxiety, Depression, HTN who presents to the hospital  with N/V for several days.History obtained via chart review as she is confused. She went to ED and she was found to have a sodium level of 103. She was admitted to hospital. On admission, she had no AMS, dizziness or weakness. It was noted that she has history of Depression/Anxiety with numerous psych medications including abilify, cymbalta, xanax, ambian. She has a chronic hyponatremia with a sodium 126-133. Throughout her admission, she has been managed with IVF with normal saline and IV pushes of hypertonic saline. Nephrology consulted for assistance with hyponatremia management.     Past Medical History:  has a past medical history of Acute hypokalemia, Anxiety disorder, unspecified, Depression, Hypertension, and Insomnia.     Past Surgical History:   has a past surgical history that includes Left heart catheterization (Left, 2022) and Fracture surgery.     Family History:  family history includes Heart disease in her father; Melanoma in her maternal uncle.     Social History:   reports that she quit smoking about 10 years ago. Her smoking use included cigarettes. She has a 20.00 pack-year smoking history. She has never used smokeless tobacco. She reports that she does not currently use alcohol. She reports that she does not use drugs.    Allergies: is allergic to meloxicam, eggs [egg derived], statins-hmg-coa reductase inhibitors, and penicillins.     Medications prior to admission: Reviewed     Old records have been reviewed.       Review of Systems  ROS: A 10 point ROS was completed and found to be negative except  "for that mentioned above in the HPI.       Physical Exam:   /75 (BP Location: Right arm, Patient Position: Lying)   Pulse 94   Temp 97.5 °F (36.4 °C) (Oral)   Resp 19   Ht 5' 3" (1.6 m)   Wt 65.2 kg (143 lb 11.8 oz)   SpO2 96%   Breastfeeding No   BMI 25.46 kg/m²     Constitutional: lying in bed, in NAD  Eyes: EOMI, white sclera  ENMT: moist mucus membranes, nares patent  Cardiovascular: normal rate, S1/S2 noted, no edema  Respiratory: symmetrical chest expansion, CTA-B  Gastrointestinal: +BS, soft, NT/ND  Musculoskeletal: normal, no joint erythema/effusions  Skin: no rash, no purpura, warm extremities  Neurological: Alert, awake, confused    Data Review:  Lab:   Labs reviewed and significant values discussed below.    Recent Labs     02/03/23  0222 02/03/23  0339 02/03/23  0536 02/03/23  0907   CALCIUM 9.0 8.9 9.2  --    * 115* 118* 121*   K 3.6 3.5 3.1*  --    CL 80* 80* 82*  --    CO2 24 23 25  --    BUN 9 8 9  --    CREATININE 0.82 0.85 0.97  --    * 106 105  --        Imaging:  Reviewed    Assessment/Plan:     Patient Active Problem List   Diagnosis    Hypertension    Anxiety disorder, unspecified    Atrophic vaginitis    Complicated migraine    Dehydration with hyponatremia    Electrolyte abnormality    Nonischemic cardiomyopathy    Acute hyponatremia    Acute hypokalemia    Nausea and vomiting     Hypotonic hyponatremia   - Initial urine sodium and urine osmolality appeared consistent with volume depletion. As she has been resuscitated with normal saline her urine sodium and osmolality increased and seem c/w SIADH. Other potential possibility include reset osmostat.   - Will obtain uric acid, urine electrolytes, urine osmolality. Urine osmolality should be repeated daily to monitor response to therapy.   - Recommend strict UOP measurement, santiago if needed and trending Na q 4h.  - Patient very sensitive to solute replacement. She received a 3% NS bolus overnight with rise in sodium " values this AM.   - In the next 24 h I would like to maintain her Na goal should be no higher than 126 by morning tomorrow.  - Her Na this  now 124. Will start desmopressin 1 mcg q 6 hr. Will plan to start 3% NS in ICU setting at 15 ml/hr and titrate accordingly.  - Recommend increasing protein intake in diet, high protein diet. Fluid restriction 1L/24h.  - Please avoid nephrotoxic agents/NSAIDs  - Renally dose all medications   - Please obtain daily BMP, Mg, and Phos levels  - Please monitor strict UOP  - Daily weights    Thank you for the consult. Will follow along. Please call with questions.    Alisha S. Parker, DO [facility]   02/03/2023

## 2023-02-03 NOTE — SUBJECTIVE & OBJECTIVE
Past Medical History:   Diagnosis Date    Acute hypokalemia     Anxiety disorder, unspecified     Depression     Hypertension     Insomnia        Past Surgical History:   Procedure Laterality Date    FRACTURE SURGERY      LEFT HEART CATHETERIZATION Left 04/25/2022    Procedure: Left heart cath;  Surgeon: Malachi Cormier DO;  Location: [facility] CATH LAB;  Service: Cardiology;  Laterality: Left;       Review of patient's allergies indicates:   Allergen Reactions    Meloxicam Swelling    Eggs [egg derived]     Statins-hmg-coa reductase inhibitors     Penicillins Rash       No current facility-administered medications on file prior to encounter.     Current Outpatient Medications on File Prior to Encounter   Medication Sig    ABILIFY 2 mg Tab Take 2 mg by mouth once daily.    ALPRAZolam (XANAX) 1 MG tablet     aspirin 81 MG Chew Take 1 tablet (81 mg total) by mouth once daily.    cetirizine (ZYRTEC) 10 MG tablet Take 10 mg by mouth once daily.    DULoxetine (CYMBALTA) 60 MG capsule     EMGALITY SYRINGE 120 mg/mL Syrg INJECT SUBCUTANEOUS 2 ML THE FIRST MONTH AND THEN 1 ML ONCE MONTHLLY    estradioL (ESTRACE) 0.01 % (0.1 mg/gram) vaginal cream Place 2 g vaginally once daily.    ezetimibe (ZETIA) 10 mg tablet Take 1 tablet (10 mg total) by mouth every evening.    hydrocortisone 2.5 % cream APPLY CREAM TO AFFECTED AREA TO AFFECTED AREA ONCE DAILY FOR 5 DAYS TO UPPER BILATERAL EXTREMITIES    lisinopriL (PRINIVIL,ZESTRIL) 20 MG tablet Take 1 tablet (20 mg total) by mouth once daily. (Patient not taking: Reported on 7/25/2022)    metoprolol succinate (TOPROL-XL) 50 MG 24 hr tablet Take 1 tablet (50 mg total) by mouth once daily.    montelukast (SINGULAIR) 10 mg tablet Take 10 mg by mouth once daily.    NURTEC 75 mg odt DISSOLVE 1 TABLET BY MOUTH ONCE DAILY AS NEEDED FOR HEADACHE    ondansetron (ZOFRAN-ODT) 4 MG TbDL DISSOLVE 1 TABLET ON TONGUE ONCE DAILY    sacubitriL-valsartan (ENTRESTO) 24-26 mg per tablet Take 1 tablet by  mouth 2 (two) times daily.    triamcinolone acetonide 0.1% (KENALOG) 0.1 % ointment Apply topically 2 (two) times daily. (Patient not taking: No sig reported)    zolpidem (AMBIEN) 10 mg Tab     [DISCONTINUED] atenoloL (TENORMIN) 100 MG tablet Take 100 mg by mouth once daily. for 90 days    [DISCONTINUED] propranoloL (INDERAL) 10 MG tablet      Family History       Problem Relation (Age of Onset)    Heart disease Father    Melanoma Maternal Uncle          Tobacco Use    Smoking status: Former     Packs/day: 1.00     Years: 20.00     Pack years: 20.00     Types: Cigarettes     Quit date: 2013     Years since quitting: 10.0    Smokeless tobacco: Never   Substance and Sexual Activity    Alcohol use: Not Currently     Comment: socially in the past    Drug use: Never    Sexual activity: Yes     Partners: Male     Review of Systems   Unable to perform ROS: Mental status change   Objective:     Vital Signs (Most Recent):  Temp: 98.1 °F (36.7 °C) (02/03/23 1113)  Pulse: 104 (02/03/23 1113)  Resp: 19 (02/03/23 1113)  BP: 117/74 (02/03/23 1113)  SpO2: (!) 94 % (02/03/23 1113)   Vital Signs (24h Range):  Temp:  [97.5 °F (36.4 °C)-98.4 °F (36.9 °C)] 98.1 °F (36.7 °C)  Pulse:  [] 104  Resp:  [18-19] 19  SpO2:  [94 %-98 %] 94 %  BP: (117-146)/(74-97) 117/74     Weight: 65.2 kg (143 lb 11.8 oz)  Body mass index is 25.46 kg/m².    SpO2: (!) 94 %       No intake or output data in the 24 hours ending 02/03/23 1314    Lines/Drains/Airways       Peripheral Intravenous Line  Duration                  Peripheral IV - Single Lumen 20 G Left;Medial Breast -- days         Peripheral IV - Single Lumen 22 G Lateral;Left Breast -- days         Peripheral IV - Single Lumen 02/01/23 2000 22 G Posterior;Right Forearm 1 day                    Physical Exam  Vitals reviewed.   Constitutional:       General: She is not in acute distress.     Appearance: Normal appearance.   Eyes:      Pupils: Pupils are equal, round, and reactive to light.    Cardiovascular:      Rate and Rhythm: Normal rate and regular rhythm.      Pulses: Normal pulses.      Heart sounds: Normal heart sounds.   Pulmonary:      Effort: Pulmonary effort is normal.      Breath sounds: Normal breath sounds.   Abdominal:      Palpations: Abdomen is soft.   Musculoskeletal:      Cervical back: Normal range of motion and neck supple.      Right lower leg: No edema.      Left lower leg: No edema.   Skin:     General: Skin is warm and dry.   Neurological:      Mental Status: She is disoriented.   Psychiatric:         Behavior: Behavior is cooperative.       Significant Labs: All pertinent lab results from the last 24 hours have been reviewed. and   Recent Lab Results  (Last 5 results in the past 24 hours)        02/03/23  1143   02/03/23  1033   02/03/23  0907   02/03/23  0809   02/03/23  0536        Anion Gap 15         14       Ao root annulus       2.20         Ao peak rebecca       1.4         Ao VTI       21.00         AV valve area       2.56         AORTIC VALVE CUSP SEPERATION       1.90         AV mean gradient       6         AV index (prosthetic)       0.90         AV peak gradient       8         AV Velocity Ratio       0.79         Baso #     0.04           Basophil %     0.3           BSA       1.72         BUN 9         9       BUN/CREAT RATIO 9         9       Calcium 9.3         9.2       Chloride 88         82       CO2 24         25       Creatinine 0.98         0.97       Left Ventricle Relative Wall Thickness       0.60         Differential Type     Manual           Echo EF Estimated       60         eGFR 67         67       EF       60         Eos #     0.03           Eosinophil %     0.2           E wave deceleration time       192.00         FS       21         Glucose 106         105       Hematocrit     35.6           Hemoglobin     13.0           Immature Grans (Abs)     0.06           Immature Granulocytes     0.4           IVC ostium       1.3         IVSd        0.97         LA size       2.30         LVOT area       2.8         LV EDV BP       42.80         LV Diastolic Volume Index       25.48         LVIDd       3.26         LVIDs       2.57         LV mass       88.76         LV Mass Index       53         Left Ventricular Outflow Tract Mean Gradient       3.00         LVOT diameter       1.90         LVOT peak matthew       1.1         LVOT stroke volume       53.84         LVOT peak VTI       19.00         LV ESV BP       23.90         LV Systolic Volume Index       14.2         Lymph #     1.94           Lymph %     13.7                15           Magnesium 2.8         1.6       MCH     28.0           MCHC     36.5           MCV     76.6           Microcytosis     Few           Mono #     0.57           Mono %     4.0                2           MPV     9.4           MV Peak E Matthew       0.49         Neutrophils, Abs     11.52           Neutrophils Relative     81.4           nRBC     0.0           NUCLEATED RBC ABSOLUTE     0.00           PLATELET MORPHOLOGY     Large Platelets           Platelets     356           Potassium 3.3         3.1       Posterior Wall       0.97         Right Atrial Pressure (from IVC)       3         RA Major Axis       2.90         RBC     4.65           RDW     13.3           RVDD       1.60         Segmented Neutrophils, Man %     83           Sodium 124     121     118       TAPSE       1.40         Triscuspid Valve Regurgitation Peak Gradient       25         TR Max Matthew       2.50         Troponin I High Sensitivity   30.7             TV rest pulmonary artery pressure       28         Uric Acid   5.7             WBC     14.16                                  Significant Imaging: Echocardiogram: Transthoracic echo (TTE) complete (Cupid Only):   Results for orders placed or performed during the hospital encounter of 01/30/23   Echo   Result Value Ref Range    BSA 1.72 m2    Right Atrial Pressure (from IVC) 3 mmHg    EF 60 %    Left  Ventricular Outflow Tract Mean Gradient 3.00 mmHg    AORTIC VALVE CUSP SEPERATION 1.90 cm    LVIDd 3.26 (A) 3.5 - 6.0 cm    IVS 0.97 0.6 - 1.1 cm    Posterior Wall 0.97 0.6 - 1.1 cm    Ao root annulus 2.20 cm    LVIDs 2.57 2.1 - 4.0 cm    FS 21 28 - 44 %    IVC ostium 1.3 cm    LV mass 88.76 g    LA size 2.30 cm    RVDD 1.60 cm    TAPSE 1.40 cm    Left Ventricle Relative Wall Thickness 0.60 cm    AV mean gradient 6 mmHg    AV valve area 2.56 cm2    AV Velocity Ratio 0.79     AV index (prosthetic) 0.90     E wave deceleration time 192.00 msec    LVOT diameter 1.90 cm    LVOT area 2.8 cm2    LVOT peak matthew 1.1 m/s    LVOT peak VTI 19.00 cm    Ao peak matthew 1.4 m/s    Ao VTI 21.00 cm    LVOT stroke volume 53.84 cm3    AV peak gradient 8 mmHg    TV rest pulmonary artery pressure 28 mmHg    MV Peak E Matthew 0.49 m/s    TR Max Matthew 2.50 m/s    LV Systolic Volume 23.90 mL    LV Systolic Volume Index 14.2 mL/m2    LV Diastolic Volume 42.80 mL    LV Diastolic Volume Index 25.48 mL/m2    LV Mass Index 53 g/m2    Echo EF Estimated 60 %    RA Major Axis 2.90 cm    Triscuspid Valve Regurgitation Peak Gradient 25 mmHg    Narrative    · The left ventricle is small with concentric remodeling and normal   systolic function.  · The estimated ejection fraction is 60%.  · Normal right ventricular size with normal right ventricular systolic   function.  · Mild tricuspid regurgitation.  · Normal central venous pressure (3 mmHg).  · The estimated PA systolic pressure is 28 mmHg.  · Compared to prior ECHo from 4/2022; LV function has improved from 40% to   60%

## 2023-02-03 NOTE — NURSING
Pt transported to ICU via bed, report called at 1300. Safety precautions in use, tele monitor returned to Monitor station.

## 2023-02-03 NOTE — ASSESSMENT & PLAN NOTE
- Dr. Joy has seen and evaluated this pt  - EF has improved to 60%  - euvolemic  - recommend correcting electrolyte imbalances  - continue entresto and Toprol XL

## 2023-02-03 NOTE — NURSING
Upon entering pt's room found pt on the floor at the end of the bed. Pt was on her knees. Fall was not witnessed. Pt states she hit her head on the end of the bed. No visible markings to head. Assistance back into bed. Vitals obtained. Dr, charge nurse, and supervisor notified. No orders at this time.

## 2023-02-03 NOTE — ASSESSMENT & PLAN NOTE
Sodium of 106- on admission  Patient has Depression and anxiety  She denies ever taking lithium  Patient started taking Abilify a year ago, She started taking cymbalta 2 years ago, She has been taking Xanax and Ambien for many years    2/1/23  D5W Discontinue  NS infusion restarted   BMP Q4hrs will continue to monitor  Zofran for nausea    2/2/23  3 percent normal saline 80 ml given 2/1/23.  Na improved overnight. Will continue to monitor    2/3/23  3 percent NS 90 ml over 2 hours given   Na improved. Patient more confused this am  BMP Q4hr  Consult Nephrology.  Transfer to ICU

## 2023-02-04 PROBLEM — G93.41 ENCEPHALOPATHY, METABOLIC: Status: ACTIVE | Noted: 2023-02-04

## 2023-02-04 LAB
ANION GAP SERPL CALCULATED.3IONS-SCNC: 11 MMOL/L (ref 7–16)
ANION GAP SERPL CALCULATED.3IONS-SCNC: 12 MMOL/L (ref 7–16)
ANION GAP SERPL CALCULATED.3IONS-SCNC: 13 MMOL/L (ref 7–16)
ANION GAP SERPL CALCULATED.3IONS-SCNC: 14 MMOL/L (ref 7–16)
ANION GAP SERPL CALCULATED.3IONS-SCNC: 16 MMOL/L (ref 7–16)
ANION GAP SERPL CALCULATED.3IONS-SCNC: 18 MMOL/L (ref 7–16)
BASOPHILS # BLD AUTO: 0.13 K/UL (ref 0–0.2)
BASOPHILS NFR BLD AUTO: 0.8 % (ref 0–1)
BUN SERPL-MCNC: 10 MG/DL (ref 7–18)
BUN SERPL-MCNC: 11 MG/DL (ref 7–18)
BUN SERPL-MCNC: 12 MG/DL (ref 7–18)
BUN SERPL-MCNC: 14 MG/DL (ref 7–18)
BUN/CREAT SERPL: 10 (ref 6–20)
BUN/CREAT SERPL: 11 (ref 6–20)
BUN/CREAT SERPL: 13 (ref 6–20)
BUN/CREAT SERPL: 13 (ref 6–20)
BUN/CREAT SERPL: 14 (ref 6–20)
BUN/CREAT SERPL: 14 (ref 6–20)
CALCIUM SERPL-MCNC: 8.7 MG/DL (ref 8.5–10.1)
CALCIUM SERPL-MCNC: 8.8 MG/DL (ref 8.5–10.1)
CALCIUM SERPL-MCNC: 9.1 MG/DL (ref 8.5–10.1)
CALCIUM SERPL-MCNC: 9.2 MG/DL (ref 8.5–10.1)
CHLORIDE SERPL-SCNC: 89 MMOL/L (ref 98–107)
CHLORIDE SERPL-SCNC: 90 MMOL/L (ref 98–107)
CHLORIDE SERPL-SCNC: 90 MMOL/L (ref 98–107)
CHLORIDE SERPL-SCNC: 91 MMOL/L (ref 98–107)
CO2 SERPL-SCNC: 21 MMOL/L (ref 21–32)
CO2 SERPL-SCNC: 23 MMOL/L (ref 21–32)
CO2 SERPL-SCNC: 24 MMOL/L (ref 21–32)
CO2 SERPL-SCNC: 25 MMOL/L (ref 21–32)
CO2 SERPL-SCNC: 25 MMOL/L (ref 21–32)
CO2 SERPL-SCNC: 26 MMOL/L (ref 21–32)
CREAT SERPL-MCNC: 0.86 MG/DL (ref 0.55–1.02)
CREAT SERPL-MCNC: 0.86 MG/DL (ref 0.55–1.02)
CREAT SERPL-MCNC: 0.87 MG/DL (ref 0.55–1.02)
CREAT SERPL-MCNC: 0.97 MG/DL (ref 0.55–1.02)
CREAT SERPL-MCNC: 1.03 MG/DL (ref 0.55–1.02)
CREAT SERPL-MCNC: 1.04 MG/DL (ref 0.55–1.02)
DIFFERENTIAL METHOD BLD: ABNORMAL
EGFR (NO RACE VARIABLE) (RUSH/TITUS): 62 ML/MIN/1.73M²
EGFR (NO RACE VARIABLE) (RUSH/TITUS): 63 ML/MIN/1.73M²
EGFR (NO RACE VARIABLE) (RUSH/TITUS): 67 ML/MIN/1.73M²
EGFR (NO RACE VARIABLE) (RUSH/TITUS): 77 ML/MIN/1.73M²
EGFR (NO RACE VARIABLE) (RUSH/TITUS): 78 ML/MIN/1.73M²
EGFR (NO RACE VARIABLE) (RUSH/TITUS): 78 ML/MIN/1.73M²
EOSINOPHIL # BLD AUTO: 0.25 K/UL (ref 0–0.5)
EOSINOPHIL NFR BLD AUTO: 1.5 % (ref 1–4)
ERYTHROCYTE [DISTWIDTH] IN BLOOD BY AUTOMATED COUNT: 14.1 % (ref 11.5–14.5)
GLUCOSE SERPL-MCNC: 100 MG/DL (ref 74–106)
GLUCOSE SERPL-MCNC: 104 MG/DL (ref 74–106)
GLUCOSE SERPL-MCNC: 105 MG/DL (ref 70–105)
GLUCOSE SERPL-MCNC: 148 MG/DL (ref 74–106)
GLUCOSE SERPL-MCNC: 203 MG/DL (ref 74–106)
GLUCOSE SERPL-MCNC: 95 MG/DL (ref 74–106)
GLUCOSE SERPL-MCNC: 98 MG/DL (ref 74–106)
HCT VFR BLD AUTO: 36.6 % (ref 38–47)
HGB BLD-MCNC: 13.2 G/DL (ref 12–16)
IMM GRANULOCYTES # BLD AUTO: 0.14 K/UL (ref 0–0.04)
IMM GRANULOCYTES NFR BLD: 0.8 % (ref 0–0.4)
LYMPHOCYTES # BLD AUTO: 4.12 K/UL (ref 1–4.8)
LYMPHOCYTES NFR BLD AUTO: 24.4 % (ref 27–41)
MCH RBC QN AUTO: 28.4 PG (ref 27–31)
MCHC RBC AUTO-ENTMCNC: 36.1 G/DL (ref 32–36)
MCV RBC AUTO: 78.7 FL (ref 80–96)
MONOCYTES # BLD AUTO: 1.43 K/UL (ref 0–0.8)
MONOCYTES NFR BLD AUTO: 8.5 % (ref 2–6)
MPC BLD CALC-MCNC: 9.4 FL (ref 9.4–12.4)
NEUTROPHILS # BLD AUTO: 10.81 K/UL (ref 1.8–7.7)
NEUTROPHILS NFR BLD AUTO: 64 % (ref 53–65)
NRBC # BLD AUTO: 0 X10E3/UL
NRBC, AUTO (.00): 0 %
OSMOLALITY UR: 567 MOSM/KG (ref 50–1400)
PLATELET # BLD AUTO: 423 K/UL (ref 150–400)
POTASSIUM SERPL-SCNC: 3.8 MMOL/L (ref 3.5–5.1)
POTASSIUM SERPL-SCNC: 4.4 MMOL/L (ref 3.5–5.1)
POTASSIUM SERPL-SCNC: 4.6 MMOL/L (ref 3.5–5.1)
POTASSIUM SERPL-SCNC: 4.7 MMOL/L (ref 3.5–5.1)
RBC # BLD AUTO: 4.65 M/UL (ref 4.2–5.4)
SODIUM SERPL-SCNC: 122 MMOL/L (ref 136–145)
SODIUM SERPL-SCNC: 123 MMOL/L (ref 136–145)
SODIUM SERPL-SCNC: 124 MMOL/L (ref 136–145)
SODIUM SERPL-SCNC: 125 MMOL/L (ref 136–145)
WBC # BLD AUTO: 16.88 K/UL (ref 4.5–11)

## 2023-02-04 PROCEDURE — 99233 PR SUBSEQUENT HOSPITAL CARE,LEVL III: ICD-10-PCS | Mod: ,,, | Performed by: NURSE PRACTITIONER

## 2023-02-04 PROCEDURE — 99233 SBSQ HOSP IP/OBS HIGH 50: CPT | Mod: ,,, | Performed by: STUDENT IN AN ORGANIZED HEALTH CARE EDUCATION/TRAINING PROGRAM

## 2023-02-04 PROCEDURE — 63600175 PHARM REV CODE 636 W HCPCS

## 2023-02-04 PROCEDURE — 25000003 PHARM REV CODE 250: Performed by: HOSPITALIST

## 2023-02-04 PROCEDURE — 63600175 PHARM REV CODE 636 W HCPCS: Performed by: INTERNAL MEDICINE

## 2023-02-04 PROCEDURE — 99233 SBSQ HOSP IP/OBS HIGH 50: CPT | Mod: ,,, | Performed by: NURSE PRACTITIONER

## 2023-02-04 PROCEDURE — 25000003 PHARM REV CODE 250

## 2023-02-04 PROCEDURE — 20000000 HC ICU ROOM

## 2023-02-04 PROCEDURE — 80048 BASIC METABOLIC PNL TOTAL CA: CPT

## 2023-02-04 PROCEDURE — 99233 PR SUBSEQUENT HOSPITAL CARE,LEVL III: ICD-10-PCS | Mod: ,,, | Performed by: STUDENT IN AN ORGANIZED HEALTH CARE EDUCATION/TRAINING PROGRAM

## 2023-02-04 PROCEDURE — C9113 INJ PANTOPRAZOLE SODIUM, VIA: HCPCS

## 2023-02-04 PROCEDURE — 83935 ASSAY OF URINE OSMOLALITY: CPT | Performed by: STUDENT IN AN ORGANIZED HEALTH CARE EDUCATION/TRAINING PROGRAM

## 2023-02-04 PROCEDURE — 82962 GLUCOSE BLOOD TEST: CPT

## 2023-02-04 PROCEDURE — 85025 COMPLETE CBC W/AUTO DIFF WBC: CPT | Performed by: NURSE PRACTITIONER

## 2023-02-04 PROCEDURE — 63600175 PHARM REV CODE 636 W HCPCS: Performed by: NURSE PRACTITIONER

## 2023-02-04 RX ORDER — 3% SODIUM CHLORIDE 3 G/100ML
20 INJECTION, SOLUTION INTRAVENOUS CONTINUOUS
Status: DISCONTINUED | OUTPATIENT
Start: 2023-02-04 | End: 2023-02-06

## 2023-02-04 RX ORDER — METOPROLOL SUCCINATE 100 MG/1
100 TABLET, EXTENDED RELEASE ORAL DAILY
Status: DISCONTINUED | OUTPATIENT
Start: 2023-02-05 | End: 2023-02-09 | Stop reason: HOSPADM

## 2023-02-04 RX ORDER — 3% SODIUM CHLORIDE 3 G/100ML
20 INJECTION, SOLUTION INTRAVENOUS CONTINUOUS
Status: CANCELLED | OUTPATIENT
Start: 2023-02-04

## 2023-02-04 RX ADMIN — SACUBITRIL AND VALSARTAN 1 TABLET: 24; 26 TABLET, FILM COATED ORAL at 09:02

## 2023-02-04 RX ADMIN — PANTOPRAZOLE SODIUM 40 MG: 40 INJECTION, POWDER, FOR SOLUTION INTRAVENOUS at 08:02

## 2023-02-04 RX ADMIN — MUPIROCIN: 20 OINTMENT TOPICAL at 08:02

## 2023-02-04 RX ADMIN — ENOXAPARIN SODIUM 40 MG: 40 INJECTION SUBCUTANEOUS at 04:02

## 2023-02-04 RX ADMIN — DESMOPRESSIN ACETATE 1 MCG: 4 SOLUTION INTRAVENOUS at 06:02

## 2023-02-04 RX ADMIN — SODIUM CHLORIDE 15 ML/HR: 3 INJECTION, SOLUTION INTRAVENOUS at 12:02

## 2023-02-04 RX ADMIN — SACUBITRIL AND VALSARTAN 1 TABLET: 24; 26 TABLET, FILM COATED ORAL at 08:02

## 2023-02-04 RX ADMIN — DESMOPRESSIN ACETATE 1 MCG: 4 SOLUTION INTRAVENOUS at 01:02

## 2023-02-04 RX ADMIN — MUPIROCIN: 20 OINTMENT TOPICAL at 09:02

## 2023-02-04 RX ADMIN — EZETIMIBE 10 MG: 10 TABLET ORAL at 09:02

## 2023-02-04 RX ADMIN — METOPROLOL SUCCINATE 50 MG: 50 TABLET, EXTENDED RELEASE ORAL at 08:02

## 2023-02-04 NOTE — ASSESSMENT & PLAN NOTE
- PPM interrogated and showed SVT with HR up to 158  - no VT seen with PPM interrogation  - discontinue amiodarone  - continue Toprol XL   - continue to correct electrolyte imbalances

## 2023-02-04 NOTE — SUBJECTIVE & OBJECTIVE
Interval History: Pt resting in bed. Mentation slightly improved from yesterday- less word salad, less uncontrolled movements. Oriented to person.     Updated mother and SO on plan of care and condition.       Objective:     Vital Signs (Most Recent):  Temp: 98.3 °F (36.8 °C) (02/04/23 1106)  Pulse: 79 (02/04/23 1300)  Resp: 13 (02/04/23 1300)  BP: (!) 145/91 (02/04/23 1300)  SpO2: 99 % (02/04/23 1300)   Vital Signs (24h Range):  Temp:  [97.9 °F (36.6 °C)-98.3 °F (36.8 °C)] 98.3 °F (36.8 °C)  Pulse:  [73-98] 79  Resp:  [8-33] 13  SpO2:  [94 %-100 %] 99 %  BP: (103-167)/() 145/91     Weight: 65.5 kg (144 lb 6.4 oz)  Body mass index is 25.58 kg/m².      Intake/Output Summary (Last 24 hours) at 2/4/2023 1411  Last data filed at 2/4/2023 1234  Gross per 24 hour   Intake 869.3 ml   Output 885 ml   Net -15.7 ml       Physical Exam  Vitals and nursing note reviewed.   Constitutional:       Appearance: She is normal weight.   HENT:      Head: Normocephalic.      Right Ear: Tympanic membrane normal.      Left Ear: Tympanic membrane normal.      Nose: Nose normal.      Mouth/Throat:      Mouth: Mucous membranes are dry.      Pharynx: Oropharynx is clear.   Eyes:      Conjunctiva/sclera: Conjunctivae normal.      Pupils: Pupils are equal, round, and reactive to light.   Cardiovascular:      Rate and Rhythm: Normal rate and regular rhythm.      Pulses: Normal pulses.      Heart sounds: Normal heart sounds.   Pulmonary:      Effort: Pulmonary effort is normal.      Breath sounds: Normal breath sounds.   Abdominal:      General: Abdomen is flat. Bowel sounds are normal.   Musculoskeletal:         General: Normal range of motion.      Cervical back: Normal range of motion.   Skin:     General: Skin is warm and dry.      Capillary Refill: Capillary refill takes less than 2 seconds.   Neurological:      Mental Status: She is alert. She is disoriented.   Psychiatric:         Mood and Affect: Mood normal.         Behavior:  Behavior normal.     Review of Systems   Unable to perform ROS: Mental status change     Vents:       Lines/Drains/Airways       Drain  Duration                  Urethral Catheter 02/03/23 1335 18 Fr. 1 day              Peripheral Intravenous Line  Duration                  Peripheral IV - Single Lumen 20 G Left;Medial Breast -- days         Peripheral IV - Single Lumen 22 G Lateral;Left Breast -- days                    Significant Labs:    CBC/Anemia Profile:  Recent Labs   Lab 02/03/23  0907 02/04/23  0502   WBC 14.16* 16.88*   HGB 13.0 13.2   HCT 35.6* 36.6*    423*   MCV 76.6* 78.7*   RDW 13.3 14.1        Chemistries:  Recent Labs   Lab 02/03/23  0536 02/03/23  0907 02/03/23  1143 02/03/23  1625 02/04/23  0502 02/04/23  0835 02/04/23  1208   *   < > 124*   < > 125* 122* 122*   K 3.1*  --  3.3*   < > 4.7 4.4 4.7   CL 82*  --  88*   < > 89* 89* 90*   CO2 25  --  24   < > 23 21 25   BUN 9  --  9   < > 11 11 11   CREATININE 0.97  --  0.98   < > 0.87 0.86 0.97   CALCIUM 9.2  --  9.3   < > 9.2 8.8 8.8   MG 1.6*  --  2.8*  --   --   --   --     < > = values in this interval not displayed.       All pertinent labs within the past 24 hours have been reviewed.    Significant Imaging:  I have reviewed all pertinent imaging results/findings within the past 24 hours.

## 2023-02-04 NOTE — PLAN OF CARE
Problem: Adult Inpatient Plan of Care  Goal: Plan of Care Review  Outcome: Ongoing, Progressing  Goal: Patient-Specific Goal (Individualized)  Outcome: Ongoing, Progressing  Goal: Absence of Hospital-Acquired Illness or Injury  Outcome: Ongoing, Progressing  Goal: Optimal Comfort and Wellbeing  Outcome: Ongoing, Progressing  Goal: Readiness for Transition of Care  Outcome: Ongoing, Progressing     Problem: Infection  Goal: Absence of Infection Signs and Symptoms  Outcome: Ongoing, Progressing     Problem: Fall Injury Risk  Goal: Absence of Fall and Fall-Related Injury  Outcome: Ongoing, Progressing     Problem: Restraint, Nonbehavioral (Nonviolent)  Goal: Absence of Harm or Injury  Outcome: Ongoing, Progressing     Problem: Skin Injury Risk Increased  Goal: Skin Health and Integrity  Outcome: Ongoing, Progressing

## 2023-02-04 NOTE — ASSESSMENT & PLAN NOTE
- sodium of 106 on admission   - denies taking lithium  - has come up to 122 at present  - was given desmopressin yesterday; Na fell again- started on 3% NS  - q4hrs BMP  - goal in the next 24hrs is 130

## 2023-02-04 NOTE — PROGRESS NOTES
[facility] ICU  Pulmonology  Progress Note    Patient Name: Flory Chacon  MRN: [id number]  Admission Date: 1/30/2023  Hospital Length of Stay: 5 days  Code Status: DNR  Attending Provider: Michael Olmstead DO  Primary Care Provider: OTTO Coe   Principal Problem: Dehydration with hyponatremia    Subjective:     Interval History: Pt resting in bed. Mentation slightly improved from yesterday- less word salad, less uncontrolled movements. Oriented to person.     Updated mother and SO on plan of care and condition.       Objective:     Vital Signs (Most Recent):  Temp: 98.3 °F (36.8 °C) (02/04/23 1106)  Pulse: 79 (02/04/23 1300)  Resp: 13 (02/04/23 1300)  BP: (!) 145/91 (02/04/23 1300)  SpO2: 99 % (02/04/23 1300)   Vital Signs (24h Range):  Temp:  [97.9 °F (36.6 °C)-98.3 °F (36.8 °C)] 98.3 °F (36.8 °C)  Pulse:  [73-98] 79  Resp:  [8-33] 13  SpO2:  [94 %-100 %] 99 %  BP: (103-167)/() 145/91     Weight: 65.5 kg (144 lb 6.4 oz)  Body mass index is 25.58 kg/m².      Intake/Output Summary (Last 24 hours) at 2/4/2023 1411  Last data filed at 2/4/2023 1234  Gross per 24 hour   Intake 869.3 ml   Output 885 ml   Net -15.7 ml       Physical Exam  Vitals and nursing note reviewed.   Constitutional:       Appearance: She is normal weight.   HENT:      Head: Normocephalic.      Right Ear: Tympanic membrane normal.      Left Ear: Tympanic membrane normal.      Nose: Nose normal.      Mouth/Throat:      Mouth: Mucous membranes are dry.      Pharynx: Oropharynx is clear.   Eyes:      Conjunctiva/sclera: Conjunctivae normal.      Pupils: Pupils are equal, round, and reactive to light.   Cardiovascular:      Rate and Rhythm: Normal rate and regular rhythm.      Pulses: Normal pulses.      Heart sounds: Normal heart sounds.   Pulmonary:      Effort: Pulmonary effort is normal.      Breath sounds: Normal breath sounds.   Abdominal:      General: Abdomen is flat. Bowel sounds are normal.   Musculoskeletal:          General: Normal range of motion.      Cervical back: Normal range of motion.   Skin:     General: Skin is warm and dry.      Capillary Refill: Capillary refill takes less than 2 seconds.   Neurological:      Mental Status: She is alert. She is disoriented.   Psychiatric:         Mood and Affect: Mood normal.         Behavior: Behavior normal.     Review of Systems   Unable to perform ROS: Mental status change     Vents:       Lines/Drains/Airways       Drain  Duration                  Urethral Catheter 02/03/23 1335 18 Fr. 1 day              Peripheral Intravenous Line  Duration                  Peripheral IV - Single Lumen 20 G Left;Medial Breast -- days         Peripheral IV - Single Lumen 22 G Lateral;Left Breast -- days                    Significant Labs:    CBC/Anemia Profile:  Recent Labs   Lab 02/03/23  0907 02/04/23  0502   WBC 14.16* 16.88*   HGB 13.0 13.2   HCT 35.6* 36.6*    423*   MCV 76.6* 78.7*   RDW 13.3 14.1        Chemistries:  Recent Labs   Lab 02/03/23  0536 02/03/23  0907 02/03/23  1143 02/03/23  1625 02/04/23  0502 02/04/23  0835 02/04/23  1208   *   < > 124*   < > 125* 122* 122*   K 3.1*  --  3.3*   < > 4.7 4.4 4.7   CL 82*  --  88*   < > 89* 89* 90*   CO2 25  --  24   < > 23 21 25   BUN 9  --  9   < > 11 11 11   CREATININE 0.97  --  0.98   < > 0.87 0.86 0.97   CALCIUM 9.2  --  9.3   < > 9.2 8.8 8.8   MG 1.6*  --  2.8*  --   --   --   --     < > = values in this interval not displayed.       All pertinent labs within the past 24 hours have been reviewed.    Significant Imaging:  I have reviewed all pertinent imaging results/findings within the past 24 hours.    Assessment/Plan:     Encephalopathy, metabolic  - AMS likely secondary to hyponatremia   - CT head negative for acute process  - has improved since transfer to ICU     Paroxysmal SVT (supraventricular tachycardia)  - cardiology following  - amiodarone infusion     Acute hyponatremia  - sodium of 106 on admission   -  denies taking lithium  - has come up to 122 at present  - was given desmopressin yesterday; Na fell again- started on 3% NS  - q4hrs BMP  - goal in the next 24hrs is 130     Nonischemic cardiomyopathy  Holding home entresto and Toprol XL  [facility] 4/25/22 - coronary arteries were normal..  Implantation of a BiVentricular Pacemaker  7/20/22 - Dr. Cordova at [place]   Hx of SVT s/p Ablation 7/20/22   Echo 4/23/22:   The left ventricle is normal in size with mild eccentric hypertrophy and mildly decreased systolic function.   The estimated ejection fraction is 40-45%. Anteroseptal wall is moderately hypokinetic.   Normal right ventricular size with normal right ventricular systolic function.   Mild mitral regurgitation.   Normal central venous pressure (3 mmHg).    Amiodarone infusion started for SVT; cardiology following     Complicated migraine  - takes nurtec and emgality for migraines   - has not taken in a while per family report   - will need to follow up with neurology upon d/c for workup     Anxiety disorder, unspecified  - controlled at present     Hypertension  - continue entresto and toprol XL           Seen for both critical care and nephrology services today.        Bryanna Bland, [facility]  Pulmonology  [facility] ICU

## 2023-02-04 NOTE — ASSESSMENT & PLAN NOTE
- AMS likely secondary to hyponatremia   - CT head negative for acute process  - has improved since transfer to ICU

## 2023-02-04 NOTE — ASSESSMENT & PLAN NOTE
- takes nurtec and emgality for migraines   - has not taken in a while per family report   - will need to follow up with neurology upon d/c for workup

## 2023-02-04 NOTE — ASSESSMENT & PLAN NOTE
Holding home entresto and Toprol XL  [facility] 4/25/22 - coronary arteries were normal..  Implantation of a BiVentricular Pacemaker  7/20/22 - Dr. Cordova at [facility]   Hx of SVT s/p Ablation 7/20/22   Echo 4/23/22:   The left ventricle is normal in size with mild eccentric hypertrophy and mildly decreased systolic function.   The estimated ejection fraction is 40-45%. Anteroseptal wall is moderately hypokinetic.   Normal right ventricular size with normal right ventricular systolic function.   Mild mitral regurgitation.   Normal central venous pressure (3 mmHg).    Amiodarone infusion started for SVT; cardiology following

## 2023-02-04 NOTE — CONSULTS
[facility]  Cardiology  Consult Note    Patient Name: Flory Chacon  MRN: [id number]  Admission Date: 1/30/2023  Hospital Length of Stay: 4 days  Code Status: Full Code   Attending Provider: Michael Olmstead DO   Consulting Provider: ALEA Avelar  Primary Care Physician: OTTO Coe  Principal Problem:Dehydration with hyponatremia    Patient information was obtained from ER records and EMR.     Inpatient consult to Cardiology  Consult performed by: ALEA Avelar  Consult ordered by: Tha Ansari MD        Subjective:     Chief Complaint:  N/V and electrolyte imbalance     HPI:   57 year old female presented to the ED for N/V and electrolytes abnormalities. Patient has been feeling nausea and vomiting multiple times a day starting Sunday afternoon. She has vomited 10-20 times since Sunday and started feeling weak and fatigue today therefore went to a clinic and after doing lab work she was notified that she should go to the ED due to electrolyte abnormalities and very low sodium. She reports her step son who she has been in close contact with has had the stomach virus and has been experiencing diarrhea. .Patient reports heartburn due to vomiting, she reports no blood in the vomit and denies fever, chills, diarrhea, abdominal pain, chest pain and dizziness.       PMHx includes depression and anxiety on multiple different psych medications. Patient reports she used to have episodes of dizziness and syncope therefore ended up getting a pacemaker. She used to be a patient of Dr. Epperson and will start seeing Dr. Pompa for the very first time in a week. Patient also has HTN.      ED workup:  Sodium of 106, potassium of 2.6 with Cl of 70  Bicarb of 29 with anion gap of 10  Mg of 0.9  Cr is 0.98 with GFR of 71  No ketones in the urine   WBC 13.13    02/03/23: Cardiology consulted for runs of nonsustained vtach. PMHx of dilated nonischemic cardiomyopathy s/p BiV PPM, SVT s/p  ablation by Dr. Cordova in [place]. Echo today shows improved EF of 60%, was previously 40-45% in April 2022. Has been on entresto. On exam, pt is disoriented (oriented to person only) with signs of delirium. She does not have any complaints and denies chest pain or SOB.       Past Medical History:   Diagnosis Date    Acute hypokalemia     Anxiety disorder, unspecified     Depression     Hypertension     Insomnia        Past Surgical History:   Procedure Laterality Date    FRACTURE SURGERY      LEFT HEART CATHETERIZATION Left 04/25/2022    Procedure: Left heart cath;  Surgeon: Malachi Cormier DO;  Location: [facility] CATH LAB;  Service: Cardiology;  Laterality: Left;       Review of patient's allergies indicates:   Allergen Reactions    Meloxicam Swelling    Eggs [egg derived]     Statins-hmg-coa reductase inhibitors     Penicillins Rash       No current facility-administered medications on file prior to encounter.     Current Outpatient Medications on File Prior to Encounter   Medication Sig    ABILIFY 2 mg Tab Take 2 mg by mouth once daily.    ALPRAZolam (XANAX) 1 MG tablet     aspirin 81 MG Chew Take 1 tablet (81 mg total) by mouth once daily.    cetirizine (ZYRTEC) 10 MG tablet Take 10 mg by mouth once daily.    DULoxetine (CYMBALTA) 60 MG capsule     EMGALITY SYRINGE 120 mg/mL Syrg INJECT SUBCUTANEOUS 2 ML THE FIRST MONTH AND THEN 1 ML ONCE MONTHLLY    estradioL (ESTRACE) 0.01 % (0.1 mg/gram) vaginal cream Place 2 g vaginally once daily.    ezetimibe (ZETIA) 10 mg tablet Take 1 tablet (10 mg total) by mouth every evening.    hydrocortisone 2.5 % cream APPLY CREAM TO AFFECTED AREA TO AFFECTED AREA ONCE DAILY FOR 5 DAYS TO UPPER BILATERAL EXTREMITIES    lisinopriL (PRINIVIL,ZESTRIL) 20 MG tablet Take 1 tablet (20 mg total) by mouth once daily. (Patient not taking: Reported on 7/25/2022)    metoprolol succinate (TOPROL-XL) 50 MG 24 hr tablet Take 1 tablet (50 mg total) by mouth once  daily.    montelukast (SINGULAIR) 10 mg tablet Take 10 mg by mouth once daily.    NURTEC 75 mg odt DISSOLVE 1 TABLET BY MOUTH ONCE DAILY AS NEEDED FOR HEADACHE    ondansetron (ZOFRAN-ODT) 4 MG TbDL DISSOLVE 1 TABLET ON TONGUE ONCE DAILY    sacubitriL-valsartan (ENTRESTO) 24-26 mg per tablet Take 1 tablet by mouth 2 (two) times daily.    triamcinolone acetonide 0.1% (KENALOG) 0.1 % ointment Apply topically 2 (two) times daily. (Patient not taking: No sig reported)    zolpidem (AMBIEN) 10 mg Tab     [DISCONTINUED] atenoloL (TENORMIN) 100 MG tablet Take 100 mg by mouth once daily. for 90 days    [DISCONTINUED] propranoloL (INDERAL) 10 MG tablet      Family History       Problem Relation (Age of Onset)    Heart disease Father    Melanoma Maternal Uncle          Tobacco Use    Smoking status: Former     Packs/day: 1.00     Years: 20.00     Pack years: 20.00     Types: Cigarettes     Quit date: 2013     Years since quitting: 10.0    Smokeless tobacco: Never   Substance and Sexual Activity    Alcohol use: Not Currently     Comment: socially in the past    Drug use: Never    Sexual activity: Yes     Partners: Male     Review of Systems   Unable to perform ROS: Mental status change   Objective:     Vital Signs (Most Recent):  Temp: 98.1 °F (36.7 °C) (02/03/23 1113)  Pulse: 104 (02/03/23 1113)  Resp: 19 (02/03/23 1113)  BP: 117/74 (02/03/23 1113)  SpO2: (!) 94 % (02/03/23 1113)   Vital Signs (24h Range):  Temp:  [97.5 °F (36.4 °C)-98.4 °F (36.9 °C)] 98.1 °F (36.7 °C)  Pulse:  [] 104  Resp:  [18-19] 19  SpO2:  [94 %-98 %] 94 %  BP: (117-146)/(74-97) 117/74     Weight: 65.2 kg (143 lb 11.8 oz)  Body mass index is 25.46 kg/m².    SpO2: (!) 94 %       No intake or output data in the 24 hours ending 02/03/23 1314    Lines/Drains/Airways       Peripheral Intravenous Line  Duration                  Peripheral IV - Single Lumen 20 G Left;Medial Breast -- days         Peripheral IV - Single Lumen 22 G Lateral;Left  Breast -- days         Peripheral IV - Single Lumen 02/01/23 2000 22 G Posterior;Right Forearm 1 day                    Physical Exam  Vitals reviewed.   Constitutional:       General: She is not in acute distress.     Appearance: Normal appearance.   Eyes:      Pupils: Pupils are equal, round, and reactive to light.   Cardiovascular:      Rate and Rhythm: Normal rate and regular rhythm.      Pulses: Normal pulses.      Heart sounds: Normal heart sounds.   Pulmonary:      Effort: Pulmonary effort is normal.      Breath sounds: Normal breath sounds.   Abdominal:      Palpations: Abdomen is soft.   Musculoskeletal:      Cervical back: Normal range of motion and neck supple.      Right lower leg: No edema.      Left lower leg: No edema.   Skin:     General: Skin is warm and dry.   Neurological:      Mental Status: She is disoriented.   Psychiatric:         Behavior: Behavior is cooperative.       Significant Labs: All pertinent lab results from the last 24 hours have been reviewed. and   Recent Lab Results  (Last 5 results in the past 24 hours)        02/03/23  1143   02/03/23  1033   02/03/23  0907   02/03/23  0809   02/03/23  0536        Anion Gap 15         14       Ao root annulus       2.20         Ao peak rebecca       1.4         Ao VTI       21.00         AV valve area       2.56         AORTIC VALVE CUSP SEPERATION       1.90         AV mean gradient       6         AV index (prosthetic)       0.90         AV peak gradient       8         AV Velocity Ratio       0.79         Baso #     0.04           Basophil %     0.3           BSA       1.72         BUN 9         9       BUN/CREAT RATIO 9         9       Calcium 9.3         9.2       Chloride 88         82       CO2 24         25       Creatinine 0.98         0.97       Left Ventricle Relative Wall Thickness       0.60         Differential Type     Manual           Echo EF Estimated       60         eGFR 67         67       EF       60         Eos #     0.03            Eosinophil %     0.2           E wave deceleration time       192.00         FS       21         Glucose 106         105       Hematocrit     35.6           Hemoglobin     13.0           Immature Grans (Abs)     0.06           Immature Granulocytes     0.4           IVC ostium       1.3         IVSd       0.97         LA size       2.30         LVOT area       2.8         LV EDV BP       42.80         LV Diastolic Volume Index       25.48         LVIDd       3.26         LVIDs       2.57         LV mass       88.76         LV Mass Index       53         Left Ventricular Outflow Tract Mean Gradient       3.00         LVOT diameter       1.90         LVOT peak matthew       1.1         LVOT stroke volume       53.84         LVOT peak VTI       19.00         LV ESV BP       23.90         LV Systolic Volume Index       14.2         Lymph #     1.94           Lymph %     13.7                15           Magnesium 2.8         1.6       MCH     28.0           MCHC     36.5           MCV     76.6           Microcytosis     Few           Mono #     0.57           Mono %     4.0                2           MPV     9.4           MV Peak E Matthew       0.49         Neutrophils, Abs     11.52           Neutrophils Relative     81.4           nRBC     0.0           NUCLEATED RBC ABSOLUTE     0.00           PLATELET MORPHOLOGY     Large Platelets           Platelets     356           Potassium 3.3         3.1       Posterior Wall       0.97         Right Atrial Pressure (from IVC)       3         RA Major Axis       2.90         RBC     4.65           RDW     13.3           RVDD       1.60         Segmented Neutrophils, Man %     83           Sodium 124     121     118       TAPSE       1.40         Triscuspid Valve Regurgitation Peak Gradient       25         TR Max Matthew       2.50         Troponin I High Sensitivity   30.7             TV rest pulmonary artery pressure       28         Uric Acid   5.7             WBC     14.16                                   Significant Imaging: Echocardiogram: Transthoracic echo (TTE) complete (Cupid Only):   Results for orders placed or performed during the hospital encounter of 01/30/23   Echo   Result Value Ref Range    BSA 1.72 m2    Right Atrial Pressure (from IVC) 3 mmHg    EF 60 %    Left Ventricular Outflow Tract Mean Gradient 3.00 mmHg    AORTIC VALVE CUSP SEPERATION 1.90 cm    LVIDd 3.26 (A) 3.5 - 6.0 cm    IVS 0.97 0.6 - 1.1 cm    Posterior Wall 0.97 0.6 - 1.1 cm    Ao root annulus 2.20 cm    LVIDs 2.57 2.1 - 4.0 cm    FS 21 28 - 44 %    IVC ostium 1.3 cm    LV mass 88.76 g    LA size 2.30 cm    RVDD 1.60 cm    TAPSE 1.40 cm    Left Ventricle Relative Wall Thickness 0.60 cm    AV mean gradient 6 mmHg    AV valve area 2.56 cm2    AV Velocity Ratio 0.79     AV index (prosthetic) 0.90     E wave deceleration time 192.00 msec    LVOT diameter 1.90 cm    LVOT area 2.8 cm2    LVOT peak matthew 1.1 m/s    LVOT peak VTI 19.00 cm    Ao peak matthew 1.4 m/s    Ao VTI 21.00 cm    LVOT stroke volume 53.84 cm3    AV peak gradient 8 mmHg    TV rest pulmonary artery pressure 28 mmHg    MV Peak E Matthew 0.49 m/s    TR Max Matthew 2.50 m/s    LV Systolic Volume 23.90 mL    LV Systolic Volume Index 14.2 mL/m2    LV Diastolic Volume 42.80 mL    LV Diastolic Volume Index 25.48 mL/m2    LV Mass Index 53 g/m2    Echo EF Estimated 60 %    RA Major Axis 2.90 cm    Triscuspid Valve Regurgitation Peak Gradient 25 mmHg    Narrative    · The left ventricle is small with concentric remodeling and normal   systolic function.  · The estimated ejection fraction is 60%.  · Normal right ventricular size with normal right ventricular systolic   function.  · Mild tricuspid regurgitation.  · Normal central venous pressure (3 mmHg).  · The estimated PA systolic pressure is 28 mmHg.  · Compared to prior ECHo from 4/2022; LV function has improved from 40% to   60%        Assessment and Plan:     Paroxysmal SVT (supraventricular tachycardia)  - PPM  interrogated and showed SVT with HR up to 158  - no VT seen with PPM interrogation  - discontinue amiodarone  - continue Toprol XL   - continue to correct electrolyte imbalances  - cardiology will sign off  - f/u in cardiology clinic with Dr. Pompa as OP    Nonischemic cardiomyopathy  - Dr. Jyo has seen and evaluated this pt  - EF has improved to 60%  - euvolemic  - recommend correcting electrolyte imbalances  - continue entresto and Toprol XL    Electrolyte abnormality  - Na+, K+ and mag all low admission (Na+ was 104 on admission, 121 today)  - has received PO and IV replacement with some improvement  - primary team and nephrology managing        VTE Risk Mitigation (From admission, onward)         Ordered     enoxaparin injection 40 mg  Daily         01/31/23 0748     IP VTE LOW RISK PATIENT  Once         01/31/23 0708     Place sequential compression device  Until discontinued         01/31/23 0708                Thank you for your consult. I will sign off. Please contact us if you have any additional questions.    Kathy Zapata, ALEA  Cardiology   [facility]

## 2023-02-05 LAB
ANION GAP SERPL CALCULATED.3IONS-SCNC: 10 MMOL/L (ref 7–16)
ANION GAP SERPL CALCULATED.3IONS-SCNC: 14 MMOL/L (ref 7–16)
ANION GAP SERPL CALCULATED.3IONS-SCNC: 14 MMOL/L (ref 7–16)
ANION GAP SERPL CALCULATED.3IONS-SCNC: 15 MMOL/L (ref 7–16)
ANION GAP SERPL CALCULATED.3IONS-SCNC: 15 MMOL/L (ref 7–16)
ANION GAP SERPL CALCULATED.3IONS-SCNC: 16 MMOL/L (ref 7–16)
BASOPHILS # BLD AUTO: 0.14 K/UL (ref 0–0.2)
BASOPHILS NFR BLD AUTO: 1 % (ref 0–1)
BUN SERPL-MCNC: 11 MG/DL (ref 7–18)
BUN SERPL-MCNC: 12 MG/DL (ref 7–18)
BUN SERPL-MCNC: 12 MG/DL (ref 7–18)
BUN SERPL-MCNC: 13 MG/DL (ref 7–18)
BUN SERPL-MCNC: 14 MG/DL (ref 7–18)
BUN SERPL-MCNC: 14 MG/DL (ref 7–18)
BUN/CREAT SERPL: 13 (ref 6–20)
BUN/CREAT SERPL: 13 (ref 6–20)
BUN/CREAT SERPL: 15 (ref 6–20)
BUN/CREAT SERPL: 16 (ref 6–20)
CALCIUM SERPL-MCNC: 8.2 MG/DL (ref 8.5–10.1)
CALCIUM SERPL-MCNC: 8.4 MG/DL (ref 8.5–10.1)
CALCIUM SERPL-MCNC: 8.5 MG/DL (ref 8.5–10.1)
CALCIUM SERPL-MCNC: 8.7 MG/DL (ref 8.5–10.1)
CALCIUM SERPL-MCNC: 8.9 MG/DL (ref 8.5–10.1)
CALCIUM SERPL-MCNC: 8.9 MG/DL (ref 8.5–10.1)
CHLORIDE SERPL-SCNC: 90 MMOL/L (ref 98–107)
CHLORIDE SERPL-SCNC: 91 MMOL/L (ref 98–107)
CHLORIDE SERPL-SCNC: 92 MMOL/L (ref 98–107)
CHLORIDE SERPL-SCNC: 92 MMOL/L (ref 98–107)
CHLORIDE SERPL-SCNC: 93 MMOL/L (ref 98–107)
CHLORIDE SERPL-SCNC: 93 MMOL/L (ref 98–107)
CO2 SERPL-SCNC: 23 MMOL/L (ref 21–32)
CO2 SERPL-SCNC: 24 MMOL/L (ref 21–32)
CO2 SERPL-SCNC: 24 MMOL/L (ref 21–32)
CO2 SERPL-SCNC: 25 MMOL/L (ref 21–32)
CO2 SERPL-SCNC: 26 MMOL/L (ref 21–32)
CO2 SERPL-SCNC: 26 MMOL/L (ref 21–32)
CREAT SERPL-MCNC: 0.82 MG/DL (ref 0.55–1.02)
CREAT SERPL-MCNC: 0.82 MG/DL (ref 0.55–1.02)
CREAT SERPL-MCNC: 0.85 MG/DL (ref 0.55–1.02)
CREAT SERPL-MCNC: 0.86 MG/DL (ref 0.55–1.02)
CREAT SERPL-MCNC: 0.96 MG/DL (ref 0.55–1.02)
CREAT SERPL-MCNC: 1 MG/DL (ref 0.55–1.02)
DIFFERENTIAL METHOD BLD: ABNORMAL
EGFR (NO RACE VARIABLE) (RUSH/TITUS): 65 ML/MIN/1.73M²
EGFR (NO RACE VARIABLE) (RUSH/TITUS): 68 ML/MIN/1.73M²
EGFR (NO RACE VARIABLE) (RUSH/TITUS): 78 ML/MIN/1.73M²
EGFR (NO RACE VARIABLE) (RUSH/TITUS): 79 ML/MIN/1.73M²
EGFR (NO RACE VARIABLE) (RUSH/TITUS): 83 ML/MIN/1.73M²
EGFR (NO RACE VARIABLE) (RUSH/TITUS): 83 ML/MIN/1.73M²
EOSINOPHIL # BLD AUTO: 0.6 K/UL (ref 0–0.5)
EOSINOPHIL NFR BLD AUTO: 4.1 % (ref 1–4)
ERYTHROCYTE [DISTWIDTH] IN BLOOD BY AUTOMATED COUNT: 14.6 % (ref 11.5–14.5)
GLUCOSE SERPL-MCNC: 104 MG/DL (ref 74–106)
GLUCOSE SERPL-MCNC: 105 MG/DL (ref 74–106)
GLUCOSE SERPL-MCNC: 110 MG/DL (ref 74–106)
GLUCOSE SERPL-MCNC: 133 MG/DL (ref 74–106)
GLUCOSE SERPL-MCNC: 86 MG/DL (ref 74–106)
GLUCOSE SERPL-MCNC: 99 MG/DL (ref 74–106)
HCT VFR BLD AUTO: 38 % (ref 38–47)
HGB BLD-MCNC: 12.9 G/DL (ref 12–16)
IMM GRANULOCYTES # BLD AUTO: 0.16 K/UL (ref 0–0.04)
IMM GRANULOCYTES NFR BLD: 1.1 % (ref 0–0.4)
LYMPHOCYTES # BLD AUTO: 4.75 K/UL (ref 1–4.8)
LYMPHOCYTES NFR BLD AUTO: 32.4 % (ref 27–41)
MCH RBC QN AUTO: 27.9 PG (ref 27–31)
MCHC RBC AUTO-ENTMCNC: 33.9 G/DL (ref 32–36)
MCV RBC AUTO: 82.1 FL (ref 80–96)
MONOCYTES # BLD AUTO: 1.13 K/UL (ref 0–0.8)
MONOCYTES NFR BLD AUTO: 7.7 % (ref 2–6)
MPC BLD CALC-MCNC: 9.5 FL (ref 9.4–12.4)
NEUTROPHILS # BLD AUTO: 7.86 K/UL (ref 1.8–7.7)
NEUTROPHILS NFR BLD AUTO: 53.7 % (ref 53–65)
NRBC # BLD AUTO: 0 X10E3/UL
NRBC, AUTO (.00): 0 %
OSMOLALITY UR: 574 MOSM/KG (ref 50–1400)
PLATELET # BLD AUTO: 404 K/UL (ref 150–400)
POTASSIUM SERPL-SCNC: 3.8 MMOL/L (ref 3.5–5.1)
POTASSIUM SERPL-SCNC: 3.9 MMOL/L (ref 3.5–5.1)
POTASSIUM SERPL-SCNC: 4 MMOL/L (ref 3.5–5.1)
POTASSIUM SERPL-SCNC: 4.2 MMOL/L (ref 3.5–5.1)
POTASSIUM SERPL-SCNC: 4.3 MMOL/L (ref 3.5–5.1)
POTASSIUM SERPL-SCNC: 4.3 MMOL/L (ref 3.5–5.1)
RBC # BLD AUTO: 4.63 M/UL (ref 4.2–5.4)
SODIUM SERPL-SCNC: 123 MMOL/L (ref 136–145)
SODIUM SERPL-SCNC: 126 MMOL/L (ref 136–145)
SODIUM SERPL-SCNC: 127 MMOL/L (ref 136–145)
SODIUM SERPL-SCNC: 129 MMOL/L (ref 136–145)
WBC # BLD AUTO: 14.64 K/UL (ref 4.5–11)

## 2023-02-05 PROCEDURE — 63600175 PHARM REV CODE 636 W HCPCS

## 2023-02-05 PROCEDURE — 25000003 PHARM REV CODE 250: Performed by: HOSPITALIST

## 2023-02-05 PROCEDURE — 85025 COMPLETE CBC W/AUTO DIFF WBC: CPT | Performed by: NURSE PRACTITIONER

## 2023-02-05 PROCEDURE — 25000003 PHARM REV CODE 250: Performed by: STUDENT IN AN ORGANIZED HEALTH CARE EDUCATION/TRAINING PROGRAM

## 2023-02-05 PROCEDURE — 25000003 PHARM REV CODE 250

## 2023-02-05 PROCEDURE — 80048 BASIC METABOLIC PNL TOTAL CA: CPT

## 2023-02-05 PROCEDURE — C9113 INJ PANTOPRAZOLE SODIUM, VIA: HCPCS

## 2023-02-05 PROCEDURE — 99233 SBSQ HOSP IP/OBS HIGH 50: CPT | Mod: ,,, | Performed by: NURSE PRACTITIONER

## 2023-02-05 PROCEDURE — 99233 PR SUBSEQUENT HOSPITAL CARE,LEVL III: ICD-10-PCS | Mod: ,,, | Performed by: NURSE PRACTITIONER

## 2023-02-05 PROCEDURE — 20000000 HC ICU ROOM

## 2023-02-05 PROCEDURE — 99233 SBSQ HOSP IP/OBS HIGH 50: CPT | Mod: ,,, | Performed by: STUDENT IN AN ORGANIZED HEALTH CARE EDUCATION/TRAINING PROGRAM

## 2023-02-05 PROCEDURE — 25000003 PHARM REV CODE 250: Performed by: NURSE PRACTITIONER

## 2023-02-05 PROCEDURE — 83935 ASSAY OF URINE OSMOLALITY: CPT | Performed by: STUDENT IN AN ORGANIZED HEALTH CARE EDUCATION/TRAINING PROGRAM

## 2023-02-05 PROCEDURE — 99233 PR SUBSEQUENT HOSPITAL CARE,LEVL III: ICD-10-PCS | Mod: ,,, | Performed by: STUDENT IN AN ORGANIZED HEALTH CARE EDUCATION/TRAINING PROGRAM

## 2023-02-05 PROCEDURE — 63600175 PHARM REV CODE 636 W HCPCS: Performed by: NURSE PRACTITIONER

## 2023-02-05 RX ORDER — POLYETHYLENE GLYCOL 3350 17 G/17G
17 POWDER, FOR SOLUTION ORAL DAILY
Status: DISCONTINUED | OUTPATIENT
Start: 2023-02-05 | End: 2023-02-09 | Stop reason: HOSPADM

## 2023-02-05 RX ORDER — ARIPIPRAZOLE 2 MG/1
2 TABLET ORAL DAILY
Status: DISCONTINUED | OUTPATIENT
Start: 2023-02-05 | End: 2023-02-07

## 2023-02-05 RX ORDER — DULOXETIN HYDROCHLORIDE 30 MG/1
60 CAPSULE, DELAYED RELEASE ORAL DAILY
Status: DISCONTINUED | OUTPATIENT
Start: 2023-02-05 | End: 2023-02-09 | Stop reason: HOSPADM

## 2023-02-05 RX ADMIN — MUPIROCIN: 20 OINTMENT TOPICAL at 08:02

## 2023-02-05 RX ADMIN — POLYETHYLENE GLYCOL 3350 17 G: 17 POWDER, FOR SOLUTION ORAL at 01:02

## 2023-02-05 RX ADMIN — METOPROLOL SUCCINATE 100 MG: 100 TABLET, EXTENDED RELEASE ORAL at 08:02

## 2023-02-05 RX ADMIN — ARIPIPRAZOLE 2 MG: 2 TABLET ORAL at 01:02

## 2023-02-05 RX ADMIN — EZETIMIBE 10 MG: 10 TABLET ORAL at 08:02

## 2023-02-05 RX ADMIN — DULOXETINE HYDROCHLORIDE 60 MG: 30 CAPSULE, DELAYED RELEASE ORAL at 01:02

## 2023-02-05 RX ADMIN — SODIUM CHLORIDE 20 ML/HR: 3 INJECTION, SOLUTION INTRAVENOUS at 06:02

## 2023-02-05 RX ADMIN — SACUBITRIL AND VALSARTAN 1 TABLET: 24; 26 TABLET, FILM COATED ORAL at 08:02

## 2023-02-05 RX ADMIN — PANTOPRAZOLE SODIUM 40 MG: 40 INJECTION, POWDER, FOR SOLUTION INTRAVENOUS at 08:02

## 2023-02-05 RX ADMIN — ENOXAPARIN SODIUM 40 MG: 40 INJECTION SUBCUTANEOUS at 05:02

## 2023-02-05 NOTE — ASSESSMENT & PLAN NOTE
Holding home entresto and Toprol XL  [facility] 4/25/22 - coronary arteries were normal..  Implantation of a BiVentricular Pacemaker  7/20/22 - Dr. Cordova at [place]   Hx of SVT s/p Ablation 7/20/22   Echo 4/23/22:   The left ventricle is normal in size with mild eccentric hypertrophy and mildly decreased systolic function.   The estimated ejection fraction is 40-45%. Anteroseptal wall is moderately hypokinetic.   Normal right ventricular size with normal right ventricular systolic function.   Mild mitral regurgitation.   Normal central venous pressure (3 mmHg).    Amiodarone infusion started for SVT; cardiology following

## 2023-02-05 NOTE — ASSESSMENT & PLAN NOTE
- AMS likely secondary to hyponatremia   - CT head negative for acute process  - has improved since transfer to ICU   - improved with correction of sodium

## 2023-02-05 NOTE — SUBJECTIVE & OBJECTIVE
Interval History: Pt resting in bed. Mentation much improved- AAOx3. Na improve- continue 3% NA       Objective:     Vital Signs (Most Recent):  Temp: 98 °F (36.7 °C) (02/05/23 0301)  Pulse: 102 (02/05/23 0645)  Resp: 17 (02/05/23 0645)  BP: (!) 141/96 (02/05/23 0645)  SpO2: (!) 94 % (02/05/23 0615)   Vital Signs (24h Range):  Temp:  [97.6 °F (36.4 °C)-98.4 °F (36.9 °C)] 98 °F (36.7 °C)  Pulse:  [] 102  Resp:  [8-31] 17  SpO2:  [94 %-100 %] 94 %  BP: (111-147)/() 141/96     Weight: 66.1 kg (145 lb 11.6 oz)  Body mass index is 25.81 kg/m².      Intake/Output Summary (Last 24 hours) at 2/5/2023 1301  Last data filed at 2/5/2023 0606  Gross per 24 hour   Intake 503 ml   Output 630 ml   Net -127 ml       Physical Exam  Vitals and nursing note reviewed.   Constitutional:       Appearance: She is normal weight.   HENT:      Head: Normocephalic.      Right Ear: Tympanic membrane normal.      Left Ear: Tympanic membrane normal.      Nose: Nose normal.      Mouth/Throat:      Mouth: Mucous membranes are dry.      Pharynx: Oropharynx is clear.   Eyes:      Conjunctiva/sclera: Conjunctivae normal.      Pupils: Pupils are equal, round, and reactive to light.   Cardiovascular:      Rate and Rhythm: Normal rate and regular rhythm.      Pulses: Normal pulses.      Heart sounds: Normal heart sounds.   Pulmonary:      Effort: Pulmonary effort is normal.      Breath sounds: Normal breath sounds.   Abdominal:      General: Abdomen is flat. Bowel sounds are normal.   Musculoskeletal:         General: Normal range of motion.      Cervical back: Normal range of motion.   Skin:     General: Skin is warm and dry.      Capillary Refill: Capillary refill takes less than 2 seconds.   Neurological:      Mental Status: She is alert and oriented to person, place, and time.      Comments: Forgetful      Psychiatric:         Mood and Affect: Mood normal.         Behavior: Behavior normal.     Review of Systems    Vents:        Lines/Drains/Airways       Drain  Duration                  Urethral Catheter 02/03/23 1335 18 Fr. 1 day              Peripheral Intravenous Line  Duration                  Peripheral IV - Single Lumen 20 G Left;Medial Breast -- days         Peripheral IV - Single Lumen 22 G Lateral;Left Breast -- days                    Significant Labs:    CBC/Anemia Profile:  Recent Labs   Lab 02/04/23  0502 02/05/23  0254   WBC 16.88* 14.64*   HGB 13.2 12.9   HCT 36.6* 38.0   * 404*   MCV 78.7* 82.1   RDW 14.1 14.6*        Chemistries:  Recent Labs   Lab 02/05/23  0254 02/05/23  0756 02/05/23  1149   * 126* 129*   K 4.2 4.3 3.9   CL 90* 92* 92*   CO2 25 24 26   BUN 13 12 11   CREATININE 1.00 0.82 0.85   CALCIUM 8.7 8.9 8.5       All pertinent labs within the past 24 hours have been reviewed.    Significant Imaging:  I have reviewed all pertinent imaging results/findings within the past 24 hours.

## 2023-02-05 NOTE — HOSPITAL COURSE
2/5- NA up to 129; continue 3% NA; will transition to PO sodium tabs tomorrow; mentation has improved- improves daily   2/6- Na up to 132; off 3% NA- now on sodium tabs; SS consult per mother request; mentation continues to improve- does have some intermittent confusion; transfer to floor

## 2023-02-05 NOTE — PROGRESS NOTES
[facility]  Pulmonology  Progress Note    Patient Name: Flory Chacon  MRN: [id number]  Admission Date: 1/30/2023  Hospital Length of Stay: 6 days  Code Status: DNR  Attending Provider: Michael Olmstead DO  Primary Care Provider: OTTO Coe   Principal Problem: Dehydration with hyponatremia    Subjective:     Interval History: Pt resting in bed. Mentation much improved- AAOx3. Na improve- continue 3% NA       Objective:     Vital Signs (Most Recent):  Temp: 98 °F (36.7 °C) (02/05/23 0301)  Pulse: 102 (02/05/23 0645)  Resp: 17 (02/05/23 0645)  BP: (!) 141/96 (02/05/23 0645)  SpO2: (!) 94 % (02/05/23 0615)   Vital Signs (24h Range):  Temp:  [97.6 °F (36.4 °C)-98.4 °F (36.9 °C)] 98 °F (36.7 °C)  Pulse:  [] 102  Resp:  [8-31] 17  SpO2:  [94 %-100 %] 94 %  BP: (111-147)/() 141/96     Weight: 66.1 kg (145 lb 11.6 oz)  Body mass index is 25.81 kg/m².      Intake/Output Summary (Last 24 hours) at 2/5/2023 1301  Last data filed at 2/5/2023 0606  Gross per 24 hour   Intake 503 ml   Output 630 ml   Net -127 ml       Physical Exam  Vitals and nursing note reviewed.   Constitutional:       Appearance: She is normal weight.   HENT:      Head: Normocephalic.      Right Ear: Tympanic membrane normal.      Left Ear: Tympanic membrane normal.      Nose: Nose normal.      Mouth/Throat:      Mouth: Mucous membranes are dry.      Pharynx: Oropharynx is clear.   Eyes:      Conjunctiva/sclera: Conjunctivae normal.      Pupils: Pupils are equal, round, and reactive to light.   Cardiovascular:      Rate and Rhythm: Normal rate and regular rhythm.      Pulses: Normal pulses.      Heart sounds: Normal heart sounds.   Pulmonary:      Effort: Pulmonary effort is normal.      Breath sounds: Normal breath sounds.   Abdominal:      General: Abdomen is flat. Bowel sounds are normal.   Musculoskeletal:         General: Normal range of motion.      Cervical back: Normal range of motion.   Skin:     General:  Skin is warm and dry.      Capillary Refill: Capillary refill takes less than 2 seconds.   Neurological:      Mental Status: She is alert and oriented to person, place, and time.      Comments: Forgetful      Psychiatric:         Mood and Affect: Mood normal.         Behavior: Behavior normal.     Review of Systems    Vents:       Lines/Drains/Airways       Drain  Duration                  Urethral Catheter 02/03/23 1335 18 Fr. 1 day              Peripheral Intravenous Line  Duration                  Peripheral IV - Single Lumen 20 G Left;Medial Breast -- days         Peripheral IV - Single Lumen 22 G Lateral;Left Breast -- days                    Significant Labs:    CBC/Anemia Profile:  Recent Labs   Lab 02/04/23  0502 02/05/23  0254   WBC 16.88* 14.64*   HGB 13.2 12.9   HCT 36.6* 38.0   * 404*   MCV 78.7* 82.1   RDW 14.1 14.6*        Chemistries:  Recent Labs   Lab 02/05/23  0254 02/05/23  0756 02/05/23  1149   * 126* 129*   K 4.2 4.3 3.9   CL 90* 92* 92*   CO2 25 24 26   BUN 13 12 11   CREATININE 1.00 0.82 0.85   CALCIUM 8.7 8.9 8.5       All pertinent labs within the past 24 hours have been reviewed.    Significant Imaging:  I have reviewed all pertinent imaging results/findings within the past 24 hours.    Assessment/Plan:     Encephalopathy, metabolic  - AMS likely secondary to hyponatremia   - CT head negative for acute process  - has improved since transfer to ICU   - improved with correction of sodium     Paroxysmal SVT (supraventricular tachycardia)  - cardiology following  - amiodarone infusion     Acute hyponatremia  - sodium of 106 on admission   - denies taking lithium  - has come up to 122 at present  - was given desmopressin yesterday; Na fell again- started on 3% NS  - q4hrs BMP  - goal in the next 24hrs is 130   - NA trended up to 127  - will increase hypertonic saline to 20ml/hr and transition to PO sodium tabs tomorrow   - will keep in ICU until tomorrow     Nonischemic  cardiomyopathy  Holding home entresto and Toprol XL  [facility] 4/25/22 - coronary arteries were normal..  Implantation of a BiVentricular Pacemaker  7/20/22 - Dr. Cordova at [place]   Hx of SVT s/p Ablation 7/20/22   Echo 4/23/22:   The left ventricle is normal in size with mild eccentric hypertrophy and mildly decreased systolic function.   The estimated ejection fraction is 40-45%. Anteroseptal wall is moderately hypokinetic.   Normal right ventricular size with normal right ventricular systolic function.   Mild mitral regurgitation.   Normal central venous pressure (3 mmHg).    Amiodarone infusion started for SVT; cardiology following     Anxiety disorder, unspecified  - controlled at present     Hypertension  - continue entresto and toprol XL         Seen for both, critical care and nephrology services.         ALEA [facility] Healthcare[facility]EXANDRA  Pulmonology  [facility]

## 2023-02-05 NOTE — ASSESSMENT & PLAN NOTE
- sodium of 106 on admission   - denies taking lithium  - has come up to 122 at present  - was given desmopressin yesterday; Na fell again- started on 3% NS  - q4hrs BMP  - goal in the next 24hrs is 130   - NA trended up to 127  - will increase hypertonic saline to 20ml/hr and transition to PO sodium tabs tomorrow   - will keep in ICU until tomorrow

## 2023-02-06 LAB
ANION GAP SERPL CALCULATED.3IONS-SCNC: 13 MMOL/L (ref 7–16)
ANION GAP SERPL CALCULATED.3IONS-SCNC: 16 MMOL/L (ref 7–16)
ANION GAP SERPL CALCULATED.3IONS-SCNC: 17 MMOL/L (ref 7–16)
BASOPHILS # BLD AUTO: 0.11 K/UL (ref 0–0.2)
BASOPHILS NFR BLD AUTO: 0.9 % (ref 0–1)
BUN SERPL-MCNC: 11 MG/DL (ref 7–18)
BUN SERPL-MCNC: 14 MG/DL (ref 7–18)
BUN SERPL-MCNC: 9 MG/DL (ref 7–18)
BUN/CREAT SERPL: 13 (ref 6–20)
BUN/CREAT SERPL: 13 (ref 6–20)
BUN/CREAT SERPL: 16 (ref 6–20)
CALCIUM SERPL-MCNC: 8.3 MG/DL (ref 8.5–10.1)
CALCIUM SERPL-MCNC: 8.8 MG/DL (ref 8.5–10.1)
CALCIUM SERPL-MCNC: 9 MG/DL (ref 8.5–10.1)
CHLORIDE SERPL-SCNC: 94 MMOL/L (ref 98–107)
CHLORIDE SERPL-SCNC: 95 MMOL/L (ref 98–107)
CHLORIDE SERPL-SCNC: 95 MMOL/L (ref 98–107)
CO2 SERPL-SCNC: 23 MMOL/L (ref 21–32)
CO2 SERPL-SCNC: 25 MMOL/L (ref 21–32)
CO2 SERPL-SCNC: 25 MMOL/L (ref 21–32)
CREAT SERPL-MCNC: 0.67 MG/DL (ref 0.55–1.02)
CREAT SERPL-MCNC: 0.86 MG/DL (ref 0.55–1.02)
CREAT SERPL-MCNC: 0.9 MG/DL (ref 0.55–1.02)
DIFFERENTIAL METHOD BLD: ABNORMAL
EGFR (NO RACE VARIABLE) (RUSH/TITUS): 101 ML/MIN/1.73M²
EGFR (NO RACE VARIABLE) (RUSH/TITUS): 74 ML/MIN/1.73M²
EGFR (NO RACE VARIABLE) (RUSH/TITUS): 78 ML/MIN/1.73M²
EOSINOPHIL # BLD AUTO: 0.55 K/UL (ref 0–0.5)
EOSINOPHIL NFR BLD AUTO: 4.3 % (ref 1–4)
ERYTHROCYTE [DISTWIDTH] IN BLOOD BY AUTOMATED COUNT: 14.4 % (ref 11.5–14.5)
GLUCOSE SERPL-MCNC: 110 MG/DL (ref 74–106)
GLUCOSE SERPL-MCNC: 110 MG/DL (ref 74–106)
GLUCOSE SERPL-MCNC: 114 MG/DL (ref 74–106)
HCT VFR BLD AUTO: 31.5 % (ref 38–47)
HGB BLD-MCNC: 11.1 G/DL (ref 12–16)
IMM GRANULOCYTES # BLD AUTO: 0.15 K/UL (ref 0–0.04)
IMM GRANULOCYTES NFR BLD: 1.2 % (ref 0–0.4)
LYMPHOCYTES # BLD AUTO: 3.66 K/UL (ref 1–4.8)
LYMPHOCYTES NFR BLD AUTO: 28.9 % (ref 27–41)
MCH RBC QN AUTO: 28.4 PG (ref 27–31)
MCHC RBC AUTO-ENTMCNC: 35.2 G/DL (ref 32–36)
MCV RBC AUTO: 80.6 FL (ref 80–96)
MONOCYTES # BLD AUTO: 0.89 K/UL (ref 0–0.8)
MONOCYTES NFR BLD AUTO: 7 % (ref 2–6)
MPC BLD CALC-MCNC: 9.4 FL (ref 9.4–12.4)
NEUTROPHILS # BLD AUTO: 7.31 K/UL (ref 1.8–7.7)
NEUTROPHILS NFR BLD AUTO: 57.7 % (ref 53–65)
NRBC # BLD AUTO: 0 X10E3/UL
NRBC, AUTO (.00): 0 %
PLATELET # BLD AUTO: 415 K/UL (ref 150–400)
POTASSIUM SERPL-SCNC: 3.6 MMOL/L (ref 3.5–5.1)
POTASSIUM SERPL-SCNC: 4.1 MMOL/L (ref 3.5–5.1)
POTASSIUM SERPL-SCNC: 4.2 MMOL/L (ref 3.5–5.1)
RBC # BLD AUTO: 3.91 M/UL (ref 4.2–5.4)
SODIUM SERPL-SCNC: 128 MMOL/L (ref 136–145)
SODIUM SERPL-SCNC: 131 MMOL/L (ref 136–145)
SODIUM SERPL-SCNC: 131 MMOL/L (ref 136–145)
SODIUM SERPL-SCNC: 132 MMOL/L (ref 136–145)
SODIUM SERPL-SCNC: 133 MMOL/L (ref 136–145)
WBC # BLD AUTO: 12.67 K/UL (ref 4.5–11)

## 2023-02-06 PROCEDURE — 25000003 PHARM REV CODE 250

## 2023-02-06 PROCEDURE — 63600175 PHARM REV CODE 636 W HCPCS

## 2023-02-06 PROCEDURE — 11000001 HC ACUTE MED/SURG PRIVATE ROOM

## 2023-02-06 PROCEDURE — 84295 ASSAY OF SERUM SODIUM: CPT | Performed by: INTERNAL MEDICINE

## 2023-02-06 PROCEDURE — 99233 SBSQ HOSP IP/OBS HIGH 50: CPT | Mod: ,,, | Performed by: INTERNAL MEDICINE

## 2023-02-06 PROCEDURE — 99233 PR SUBSEQUENT HOSPITAL CARE,LEVL III: ICD-10-PCS | Mod: ,,, | Performed by: INTERNAL MEDICINE

## 2023-02-06 PROCEDURE — 25000003 PHARM REV CODE 250: Performed by: HOSPITALIST

## 2023-02-06 PROCEDURE — C9113 INJ PANTOPRAZOLE SODIUM, VIA: HCPCS

## 2023-02-06 PROCEDURE — 80048 BASIC METABOLIC PNL TOTAL CA: CPT

## 2023-02-06 PROCEDURE — 85025 COMPLETE CBC W/AUTO DIFF WBC: CPT | Performed by: NURSE PRACTITIONER

## 2023-02-06 PROCEDURE — 25000003 PHARM REV CODE 250: Performed by: STUDENT IN AN ORGANIZED HEALTH CARE EDUCATION/TRAINING PROGRAM

## 2023-02-06 PROCEDURE — 25000003 PHARM REV CODE 250: Performed by: INTERNAL MEDICINE

## 2023-02-06 PROCEDURE — 25000003 PHARM REV CODE 250: Performed by: NURSE PRACTITIONER

## 2023-02-06 RX ORDER — HYDRALAZINE HYDROCHLORIDE 20 MG/ML
10 INJECTION INTRAMUSCULAR; INTRAVENOUS EVERY 6 HOURS PRN
Status: DISCONTINUED | OUTPATIENT
Start: 2023-02-06 | End: 2023-02-09 | Stop reason: HOSPADM

## 2023-02-06 RX ORDER — AMLODIPINE BESYLATE 10 MG/1
10 TABLET ORAL DAILY
Status: DISCONTINUED | OUTPATIENT
Start: 2023-02-06 | End: 2023-02-09 | Stop reason: HOSPADM

## 2023-02-06 RX ADMIN — ENOXAPARIN SODIUM 40 MG: 40 INJECTION SUBCUTANEOUS at 05:02

## 2023-02-06 RX ADMIN — SACUBITRIL AND VALSARTAN 1 TABLET: 24; 26 TABLET, FILM COATED ORAL at 08:02

## 2023-02-06 RX ADMIN — MUPIROCIN: 20 OINTMENT TOPICAL at 08:02

## 2023-02-06 RX ADMIN — ARIPIPRAZOLE 2 MG: 2 TABLET ORAL at 08:02

## 2023-02-06 RX ADMIN — SODIUM CHLORIDE 2 G: 1 TABLET ORAL at 03:02

## 2023-02-06 RX ADMIN — DULOXETINE HYDROCHLORIDE 60 MG: 30 CAPSULE, DELAYED RELEASE ORAL at 08:02

## 2023-02-06 RX ADMIN — SODIUM CHLORIDE 2 G: 1 TABLET ORAL at 08:02

## 2023-02-06 RX ADMIN — PANTOPRAZOLE SODIUM 40 MG: 40 INJECTION, POWDER, FOR SOLUTION INTRAVENOUS at 08:02

## 2023-02-06 RX ADMIN — POLYETHYLENE GLYCOL 3350 17 G: 17 POWDER, FOR SOLUTION ORAL at 08:02

## 2023-02-06 RX ADMIN — SODIUM CHLORIDE 2 G: 1 TABLET ORAL at 11:02

## 2023-02-06 RX ADMIN — EZETIMIBE 10 MG: 10 TABLET ORAL at 08:02

## 2023-02-06 RX ADMIN — METOPROLOL SUCCINATE 100 MG: 100 TABLET, EXTENDED RELEASE ORAL at 08:02

## 2023-02-06 RX ADMIN — AMLODIPINE BESYLATE 10 MG: 10 TABLET ORAL at 11:02

## 2023-02-06 NOTE — ASSESSMENT & PLAN NOTE
- sodium of 106 on admission   - denies taking lithium  - has come up to 122 at present  - was given desmopressin yesterday; Na fell again- started on 3% NS  - q4hrs BMP  - goal in the next 24hrs is 130   - NA trended up to 127  - will increase hypertonic saline to 20ml/hr and transition to PO sodium tabs tomorrow   - will keep in ICU until tomorrow   - has improved to 132 this AM; d/c hypertonic saline- transition to sodium tabs; continue to monitor sodium

## 2023-02-06 NOTE — SUBJECTIVE & OBJECTIVE
Interval History: Pt resting in bed. AAOx3 during exam, did have some confusion overnight. NA has improved- will transition off hypertonic saline to sodium tabs. SS consult per mother's request.       Objective:     Vital Signs (Most Recent):  Temp: 98.5 °F (36.9 °C) (02/06/23 0717)  Pulse: 90 (02/06/23 1015)  Resp: 13 (02/06/23 1015)  BP: (!) 134/97 (02/06/23 1015)  SpO2: 99 % (02/06/23 0745)   Vital Signs (24h Range):  Temp:  [98 °F (36.7 °C)-98.8 °F (37.1 °C)] 98.5 °F (36.9 °C)  Pulse:  [] 90  Resp:  [10-28] 13  SpO2:  [90 %-100 %] 99 %  BP: ()/() 134/97     Weight: 65.6 kg (144 lb 10 oz)  Body mass index is 25.62 kg/m².      Intake/Output Summary (Last 24 hours) at 2/6/2023 1032  Last data filed at 2/6/2023 0600  Gross per 24 hour   Intake 1012.83 ml   Output 1750 ml   Net -737.17 ml       Physical Exam  Vitals and nursing note reviewed.   Constitutional:       Appearance: She is normal weight.   HENT:      Head: Normocephalic.      Right Ear: Tympanic membrane normal.      Left Ear: Tympanic membrane normal.      Nose: Nose normal.      Mouth/Throat:      Mouth: Mucous membranes are dry.      Pharynx: Oropharynx is clear.   Eyes:      Conjunctiva/sclera: Conjunctivae normal.      Pupils: Pupils are equal, round, and reactive to light.   Cardiovascular:      Rate and Rhythm: Normal rate and regular rhythm.      Pulses: Normal pulses.      Heart sounds: Normal heart sounds.   Pulmonary:      Effort: Pulmonary effort is normal.      Breath sounds: Normal breath sounds.   Abdominal:      General: Abdomen is flat. Bowel sounds are normal.   Musculoskeletal:         General: Normal range of motion.      Cervical back: Normal range of motion.   Skin:     General: Skin is warm and dry.      Capillary Refill: Capillary refill takes less than 2 seconds.   Neurological:      Mental Status: She is alert and oriented to person, place, and time.      Comments: Forgetful   Does have some intermittent  confusion    Psychiatric:         Mood and Affect: Mood normal.         Behavior: Behavior normal.     Review of Systems    Vents:       Lines/Drains/Airways       None                   Significant Labs:    CBC/Anemia Profile:  Recent Labs   Lab 02/05/23  0254 02/06/23  0501   WBC 14.64* 12.67*   HGB 12.9 11.1*   HCT 38.0 31.5*   * 415*   MCV 82.1 80.6   RDW 14.6* 14.4        Chemistries:  Recent Labs   Lab 02/05/23  2351 02/06/23  0501 02/06/23  0755   * 131* 132*   K 3.6 4.1 4.2   CL 94* 95* 95*   CO2 25 23 25   BUN 14 11 9   CREATININE 0.90 0.86 0.67   CALCIUM 8.3* 8.8 9.0       All pertinent labs within the past 24 hours have been reviewed.    Significant Imaging:  I have reviewed all pertinent imaging results/findings within the past 24 hours.

## 2023-02-06 NOTE — PROGRESS NOTES
"[facility] Consult Follow-Up Note     HPI:  Flory Chacon is a 58 yo WF with medical history significant for Anxiety, Depression, HTN who presents to the hospital 1/31 with N/V for several days.History obtained via chart review as she is confused. She went to ED and she was found to have a sodium level of 103. She was admitted to hospital. On admission, she had no AMS, dizziness or weakness. It was noted that she has history of Depression/Anxiety with numerous psych medications including abilify, cymbalta, xanax, ambian. She has a chronic hyponatremia with a sodium 126-133. Throughout her admission, she has been managed with IVF with normal saline and IV pushes of hypertonic saline. Nephrology consulted for assistance with hyponatremia management.     Subjective/Interval History:  No acute events overnight  Sodium improved to 132 with 3% hypertonic saline  Mentation much improved    Objective     Medications:   ARIPiprazole  2 mg Oral Daily    DULoxetine  60 mg Oral Daily    enoxaparin  40 mg Subcutaneous Daily    ezetimibe  10 mg Oral QHS    metoprolol succinate  100 mg Oral Daily    mupirocin   Nasal BID    pantoprazole  40 mg Intravenous Daily    polyethylene glycol  17 g Oral Daily    sacubitriL-valsartan  1 tablet Oral BID    sodium chloride  1 g Oral TID       Physical Exam:   BP (!) 132/91   Pulse 101   Temp 98.5 °F (36.9 °C) (Oral)   Resp 13   Ht 5' 3" (1.6 m)   Wt 65.6 kg (144 lb 10 oz)   SpO2 99%   Breastfeeding No   BMI 25.62 kg/m²   General: WD, WN WF, lying in bed in NAD  Eyes: PERRL, EOMI, no conjunctival icterus  HENT: NC/AT, nares patent, OP benign  Neck: supple, no LAD or thyromegaly  Lungs: CTAB, no w/r/r  CV: normal rate, regular rhythm, no m/r/g, no edema  Abd: soft, NT/ND, +BS   Ext: no clubbing or cyanosis  Skin: no rashes or lesions appreciated  Neuro: awake, alert, following commands    I/Os:   I/O last 3 completed shifts:  In: 1462.8 [P.O.:840; I.V.:622.8]  Out: 2130 " [Urine:2130]    Labs, micro, imaging reviewed.       Assessment and Plan:     Patient Active Problem List   Diagnosis    Hypertension    Anxiety disorder, unspecified    Atrophic vaginitis    Complicated migraine    Dehydration with hyponatremia    Electrolyte abnormality    Nonischemic cardiomyopathy    Acute hyponatremia    Acute hypokalemia    Nausea and vomiting    SIADH (syndrome of inappropriate ADH production)    Paroxysmal SVT (supraventricular tachycardia)    Encephalopathy, metabolic     Hypotonic hyponatremia secondary to drug induced SIADH   - Sodium safe range now with hypertonic saline. Transition to salt tablets 2 g TID today. Trend Na q6h  - Long term management of SIADH includes high protein diet. Fluid restriction <1L/24h.  - Please avoid nephrotoxic agents/NSAIDs  - Renally dose all medications   - Please obtain daily BMP, Mg, and Phos levels  - Please monitor strict UOP  - Daily weights    Thank you for this consult. [facility] will continue to follow along. Please call with any questions.     Vanessa Olmstead, DO [facility]   02/06/2023

## 2023-02-06 NOTE — PROGRESS NOTES
[facility]  Pulmonology  Progress Note    Patient Name: Flory Chacon  MRN: [id number]  Admission Date: 1/30/2023  Hospital Length of Stay: 7 days  Code Status: DNR  Attending Provider: Michael Olmstead DO  Primary Care Provider: OTTO Coe   Principal Problem: Dehydration with hyponatremia    Subjective:     Interval History: Pt resting in bed. AAOx3 during exam, did have some confusion overnight. NA has improved- will transition off hypertonic saline to sodium tabs. SS consult per mother's request.     2/5- NA up to 129; continue 3% NA; will transition to PO sodium tabs tomorrow; mentation has improved- improves daily   2/6- Na up to 132; off 3% NA- now on sodium tabs; SS consult per mother request; mentation continues to improve- does have some intermittent confusion; transfer to floor         Objective:     Vital Signs (Most Recent):  Temp: 98.5 °F (36.9 °C) (02/06/23 0717)  Pulse: 90 (02/06/23 1015)  Resp: 13 (02/06/23 1015)  BP: (!) 134/97 (02/06/23 1015)  SpO2: 99 % (02/06/23 0745)   Vital Signs (24h Range):  Temp:  [98 °F (36.7 °C)-98.8 °F (37.1 °C)] 98.5 °F (36.9 °C)  Pulse:  [] 90  Resp:  [10-28] 13  SpO2:  [90 %-100 %] 99 %  BP: ()/() 134/97     Weight: 65.6 kg (144 lb 10 oz)  Body mass index is 25.62 kg/m².      Intake/Output Summary (Last 24 hours) at 2/6/2023 1032  Last data filed at 2/6/2023 0600  Gross per 24 hour   Intake 1012.83 ml   Output 1750 ml   Net -737.17 ml       Physical Exam  Vitals and nursing note reviewed.   Constitutional:       Appearance: She is normal weight.   HENT:      Head: Normocephalic.      Right Ear: Tympanic membrane normal.      Left Ear: Tympanic membrane normal.      Nose: Nose normal.      Mouth/Throat:      Mouth: Mucous membranes are dry.      Pharynx: Oropharynx is clear.   Eyes:      Conjunctiva/sclera: Conjunctivae normal.      Pupils: Pupils are equal, round, and reactive to light.   Cardiovascular:      Rate and  Rhythm: Normal rate and regular rhythm.      Pulses: Normal pulses.      Heart sounds: Normal heart sounds.   Pulmonary:      Effort: Pulmonary effort is normal.      Breath sounds: Normal breath sounds.   Abdominal:      General: Abdomen is flat. Bowel sounds are normal.   Musculoskeletal:         General: Normal range of motion.      Cervical back: Normal range of motion.   Skin:     General: Skin is warm and dry.      Capillary Refill: Capillary refill takes less than 2 seconds.   Neurological:      Mental Status: She is alert and oriented to person, place, and time.      Comments: Forgetful   Does have some intermittent confusion    Psychiatric:         Mood and Affect: Mood normal.         Behavior: Behavior normal.     Review of Systems    Vents:       Lines/Drains/Airways       None                   Significant Labs:    CBC/Anemia Profile:  Recent Labs   Lab 02/05/23  0254 02/06/23  0501   WBC 14.64* 12.67*   HGB 12.9 11.1*   HCT 38.0 31.5*   * 415*   MCV 82.1 80.6   RDW 14.6* 14.4        Chemistries:  Recent Labs   Lab 02/05/23  2351 02/06/23  0501 02/06/23  0755   * 131* 132*   K 3.6 4.1 4.2   CL 94* 95* 95*   CO2 25 23 25   BUN 14 11 9   CREATININE 0.90 0.86 0.67   CALCIUM 8.3* 8.8 9.0       All pertinent labs within the past 24 hours have been reviewed.    Significant Imaging:  I have reviewed all pertinent imaging results/findings within the past 24 hours.    Assessment/Plan:     Encephalopathy, metabolic  - AMS likely secondary to hyponatremia   - CT head negative for acute process  - has improved since transfer to ICU   - improved with correction of sodium   - continues to improve daily     Acute hyponatremia  - sodium of 106 on admission   - denies taking lithium  - has come up to 122 at present  - was given desmopressin yesterday; Na fell again- started on 3% NS  - q4hrs BMP  - goal in the next 24hrs is 130   - NA trended up to 127  - will increase hypertonic saline to 20ml/hr and  transition to PO sodium tabs tomorrow   - will keep in ICU until tomorrow   - has improved to 132 this AM; d/c hypertonic saline- transition to sodium tabs; continue to monitor sodium    Complicated migraine  - takes nurtec and emgality for migraines   - has not taken in a while per family report   - will need to follow up with neurology upon d/c for workup     Anxiety disorder, unspecified  - controlled at present     Hypertension  - continue entresto and toprol XL   - BP trend remains elevated; meds adjusted                  ALEA Hinson-AGACNP  Pulmonology  [facility]

## 2023-02-06 NOTE — ASSESSMENT & PLAN NOTE
- AMS likely secondary to hyponatremia   - CT head negative for acute process  - has improved since transfer to ICU   - improved with correction of sodium   - continues to improve daily

## 2023-02-06 NOTE — PROGRESS NOTES
"[facility]  Adult Nutrition  First Assessment Note         Reason for Assessment  Reason For Assessment: length of stay  Nutrition Risk Screen: no indicators present    RD assessment d/t LOS.     Recommend continue current diet order as medically appropriate and tolerated. Encourage good PO intakes.    Patient transferred from ICU to floor today.    Per 2/6 FNP note:  "Interval History: Pt resting in bed. AAOx3 during exam, did have some confusion overnight. NA has improved- will transition off hypertonic saline to sodium tabs. SS consult per mother's request.      2/5- NA up to 129; continue 3% NA; will transition to PO sodium tabs tomorrow; mentation has improved- improves daily   2/6- Na up to 132; off 3% NA- now on sodium tabs; SS consult per mother request; mentation continues to improve- does have some intermittent confusion; transfer to floor"    Per nephrology 2/6 note,  "Hypotonic hyponatremia secondary to drug induced SIADH   - Sodium safe range now with hypertonic saline. Transition to salt tablets 2 g TID today. Trend Na q6h  - Long term management of SIADH includes high protein diet. Fluid restriction <1L/24h."    Patient currently receiving Regular Diet with 1000ml fluid restriction with the following documented PO intakes:  2/4: 0 + 75%  2/5: 50 + 100%  2/6: 100%  Encourage good PO intakes.    Last BM 2/1 per flowsheets - pt receiving polyethylene glycol per med list. Will continue to monitor PO intake, weight trend, meds, labs, updates inpatient condition. RD following.    Malnutrition  Is Patient Malnourished: No    Nutrition Diagnosis  Altered Nutrition Related Lab Values   related to  dehydration with hyponatremia  as evidenced by low sodium, chloride levels    Nutrition Diagnosis Status: Improving    Nutrition Risk  Level of Risk/Frequency of Follow-up: moderate   Chewing or Swallowing Difficulty?: No Chewing or swallowing difficulty    Estimated/Assessed " Needs    Temp: 97.3 °F (36.3 °C)Oral  Weight Used For Calorie Calculations: 65.6 kg (144 lb 10 oz)   Energy Need Method: Kcal/kg (25-30kcal/kg) Energy Calorie Requirements (kcal): 1640-1968kcal  Weight Used For Protein Calculations: 65.6 kg (144 lb 10 oz)  Protein Requirements: 66-99g pro (1.0-1.5g pro/kg)  Estimated Fluid Requirement Method:  (1000ml per MD)    RDA Method (mL): 1640     Nutrition Prescription / Recommendations  Recommendation/Intervention: Recommend continue current diet order as medically appropriate and tolerated. Encourage good PO intakes.  Goals: PO intake %, weight maintenance  Nutrition Goal Status: new  Current Diet Order: Diet Adult Regular; Fluid - 1000mL  Comments: NO EGGS ( patient is allergic to eggs)  Nutrition Order Comments: Appropriate  Recommended Diet: Regular 1000ml fluid restriction  Recommended Oral Supplement: No Oral Supplements  Is Nutrition Support Recommended: No  Is Education Recommended: No    Monitor and Evaluation  % current Intake: Other: PO intake 0-100%  % intake to meet estimated needs: 75 - 100 %  Food and Nutrient Intake: energy intake, food and beverage intake  Food and Nutrient Adminstration: diet order  Anthropometric Measurements: weight, weight change, body mass index  Biochemical Data, Medical Tests and Procedures: electrolyte and renal panel, gastrointestinal profile, glucose/endocrine profile, inflammatory profile, lipid profile  Nutrition-Focused Physical Findings: overall appearance, extremities, muscles and bones, head and eyes, skin     Current Medical Diagnosis and Past Medical History  Diagnosis: other (see comments) (dehydration with hyponatremia)  Past Medical History:   Diagnosis Date    Acute hypokalemia     Anxiety disorder, unspecified     Depression     Hypertension     Insomnia      Nutrition/Diet History  Food Allergies: egg    Lab/Procedures/Meds  Recent Labs   Lab 02/06/23  0755 02/06/23  1110   * 133*   K 4.2  --    BUN 9  --  "   CREATININE 0.67  --    *  --    CALCIUM 9.0  --    CL 95*  --      Last A1c: No results found for: HGBA1C  Lab Results   Component Value Date    RBC 3.91 (L) 02/06/2023    HGB 11.1 (L) 02/06/2023    HCT 31.5 (L) 02/06/2023    MCV 80.6 02/06/2023    MCH 28.4 02/06/2023    MCHC 35.2 02/06/2023     Pertinent Labs Reviewed: reviewed  Na 133 (L), 95 (L)  - pt receiving sodium chloride, glu 114 (H) - pt with no PMH of DM, likely r/t stress response  Pertinent Medications Reviewed: reviewed  Amlodipine, aripiprazole, duloxetine, enoxaparin, ezetimibe, toprol-xl, pantoprazole, polyethylene glycol, sacubitril-valsartan, sodium chloride     Anthropometrics  Temp: 97.3 °F (36.3 °C)  Height: 5' 3" (160 cm)  Height (inches): 63 in  Weight Method: Bed Scale  Weight: 65.6 kg (144 lb 10 oz)  Weight (lb): 144.62 lb  Ideal Body Weight (IBW), Female: 115 lb  % Ideal Body Weight, Female (lb): 125.57 %  BMI (Calculated): 25.6     Nutrition by Nursing  Diet/Nutrition Received: full liquid  Intake (%): 100%  Diet/Feeding Assistance: assisted with feeding     Last Bowel Movement: 02/01/23    Nutrition Follow-Up  RD Follow-up?: Yes  "

## 2023-02-06 NOTE — NURSING TRANSFER
Nursing Transfer Note      2/6/2023     Reason patient is stable    Transfer from [facility] 8 to 558      Transfer via wheel chair    Transfer with room air    Transported by brian jimenez    Medicines sent: yes sodium tablets    Any special needs or follow-up needed: ns    Chart send with patient: yes    Notified: mom at bedside    Patient reassessed at: 02/06/2023 1500 (date, time)    Upon arrival to floor: call light in place oriented to room

## 2023-02-06 NOTE — PLAN OF CARE
Consult for dc planning. Per initial dc assessment dc plan is home. Will follow.    Patient not in the [facility].

## 2023-02-06 NOTE — NURSING
Patient received to room 558. Resp even and unlabored. No distress noted.  Wrist restraints reapplied.

## 2023-02-06 NOTE — PLAN OF CARE
Problem: Restraint, Nonbehavioral (Nonviolent)  Goal: Absence of Harm or Injury  Outcome: Ongoing, Progressing  Intervention: Implement Least Restrictive Safety Strategies  Flowsheets (Taken 2/6/2023 1627)  Less Restrictive Alternative:   bed alarm in use   calming techniques promoted   environment adjusted   family presence promoted  Diversional Activities: television  De-Escalation Techniques:   family involvement requested   reoriented   quiet time facilitated  Intervention: Protect Dignity, Rights, and Personal Wellbeing  Flowsheets (Taken 2/6/2023 1627)  Trust Relationship/Rapport: care explained  Intervention: Protect Skin and Joint Integrity  Flowsheets (Taken 2/6/2023 1627)  Body Position: position changed independently  Range of Motion:   active ROM (range of motion) encouraged   ROM (range of motion) performed  Intervention: Education  Flowsheets (Taken 2/6/2023 1627)  Discontinuation Criteria: Absence of behavior that required restraint  Criteria Explained: Yes  Patient's Response: NL  Patient / Family Notification: Family (who)  Patient / Family Teaching: Need for restraint  Intervention: Restraint Monitoring Q2 hours w/Assessment for Injury  Flowsheets (Taken 2/6/2023 1627)  Observed Emotional State: calm  Visual Check: CF  Circulation: NS  Range of Motion: P  Fluids: O  Food/Meal: S  Elimination: O  Pain Rating (0-10): Rest: 0  Comfort Measures: Repositioned  Assessed readiness for DC: Yes  Privacy Maintained: Yes  Vital Signs per MD order: Yes  Intervention: Restraint Injuries Monitor Q2 hours  Flowsheets (Taken 2/6/2023 1627)  Injuries Noted: None  Treatment: Skin care

## 2023-02-07 LAB
ALBUMIN SERPL BCP-MCNC: 4 G/DL (ref 3.5–5)
ANION GAP SERPL CALCULATED.3IONS-SCNC: 18 MMOL/L (ref 7–16)
BASOPHILS # BLD AUTO: 0.12 K/UL (ref 0–0.2)
BASOPHILS NFR BLD AUTO: 1.1 % (ref 0–1)
BUN SERPL-MCNC: 10 MG/DL (ref 7–18)
BUN/CREAT SERPL: 14 (ref 6–20)
CALCIUM SERPL-MCNC: 9.2 MG/DL (ref 8.5–10.1)
CHLORIDE SERPL-SCNC: 94 MMOL/L (ref 98–107)
CO2 SERPL-SCNC: 23 MMOL/L (ref 21–32)
CREAT SERPL-MCNC: 0.69 MG/DL (ref 0.55–1.02)
DIFFERENTIAL METHOD BLD: ABNORMAL
EGFR (NO RACE VARIABLE) (RUSH/TITUS): 100 ML/MIN/1.73M²
EOSINOPHIL # BLD AUTO: 0.45 K/UL (ref 0–0.5)
EOSINOPHIL NFR BLD AUTO: 4.1 % (ref 1–4)
ERYTHROCYTE [DISTWIDTH] IN BLOOD BY AUTOMATED COUNT: 14.6 % (ref 11.5–14.5)
GLUCOSE SERPL-MCNC: 104 MG/DL (ref 74–106)
HCT VFR BLD AUTO: 34.2 % (ref 38–47)
HGB BLD-MCNC: 11.9 G/DL (ref 12–16)
IMM GRANULOCYTES # BLD AUTO: 0.14 K/UL (ref 0–0.04)
IMM GRANULOCYTES NFR BLD: 1.3 % (ref 0–0.4)
LYMPHOCYTES # BLD AUTO: 3.63 K/UL (ref 1–4.8)
LYMPHOCYTES NFR BLD AUTO: 33 % (ref 27–41)
MCH RBC QN AUTO: 28.3 PG (ref 27–31)
MCHC RBC AUTO-ENTMCNC: 34.8 G/DL (ref 32–36)
MCV RBC AUTO: 81.2 FL (ref 80–96)
MONOCYTES # BLD AUTO: 0.8 K/UL (ref 0–0.8)
MONOCYTES NFR BLD AUTO: 7.3 % (ref 2–6)
MPC BLD CALC-MCNC: 9.1 FL (ref 9.4–12.4)
NEUTROPHILS # BLD AUTO: 5.86 K/UL (ref 1.8–7.7)
NEUTROPHILS NFR BLD AUTO: 53.2 % (ref 53–65)
NRBC # BLD AUTO: 0 X10E3/UL
NRBC, AUTO (.00): 0 %
OSMOLALITY UR: 503 MOSM/KG (ref 50–1400)
PHOSPHATE SERPL-MCNC: 5.3 MG/DL (ref 2.5–4.5)
PLATELET # BLD AUTO: 436 K/UL (ref 150–400)
POTASSIUM SERPL-SCNC: 4.1 MMOL/L (ref 3.5–5.1)
RBC # BLD AUTO: 4.21 M/UL (ref 4.2–5.4)
SODIUM SERPL-SCNC: 130 MMOL/L (ref 136–145)
SODIUM SERPL-SCNC: 131 MMOL/L (ref 136–145)
SODIUM SERPL-SCNC: 134 MMOL/L (ref 136–145)
WBC # BLD AUTO: 11 K/UL (ref 4.5–11)

## 2023-02-07 PROCEDURE — 99233 PR SUBSEQUENT HOSPITAL CARE,LEVL III: ICD-10-PCS | Mod: ,,, | Performed by: INTERNAL MEDICINE

## 2023-02-07 PROCEDURE — 85025 COMPLETE CBC W/AUTO DIFF WBC: CPT | Performed by: NURSE PRACTITIONER

## 2023-02-07 PROCEDURE — 25000003 PHARM REV CODE 250: Performed by: NURSE PRACTITIONER

## 2023-02-07 PROCEDURE — 84295 ASSAY OF SERUM SODIUM: CPT | Performed by: STUDENT IN AN ORGANIZED HEALTH CARE EDUCATION/TRAINING PROGRAM

## 2023-02-07 PROCEDURE — 25000003 PHARM REV CODE 250: Performed by: INTERNAL MEDICINE

## 2023-02-07 PROCEDURE — 11000001 HC ACUTE MED/SURG PRIVATE ROOM

## 2023-02-07 PROCEDURE — 83935 ASSAY OF URINE OSMOLALITY: CPT | Performed by: INTERNAL MEDICINE

## 2023-02-07 PROCEDURE — C9113 INJ PANTOPRAZOLE SODIUM, VIA: HCPCS

## 2023-02-07 PROCEDURE — 80069 RENAL FUNCTION PANEL: CPT | Performed by: INTERNAL MEDICINE

## 2023-02-07 PROCEDURE — 25000003 PHARM REV CODE 250

## 2023-02-07 PROCEDURE — 25000003 PHARM REV CODE 250: Performed by: STUDENT IN AN ORGANIZED HEALTH CARE EDUCATION/TRAINING PROGRAM

## 2023-02-07 PROCEDURE — 63600175 PHARM REV CODE 636 W HCPCS

## 2023-02-07 PROCEDURE — 25000003 PHARM REV CODE 250: Performed by: HOSPITALIST

## 2023-02-07 PROCEDURE — 99232 SBSQ HOSP IP/OBS MODERATE 35: CPT | Mod: GC,,, | Performed by: STUDENT IN AN ORGANIZED HEALTH CARE EDUCATION/TRAINING PROGRAM

## 2023-02-07 PROCEDURE — 99233 SBSQ HOSP IP/OBS HIGH 50: CPT | Mod: ,,, | Performed by: INTERNAL MEDICINE

## 2023-02-07 PROCEDURE — 84295 ASSAY OF SERUM SODIUM: CPT | Performed by: INTERNAL MEDICINE

## 2023-02-07 PROCEDURE — 99232 PR SUBSEQUENT HOSPITAL CARE,LEVL II: ICD-10-PCS | Mod: GC,,, | Performed by: STUDENT IN AN ORGANIZED HEALTH CARE EDUCATION/TRAINING PROGRAM

## 2023-02-07 RX ORDER — ARIPIPRAZOLE 2 MG/1
2 TABLET ORAL 2 TIMES DAILY
Status: DISCONTINUED | OUTPATIENT
Start: 2023-02-07 | End: 2023-02-09 | Stop reason: HOSPADM

## 2023-02-07 RX ADMIN — EZETIMIBE 10 MG: 10 TABLET ORAL at 08:02

## 2023-02-07 RX ADMIN — MUPIROCIN: 20 OINTMENT TOPICAL at 08:02

## 2023-02-07 RX ADMIN — SACUBITRIL AND VALSARTAN 1 TABLET: 24; 26 TABLET, FILM COATED ORAL at 08:02

## 2023-02-07 RX ADMIN — SODIUM CHLORIDE 2 G: 1 TABLET ORAL at 08:02

## 2023-02-07 RX ADMIN — POLYETHYLENE GLYCOL 3350 17 G: 17 POWDER, FOR SOLUTION ORAL at 08:02

## 2023-02-07 RX ADMIN — AMLODIPINE BESYLATE 10 MG: 10 TABLET ORAL at 08:02

## 2023-02-07 RX ADMIN — DULOXETINE HYDROCHLORIDE 60 MG: 30 CAPSULE, DELAYED RELEASE ORAL at 08:02

## 2023-02-07 RX ADMIN — SODIUM CHLORIDE 2 G: 1 TABLET ORAL at 02:02

## 2023-02-07 RX ADMIN — ENOXAPARIN SODIUM 40 MG: 40 INJECTION SUBCUTANEOUS at 04:02

## 2023-02-07 RX ADMIN — METOPROLOL SUCCINATE 100 MG: 100 TABLET, EXTENDED RELEASE ORAL at 08:02

## 2023-02-07 RX ADMIN — ARIPIPRAZOLE 2 MG: 2 TABLET ORAL at 08:02

## 2023-02-07 RX ADMIN — PANTOPRAZOLE SODIUM 40 MG: 40 INJECTION, POWDER, FOR SOLUTION INTRAVENOUS at 09:02

## 2023-02-07 NOTE — ASSESSMENT & PLAN NOTE
Sodium of 106, potassium of 2.6 with Cl of 70  Bicarb of 29 with anion gap of 10  Mg of 0.9    KCL 40 mEq IV given  Mag sulfat 4 g IV given  Patient is on Normal saline wth rate of 100 cc/hr  BMP pending     1/31/23    Na increase to 115. NS infusion discontinued  D5W @ 100cc/hr started because Na increasing too fast  BMP ordered     2/7/23  Na 131  Will continue to monitor q6  NS tablets 2G TID

## 2023-02-07 NOTE — PROGRESS NOTES
"[facility] Consult Follow-Up Note     HPI:  Flory Chacon is a 58 yo WF with medical history significant for Anxiety, Depression, HTN who presents to the hospital 1/31 with N/V for several days.History obtained via chart review as she is confused. She went to ED and she was found to have a sodium level of 103. She was admitted to hospital. On admission, she had no AMS, dizziness or weakness. It was noted that she has history of Depression/Anxiety with numerous psych medications including abilify, cymbalta, xanax, ambian. She has a chronic hyponatremia with a sodium 126-133. Throughout her admission, she has been managed with IVF with normal saline and IV pushes of hypertonic saline. Nephrology consulted for assistance with hyponatremia management.     Subjective/Interval History:  No acute events overnight  Sodium stable ~130  Mental status improved    Objective     Medications:   amLODIPine  10 mg Oral Daily    ARIPiprazole  2 mg Oral Daily    DULoxetine  60 mg Oral Daily    enoxaparin  40 mg Subcutaneous Daily    ezetimibe  10 mg Oral QHS    metoprolol succinate  100 mg Oral Daily    mupirocin   Nasal BID    pantoprazole  40 mg Intravenous Daily    polyethylene glycol  17 g Oral Daily    sacubitriL-valsartan  1 tablet Oral BID    sodium chloride  2 g Oral TID       Physical Exam:   BP (!) 146/102 Comment: reported to nurse [place]  Pulse 97   Temp 97.5 °F (36.4 °C) (Oral)   Resp 18   Ht 5' 3" (1.6 m)   Wt 65.6 kg (144 lb 10 oz)   SpO2 95%   Breastfeeding No   BMI 25.62 kg/m²   General: WD, WN WF, lying in bed in NAD  Eyes: PERRL, EOMI, no conjunctival icterus  HENT: NC/AT, nares patent, OP benign  Neck: supple, no LAD or thyromegaly  Lungs: CTAB, no w/r/r  CV: normal rate, regular rhythm, no m/r/g, no edema  Abd: soft, NT/ND, +BS   Ext: no clubbing or cyanosis  Skin: no rashes or lesions appreciated  Neuro: awake, alert, following commands    I/Os:   I/O last 3 completed shifts:  In: 220 " [I.V.:220]  Out: 1900 [Urine:1900]    Labs, micro, imaging reviewed.       Assessment and Plan:     Patient Active Problem List   Diagnosis    Hypertension    Anxiety disorder, unspecified    Atrophic vaginitis    Complicated migraine    Dehydration with hyponatremia    Electrolyte abnormality    Nonischemic cardiomyopathy    Acute hyponatremia    Acute hypokalemia    Nausea and vomiting    SIADH (syndrome of inappropriate ADH production)    Paroxysmal SVT (supraventricular tachycardia)    Encephalopathy, metabolic     Hypotonic hyponatremia secondary to drug induced SIADH   - Continue salt tablets 2 g TID today. Agree with trending Na q 12.  - Long term management of SIADH includes high protein diet. Fluid restriction <1L/24h.   - Please avoid nephrotoxic agents/NSAIDs  - Renally dose all medications   - Please obtain daily BMP, Mg, and Phos levels  - Please monitor strict UOP  - Daily weights    Thank you for this consult. [facility] will continue to follow along. Please call with any questions.     Vanessa Olmstead, DO  [facility]   02/07/2023

## 2023-02-07 NOTE — PLAN OF CARE
Problem: Fall Injury Risk  Goal: Absence of Fall and Fall-Related Injury  Outcome: Ongoing, Progressing     Problem: Restraint, Nonbehavioral (Nonviolent)  Goal: Absence of Harm or Injury  Outcome: Ongoing, Progressing     Problem: Skin Injury Risk Increased  Goal: Skin Health and Integrity  Outcome: Ongoing, Progressing

## 2023-02-07 NOTE — SUBJECTIVE & OBJECTIVE
Interval History: Patient sitting up on side of the bed. Patient easily reoriented to place and time. We will increase patients Abilify to her home dose and monitor her labs and mentation.    Review of Systems   Constitutional:  Negative for fatigue and fever.   HENT:  Negative for congestion, facial swelling, hearing loss, nosebleeds, sinus pressure, sneezing and sore throat.    Eyes:  Negative for pain, discharge and itching.   Respiratory:  Negative for cough and shortness of breath.    Cardiovascular:  Negative for leg swelling.   Gastrointestinal:  Negative for abdominal distention, abdominal pain, anal bleeding, diarrhea, nausea and vomiting.   Endocrine: Negative for cold intolerance and polydipsia.   Genitourinary:  Negative for difficulty urinating, dyspareunia and dysuria.   Musculoskeletal: Negative.    Skin: Negative.    Allergic/Immunologic: Negative.    Neurological:  Negative for light-headedness.   Psychiatric/Behavioral:  Positive for confusion and sleep disturbance.    Objective:     Vital Signs (Most Recent):  Temp: 98.2 °F (36.8 °C) (02/07/23 1100)  Pulse: 92 (02/07/23 1100)  Resp: 18 (02/07/23 1100)  BP: 130/86 (02/07/23 1100)  SpO2: 95 % (02/07/23 1100) Vital Signs (24h Range):  Temp:  [97.1 °F (36.2 °C)-98.2 °F (36.8 °C)] 98.2 °F (36.8 °C)  Pulse:  [82-97] 92  Resp:  [12-18] 18  SpO2:  [91 %-100 %] 95 %  BP: ()/() 130/86     Weight: 65.6 kg (144 lb 10 oz)  Body mass index is 25.62 kg/m².  No intake or output data in the 24 hours ending 02/07/23 1238   Physical Exam  Constitutional:       Appearance: She is normal weight.   HENT:      Head: Normocephalic.      Right Ear: Tympanic membrane normal.      Left Ear: Tympanic membrane normal.      Nose: Nose normal.      Mouth/Throat:      Mouth: Mucous membranes are dry.      Pharynx: Oropharynx is clear.   Eyes:      Conjunctiva/sclera: Conjunctivae normal.      Pupils: Pupils are equal, round, and reactive to light.   Cardiovascular:       Rate and Rhythm: Normal rate and regular rhythm.      Pulses: Normal pulses.      Heart sounds: Normal heart sounds.   Pulmonary:      Effort: Pulmonary effort is normal.      Breath sounds: Normal breath sounds.   Abdominal:      General: Abdomen is flat. Bowel sounds are normal.   Musculoskeletal:         General: Normal range of motion.      Cervical back: Normal range of motion.   Skin:     General: Skin is warm and dry.      Capillary Refill: Capillary refill takes less than 2 seconds.   Neurological:      Mental Status: She is alert. She is disoriented.   Psychiatric:         Mood and Affect: Mood normal.         Behavior: Behavior normal.       Significant Labs: All pertinent labs within the past 24 hours have been reviewed.  BMP:   Recent Labs   Lab 02/07/23  0449 02/07/23  0804     --    *  131* 130*   K 4.1  --    CL 94*  --    CO2 23  --    BUN 10  --    CREATININE 0.69  --    CALCIUM 9.2  --      CBC:   Recent Labs   Lab 02/06/23  0501 02/07/23  0449   WBC 12.67* 11.00   HGB 11.1* 11.9*   HCT 31.5* 34.2*   * 436*       Significant Imaging: I have reviewed all pertinent imaging results/findings within the past 24 hours.

## 2023-02-07 NOTE — PROGRESS NOTES
[facility] - [address] [place] [address] Medicine  Progress Note    Patient Name: Flory Chacon  MRN: [id number]  Patient Class: IP- Inpatient   Admission Date: 1/30/2023  Length of Stay: 8 days  Attending Physician: Michael Olmstead DO  Primary Care Provider: OTTO Coe        Subjective:     Principal Problem:Dehydration with hyponatremia        HPI:  57 year old female presented to the ED for N/V and electrolytes abnormalities. Patient has been feeling nausea and vomiting multiple times a day starting Sunday afternoon. She has vomited 10-20 times since Sunday and started feeling weak and fatigue today therefore went to a clinic and after doing lab work she was notified that she should go to the ED due to electrolyte abnormalities and very low sodium. She reports her step son who she has been in close contact with has had the stomach virus and has been experiencing diarrhea. .Patient reports heartburn due to vomiting, she reports no blood in the vomit and denies fever, chills, diarrhea, abdominal pain, chest pain and dizziness.      PMHx includes depression and anxiety on multiple different psych medications. Patient reports she used to have episodes of dizziness and syncope therefore ended up getting a pacemaker. She used to be a patient of Dr. Epperson and will start seeing Dr. Pompa for the very first time in a week. Patient also has HTN.     ED workup:  Sodium of 106, potassium of 2.6 with Cl of 70  Bicarb of 29 with anion gap of 10  Mg of 0.9  Cr is 0.98 with GFR of 71  No ketones in the urine   WBC 13.13        Overview/Hospital Course:    1/31/23 Patient AAOX3 this am. Patient states she feels better this morning. She still feels weak and lightheaded but improved from last night. Repeat Na 115. Normal saline infusion changed to D5W. We will continue to monitor BMP.  Patients Na increased to 118 and from 103 over 14 hours. Patient given IVP desmopressin.    2/1/23 The next am patients  Na was down to 110 and eventually Na become 103 again. Patient had become more diaphoretic and confused. 3 percent NS at 40 cc/hr given over 2 hours and Na was rechecked. Patient Na slightly improved. Urine studies were repeated.   2/2/23: Patient more alert but confused at this time. Na increased back to 110. Recheck of NA showed patient to still be 110. Patient given 3 percent Na 45 cc/hr over two hours.   2/3/23: Patient confused this am her NA increased to 118 overnight. Patient has a run of Vtach overnight she was started on amiodarone and EKG and Echo ordered. Patient pending transfer to ICU. Nephrology Consulted.      Interval History: Patient sitting up on side of the bed. Patient easily reoriented to place and time. We will increase patients Abilify to her home dose and monitor her labs and mentation.    Review of Systems   Constitutional:  Negative for fatigue and fever.   HENT:  Negative for congestion, facial swelling, hearing loss, nosebleeds, sinus pressure, sneezing and sore throat.    Eyes:  Negative for pain, discharge and itching.   Respiratory:  Negative for cough and shortness of breath.    Cardiovascular:  Negative for leg swelling.   Gastrointestinal:  Negative for abdominal distention, abdominal pain, anal bleeding, diarrhea, nausea and vomiting.   Endocrine: Negative for cold intolerance and polydipsia.   Genitourinary:  Negative for difficulty urinating, dyspareunia and dysuria.   Musculoskeletal: Negative.    Skin: Negative.    Allergic/Immunologic: Negative.    Neurological:  Negative for light-headedness.   Psychiatric/Behavioral:  Positive for confusion and sleep disturbance.    Objective:     Vital Signs (Most Recent):  Temp: 98.2 °F (36.8 °C) (02/07/23 1100)  Pulse: 92 (02/07/23 1100)  Resp: 18 (02/07/23 1100)  BP: 130/86 (02/07/23 1100)  SpO2: 95 % (02/07/23 1100) Vital Signs (24h Range):  Temp:  [97.1 °F (36.2 °C)-98.2 °F (36.8 °C)] 98.2 °F (36.8 °C)  Pulse:  [82-97] 92  Resp:   [12-18] 18  SpO2:  [91 %-100 %] 95 %  BP: ()/() 130/86     Weight: 65.6 kg (144 lb 10 oz)  Body mass index is 25.62 kg/m².  No intake or output data in the 24 hours ending 02/07/23 1238   Physical Exam  Constitutional:       Appearance: She is normal weight.   HENT:      Head: Normocephalic.      Right Ear: Tympanic membrane normal.      Left Ear: Tympanic membrane normal.      Nose: Nose normal.      Mouth/Throat:      Mouth: Mucous membranes are dry.      Pharynx: Oropharynx is clear.   Eyes:      Conjunctiva/sclera: Conjunctivae normal.      Pupils: Pupils are equal, round, and reactive to light.   Cardiovascular:      Rate and Rhythm: Normal rate and regular rhythm.      Pulses: Normal pulses.      Heart sounds: Normal heart sounds.   Pulmonary:      Effort: Pulmonary effort is normal.      Breath sounds: Normal breath sounds.   Abdominal:      General: Abdomen is flat. Bowel sounds are normal.   Musculoskeletal:         General: Normal range of motion.      Cervical back: Normal range of motion.   Skin:     General: Skin is warm and dry.      Capillary Refill: Capillary refill takes less than 2 seconds.   Neurological:      Mental Status: She is alert. She is disoriented.   Psychiatric:         Mood and Affect: Mood normal.         Behavior: Behavior normal.       Significant Labs: All pertinent labs within the past 24 hours have been reviewed.  BMP:   Recent Labs   Lab 02/07/23  0449 02/07/23  0804     --    *  131* 130*   K 4.1  --    CL 94*  --    CO2 23  --    BUN 10  --    CREATININE 0.69  --    CALCIUM 9.2  --      CBC:   Recent Labs   Lab 02/06/23  0501 02/07/23  0449   WBC 12.67* 11.00   HGB 11.1* 11.9*   HCT 31.5* 34.2*   * 436*       Significant Imaging: I have reviewed all pertinent imaging results/findings within the past 24 hours.      Assessment/Plan:      * Dehydration with hyponatremia  Sodium of 106- on admission  Patient has Depression and anxiety  She denies  ever taking lithium  Patient started taking Abilify a year ago, She started taking cymbalta 2 years ago, She has been taking Xanax and Ambien for many years    2/1/23  D5W Discontinue  NS infusion restarted   BMP Q4hrs will continue to monitor  Zofran for nausea    2/2/23  3 percent normal saline 80 ml given 2/1/23.  Na improved overnight. Will continue to monitor    2/3/23  3 percent NS 90 ml over 2 hours given   Na improved. Patient more confused this am  BMP Q4hr  Consult Nephrology.  Transfer to ICU    Encephalopathy, metabolic  Restarted Home Abilify Dose today      Nonischemic cardiomyopathy  Holding home entresto and Toprol XL    [facility] 4/25/22 - coronary arteries were normal..     Implantation of a BiVentricular Pacemaker  7/20/22 - Dr. Cordova at [place]   Hx of SVT s/p Ablation 7/20/22     Echo 4/23/22:   The left ventricle is normal in size with mild eccentric hypertrophy and mildly decreased systolic function.   The estimated ejection fraction is 40-45%. Anteroseptal wall is moderately hypokinetic.   Normal right ventricular size with normal right ventricular systolic function.   Mild mitral regurgitation.   Normal central venous pressure (3 mmHg).    2/3/33  Abnormal Heart rate over night. Vtach on monitor. Amiodarone drip started.    Electrolyte abnormality    Sodium of 106, potassium of 2.6 with Cl of 70  Bicarb of 29 with anion gap of 10  Mg of 0.9    KCL 40 mEq IV given  Mag sulfat 4 g IV given  Patient is on Normal saline wth rate of 100 cc/hr  BMP pending     1/31/23    Na increase to 115. NS infusion discontinued  D5W @ 100cc/hr started because Na increasing too fast  BMP ordered     2/7/23  Na 131  Will continue to monitor q6  NS tablets 2G TID    Complicated migraine  Patient used to take Nurtec and emgality for migraine but she has not taken them for a while   Currently she does not have any headache       Anxiety disorder, unspecified    Holding home medications for now      Hypertension  Currently well controlled  Continue Entresto    Toprol XL         VTE Risk Mitigation (From admission, onward)         Ordered     enoxaparin injection 40 mg  Daily         01/31/23 0748     IP VTE LOW RISK PATIENT  Once         01/31/23 0708     Place sequential compression device  Until discontinued         01/31/23 0708                Discharge Planning   ESME:      Code Status: DNR   Is the patient medically ready for discharge?:     Reason for patient still in hospital (select all that apply): Treatment  Discharge Plan A: Home                  Saroj Pineda MD  Department of Hospital Medicine   [facility] - [facility] Tele[facility]

## 2023-02-07 NOTE — PLAN OF CARE
Problem: Adult Inpatient Plan of Care  Goal: Plan of Care Review  Outcome: Ongoing, Progressing  Goal: Patient-Specific Goal (Individualized)  Outcome: Ongoing, Progressing  Goal: Absence of Hospital-Acquired Illness or Injury  Outcome: Ongoing, Progressing  Goal: Optimal Comfort and Wellbeing  Outcome: Ongoing, Progressing  Goal: Readiness for Transition of Care  Outcome: Ongoing, Progressing     Problem: Infection  Goal: Absence of Infection Signs and Symptoms  Outcome: Ongoing, Progressing     Problem: Fall Injury Risk  Goal: Absence of Fall and Fall-Related Injury  Outcome: Ongoing, Progressing     Problem: Restraint, Nonbehavioral (Nonviolent)  Goal: Absence of Harm or Injury  Outcome: Ongoing, Progressing     Problem: Skin Injury Risk Increased  Goal: Skin Health and Integrity  Outcome: Ongoing, Progressing     Problem: Violence Risk or Actual  Goal: Anger and Impulse Control  Outcome: Ongoing, Progressing    POC reviewed and continues.

## 2023-02-08 LAB
ALBUMIN SERPL BCP-MCNC: 3.7 G/DL (ref 3.5–5)
ANION GAP SERPL CALCULATED.3IONS-SCNC: 17 MMOL/L (ref 7–16)
BASOPHILS # BLD AUTO: 0.12 K/UL (ref 0–0.2)
BASOPHILS NFR BLD AUTO: 1 % (ref 0–1)
BUN SERPL-MCNC: 15 MG/DL (ref 7–18)
BUN/CREAT SERPL: 18 (ref 6–20)
CALCIUM SERPL-MCNC: 8.7 MG/DL (ref 8.5–10.1)
CHLORIDE SERPL-SCNC: 98 MMOL/L (ref 98–107)
CO2 SERPL-SCNC: 24 MMOL/L (ref 21–32)
CREAT SERPL-MCNC: 0.84 MG/DL (ref 0.55–1.02)
DIFFERENTIAL METHOD BLD: ABNORMAL
EGFR (NO RACE VARIABLE) (RUSH/TITUS): 80 ML/MIN/1.73M²
EOSINOPHIL # BLD AUTO: 0.55 K/UL (ref 0–0.5)
EOSINOPHIL NFR BLD AUTO: 4.7 % (ref 1–4)
ERYTHROCYTE [DISTWIDTH] IN BLOOD BY AUTOMATED COUNT: 14.5 % (ref 11.5–14.5)
GLUCOSE SERPL-MCNC: 105 MG/DL (ref 74–106)
HCT VFR BLD AUTO: 32.1 % (ref 38–47)
HGB BLD-MCNC: 11.3 G/DL (ref 12–16)
IMM GRANULOCYTES # BLD AUTO: 0.14 K/UL (ref 0–0.04)
IMM GRANULOCYTES NFR BLD: 1.2 % (ref 0–0.4)
LYMPHOCYTES # BLD AUTO: 4.12 K/UL (ref 1–4.8)
LYMPHOCYTES NFR BLD AUTO: 35.2 % (ref 27–41)
MCH RBC QN AUTO: 28.6 PG (ref 27–31)
MCHC RBC AUTO-ENTMCNC: 35.2 G/DL (ref 32–36)
MCV RBC AUTO: 81.3 FL (ref 80–96)
MONOCYTES # BLD AUTO: 1.06 K/UL (ref 0–0.8)
MONOCYTES NFR BLD AUTO: 9.1 % (ref 2–6)
MPC BLD CALC-MCNC: 9.2 FL (ref 9.4–12.4)
NEUTROPHILS # BLD AUTO: 5.7 K/UL (ref 1.8–7.7)
NEUTROPHILS NFR BLD AUTO: 48.8 % (ref 53–65)
NRBC # BLD AUTO: 0 X10E3/UL
NRBC, AUTO (.00): 0 %
PHOSPHATE SERPL-MCNC: 5.5 MG/DL (ref 2.5–4.5)
PLATELET # BLD AUTO: 408 K/UL (ref 150–400)
POTASSIUM SERPL-SCNC: 4 MMOL/L (ref 3.5–5.1)
RBC # BLD AUTO: 3.95 M/UL (ref 4.2–5.4)
SODIUM SERPL-SCNC: 132 MMOL/L (ref 136–145)
SODIUM SERPL-SCNC: 135 MMOL/L (ref 136–145)
WBC # BLD AUTO: 11.69 K/UL (ref 4.5–11)

## 2023-02-08 PROCEDURE — 84295 ASSAY OF SERUM SODIUM: CPT | Performed by: STUDENT IN AN ORGANIZED HEALTH CARE EDUCATION/TRAINING PROGRAM

## 2023-02-08 PROCEDURE — 99233 PR SUBSEQUENT HOSPITAL CARE,LEVL III: ICD-10-PCS | Mod: GC,,, | Performed by: STUDENT IN AN ORGANIZED HEALTH CARE EDUCATION/TRAINING PROGRAM

## 2023-02-08 PROCEDURE — 63600175 PHARM REV CODE 636 W HCPCS

## 2023-02-08 PROCEDURE — 25000003 PHARM REV CODE 250: Performed by: HOSPITALIST

## 2023-02-08 PROCEDURE — 80069 RENAL FUNCTION PANEL: CPT | Performed by: INTERNAL MEDICINE

## 2023-02-08 PROCEDURE — 25000003 PHARM REV CODE 250: Performed by: STUDENT IN AN ORGANIZED HEALTH CARE EDUCATION/TRAINING PROGRAM

## 2023-02-08 PROCEDURE — 25000003 PHARM REV CODE 250

## 2023-02-08 PROCEDURE — 99233 PR SUBSEQUENT HOSPITAL CARE,LEVL III: ICD-10-PCS | Mod: ,,, | Performed by: INTERNAL MEDICINE

## 2023-02-08 PROCEDURE — 25000003 PHARM REV CODE 250: Performed by: NURSE PRACTITIONER

## 2023-02-08 PROCEDURE — 99233 SBSQ HOSP IP/OBS HIGH 50: CPT | Mod: ,,, | Performed by: INTERNAL MEDICINE

## 2023-02-08 PROCEDURE — C9113 INJ PANTOPRAZOLE SODIUM, VIA: HCPCS

## 2023-02-08 PROCEDURE — 11000001 HC ACUTE MED/SURG PRIVATE ROOM

## 2023-02-08 PROCEDURE — 85025 COMPLETE CBC W/AUTO DIFF WBC: CPT | Performed by: NURSE PRACTITIONER

## 2023-02-08 PROCEDURE — 94761 N-INVAS EAR/PLS OXIMETRY MLT: CPT

## 2023-02-08 PROCEDURE — 25000003 PHARM REV CODE 250: Performed by: INTERNAL MEDICINE

## 2023-02-08 PROCEDURE — 99233 SBSQ HOSP IP/OBS HIGH 50: CPT | Mod: GC,,, | Performed by: STUDENT IN AN ORGANIZED HEALTH CARE EDUCATION/TRAINING PROGRAM

## 2023-02-08 RX ADMIN — AMLODIPINE BESYLATE 10 MG: 10 TABLET ORAL at 09:02

## 2023-02-08 RX ADMIN — SODIUM CHLORIDE 2 G: 1 TABLET ORAL at 08:02

## 2023-02-08 RX ADMIN — METOPROLOL SUCCINATE 100 MG: 100 TABLET, EXTENDED RELEASE ORAL at 09:02

## 2023-02-08 RX ADMIN — SODIUM CHLORIDE 2 G: 1 TABLET ORAL at 09:02

## 2023-02-08 RX ADMIN — EZETIMIBE 10 MG: 10 TABLET ORAL at 08:02

## 2023-02-08 RX ADMIN — SACUBITRIL AND VALSARTAN 1 TABLET: 24; 26 TABLET, FILM COATED ORAL at 09:02

## 2023-02-08 RX ADMIN — ARIPIPRAZOLE 2 MG: 2 TABLET ORAL at 09:02

## 2023-02-08 RX ADMIN — SACUBITRIL AND VALSARTAN 1 TABLET: 24; 26 TABLET, FILM COATED ORAL at 08:02

## 2023-02-08 RX ADMIN — PANTOPRAZOLE SODIUM 40 MG: 40 INJECTION, POWDER, FOR SOLUTION INTRAVENOUS at 09:02

## 2023-02-08 RX ADMIN — ENOXAPARIN SODIUM 40 MG: 40 INJECTION SUBCUTANEOUS at 05:02

## 2023-02-08 RX ADMIN — ARIPIPRAZOLE 2 MG: 2 TABLET ORAL at 08:02

## 2023-02-08 RX ADMIN — DULOXETINE HYDROCHLORIDE 60 MG: 30 CAPSULE, DELAYED RELEASE ORAL at 09:02

## 2023-02-08 NOTE — SUBJECTIVE & OBJECTIVE
Interval History: Patient slightly confused but more alert today. Per patients mother she is not near her baseline yet but she can see some improvement. Patient had her first night where she could sleep.    Review of Systems   Constitutional:  Negative for fatigue and fever.   HENT:  Negative for congestion, facial swelling, hearing loss, nosebleeds, sinus pressure, sneezing and sore throat.    Eyes:  Negative for pain, discharge and itching.   Respiratory:  Negative for cough and shortness of breath.    Cardiovascular:  Negative for leg swelling.   Gastrointestinal:  Negative for abdominal distention, abdominal pain, anal bleeding, diarrhea, nausea and vomiting.   Endocrine: Negative for cold intolerance and polydipsia.   Genitourinary:  Negative for difficulty urinating, dyspareunia and dysuria.   Musculoskeletal: Negative.    Skin: Negative.    Allergic/Immunologic: Negative.    Neurological:  Negative for light-headedness.   Psychiatric/Behavioral:  Positive for confusion and sleep disturbance.    Objective:     Vital Signs (Most Recent):  Temp: 97.7 °F (36.5 °C) (02/08/23 0705)  Pulse: 102 (02/08/23 0705)  Resp: 19 (02/08/23 0705)  BP: 127/88 (02/08/23 0705)  SpO2: (!) 94 % (02/08/23 0705)   Vital Signs (24h Range):  Temp:  [97.7 °F (36.5 °C)-98.4 °F (36.9 °C)] 97.7 °F (36.5 °C)  Pulse:  [] 102  Resp:  [18-19] 19  SpO2:  [94 %-97 %] 94 %  BP: (109-130)/(83-88) 127/88     Weight: 65.6 kg (144 lb 10 oz)  Body mass index is 25.62 kg/m².  No intake or output data in the 24 hours ending 02/08/23 0850   Physical Exam  Constitutional:       Appearance: She is normal weight.   HENT:      Head: Normocephalic.      Right Ear: Tympanic membrane normal.      Left Ear: Tympanic membrane normal.      Nose: Nose normal.      Mouth/Throat:      Mouth: Mucous membranes are dry.      Pharynx: Oropharynx is clear.   Eyes:      Conjunctiva/sclera: Conjunctivae normal.      Pupils: Pupils are equal, round, and reactive to  light.   Cardiovascular:      Rate and Rhythm: Normal rate and regular rhythm.      Pulses: Normal pulses.      Heart sounds: Normal heart sounds.   Pulmonary:      Effort: Pulmonary effort is normal.      Breath sounds: Normal breath sounds.   Abdominal:      General: Abdomen is flat. Bowel sounds are normal.   Musculoskeletal:         General: Normal range of motion.      Cervical back: Normal range of motion.   Skin:     General: Skin is warm and dry.      Capillary Refill: Capillary refill takes less than 2 seconds.   Neurological:      Mental Status: She is alert. She is disoriented.   Psychiatric:         Mood and Affect: Mood normal.         Behavior: Behavior normal.       Significant Labs: All pertinent labs within the past 24 hours have been reviewed.  BMP:   Recent Labs   Lab 02/08/23  0333      *   K 4.0   CL 98   CO2 24   BUN 15   CREATININE 0.84   CALCIUM 8.7     CBC:   Recent Labs   Lab 02/07/23  0449 02/08/23  0333   WBC 11.00 11.69*   HGB 11.9* 11.3*   HCT 34.2* 32.1*   * 408*       Significant Imaging: I have reviewed all pertinent imaging results/findings within the past 24 hours.

## 2023-02-08 NOTE — ASSESSMENT & PLAN NOTE
Sodium of 106, potassium of 2.6 with Cl of 70  Bicarb of 29 with anion gap of 10  Mg of 0.9    KCL 40 mEq IV given  Mag sulfat 4 g IV given  Patient is on Normal saline wth rate of 100 cc/hr  BMP pending     1/31/23    Na increase to 115. NS infusion discontinued  D5W @ 100cc/hr started because Na increasing too fast  BMP ordered     2/8/23  Na 135  Will continue to monitor q12  NS tablets 2G TID

## 2023-02-08 NOTE — PROGRESS NOTES
"[facility] Consult Follow-Up Note     HPI:  Flory Chacon is a 56 yo WF with medical history significant for Anxiety, Depression, HTN who presents to the hospital 1/31 with N/V for several days.History obtained via chart review as she is confused. She went to ED and she was found to have a sodium level of 103. She was admitted to hospital. On admission, she had no AMS, dizziness or weakness. It was noted that she has history of Depression/Anxiety with numerous psych medications including abilify, cymbalta, xanax, ambian. She has a chronic hyponatremia with a sodium 126-133. Throughout her admission, she has been managed with IVF with normal saline and IV pushes of hypertonic saline. Nephrology consulted for assistance with hyponatremia management.     Subjective/Interval History:  No acute events overnight  Sodium stable ~132-134    Objective     Medications:   amLODIPine  10 mg Oral Daily    ARIPiprazole  2 mg Oral BID    DULoxetine  60 mg Oral Daily    enoxaparin  40 mg Subcutaneous Daily    ezetimibe  10 mg Oral QHS    metoprolol succinate  100 mg Oral Daily    mupirocin   Nasal BID    pantoprazole  40 mg Intravenous Daily    polyethylene glycol  17 g Oral Daily    sacubitriL-valsartan  1 tablet Oral BID    sodium chloride  2 g Oral TID       Physical Exam:   /88 (BP Location: Left arm, Patient Position: Lying)   Pulse 102   Temp 97.7 °F (36.5 °C) (Oral)   Resp 19   Ht 5' 3" (1.6 m)   Wt 65.6 kg (144 lb 10 oz)   SpO2 (!) 94%   Breastfeeding No   BMI 25.62 kg/m²   General: WD, WN WF, lying in bed in NAD  Eyes: PERRL, EOMI, no conjunctival icterus  HENT: NC/AT, nares patent, OP benign  Neck: supple, no LAD or thyromegaly  Lungs: CTAB, no w/r/r  CV: normal rate, regular rhythm, no m/r/g, no edema  Abd: soft, NT/ND, +BS   Ext: no clubbing or cyanosis  Skin: no rashes or lesions appreciated  Neuro: awake, alert, following commands    I/Os:   No intake/output data recorded.    Labs, micro, " imaging reviewed.       Assessment and Plan:     Patient Active Problem List   Diagnosis    Hypertension    Anxiety disorder, unspecified    Atrophic vaginitis    Complicated migraine    Dehydration with hyponatremia    Electrolyte abnormality    Nonischemic cardiomyopathy    Acute hyponatremia    Acute hypokalemia    Nausea and vomiting    SIADH (syndrome of inappropriate ADH production)    Paroxysmal SVT (supraventricular tachycardia)    Encephalopathy, metabolic     Hypotonic hyponatremia secondary to drug induced SIADH   - Continue salt tablets 2 g TID. She should be discharged on this regimen.   - Long term management of SIADH includes high protein diet. Fluid restriction <1L/24h.   - Outpatient f/u has been arranged with myself for March  - Please avoid nephrotoxic agents/NSAIDs  - Renally dose all medications   - Please obtain daily BMP, Mg, and Phos levels  - Please monitor strict UOP  - Daily weights    Thank you for this consult. [facility] will sign off. Please call with any questions.     Vanessa Olmstead, DO [facility]   02/08/2023

## 2023-02-08 NOTE — PLAN OF CARE
Problem: Adult Inpatient Plan of Care  Goal: Plan of Care Review  Outcome: Ongoing, Progressing  Goal: Patient-Specific Goal (Individualized)  Outcome: Ongoing, Progressing  Flowsheets (Taken 2/8/2023 170)  Anxieties, Fears or Concerns: none voiced  Individualized Care Needs: maintain sodium levels. d.c home when at baseline  Goal: Absence of Hospital-Acquired Illness or Injury  Outcome: Ongoing, Progressing  Goal: Optimal Comfort and Wellbeing  Outcome: Ongoing, Progressing  Goal: Readiness for Transition of Care  Outcome: Ongoing, Progressing     Problem: Infection  Goal: Absence of Infection Signs and Symptoms  Outcome: Ongoing, Progressing     Problem: Fall Injury Risk  Goal: Absence of Fall and Fall-Related Injury  Outcome: Ongoing, Progressing     Problem: Restraint, Nonbehavioral (Nonviolent)  Goal: Absence of Harm or Injury  Outcome: Ongoing, Progressing     Problem: Skin Injury Risk Increased  Goal: Skin Health and Integrity  Outcome: Ongoing, Progressing     Problem: Violence Risk or Actual  Goal: Anger and Impulse Control  Outcome: Ongoing, Progressing  POC reviewed and continues

## 2023-02-08 NOTE — PLAN OF CARE
Problem: Restraint, Nonbehavioral (Nonviolent)  Goal: Absence of Harm or Injury  Outcome: Ongoing, Progressing     Problem: Violence Risk or Actual  Goal: Anger and Impulse Control  Outcome: Ongoing, Progressing

## 2023-02-08 NOTE — PROGRESS NOTES
[facility] - [address] [place] [address] Medicine  Progress Note    Patient Name: Flory Chacon  MRN: [id number]  Patient Class: IP- Inpatient   Admission Date: 1/30/2023  Length of Stay: 9 days  Attending Physician: Michael Olmstead DO  Primary Care Provider: OTTO Coe        Subjective:     Principal Problem:Dehydration with hyponatremia        HPI:  57 year old female presented to the ED for N/V and electrolytes abnormalities. Patient has been feeling nausea and vomiting multiple times a day starting Sunday afternoon. She has vomited 10-20 times since Sunday and started feeling weak and fatigue today therefore went to a clinic and after doing lab work she was notified that she should go to the ED due to electrolyte abnormalities and very low sodium. She reports her step son who she has been in close contact with has had the stomach virus and has been experiencing diarrhea. .Patient reports heartburn due to vomiting, she reports no blood in the vomit and denies fever, chills, diarrhea, abdominal pain, chest pain and dizziness.      PMHx includes depression and anxiety on multiple different psych medications. Patient reports she used to have episodes of dizziness and syncope therefore ended up getting a pacemaker. She used to be a patient of Dr. Epperson and will start seeing Dr. Pompa for the very first time in a week. Patient also has HTN.     ED workup:  Sodium of 106, potassium of 2.6 with Cl of 70  Bicarb of 29 with anion gap of 10  Mg of 0.9  Cr is 0.98 with GFR of 71  No ketones in the urine   WBC 13.13        Overview/Hospital Course:    1/31/23 Patient AAOX3 this am. Patient states she feels better this morning. She still feels weak and lightheaded but improved from last night. Repeat Na 115. Normal saline infusion changed to D5W. We will continue to monitor BMP.  Patients Na increased to 118 and from 103 over 14 hours. Patient given IVP desmopressin.    2/1/23 The next am patients  Na was down to 110 and eventually Na become 103 again. Patient had become more diaphoretic and confused. 3 percent NS at 40 cc/hr given over 2 hours and Na was rechecked. Patient Na slightly improved. Urine studies were repeated.   2/2/23: Patient more alert but confused at this time. Na increased back to 110. Recheck of NA showed patient to still be 110. Patient given 3 percent Na 45 cc/hr over two hours.   2/3/23: Patient confused this am her NA increased to 118 overnight. Patient has a run of Vtach overnight she was started on amiodarone and EKG and Echo ordered. Patient pending transfer to ICU. Nephrology Consulted.      Interval History: Patient slightly confused but more alert today. Per patients mother she is not near her baseline yet but she can see some improvement. Patient had her first night where she could sleep.    Review of Systems   Constitutional:  Negative for fatigue and fever.   HENT:  Negative for congestion, facial swelling, hearing loss, nosebleeds, sinus pressure, sneezing and sore throat.    Eyes:  Negative for pain, discharge and itching.   Respiratory:  Negative for cough and shortness of breath.    Cardiovascular:  Negative for leg swelling.   Gastrointestinal:  Negative for abdominal distention, abdominal pain, anal bleeding, diarrhea, nausea and vomiting.   Endocrine: Negative for cold intolerance and polydipsia.   Genitourinary:  Negative for difficulty urinating, dyspareunia and dysuria.   Musculoskeletal: Negative.    Skin: Negative.    Allergic/Immunologic: Negative.    Neurological:  Negative for light-headedness.   Psychiatric/Behavioral:  Positive for confusion and sleep disturbance.    Objective:     Vital Signs (Most Recent):  Temp: 97.7 °F (36.5 °C) (02/08/23 0705)  Pulse: 102 (02/08/23 0705)  Resp: 19 (02/08/23 0705)  BP: 127/88 (02/08/23 0705)  SpO2: (!) 94 % (02/08/23 0705)   Vital Signs (24h Range):  Temp:  [97.7 °F (36.5 °C)-98.4 °F (36.9 °C)] 97.7 °F (36.5 °C)  Pulse:   [] 102  Resp:  [18-19] 19  SpO2:  [94 %-97 %] 94 %  BP: (109-130)/(83-88) 127/88     Weight: 65.6 kg (144 lb 10 oz)  Body mass index is 25.62 kg/m².  No intake or output data in the 24 hours ending 02/08/23 0850   Physical Exam  Constitutional:       Appearance: She is normal weight.   HENT:      Head: Normocephalic.      Right Ear: Tympanic membrane normal.      Left Ear: Tympanic membrane normal.      Nose: Nose normal.      Mouth/Throat:      Mouth: Mucous membranes are dry.      Pharynx: Oropharynx is clear.   Eyes:      Conjunctiva/sclera: Conjunctivae normal.      Pupils: Pupils are equal, round, and reactive to light.   Cardiovascular:      Rate and Rhythm: Normal rate and regular rhythm.      Pulses: Normal pulses.      Heart sounds: Normal heart sounds.   Pulmonary:      Effort: Pulmonary effort is normal.      Breath sounds: Normal breath sounds.   Abdominal:      General: Abdomen is flat. Bowel sounds are normal.   Musculoskeletal:         General: Normal range of motion.      Cervical back: Normal range of motion.   Skin:     General: Skin is warm and dry.      Capillary Refill: Capillary refill takes less than 2 seconds.   Neurological:      Mental Status: She is alert. She is disoriented.   Psychiatric:         Mood and Affect: Mood normal.         Behavior: Behavior normal.       Significant Labs: All pertinent labs within the past 24 hours have been reviewed.  BMP:   Recent Labs   Lab 02/08/23  0333      *   K 4.0   CL 98   CO2 24   BUN 15   CREATININE 0.84   CALCIUM 8.7     CBC:   Recent Labs   Lab 02/07/23  0449 02/08/23  0333   WBC 11.00 11.69*   HGB 11.9* 11.3*   HCT 34.2* 32.1*   * 408*       Significant Imaging: I have reviewed all pertinent imaging results/findings within the past 24 hours.      Assessment/Plan:      * Dehydration with hyponatremia  Sodium of 106- on admission  Patient has Depression and anxiety  She denies ever taking lithium  Patient started taking  Abilify a year ago, She started taking cymbalta 2 years ago, She has been taking Xanax and Ambien for many years    2/1/23  D5W Discontinue  NS infusion restarted   BMP Q4hrs will continue to monitor  Zofran for nausea    2/2/23  3 percent normal saline 80 ml given 2/1/23.  Na improved overnight. Will continue to monitor    2/3/23  3 percent NS 90 ml over 2 hours given   Na improved. Patient more confused this am  BMP Q4hr  Consult Nephrology.  Transfer to ICU    Encephalopathy, metabolic  Restarted Home Abilify Dose today      Nonischemic cardiomyopathy  Holding home entresto and Toprol XL    LHC 4/25/22 - coronary arteries were normal..     Implantation of a BiVentricular Pacemaker  7/20/22 - Dr. Cordova at [facility]   Hx of SVT s/p Ablation 7/20/22     Echo 4/23/22:   The left ventricle is normal in size with mild eccentric hypertrophy and mildly decreased systolic function.   The estimated ejection fraction is 40-45%. Anteroseptal wall is moderately hypokinetic.   Normal right ventricular size with normal right ventricular systolic function.   Mild mitral regurgitation.   Normal central venous pressure (3 mmHg).    2/3/33  Abnormal Heart rate over night. Vtach on monitor. Amiodarone drip started.    Electrolyte abnormality    Sodium of 106, potassium of 2.6 with Cl of 70  Bicarb of 29 with anion gap of 10  Mg of 0.9    KCL 40 mEq IV given  Mag sulfat 4 g IV given  Patient is on Normal saline wth rate of 100 cc/hr  BMP pending     1/31/23    Na increase to 115. NS infusion discontinued  D5W @ 100cc/hr started because Na increasing too fast  BMP ordered     2/8/23  Na 135  Will continue to monitor q12  NS tablets 2G TID    Complicated migraine  Patient used to take Nurtec and emgality for migraine but she has not taken them for a while   Currently she does not have any headache       Anxiety disorder, unspecified  Aripripazole 2 mg  Duloxetine 60 mg      Hypertension  Currently well controlled  Continue  Entresto    Toprol XL         VTE Risk Mitigation (From admission, onward)         Ordered     enoxaparin injection 40 mg  Daily         01/31/23 0748     IP VTE LOW RISK PATIENT  Once         01/31/23 0708     Place sequential compression device  Until discontinued         01/31/23 0708                Discharge Planning   ESME:      Code Status: DNR   Is the patient medically ready for discharge?:     Reason for patient still in hospital (select all that apply): Treatment  Discharge Plan A: Home                  Saroj Pineda MD  Department of Hospital Medicine   [facility]

## 2023-02-09 VITALS
DIASTOLIC BLOOD PRESSURE: 83 MMHG | RESPIRATION RATE: 18 BRPM | WEIGHT: 144.63 LBS | OXYGEN SATURATION: 99 % | TEMPERATURE: 99 F | HEART RATE: 92 BPM | SYSTOLIC BLOOD PRESSURE: 119 MMHG | BODY MASS INDEX: 25.62 KG/M2 | HEIGHT: 63 IN

## 2023-02-09 LAB
ALBUMIN SERPL BCP-MCNC: 3.7 G/DL (ref 3.5–5)
ANION GAP SERPL CALCULATED.3IONS-SCNC: 15 MMOL/L (ref 7–16)
BASOPHILS # BLD AUTO: 0.1 K/UL (ref 0–0.2)
BASOPHILS NFR BLD AUTO: 0.9 % (ref 0–1)
BUN SERPL-MCNC: 13 MG/DL (ref 7–18)
BUN/CREAT SERPL: 15 (ref 6–20)
CALCIUM SERPL-MCNC: 8.7 MG/DL (ref 8.5–10.1)
CHLORIDE SERPL-SCNC: 97 MMOL/L (ref 98–107)
CO2 SERPL-SCNC: 26 MMOL/L (ref 21–32)
CREAT SERPL-MCNC: 0.85 MG/DL (ref 0.55–1.02)
DIFFERENTIAL METHOD BLD: ABNORMAL
EGFR (NO RACE VARIABLE) (RUSH/TITUS): 79 ML/MIN/1.73M²
EOSINOPHIL # BLD AUTO: 0.31 K/UL (ref 0–0.5)
EOSINOPHIL NFR BLD AUTO: 2.9 % (ref 1–4)
ERYTHROCYTE [DISTWIDTH] IN BLOOD BY AUTOMATED COUNT: 14.2 % (ref 11.5–14.5)
GLUCOSE SERPL-MCNC: 96 MG/DL (ref 74–106)
HCT VFR BLD AUTO: 31.4 % (ref 38–47)
HGB BLD-MCNC: 10.8 G/DL (ref 12–16)
IMM GRANULOCYTES # BLD AUTO: 0.11 K/UL (ref 0–0.04)
IMM GRANULOCYTES NFR BLD: 1 % (ref 0–0.4)
LYMPHOCYTES # BLD AUTO: 3.2 K/UL (ref 1–4.8)
LYMPHOCYTES NFR BLD AUTO: 29.9 % (ref 27–41)
MCH RBC QN AUTO: 28.6 PG (ref 27–31)
MCHC RBC AUTO-ENTMCNC: 34.4 G/DL (ref 32–36)
MCV RBC AUTO: 83.3 FL (ref 80–96)
MONOCYTES # BLD AUTO: 1.11 K/UL (ref 0–0.8)
MONOCYTES NFR BLD AUTO: 10.4 % (ref 2–6)
MPC BLD CALC-MCNC: 9 FL (ref 9.4–12.4)
NEUTROPHILS # BLD AUTO: 5.89 K/UL (ref 1.8–7.7)
NEUTROPHILS NFR BLD AUTO: 54.9 % (ref 53–65)
NRBC # BLD AUTO: 0 X10E3/UL
NRBC, AUTO (.00): 0 %
PHOSPHATE SERPL-MCNC: 5.2 MG/DL (ref 2.5–4.5)
PLATELET # BLD AUTO: 448 K/UL (ref 150–400)
POTASSIUM SERPL-SCNC: 4.3 MMOL/L (ref 3.5–5.1)
RBC # BLD AUTO: 3.77 M/UL (ref 4.2–5.4)
SODIUM SERPL-SCNC: 134 MMOL/L (ref 136–145)
WBC # BLD AUTO: 10.72 K/UL (ref 4.5–11)

## 2023-02-09 PROCEDURE — 25000003 PHARM REV CODE 250: Performed by: STUDENT IN AN ORGANIZED HEALTH CARE EDUCATION/TRAINING PROGRAM

## 2023-02-09 PROCEDURE — C9113 INJ PANTOPRAZOLE SODIUM, VIA: HCPCS

## 2023-02-09 PROCEDURE — 25000003 PHARM REV CODE 250: Performed by: NURSE PRACTITIONER

## 2023-02-09 PROCEDURE — 85025 COMPLETE CBC W/AUTO DIFF WBC: CPT

## 2023-02-09 PROCEDURE — 25000003 PHARM REV CODE 250

## 2023-02-09 PROCEDURE — 99239 PR HOSPITAL DISCHARGE DAY,>30 MIN: ICD-10-PCS | Mod: GC,,, | Performed by: STUDENT IN AN ORGANIZED HEALTH CARE EDUCATION/TRAINING PROGRAM

## 2023-02-09 PROCEDURE — 80069 RENAL FUNCTION PANEL: CPT | Performed by: INTERNAL MEDICINE

## 2023-02-09 PROCEDURE — 99239 HOSP IP/OBS DSCHRG MGMT >30: CPT | Mod: GC,,, | Performed by: STUDENT IN AN ORGANIZED HEALTH CARE EDUCATION/TRAINING PROGRAM

## 2023-02-09 PROCEDURE — 94761 N-INVAS EAR/PLS OXIMETRY MLT: CPT

## 2023-02-09 PROCEDURE — 63600175 PHARM REV CODE 636 W HCPCS

## 2023-02-09 RX ORDER — METOPROLOL SUCCINATE 100 MG/1
100 TABLET, EXTENDED RELEASE ORAL DAILY
Qty: 30 TABLET | Refills: 11 | Status: SHIPPED | OUTPATIENT
Start: 2023-02-10 | End: 2024-03-04 | Stop reason: SDUPTHER

## 2023-02-09 RX ORDER — AMLODIPINE BESYLATE 10 MG/1
10 TABLET ORAL DAILY
Qty: 30 TABLET | Refills: 11 | Status: SHIPPED | OUTPATIENT
Start: 2023-02-10 | End: 2024-02-10

## 2023-02-09 RX ADMIN — DULOXETINE HYDROCHLORIDE 60 MG: 30 CAPSULE, DELAYED RELEASE ORAL at 09:02

## 2023-02-09 RX ADMIN — AMLODIPINE BESYLATE 10 MG: 10 TABLET ORAL at 09:02

## 2023-02-09 RX ADMIN — METOPROLOL SUCCINATE 100 MG: 100 TABLET, EXTENDED RELEASE ORAL at 09:02

## 2023-02-09 RX ADMIN — SODIUM CHLORIDE 2 G: 1 TABLET ORAL at 09:02

## 2023-02-09 RX ADMIN — SACUBITRIL AND VALSARTAN 1 TABLET: 24; 26 TABLET, FILM COATED ORAL at 09:02

## 2023-02-09 RX ADMIN — PANTOPRAZOLE SODIUM 40 MG: 40 INJECTION, POWDER, FOR SOLUTION INTRAVENOUS at 09:02

## 2023-02-09 RX ADMIN — ARIPIPRAZOLE 2 MG: 2 TABLET ORAL at 09:02

## 2023-02-09 NOTE — DISCHARGE SUMMARY
[facility] Hospital Medicine  Discharge Summary      Patient Name: Flory Chacon  MRN: [id number]  RADHA: [id number]  Patient Class: IP- Inpatient  Admission Date: 1/30/2023  Hospital Length of Stay: 10 days  Discharge Date and Time:  02/09/2023 12:22 PM  Attending Physician: Michael Olmstead DO   Discharging Provider: Saroj Pineda MD  Primary Care Provider: OTTO Coe    Primary Care Team: Networked reference to record PCT     HPI:   57 year old female presented to the ED for N/V and electrolytes abnormalities. Patient has been feeling nausea and vomiting multiple times a day starting Sunday afternoon. She has vomited 10-20 times since Sunday and started feeling weak and fatigue today therefore went to a clinic and after doing lab work she was notified that she should go to the ED due to electrolyte abnormalities and very low sodium. She reports her step son who she has been in close contact with has had the stomach virus and has been experiencing diarrhea. .Patient reports heartburn due to vomiting, she reports no blood in the vomit and denies fever, chills, diarrhea, abdominal pain, chest pain and dizziness.      PMHx includes depression and anxiety on multiple different psych medications. Patient reports she used to have episodes of dizziness and syncope therefore ended up getting a pacemaker. She used to be a patient of Dr. Epperson and will start seeing Dr. Pompa for the very first time in a week. Patient also has HTN.     ED workup:  Sodium of 106, potassium of 2.6 with Cl of 70  Bicarb of 29 with anion gap of 10  Mg of 0.9  Cr is 0.98 with GFR of 71  No ketones in the urine   WBC 13.13        * No surgery found *      Hospital Course:     1/31/23 Patient AAOX3 this am. Patient states she feels better this morning. She still feels weak and lightheaded but improved from last night. Repeat Na 115. Normal saline infusion changed to D5W. We will continue to monitor  BMP.  Patients Na increased to 118 and from 103 over 14 hours. Patient given IVP desmopressin.    2/1/23 The next am patients Na was down to 110 and eventually Na become 103 again. Patient had become more diaphoretic and confused. 3 percent NS at 40 cc/hr given over 2 hours and Na was rechecked. Patient Na slightly improved. Urine studies were repeated.   2/2/23: Patient more alert but confused at this time. Na increased back to 110. Recheck of NA showed patient to still be 110. Patient given 3 percent Na 45 cc/hr over two hours.   2/3/23: Patient confused this am her NA increased to 118 overnight. Patient has a run of Vtach overnight she was started on amiodarone and EKG and Echo ordered. Patient pending transfer to ICU. Nephrology Consulted.  2/4-2/6 Patient in ICU started on continuous 3 percent NS and Na followed closely. Urine and Serum Osmolarity improved and patient transitioned to 2G NS tablets Na continued to improved and patient transferred to floor. Patient restarted home Abilify and cymbalta.   2/7-2/8: Patient still slightly confused so we decided to continue to watch until she reached baseline mentation prior to admission. 2/9/23 patient reached mental baseline and labs were WNL. Patient is deemed stable for discharge and reached maximum benefit for this admission.    Patient is to follow up with PCP in 1 week for hospital follow up, medication optimization, and cbc, bmp recheck. Patient is to follow up with Nephrology in 2 weeks for renal follow up. Patient is to follow up with Dr Pompa cardiology in 3 weeks for SVT. Patient told if symptoms worsen to return to nearest ED. Patient and family member at bedside verbalized understanding.       Goals of Care Treatment Preferences:  Code Status: DNR      Consults:   Consults (From admission, onward)        Status Ordering Provider     Inpatient consult to Social Work  Once        Provider:  (Not yet assigned)    Completed SADIQ GREER     Inpatient consult  to Nephrology  Once        Provider:  DO Elijah Arnett MICHAEL     Inpatient consult to Nephrology  Once        Provider:  DO Elijah Arnett THOMAS     Inpatient consult to Cardiology  Once        Provider:  (Not yet assigned)    GIGI Tom          * Dehydration with hyponatremia  Sodium of 106- on admission  Patient has Depression and anxiety  She denies ever taking lithium  Patient started taking Abilify a year ago, She started taking cymbalta 2 years ago, She has been taking Xanax and Ambien for many years    2/1/23  D5W Discontinue  NS infusion restarted   BMP Q4hrs will continue to monitor  Zofran for nausea    2/2/23  3 percent normal saline 80 ml given 2/1/23.  Na improved overnight. Will continue to monitor    2/3/23  3 percent NS 90 ml over 2 hours given   Na improved. Patient more confused this am  BMP Q4hr  Consult Nephrology.  Transfer to ICU      Improved discharge home    Acute hyponatremia  Continue NS tablets TID      Electrolyte abnormality    Sodium of 106, potassium of 2.6 with Cl of 70  Bicarb of 29 with anion gap of 10  Mg of 0.9    KCL 40 mEq IV given  Mag sulfat 4 g IV given  Patient is on Normal saline wth rate of 100 cc/hr  BMP pending       improved    1/31/23    Na increase to 115. NS infusion discontinued  D5W @ 100cc/hr started because Na increasing too fast  BMP ordered     2/8/23  Na 135  Will continue to monitor q12  NS tablets 2G TID    Anxiety disorder, unspecified  Aripripazole 2 mg  Duloxetine 60 mg      Hypertension  Currently well controlled  Continue Entresto    Toprol XL         Final Active Diagnoses:    Diagnosis Date Noted POA    PRINCIPAL PROBLEM:  Dehydration with hyponatremia [E86.0, E87.1] 01/31/2023 Yes    Encephalopathy, metabolic [G93.41] 02/04/2023 Yes    SIADH (syndrome of inappropriate ADH production) [E22.2] 02/03/2023 Yes    Paroxysmal SVT (supraventricular tachycardia) [I47.1] 02/03/2023 Unknown     Electrolyte abnormality [E87.8] 01/31/2023 Yes    Nonischemic cardiomyopathy [I42.8] 01/31/2023 Yes    Acute hyponatremia [E87.1] 01/31/2023 Yes    Acute hypokalemia [E87.6] 01/31/2023 Yes    Nausea and vomiting [R11.2] 01/31/2023 Yes    Complicated migraine [G43.109] 05/09/2022 Yes    Hypertension [I10]  Yes    Anxiety disorder, unspecified [F41.9]  Yes      Problems Resolved During this Admission:       Discharged Condition: stable    Disposition: Home or Self Care    Follow Up:   Follow-up Information     BRUNO ZUNIGA MD. Schedule an appointment as soon as possible for a visit in 3 week(s).    Specialty: Cardiology  Why: Hospital f/u - SVT  Contact information:  [address] [facility] [address]  [phone]             Saroj Pineda MD. Schedule an appointment as soon as possible for a visit in 1 week(s).    Specialty: Family Medicine  Why: Labs follow up and Hospital follow up and medication optimization  Contact information:  [address] [facility] [id number] [phone]             Vanessa Olmstead DO. Schedule an appointment as soon as possible for a visit in 2 week(s).    Specialty: Nephrology  Why: Hyponatremia, follow up. May already have appointment  Contact information:  [address] [place] [address]  [phone]                       Patient Instructions:      Activity as tolerated       Significant Diagnostic Studies: Labs:   BMP:   Recent Labs   Lab 02/08/23  0333 02/08/23  0759 02/09/23  0343     --  96   * 132* 134*   K 4.0  --  4.3   CL 98  --  97*   CO2 24  --  26   BUN 15  --  13   CREATININE 0.84  --  0.85   CALCIUM 8.7  --  8.7    and CBC   Recent Labs   Lab 02/08/23  0333 02/09/23  1001   WBC 11.69* 10.72   HGB 11.3* 10.8*   HCT 32.1* 31.4*   * 448*       Pending Diagnostic Studies:     Procedure Component Value Units Date/Time    EXTRA TUBES [[id number]] Collected: 02/07/23 0804    Order Status: Sent Lab Status: In process Updated: 02/07/23 0812     Specimen: Blood, Venous     Narrative:      The following orders were created for panel order EXTRA TUBES.  Procedure                               Abnormality         Status                     ---------                               -----------         ------                     Gold Top Hold[[id number]]                                    In process                   Please view results for these tests on the individual orders.    EXTRA TUBES [[id number]] Collected: 02/03/23 0232    Order Status: Sent Lab Status: In process Updated: 02/03/23 0232    Specimen: Blood, Venous     Narrative:      The following orders were created for panel order EXTRA TUBES.  Procedure                               Abnormality         Status                     ---------                               -----------         ------                     Lavender Top Hold[[id number]]                                In process                   Please view results for these tests on the individual orders.    EXTRA TUBES [[id number]] Collected: 02/02/23 2149    Order Status: Sent Lab Status: In process Updated: 02/02/23 2149    Specimen: Blood, Venous     Narrative:      The following orders were created for panel order EXTRA TUBES.  Procedure                               Abnormality         Status                     ---------                               -----------         ------                     Lavender Top Hold[[id number]]                                In process                   Please view results for these tests on the individual orders.    EXTRA TUBES [[id number]] Collected: 02/02/23 0017    Order Status: Sent Lab Status: In process Updated: 02/02/23 0017    Specimen: Blood, Venous     Narrative:      The following orders were created for panel order EXTRA TUBES.  Procedure                               Abnormality         Status                     ---------                               -----------         ------                      Lavender Top Hold[[id number]]                                In process                   Please view results for these tests on the individual orders.    EXTRA TUBES [[id number]] Collected: 01/30/23 2015    Order Status: Sent Lab Status: In process Updated: 01/30/23 2016    Specimen: Blood, Venous     Narrative:      The following orders were created for panel order EXTRA TUBES.  Procedure                               Abnormality         Status                     ---------                               -----------         ------                     Light Green Top Hold[[id number]]                             In process                 Lavender Top Hold[[id number]]                                In process                   Please view results for these tests on the individual orders.         Medications:  Reconciled Home Medications:      Medication List      START taking these medications    amLODIPine 10 MG tablet  Commonly known as: NORVASC  Take 1 tablet (10 mg total) by mouth once daily.  Start taking on: February 10, 2023     sodium chloride 1 gram tablet  Take 2 tablets (2 g total) by mouth 3 (three) times daily.        CHANGE how you take these medications    metoprolol succinate 100 MG 24 hr tablet  Commonly known as: TOPROL-XL  Take 1 tablet (100 mg total) by mouth once daily.  Start taking on: February 10, 2023  What changed:   · medication strength  · how much to take        CONTINUE taking these medications    ABILIFY 2 MG Tab  Generic drug: ARIPiprazole  Take 2 mg by mouth once daily.     ALPRAZolam 1 MG tablet  Commonly known as: XANAX     aspirin 81 MG Chew  Take 1 tablet (81 mg total) by mouth once daily.     cetirizine 10 MG tablet  Commonly known as: ZYRTEC  Take 10 mg by mouth once daily.     DULoxetine 60 MG capsule  Commonly known as: CYMBALTA     EMGALITY SYRINGE 120 mg/mL Syrg  Generic drug: galcanezumab-gnlm  INJECT SUBCUTANEOUS 2 ML THE FIRST MONTH AND THEN 1 ML ONCE MONTHLLY     [facility]  24-26 mg per tablet  Generic drug: sacubitriL-valsartan  Take 1 tablet by mouth 2 (two) times daily.     estradioL 0.01 % (0.1 mg/gram) vaginal cream  Commonly known as: ESTRACE  Place 2 g vaginally once daily.     ezetimibe 10 mg tablet  Commonly known as: ZETIA  Take 1 tablet (10 mg total) by mouth every evening.     hydrocortisone 2.5 % cream  APPLY CREAM TO AFFECTED AREA TO AFFECTED AREA ONCE DAILY FOR 5 DAYS TO UPPER BILATERAL EXTREMITIES     montelukast 10 mg tablet  Commonly known as: SINGULAIR  Take 10 mg by mouth once daily.     NURTEC 75 mg odt  Generic drug: rimegepant  DISSOLVE 1 TABLET BY MOUTH ONCE DAILY AS NEEDED FOR HEADACHE     ondansetron 4 MG Tbdl  Commonly known as: ZOFRAN-ODT  DISSOLVE 1 TABLET ON TONGUE ONCE DAILY        STOP taking these medications    lisinopriL 20 MG tablet  Commonly known as: PRINIVIL,ZESTRIL     triamcinolone acetonide 0.1% 0.1 % ointment  Commonly known as: KENALOG     zolpidem 10 mg Tab  Commonly known as: AMBIEN            Indwelling Lines/Drains at time of discharge:   Lines/Drains/Airways     None                 Time spent on the discharge of patient: 45   minutes         Saroj Pineda MD  Department of Hospital Medicine  [facility]

## 2023-02-09 NOTE — DISCHARGE INSTRUCTIONS
Take medications as ordered, keep follow up appointments, and return with any new or worsening symptoms

## 2023-02-09 NOTE — ASSESSMENT & PLAN NOTE
Sodium of 106- on admission  Patient has Depression and anxiety  She denies ever taking lithium  Patient started taking Abilify a year ago, She started taking cymbalta 2 years ago, She has been taking Xanax and Ambien for many years    2/1/23  D5W Discontinue  NS infusion restarted   BMP Q4hrs will continue to monitor  Zofran for nausea    2/2/23  3 percent normal saline 80 ml given 2/1/23.  Na improved overnight. Will continue to monitor    2/3/23  3 percent NS 90 ml over 2 hours given   Na improved. Patient more confused this am  BMP Q4hr  Consult Nephrology.  Transfer to ICU      Improved discharge home

## 2023-02-09 NOTE — ASSESSMENT & PLAN NOTE
Sodium of 106, potassium of 2.6 with Cl of 70  Bicarb of 29 with anion gap of 10  Mg of 0.9    KCL 40 mEq IV given  Mag sulfat 4 g IV given  Patient is on Normal saline wth rate of 100 cc/hr  BMP pending       improved    1/31/23    Na increase to 115. NS infusion discontinued  D5W @ 100cc/hr started because Na increasing too fast  BMP ordered     2/8/23  Na 135  Will continue to monitor q12  NS tablets 2G TID

## 2023-02-09 NOTE — PLAN OF CARE
Problem: Adult Inpatient Plan of Care  Goal: Plan of Care Review  Outcome: Met  Goal: Patient-Specific Goal (Individualized)  Outcome: Met  Goal: Absence of Hospital-Acquired Illness or Injury  Outcome: Met  Goal: Optimal Comfort and Wellbeing  Outcome: Met  Goal: Readiness for Transition of Care  Outcome: Met     Problem: Infection  Goal: Absence of Infection Signs and Symptoms  Outcome: Met     Problem: Fall Injury Risk  Goal: Absence of Fall and Fall-Related Injury  Outcome: Met     Problem: Restraint, Nonbehavioral (Nonviolent)  Goal: Absence of Harm or Injury  Outcome: Met     Problem: Skin Injury Risk Increased  Goal: Skin Health and Integrity  Outcome: Met     Problem: Violence Risk or Actual  Goal: Anger and Impulse Control  Outcome: Met

## 2023-02-09 NOTE — PLAN OF CARE
[facility] - [facility] Telemetry  Discharge Final Note    Primary Care Provider: OTTO Coe    Expected Discharge Date: 2/9/2023    Final Discharge Note (most recent)       Final Note - 02/09/23 1101          Final Note    Assessment Type Final Discharge Note     Anticipated Discharge Disposition Home or Self Care        Post-Acute Status    Discharge Delays None known at this time                   Patient discharging home today. No dc needs noted.     Patient now loaded as [employer] [place] [employer] (not in [employer] portal).        Contact Info       BRUNO ZUNIGA MD   Specialty: Cardiology    [address] [facility] [address]   Phone: [phone]       Next Steps: Schedule an appointment as soon as possible for a visit in 3 week(s)    Instructions: Hospital f/u - SVT

## 2023-02-09 NOTE — PLAN OF CARE
Problem: Fall Injury Risk  Goal: Absence of Fall and Fall-Related Injury  Outcome: Ongoing, Progressing     Problem: Violence Risk or Actual  Goal: Anger and Impulse Control  Outcome: Ongoing, Progressing

## 2023-02-13 ENCOUNTER — OFFICE VISIT (OUTPATIENT)
Dept: FAMILY MEDICINE | Facility: CLINIC | Age: 58
End: 2023-02-13
Payer: COMMERCIAL

## 2023-02-13 VITALS
WEIGHT: 141 LBS | BODY MASS INDEX: 24.98 KG/M2 | SYSTOLIC BLOOD PRESSURE: 124 MMHG | HEIGHT: 63 IN | HEART RATE: 104 BPM | TEMPERATURE: 98 F | RESPIRATION RATE: 20 BRPM | OXYGEN SATURATION: 96 % | DIASTOLIC BLOOD PRESSURE: 87 MMHG

## 2023-02-13 DIAGNOSIS — I47.10 PAROXYSMAL SVT (SUPRAVENTRICULAR TACHYCARDIA): ICD-10-CM

## 2023-02-13 DIAGNOSIS — E87.1 ACUTE HYPONATREMIA: ICD-10-CM

## 2023-02-13 DIAGNOSIS — D64.9 ANEMIA, UNSPECIFIED TYPE: ICD-10-CM

## 2023-02-13 DIAGNOSIS — E87.1 HYPONATREMIA: Primary | ICD-10-CM

## 2023-02-13 DIAGNOSIS — I10 HYPERTENSION, UNSPECIFIED TYPE: ICD-10-CM

## 2023-02-13 LAB
ANION GAP SERPL CALCULATED.3IONS-SCNC: 12 MMOL/L (ref 7–16)
BASOPHILS # BLD AUTO: 0.14 K/UL (ref 0–0.2)
BASOPHILS NFR BLD AUTO: 1.3 % (ref 0–1)
BUN SERPL-MCNC: 8 MG/DL (ref 7–18)
BUN/CREAT SERPL: 9 (ref 6–20)
CALCIUM SERPL-MCNC: 8.9 MG/DL (ref 8.5–10.1)
CHLORIDE SERPL-SCNC: 98 MMOL/L (ref 98–107)
CO2 SERPL-SCNC: 29 MMOL/L (ref 21–32)
CREAT SERPL-MCNC: 0.92 MG/DL (ref 0.55–1.02)
DIFFERENTIAL METHOD BLD: ABNORMAL
EGFR (NO RACE VARIABLE) (RUSH/TITUS): 72 ML/MIN/1.73M²
EOSINOPHIL # BLD AUTO: 0.35 K/UL (ref 0–0.5)
EOSINOPHIL NFR BLD AUTO: 3.2 % (ref 1–4)
ERYTHROCYTE [DISTWIDTH] IN BLOOD BY AUTOMATED COUNT: 14.1 % (ref 11.5–14.5)
GLUCOSE SERPL-MCNC: 74 MG/DL (ref 74–106)
HCT VFR BLD AUTO: 36 % (ref 38–47)
HGB BLD-MCNC: 12.2 G/DL (ref 12–16)
IMM GRANULOCYTES # BLD AUTO: 0.06 K/UL (ref 0–0.04)
IMM GRANULOCYTES NFR BLD: 0.6 % (ref 0–0.4)
LYMPHOCYTES # BLD AUTO: 3.82 K/UL (ref 1–4.8)
LYMPHOCYTES NFR BLD AUTO: 35.2 % (ref 27–41)
MCH RBC QN AUTO: 28.8 PG (ref 27–31)
MCHC RBC AUTO-ENTMCNC: 33.9 G/DL (ref 32–36)
MCV RBC AUTO: 84.9 FL (ref 80–96)
MONOCYTES # BLD AUTO: 0.66 K/UL (ref 0–0.8)
MONOCYTES NFR BLD AUTO: 6.1 % (ref 2–6)
MPC BLD CALC-MCNC: 9.1 FL (ref 9.4–12.4)
NEUTROPHILS # BLD AUTO: 5.81 K/UL (ref 1.8–7.7)
NEUTROPHILS NFR BLD AUTO: 53.6 % (ref 53–65)
NRBC # BLD AUTO: 0 X10E3/UL
NRBC, AUTO (.00): 0 %
PLATELET # BLD AUTO: 600 K/UL (ref 150–400)
POTASSIUM SERPL-SCNC: 4.2 MMOL/L (ref 3.5–5.1)
RBC # BLD AUTO: 4.24 M/UL (ref 4.2–5.4)
SODIUM SERPL-SCNC: 135 MMOL/L (ref 136–145)
WBC # BLD AUTO: 10.84 K/UL (ref 4.5–11)

## 2023-02-13 PROCEDURE — 1111F DSCHRG MED/CURRENT MED MERGE: CPT | Mod: ,,, | Performed by: FAMILY MEDICINE

## 2023-02-13 PROCEDURE — 99213 PR OFFICE/OUTPT VISIT, EST, LEVL III, 20-29 MIN: ICD-10-PCS | Mod: GC,,, | Performed by: FAMILY MEDICINE

## 2023-02-13 PROCEDURE — 85025 COMPLETE CBC W/AUTO DIFF WBC: CPT | Mod: ,,, | Performed by: CLINICAL MEDICAL LABORATORY

## 2023-02-13 PROCEDURE — 4010F PR ACE/ARB THEARPY RXD/TAKEN: ICD-10-PCS | Mod: ,,, | Performed by: FAMILY MEDICINE

## 2023-02-13 PROCEDURE — 1159F MED LIST DOCD IN RCRD: CPT | Mod: ,,, | Performed by: FAMILY MEDICINE

## 2023-02-13 PROCEDURE — 99213 OFFICE O/P EST LOW 20 MIN: CPT | Mod: GC,,, | Performed by: FAMILY MEDICINE

## 2023-02-13 PROCEDURE — 1111F PR DISCHARGE MEDS RECONCILED W/ CURRENT OUTPATIENT MED LIST: ICD-10-PCS | Mod: ,,, | Performed by: FAMILY MEDICINE

## 2023-02-13 PROCEDURE — 3074F SYST BP LT 130 MM HG: CPT | Mod: ,,, | Performed by: FAMILY MEDICINE

## 2023-02-13 PROCEDURE — 3079F DIAST BP 80-89 MM HG: CPT | Mod: ,,, | Performed by: FAMILY MEDICINE

## 2023-02-13 PROCEDURE — 80048 BASIC METABOLIC PNL TOTAL CA: CPT | Mod: ,,, | Performed by: CLINICAL MEDICAL LABORATORY

## 2023-02-13 PROCEDURE — 4010F ACE/ARB THERAPY RXD/TAKEN: CPT | Mod: ,,, | Performed by: FAMILY MEDICINE

## 2023-02-13 PROCEDURE — 3079F PR MOST RECENT DIASTOLIC BLOOD PRESSURE 80-89 MM HG: ICD-10-PCS | Mod: ,,, | Performed by: FAMILY MEDICINE

## 2023-02-13 PROCEDURE — 85025 CBC WITH DIFFERENTIAL: ICD-10-PCS | Mod: ,,, | Performed by: CLINICAL MEDICAL LABORATORY

## 2023-02-13 PROCEDURE — 80048 BASIC METABOLIC PANEL: ICD-10-PCS | Mod: ,,, | Performed by: CLINICAL MEDICAL LABORATORY

## 2023-02-13 PROCEDURE — 1159F PR MEDICATION LIST DOCUMENTED IN MEDICAL RECORD: ICD-10-PCS | Mod: ,,, | Performed by: FAMILY MEDICINE

## 2023-02-13 PROCEDURE — 3074F PR MOST RECENT SYSTOLIC BLOOD PRESSURE < 130 MM HG: ICD-10-PCS | Mod: ,,, | Performed by: FAMILY MEDICINE

## 2023-02-13 PROCEDURE — 3008F BODY MASS INDEX DOCD: CPT | Mod: ,,, | Performed by: FAMILY MEDICINE

## 2023-02-13 PROCEDURE — 3008F PR BODY MASS INDEX (BMI) DOCUMENTED: ICD-10-PCS | Mod: ,,, | Performed by: FAMILY MEDICINE

## 2023-02-13 RX ORDER — CYANOCOBALAMIN 1000 UG/ML
INJECTION, SOLUTION INTRAMUSCULAR; SUBCUTANEOUS
COMMUNITY

## 2023-02-13 RX ORDER — LEVOFLOXACIN 500 MG/1
TABLET, FILM COATED ORAL
COMMUNITY
End: 2023-03-01 | Stop reason: ALTCHOICE

## 2023-02-13 RX ORDER — CHLORTHALIDONE 25 MG/1
25 TABLET ORAL EVERY MORNING
COMMUNITY
Start: 2023-01-21

## 2023-02-13 RX ORDER — CLINDAMYCIN HYDROCHLORIDE 300 MG/1
CAPSULE ORAL
COMMUNITY
End: 2023-03-01 | Stop reason: ALTCHOICE

## 2023-02-13 RX ORDER — SULFAMETHOXAZOLE AND TRIMETHOPRIM 800; 160 MG/1; MG/1
TABLET ORAL
COMMUNITY
End: 2023-03-01 | Stop reason: ALTCHOICE

## 2023-02-13 RX ORDER — LORATADINE 10 MG/1
10 TABLET ORAL
COMMUNITY
End: 2024-03-04

## 2023-02-13 RX ORDER — HYDROCHLOROTHIAZIDE 12.5 MG/1
CAPSULE ORAL
COMMUNITY
End: 2023-04-24

## 2023-02-13 RX ORDER — POTASSIUM CITRATE 10 MEQ/1
TABLET, EXTENDED RELEASE ORAL
COMMUNITY
End: 2023-11-08 | Stop reason: SDUPTHER

## 2023-02-13 RX ORDER — LISINOPRIL 30 MG/1
TABLET ORAL
COMMUNITY
End: 2023-03-06

## 2023-02-13 RX ORDER — FLUTICASONE PROPIONATE 50 MCG
SPRAY, SUSPENSION (ML) NASAL
COMMUNITY

## 2023-02-13 RX ORDER — CYCLOSPORINE 0.5 MG/ML
EMULSION OPHTHALMIC
COMMUNITY

## 2023-02-13 RX ORDER — PROGESTERONE 100 MG/1
CAPSULE ORAL
COMMUNITY
End: 2023-03-06

## 2023-02-13 RX ORDER — ESOMEPRAZOLE MAGNESIUM 40 MG/1
40 GRANULE, DELAYED RELEASE ORAL
COMMUNITY
End: 2023-03-01

## 2023-02-13 RX ORDER — NITROFURANTOIN 25; 75 MG/1; MG/1
CAPSULE ORAL
COMMUNITY
End: 2023-03-01 | Stop reason: ALTCHOICE

## 2023-02-13 RX ORDER — ZOLPIDEM TARTRATE 10 MG/1
10 TABLET ORAL NIGHTLY PRN
COMMUNITY
Start: 2023-02-10

## 2023-02-13 RX ORDER — POLYMYXIN B SULFATE AND TRIMETHOPRIM 1; 10000 MG/ML; [USP'U]/ML
SOLUTION OPHTHALMIC
COMMUNITY
End: 2023-03-06

## 2023-02-13 RX ORDER — ONDANSETRON 4 MG/1
TABLET, FILM COATED ORAL
COMMUNITY

## 2023-02-13 RX ORDER — FERROUS SULFATE 220 (44)/5
ELIXIR ORAL
COMMUNITY
Start: 2023-01-09 | End: 2023-03-06

## 2023-02-13 NOTE — PROGRESS NOTES
Subjective:       Patient ID: Flory Chacon is a 57 y.o. female.    Chief Complaint: Transitional Care (Hyponatremia )    This is 57 year old woman with hx of anxiety , depression and HTN who presents today for hospital follow up for hyponatremia. Patient states today she is about 50 percent of her baseline prior to admission to the hospital. During hospital stay patient has confusion along with hyponatremia. Patient still has some residual confusion but she is now AAOX4. Patients mother is at bedside and she states her daughter has showed some improvement but not close to baseline. PT Denies HA, fever, fatigue, chest pain, or SOB.     I have reviewed the discharge summary and reconciled her home medication. The plan for today is to repeat a CBC, and BMP. I will have patient to follow up with me in 2 weeks. Patient has a scheduled Cardiology and Nephrology appointment in the next two weeks.          Current Outpatient Medications:     ABILIFY 2 mg Tab, Take 2 mg by mouth once daily., Disp: , Rfl:     ALPRAZolam (XANAX) 1 MG tablet, , Disp: , Rfl:     amLODIPine (NORVASC) 10 MG tablet, Take 1 tablet (10 mg total) by mouth once daily., Disp: 30 tablet, Rfl: 11    cetirizine (ZYRTEC) 10 MG tablet, Take 10 mg by mouth once daily., Disp: , Rfl:     chlorthalidone (HYGROTEN) 25 MG Tab, Take 25 mg by mouth every morning., Disp: , Rfl:     clindamycin (CLEOCIN) 300 MG capsule, clindamycin HCl 300 mg capsule  TAKE 1 CAPSULE BY MOUTH EVERY 8 HOURS FOR 10 DAYS, Disp: , Rfl:     cyanocobalamin 1,000 mcg/mL injection, cyanocobalamin (vit B-12) 1,000 mcg/mL injection solution  INJECT 1 ML ONCE DAILY INTRAMUSCULARLY FOR 7 DAYS AND THEN INJECT 1 ML ONCE A WEEK FOR 4 WEEKS AND THEN INJECT 1 ML ONCE EVERY MONTH, Disp: , Rfl:     cycloSPORINE (RESTASIS) 0.05 % ophthalmic emulsion, Restasis 0.05 % eye drops in a dropperette  INSTILL 1 DROP INTO EACH EYE EVERY 12 HOURS, Disp: , Rfl:     DULoxetine (CYMBALTA) 60 MG capsule, , Disp: ,  Rfl:     EMGALITY SYRINGE 120 mg/mL Syrg, INJECT SUBCUTANEOUS 2 ML THE FIRST MONTH AND THEN 1 ML ONCE MONTHLLY, Disp: , Rfl:     esomeprazole (NEXIUM) 40 mg GrPS, Take 40 mg by mouth., Disp: , Rfl:     estradioL (ESTRACE) 0.01 % (0.1 mg/gram) vaginal cream, Place 2 g vaginally once daily., Disp: 60 g, Rfl: 11    ezetimibe (ZETIA) 10 mg tablet, Take 1 tablet (10 mg total) by mouth every evening., Disp: 90 tablet, Rfl: 3    ferrous sulfate (FEROSUL) 220 mg (44 mg iron)/5 mL Elix, SMARTSI Milliliter(s) By Mouth Daily, Disp: , Rfl:     fluticasone propionate (FLONASE) 50 mcg/actuation nasal spray, fluticasone propionate 50 mcg/actuation nasal spray,suspension  USE 2 SPRAY(S) IN EACH NOSTRIL ONCE DAILY, Disp: , Rfl:     hydroCHLOROthiazide (MICROZIDE) 12.5 mg capsule, hydrochlorothiazide 12.5 mg capsule  TAKE 1 CAPSULE BY MOUTH ONCE DAILY IN THE MORNING FOR 90 DAYS, Disp: , Rfl:     hydrocortisone 2.5 % cream, APPLY CREAM TO AFFECTED AREA TO AFFECTED AREA ONCE DAILY FOR 5 DAYS TO UPPER BILATERAL EXTREMITIES, Disp: , Rfl:     levoFLOXacin (LEVAQUIN) 500 MG tablet, levofloxacin 500 mg tablet  TAKE 1 TABLET BY MOUTH ONCE DAILY FOR 5 DAYS, Disp: , Rfl:     lisinopriL (PRINIVIL,ZESTRIL) 30 MG tablet, lisinopril 30 mg tablet  TAKE 1 TABLET BY MOUTH ONCE DAILY, Disp: , Rfl:     loratadine (CLARITIN) 10 mg tablet, Take 10 mg by mouth., Disp: , Rfl:     metoprolol succinate (TOPROL-XL) 100 MG 24 hr tablet, Take 1 tablet (100 mg total) by mouth once daily., Disp: 30 tablet, Rfl: 11    montelukast (SINGULAIR) 10 mg tablet, Take 10 mg by mouth once daily., Disp: , Rfl:     nitrofurantoin, macrocrystal-monohydrate, (MACROBID) 100 MG capsule, nitrofurantoin monohydrate/macrocrystals 100 mg capsule  TAKE 1 CAPSULE BY MOUTH EVERY 12 HOURS FOR 7 DAYS, Disp: , Rfl:     NURTEC 75 mg odt, DISSOLVE 1 TABLET BY MOUTH ONCE DAILY AS NEEDED FOR HEADACHE, Disp: , Rfl:     ondansetron (ZOFRAN) 4 MG tablet, ondansetron HCl 4 mg tablet  TAKE 1  TABLET BY MOUTH AS NEEDED FOR NAUSEA, Disp: , Rfl:     ondansetron (ZOFRAN-ODT) 4 MG TbDL, DISSOLVE 1 TABLET ON TONGUE ONCE DAILY, Disp: , Rfl:     polymyxin B sulf-trimethoprim (POLYTRIM) 10,000 unit- 1 mg/mL Drop, polymyxin B sulfate 10,000 unit-trimethoprim 1 mg/mL eye drops, Disp: , Rfl:     potassium citrate (UROCIT-K) 10 mEq (1,080 mg) TbSR, Urocit-K 10 10 mEq (1,080 mg) tablet,extended release  Take 1 tablet every day by oral route for 30 days., Disp: , Rfl:     progesterone (PROMETRIUM) 100 MG capsule, progesterone micronized 100 mg capsule  TAKE 1 CAPSULE BY MOUTH AT BEDTIME EVERY CYCLE ONCE DAILY, Disp: , Rfl:     sacubitriL-valsartan (ENTRESTO) 24-26 mg per tablet, Take 1 tablet by mouth 2 (two) times daily., Disp: , Rfl:     sodium chloride 1 gram tablet, Take 2 tablets (2 g total) by mouth 3 (three) times daily., Disp: 180 tablet, Rfl: 0    sulfamethoxazole-trimethoprim 800-160mg (BACTRIM DS) 800-160 mg Tab, sulfamethoxazole 800 mg-trimethoprim 160 mg tablet  TAKE 1 TABLET BY MOUTH TWICE DAILY FOR 3 DAYS, Disp: , Rfl:     zolpidem (AMBIEN) 10 mg Tab, Take 10 mg by mouth nightly as needed., Disp: , Rfl:     aspirin 81 MG Chew, Take 1 tablet (81 mg total) by mouth once daily., Disp: 30 tablet, Rfl: 0    Review of patient's allergies indicates:   Allergen Reactions    Meloxicam Swelling    Eggs [egg derived]     Statins-hmg-coa reductase inhibitors     Penicillins Rash       Past Medical History:   Diagnosis Date    Acute hypokalemia     Anxiety disorder, unspecified     Depression     Hypertension     Insomnia        Past Surgical History:   Procedure Laterality Date    FRACTURE SURGERY      LEFT HEART CATHETERIZATION Left 04/25/2022    Procedure: Left heart cath;  Surgeon: Malachi Cormier DO;  Location: [facility] CATH LAB;  Service: Cardiology;  Laterality: Left;       Family History   Problem Relation Age of Onset    Heart disease Father     Melanoma Maternal Uncle        Social History     Tobacco Use     Smoking status: Former     Packs/day: 1.00     Years: 20.00     Pack years: 20.00     Types: Cigarettes     Quit date: 2013     Years since quitting: 10.1    Smokeless tobacco: Never   Substance Use Topics    Alcohol use: Not Currently     Comment: socially in the past    Drug use: Never       Review of Systems   Constitutional:  Negative for fatigue and fever.   HENT:  Negative for dental problem, hearing loss, mouth dryness, mouth sores and rhinorrhea.    Eyes:  Negative for pain, discharge and itching.   Respiratory:  Negative for cough and shortness of breath.    Cardiovascular:  Negative for chest pain and claudication.   Gastrointestinal:  Negative for abdominal distention, abdominal pain, anal bleeding, constipation and diarrhea.   Endocrine: Negative for cold intolerance and polydipsia.   Genitourinary:  Negative for bladder incontinence, dyspareunia and genital sores.   Integumentary:  Negative for wound.   Neurological:  Negative for light-headedness, numbness and headaches.   Hematological: Negative.    Psychiatric/Behavioral:  Negative for confusion.        Current Medications:   Medication List with Changes/Refills   Current Medications    ABILIFY 2 MG TAB    Take 2 mg by mouth once daily.       Start Date: 4/29/2022 End Date: --    ALPRAZOLAM (XANAX) 1 MG TABLET           Start Date: 9/14/2021 End Date: --    AMLODIPINE (NORVASC) 10 MG TABLET    Take 1 tablet (10 mg total) by mouth once daily.       Start Date: 2/10/2023 End Date: 2/10/2024    ASPIRIN 81 MG CHEW    Take 1 tablet (81 mg total) by mouth once daily.       Start Date: 4/28/2022 End Date: 8/2/2022    CETIRIZINE (ZYRTEC) 10 MG TABLET    Take 10 mg by mouth once daily.       Start Date: 8/24/2021 End Date: --    CHLORTHALIDONE (HYGROTEN) 25 MG TAB    Take 25 mg by mouth every morning.       Start Date: 1/21/2023 End Date: --    CLINDAMYCIN (CLEOCIN) 300 MG CAPSULE    clindamycin HCl 300 mg capsule   TAKE 1 CAPSULE BY MOUTH EVERY 8 HOURS  FOR 10 DAYS       Start Date: --        End Date: --    CYANOCOBALAMIN 1,000 MCG/ML INJECTION    cyanocobalamin (vit B-12) 1,000 mcg/mL injection solution   INJECT 1 ML ONCE DAILY INTRAMUSCULARLY FOR 7 DAYS AND THEN INJECT 1 ML ONCE A WEEK FOR 4 WEEKS AND THEN INJECT 1 ML ONCE EVERY MONTH       Start Date: --        End Date: --    CYCLOSPORINE (RESTASIS) 0.05 % OPHTHALMIC EMULSION    Restasis 0.05 % eye drops in a dropperette   INSTILL 1 DROP INTO EACH EYE EVERY 12 HOURS       Start Date: --        End Date: --    DULOXETINE (CYMBALTA) 60 MG CAPSULE           Start Date: 2021 End Date: --    EMGALITY SYRINGE 120 MG/ML SYRG    INJECT SUBCUTANEOUS 2 ML THE FIRST MONTH AND THEN 1 ML ONCE MONTHLLY       Start Date: 2021  End Date: --    ESOMEPRAZOLE (NEXIUM) 40 MG GRPS    Take 40 mg by mouth.       Start Date: --        End Date: --    ESTRADIOL (ESTRACE) 0.01 % (0.1 MG/GRAM) VAGINAL CREAM    Place 2 g vaginally once daily.       Start Date: 2022  End Date: 2023    EZETIMIBE (ZETIA) 10 MG TABLET    Take 1 tablet (10 mg total) by mouth every evening.       Start Date: 2022 End Date: 2023    FERROUS SULFATE (FEROSUL) 220 MG (44 MG IRON)/5 ML ELIX    SMARTSI Milliliter(s) By Mouth Daily       Start Date: 2023  End Date: --    FLUTICASONE PROPIONATE (FLONASE) 50 MCG/ACTUATION NASAL SPRAY    fluticasone propionate 50 mcg/actuation nasal spray,suspension   USE 2 SPRAY(S) IN EACH NOSTRIL ONCE DAILY       Start Date: --        End Date: --    HYDROCHLOROTHIAZIDE (MICROZIDE) 12.5 MG CAPSULE    hydrochlorothiazide 12.5 mg capsule   TAKE 1 CAPSULE BY MOUTH ONCE DAILY IN THE MORNING FOR 90 DAYS       Start Date: --        End Date: --    HYDROCORTISONE 2.5 % CREAM    APPLY CREAM TO AFFECTED AREA TO AFFECTED AREA ONCE DAILY FOR 5 DAYS TO UPPER BILATERAL EXTREMITIES       Start Date: 2021 End Date: --    LEVOFLOXACIN (LEVAQUIN) 500 MG TABLET    levofloxacin 500 mg tablet   TAKE 1 TABLET BY  MOUTH ONCE DAILY FOR 5 DAYS       Start Date: --        End Date: --    LISINOPRIL (PRINIVIL,ZESTRIL) 30 MG TABLET    lisinopril 30 mg tablet   TAKE 1 TABLET BY MOUTH ONCE DAILY       Start Date: --        End Date: --    LORATADINE (CLARITIN) 10 MG TABLET    Take 10 mg by mouth.       Start Date: --        End Date: --    METOPROLOL SUCCINATE (TOPROL-XL) 100 MG 24 HR TABLET    Take 1 tablet (100 mg total) by mouth once daily.       Start Date: 2/10/2023 End Date: 2/10/2024    MONTELUKAST (SINGULAIR) 10 MG TABLET    Take 10 mg by mouth once daily.       Start Date: 7/27/2021 End Date: --    NITROFURANTOIN, MACROCRYSTAL-MONOHYDRATE, (MACROBID) 100 MG CAPSULE    nitrofurantoin monohydrate/macrocrystals 100 mg capsule   TAKE 1 CAPSULE BY MOUTH EVERY 12 HOURS FOR 7 DAYS       Start Date: --        End Date: --    NURTEC 75 MG ODT    DISSOLVE 1 TABLET BY MOUTH ONCE DAILY AS NEEDED FOR HEADACHE       Start Date: 6/11/2021 End Date: --    ONDANSETRON (ZOFRAN) 4 MG TABLET    ondansetron HCl 4 mg tablet   TAKE 1 TABLET BY MOUTH AS NEEDED FOR NAUSEA       Start Date: --        End Date: --    ONDANSETRON (ZOFRAN-ODT) 4 MG TBDL    DISSOLVE 1 TABLET ON TONGUE ONCE DAILY       Start Date: 6/8/2021  End Date: --    POLYMYXIN B SULF-TRIMETHOPRIM (POLYTRIM) 10,000 UNIT- 1 MG/ML DROP    polymyxin B sulfate 10,000 unit-trimethoprim 1 mg/mL eye drops       Start Date: --        End Date: --    POTASSIUM CITRATE (UROCIT-K) 10 MEQ (1,080 MG) TBSR    Urocit-K 10 10 mEq (1,080 mg) tablet,extended release   Take 1 tablet every day by oral route for 30 days.       Start Date: --        End Date: --    PROGESTERONE (PROMETRIUM) 100 MG CAPSULE    progesterone micronized 100 mg capsule   TAKE 1 CAPSULE BY MOUTH AT BEDTIME EVERY CYCLE ONCE DAILY       Start Date: --        End Date: --    SACUBITRIL-VALSARTAN (ENTRESTO) 24-26 MG PER TABLET    Take 1 tablet by mouth 2 (two) times daily.       Start Date: --        End Date: --    SODIUM CHLORIDE  "1 GRAM TABLET    Take 2 tablets (2 g total) by mouth 3 (three) times daily.       Start Date: 2/9/2023  End Date: 3/11/2023    SULFAMETHOXAZOLE-TRIMETHOPRIM 800-160MG (BACTRIM DS) 800-160 MG TAB    sulfamethoxazole 800 mg-trimethoprim 160 mg tablet   TAKE 1 TABLET BY MOUTH TWICE DAILY FOR 3 DAYS       Start Date: --        End Date: --    ZOLPIDEM (AMBIEN) 10 MG TAB    Take 10 mg by mouth nightly as needed.       Start Date: 2/10/2023 End Date: --            Objective:        Vitals:    02/13/23 1413   BP: 124/87   BP Location: Right arm   Patient Position: Sitting   BP Method: Medium (Automatic)   Pulse: 104   Resp: 20   Temp: 97.9 °F (36.6 °C)   TempSrc: Oral   SpO2: 96%   Weight: 64 kg (141 lb)   Height: 5' 3" (1.6 m)       Physical Exam  Constitutional:       Appearance: She is normal weight.   HENT:      Head: Normocephalic and atraumatic.      Right Ear: Tympanic membrane normal.      Left Ear: Tympanic membrane normal.      Nose: Nose normal.      Mouth/Throat:      Mouth: Mucous membranes are moist.      Pharynx: Oropharynx is clear.   Eyes:      Conjunctiva/sclera: Conjunctivae normal.      Pupils: Pupils are equal, round, and reactive to light.   Cardiovascular:      Rate and Rhythm: Normal rate and regular rhythm.      Pulses: Normal pulses.      Heart sounds: Normal heart sounds.   Pulmonary:      Effort: Pulmonary effort is normal.      Breath sounds: Normal breath sounds.   Abdominal:      General: Abdomen is flat. Bowel sounds are normal.   Musculoskeletal:         General: Normal range of motion.      Cervical back: Normal range of motion.   Skin:     General: Skin is warm and dry.      Capillary Refill: Capillary refill takes less than 2 seconds.   Neurological:      Mental Status: She is alert and oriented to person, place, and time.   Psychiatric:         Mood and Affect: Mood normal.         Behavior: Behavior normal.           Lab Results   Component Value Date    WBC 10.72 02/09/2023    HGB " 10.8 (L) 02/09/2023    HCT 31.4 (L) 02/09/2023     (H) 02/09/2023    CHOL 197 04/23/2022    TRIG 82 04/23/2022    HDL 85 (H) 04/23/2022    ALT 29 01/30/2023    AST 49 (H) 01/30/2023     (L) 02/09/2023    K 4.3 02/09/2023    CL 97 (L) 02/09/2023    CREATININE 0.85 02/09/2023    BUN 13 02/09/2023    CO2 26 02/09/2023    TSH 0.220 (L) 02/01/2023    INR 0.96 01/31/2023      Assessment:       1. Hyponatremia    2. Anemia, unspecified type    3. Acute hyponatremia    4. Paroxysmal SVT (supraventricular tachycardia)    5. Hypertension, unspecified type          Plan:         Problem List Items Addressed This Visit          Cardiac/Vascular    Hypertension     CBC          Paroxysmal SVT (supraventricular tachycardia)     Appointment with Cardiology Scheduled            Renal/    Acute hyponatremia     Reviewed Discharge Summary and reconciled home medication  Repeat BMP  Will follow up    Continue Salt tablets          Other Visit Diagnoses       Hyponatremia    -  Primary    Relevant Orders    Basic Metabolic Panel    Anemia, unspecified type        Relevant Orders    CBC Auto Differential              No follow-ups on file.    Saroj Pineda MD     Instructed patient that if symptoms fail to improve or worsen patient should seek immediate medical attention or report to the nearest emergency department. Patient expressed verbal agreement and understanding to this plan of care.

## 2023-02-13 NOTE — ASSESSMENT & PLAN NOTE
Reviewed Discharge Summary and reconciled home medication  Repeat BMP  Will follow up    Continue Salt tablets

## 2023-02-14 ENCOUNTER — HOSPITAL ENCOUNTER (EMERGENCY)
Facility: HOSPITAL | Age: 58
Discharge: HOME | End: 2023-02-14
Attending: EMERGENCY MEDICINE
Payer: COMMERCIAL

## 2023-02-14 VITALS
RESPIRATION RATE: 20 BRPM | TEMPERATURE: 98 F | BODY MASS INDEX: 25.16 KG/M2 | HEART RATE: 87 BPM | HEIGHT: 63 IN | DIASTOLIC BLOOD PRESSURE: 100 MMHG | OXYGEN SATURATION: 99 % | WEIGHT: 142 LBS | SYSTOLIC BLOOD PRESSURE: 129 MMHG

## 2023-02-14 DIAGNOSIS — M79.603 ARM PAIN: ICD-10-CM

## 2023-02-14 DIAGNOSIS — S52.502A CLOSED FRACTURE OF DISTAL END OF LEFT RADIUS, UNSPECIFIED FRACTURE MORPHOLOGY, INITIAL ENCOUNTER: Primary | ICD-10-CM

## 2023-02-14 PROCEDURE — 99284 EMERGENCY DEPT VISIT MOD MDM: CPT | Mod: ,,, | Performed by: EMERGENCY MEDICINE

## 2023-02-14 PROCEDURE — 96372 THER/PROPH/DIAG INJ SC/IM: CPT | Performed by: EMERGENCY MEDICINE

## 2023-02-14 PROCEDURE — 99284 PR EMERGENCY DEPT VISIT,LEVEL IV: ICD-10-PCS | Mod: ,,, | Performed by: EMERGENCY MEDICINE

## 2023-02-14 PROCEDURE — 63600175 PHARM REV CODE 636 W HCPCS: Performed by: EMERGENCY MEDICINE

## 2023-02-14 PROCEDURE — 99284 EMERGENCY DEPT VISIT MOD MDM: CPT

## 2023-02-14 RX ORDER — ORPHENADRINE CITRATE 30 MG/ML
60 INJECTION INTRAMUSCULAR; INTRAVENOUS
Status: COMPLETED | OUTPATIENT
Start: 2023-02-14 | End: 2023-02-14

## 2023-02-14 RX ADMIN — ORPHENADRINE CITRATE 60 MG: 30 INJECTION INTRAMUSCULAR; INTRAVENOUS at 09:02

## 2023-02-15 ENCOUNTER — OFFICE VISIT (OUTPATIENT)
Dept: ORTHOPEDICS | Facility: CLINIC | Age: 58
End: 2023-02-15
Payer: COMMERCIAL

## 2023-02-15 DIAGNOSIS — S52.532A CLOSED COLLES' FRACTURE OF LEFT RADIUS, INITIAL ENCOUNTER: Primary | ICD-10-CM

## 2023-02-15 DIAGNOSIS — S52.502A CLOSED FRACTURE OF DISTAL END OF LEFT RADIUS, UNSPECIFIED FRACTURE MORPHOLOGY, INITIAL ENCOUNTER: ICD-10-CM

## 2023-02-15 PROCEDURE — 4010F PR ACE/ARB THEARPY RXD/TAKEN: ICD-10-PCS | Mod: ,,, | Performed by: ORTHOPAEDIC SURGERY

## 2023-02-15 PROCEDURE — 99204 PR OFFICE/OUTPT VISIT, NEW, LEVL IV, 45-59 MIN: ICD-10-PCS | Mod: S$PBB,57,, | Performed by: ORTHOPAEDIC SURGERY

## 2023-02-15 PROCEDURE — 99212 OFFICE O/P EST SF 10 MIN: CPT | Mod: PBBFAC,25 | Performed by: ORTHOPAEDIC SURGERY

## 2023-02-15 PROCEDURE — 25600 PR CLOSED RX DIST RAD/ULNA FX: ICD-10-PCS | Mod: S$PBB,LT,, | Performed by: ORTHOPAEDIC SURGERY

## 2023-02-15 PROCEDURE — 1111F DSCHRG MED/CURRENT MED MERGE: CPT | Mod: ,,, | Performed by: ORTHOPAEDIC SURGERY

## 2023-02-15 PROCEDURE — 25600 CLTX DST RDL FX/EPHYS SEP WO: CPT | Mod: PBBFAC | Performed by: ORTHOPAEDIC SURGERY

## 2023-02-15 PROCEDURE — 99204 OFFICE O/P NEW MOD 45 MIN: CPT | Mod: S$PBB,57,, | Performed by: ORTHOPAEDIC SURGERY

## 2023-02-15 PROCEDURE — 1111F PR DISCHARGE MEDS RECONCILED W/ CURRENT OUTPATIENT MED LIST: ICD-10-PCS | Mod: ,,, | Performed by: ORTHOPAEDIC SURGERY

## 2023-02-15 PROCEDURE — 4010F ACE/ARB THERAPY RXD/TAKEN: CPT | Mod: ,,, | Performed by: ORTHOPAEDIC SURGERY

## 2023-02-15 PROCEDURE — 25600 CLTX DST RDL FX/EPHYS SEP WO: CPT | Mod: S$PBB,LT,, | Performed by: ORTHOPAEDIC SURGERY

## 2023-02-15 PROCEDURE — 29075 APPL CST ELBW FNGR SHORT ARM: CPT | Mod: PBBFAC,LT | Performed by: ORTHOPAEDIC SURGERY

## 2023-02-15 NOTE — ED PROVIDER NOTES
Encounter Date: 2/14/2023    SCRIBE #1 NOTE: I, Lulu Everardo, am scribing for, and in the presence of,  Abrahan Hinojosa MD. I have scribed the entire note.     History     Chief Complaint   Patient presents with    Arm Injury     The patient is a 57 y.o. female who presents to the emergency department for arm injury. The patient was in her yard today where fell after tripping on a rock. She complains of pain in her left arm that worsens with movement. The patient was recently admitted for a heart problem. No other symptoms reported.    The history is provided by the patient. No  was used.   Review of patient's allergies indicates:   Allergen Reactions    Meloxicam Swelling    Eggs [egg derived]     Statins-hmg-coa reductase inhibitors     Penicillins Rash     Past Medical History:   Diagnosis Date    Acute hypokalemia     Anxiety disorder, unspecified     Depression     Hypertension     Insomnia      Past Surgical History:   Procedure Laterality Date    FRACTURE SURGERY      LEFT HEART CATHETERIZATION Left 04/25/2022    Procedure: Left heart cath;  Surgeon: Malachi Cormier DO;  Location: [facility] CATH LAB;  Service: Cardiology;  Laterality: Left;     Family History   Problem Relation Age of Onset    Heart disease Father     Melanoma Maternal Uncle      Social History     Tobacco Use    Smoking status: Former     Packs/day: 1.00     Years: 20.00     Pack years: 20.00     Types: Cigarettes     Quit date: 2013     Years since quitting: 10.1    Smokeless tobacco: Never   Substance Use Topics    Alcohol use: Not Currently     Comment: socially in the past    Drug use: Never     Review of Systems   Constitutional: Negative.  Negative for fever.   Eyes: Negative.    Respiratory:  Negative for wheezing.    Endocrine: Negative.    Allergic/Immunologic: Negative.    Neurological:  Negative for syncope and facial asymmetry.   All other systems reviewed and are negative.    Physical Exam     Initial  Patient Education        Hysteroscopy With Dilation and Curettage: What to Expect at [address] [facility]  For a hysteroscopy, your doctor guides a lighted tube through your cervix and into your uterus. This helps the doctor see inside your uterus. For a dilation and curettage (D&C), your doctor uses a curved tool, called a curette, to gently scrape tissue from your uterus. After these procedures, you are likely to have a backache or cramps similar to menstrual cramps. Expect to pass small clots of blood from your vagina for the first few days. You may have light vaginal bleeding for several weeks after the D&C. If the doctor filled your uterus with air, you may have gas pains or your belly may feel full. You may also have shoulder pain. These symptoms should go away in 1 to 2 days. You will probably be able to go back to most of your normal activities in 1 or 2 days. This care sheet gives you a general idea about how long it will take for you to recover. But each person recovers at a different pace. Follow the steps below to get better as quickly as possible. How can you care for yourself at home? Activity    · Rest when you feel tired.     · Most women are able to return to work the day after the procedure.     · Wear sanitary pads if needed. Do not douche or use tampons for 2 weeks or until your doctor says it is okay.     · Ask your doctor when it is okay for you to have sex.     · If you could become pregnant, talk about birth control with your doctor. Do not try to become pregnant until your doctor says it is okay. Diet    · You can eat your normal diet. If your stomach is upset, try bland, low-fat foods like plain rice, broiled chicken, toast, and yogurt.     · Drink plenty of fluids (unless your doctor tells you not to). Medicines    · Your doctor will tell you if and when you can restart your medicines.  He or she will also give you instructions about taking any new medicines.     · If you take Vitals [02/14/23 1927]   BP Pulse Resp Temp SpO2   (!) 129/100 87 20 98 °F (36.7 °C) 99 %      MAP       --         Physical Exam    Nursing note and vitals reviewed.  Constitutional: She appears well-developed and well-nourished.   HENT:   Head: Normocephalic.   Eyes: Pupils are equal, round, and reactive to light.   Cardiovascular:  Normal rate, regular rhythm and normal heart sounds.           Pulmonary/Chest: Breath sounds normal.   Musculoskeletal:      Left wrist: Deformity present.      Comments: Left wrist broken, impacted. Pain with movement.      Neurological: She is alert and oriented to person, place, and time.   Psychiatric: She has a normal mood and affect. Her behavior is normal. Judgment and thought content normal.       ED Course   Procedures  Labs Reviewed - No data to display       Imaging Results              X-Ray Wrist Complete Left (Final result)  Result time 02/14/23 19:57:59      Final result by Donavon Chowdhury II, MD (02/14/23 19:57:59)                   Impression:      Distal radius fracture as described above.      Electronically signed by: Donavon Chowdhury  Date:    02/14/2023  Time:    19:57               Narrative:    EXAMINATION:  XR WRIST COMPLETE 3 VIEWS LEFT    CLINICAL HISTORY:  Pain in arm, unspecified    COMPARISON:  None available    TECHNIQUE:  XR WRIST COMPLETE 3 VIEWS LEFT    FINDINGS:  Distal radius fracture present with slight dorsal impaction.  Fracture appears to extend articular surface.  The alignment of the joints appears normal.  Moderate wrist degenerative change is present.  No soft tissue abnormality is seen.                                    X-Rays:   Independently Interpreted Readings:   Other Readings:  Left wrist x-ray:  Distal radius fracture  Medications   orphenadrine injection 60 mg (60 mg Intramuscular Given 2/14/23 2126)     Medical Decision Making:   Initial Assessment:   A 57-year-old female patient fall on her outstretched hand; she came to the  blood thinners, such as warfarin (Coumadin), clopidogrel (Plavix), or aspirin, be sure to talk to your doctor. He or she will tell you if and when to start taking those medicines again. Make sure that you understand exactly what your doctor wants you to do.     · Be safe with medicines. Read and follow all instructions on the label. ? If the doctor gave you a prescription medicine for pain, take it as prescribed. ? If you are not taking a prescription pain medicine, ask your doctor if you can take an over-the-counter medicine.     · If your doctor prescribed antibiotics, take them as directed. Do not stop taking them just because you feel better. You need to take the full course of antibiotics. Follow-up care is a key part of your treatment and safety. Be sure to make and go to all appointments, and call your doctor if you are having problems. It's also a good idea to know your test results and keep a list of the medicines you take. When should you call for help? Call 911 anytime you think you may need emergency care. For example, call if:    · You passed out (lost consciousness).     · You have chest pain, are short of breath, or cough up blood.    Call your doctor now or seek immediate medical care if:    · You have pain that does not get better after you take pain medicine.     · You cannot pass stools or gas.     · You have bright red vaginal bleeding that soaks one or more pads in an hour, or you have large clots.     · You have a vaginal discharge that has increased or that smells bad.     · You are sick to your stomach or cannot drink fluids.     · You have symptoms of a blood clot in your leg (called a deep vein thrombosis), such as:  ? Pain in your calf, back of the knee, thigh, or groin. ? Redness and swelling in your leg.     · You have signs of infection, such as:  ? Increased pain, swelling, warmth, or redness. ? Red streaks leading from the incision. ? Pus draining from the incision. ?  A emergency department complaining of left wrist pain and swelling.  Examination showed deformed left wrist with normal range of movement of fingers.  Differential Diagnosis:   Wrist fracture  Wrist dislocation  Hematoma swelling  Clinical Tests:   Radiological Study: Ordered and Reviewed  ED Management:  Patient developed distal radius fracture after the fall.  We will put the patient in left wrist splint and discharge to follow-up with orthopedic surgeon          Attending Attestation:           Physician Attestation for Scribe:  Physician Attestation Statement for Scribe #1: I, Abrahan Hinojosa MD, reviewed documentation, as scribed by Lulu Mcgee in my presence, and it is both accurate and complete.                        Clinical Impression:   Final diagnoses:  [M79.603] Arm pain  [S52.502A] Closed fracture of distal end of left radius, unspecified fracture morphology, initial encounter (Primary)        ED Disposition Condition    Discharge Stable          ED Prescriptions    None       Follow-up Information    None          Abrahan Hinojosa MD  02/16/23 0010     fever.    Watch closely for changes in your health, and be sure to contact your doctor if you have any problems. Where can you learn more? Go to http://james-alonzo.info/. Enter S276 in the search box to learn more about \"Hysteroscopy With Dilation and Curettage: What to Expect at Home. \"  Current as of: May 29, 2019  Content Version: 12.2  © 9893-2774 [employer]. Care instructions adapted under license by "[employer]" (which disclaims liability or warranty for this information). If you have questions about a medical condition or this instruction, always ask your healthcare professional. [address] any warranty or liability for your use of this information.

## 2023-02-15 NOTE — PROGRESS NOTES
CLINIC NOTE       Chief Complaint   Patient presents with    Left Wrist - Pain, Injury        Flory Chacon is a 57 y.o. female seen today for evaluation of left distal forearm/wrist injury.  She reportedly fell yesterday attempting to break her fall on her outstretched left nondominant hand.  She had subsequent pain swelling and ecchymosis.  She was seen at [facility].  X-rays there have been reviewed and reveal transverse fracture of the distal radial metaphyseal region without significant displacement or shortening.  This is superimposed on chronic appearing pantrapezial arthritis and increased scapholunate gap.  Patient indicates she is known she is had some arthritis present with the left wrist in the past prior to her fall.    Past Medical History:   Diagnosis Date    Acute hypokalemia     Anxiety disorder, unspecified     Depression     Hypertension     Insomnia      Family History   Problem Relation Age of Onset    Heart disease Father     Melanoma Maternal Uncle      Current Outpatient Medications on File Prior to Visit   Medication Sig Dispense Refill    ABILIFY 2 mg Tab Take 2 mg by mouth once daily.      ALPRAZolam (XANAX) 1 MG tablet       amLODIPine (NORVASC) 10 MG tablet Take 1 tablet (10 mg total) by mouth once daily. 30 tablet 11    aspirin 81 MG Chew Take 1 tablet (81 mg total) by mouth once daily. 30 tablet 0    cetirizine (ZYRTEC) 10 MG tablet Take 10 mg by mouth once daily.      chlorthalidone (HYGROTEN) 25 MG Tab Take 25 mg by mouth every morning.      clindamycin (CLEOCIN) 300 MG capsule clindamycin HCl 300 mg capsule   TAKE 1 CAPSULE BY MOUTH EVERY 8 HOURS FOR 10 DAYS      cyanocobalamin 1,000 mcg/mL injection cyanocobalamin (vit B-12) 1,000 mcg/mL injection solution   INJECT 1 ML ONCE DAILY INTRAMUSCULARLY FOR 7 DAYS AND THEN INJECT 1 ML ONCE A WEEK FOR 4 WEEKS AND THEN INJECT 1 ML ONCE EVERY MONTH      cycloSPORINE (RESTASIS) 0.05 % ophthalmic emulsion Restasis 0.05  % eye drops in a dropperette   INSTILL 1 DROP INTO EACH EYE EVERY 12 HOURS      DULoxetine (CYMBALTA) 60 MG capsule       EMGALITY SYRINGE 120 mg/mL Syrg INJECT SUBCUTANEOUS 2 ML THE FIRST MONTH AND THEN 1 ML ONCE MONTHLLY      esomeprazole (NEXIUM) 40 mg GrPS Take 40 mg by mouth.      estradioL (ESTRACE) 0.01 % (0.1 mg/gram) vaginal cream Place 2 g vaginally once daily. 60 g 11    ezetimibe (ZETIA) 10 mg tablet Take 1 tablet (10 mg total) by mouth every evening. 90 tablet 3    ferrous sulfate (FEROSUL) 220 mg (44 mg iron)/5 mL Elix SMARTSI Milliliter(s) By Mouth Daily      fluticasone propionate (FLONASE) 50 mcg/actuation nasal spray fluticasone propionate 50 mcg/actuation nasal spray,suspension   USE 2 SPRAY(S) IN EACH NOSTRIL ONCE DAILY      hydroCHLOROthiazide (MICROZIDE) 12.5 mg capsule hydrochlorothiazide 12.5 mg capsule   TAKE 1 CAPSULE BY MOUTH ONCE DAILY IN THE MORNING FOR 90 DAYS      hydrocortisone 2.5 % cream APPLY CREAM TO AFFECTED AREA TO AFFECTED AREA ONCE DAILY FOR 5 DAYS TO UPPER BILATERAL EXTREMITIES      levoFLOXacin (LEVAQUIN) 500 MG tablet levofloxacin 500 mg tablet   TAKE 1 TABLET BY MOUTH ONCE DAILY FOR 5 DAYS      lisinopriL (PRINIVIL,ZESTRIL) 30 MG tablet lisinopril 30 mg tablet   TAKE 1 TABLET BY MOUTH ONCE DAILY      loratadine (CLARITIN) 10 mg tablet Take 10 mg by mouth.      metoprolol succinate (TOPROL-XL) 100 MG 24 hr tablet Take 1 tablet (100 mg total) by mouth once daily. 30 tablet 11    montelukast (SINGULAIR) 10 mg tablet Take 10 mg by mouth once daily.      nitrofurantoin, macrocrystal-monohydrate, (MACROBID) 100 MG capsule nitrofurantoin monohydrate/macrocrystals 100 mg capsule   TAKE 1 CAPSULE BY MOUTH EVERY 12 HOURS FOR 7 DAYS      NURTEC 75 mg odt DISSOLVE 1 TABLET BY MOUTH ONCE DAILY AS NEEDED FOR HEADACHE      ondansetron (ZOFRAN) 4 MG tablet ondansetron HCl 4 mg tablet   TAKE 1 TABLET BY MOUTH AS NEEDED FOR NAUSEA      ondansetron (ZOFRAN-ODT) 4 MG TbDL DISSOLVE 1 TABLET  ON TONGUE ONCE DAILY      polymyxin B sulf-trimethoprim (POLYTRIM) 10,000 unit- 1 mg/mL Drop polymyxin B sulfate 10,000 unit-trimethoprim 1 mg/mL eye drops      potassium citrate (UROCIT-K) 10 mEq (1,080 mg) TbSR Urocit-K 10 10 mEq (1,080 mg) tablet,extended release   Take 1 tablet every day by oral route for 30 days.      progesterone (PROMETRIUM) 100 MG capsule progesterone micronized 100 mg capsule   TAKE 1 CAPSULE BY MOUTH AT BEDTIME EVERY CYCLE ONCE DAILY      sacubitriL-valsartan (ENTRESTO) 24-26 mg per tablet Take 1 tablet by mouth 2 (two) times daily.      sodium chloride 1 gram tablet Take 2 tablets (2 g total) by mouth 3 (three) times daily. 180 tablet 0    sulfamethoxazole-trimethoprim 800-160mg (BACTRIM DS) 800-160 mg Tab sulfamethoxazole 800 mg-trimethoprim 160 mg tablet   TAKE 1 TABLET BY MOUTH TWICE DAILY FOR 3 DAYS      zolpidem (AMBIEN) 10 mg Tab Take 10 mg by mouth nightly as needed.      [DISCONTINUED] atenoloL (TENORMIN) 100 MG tablet Take 100 mg by mouth once daily. for 90 days      [DISCONTINUED] propranoloL (INDERAL) 10 MG tablet        Current Facility-Administered Medications on File Prior to Visit   Medication Dose Route Frequency Provider Last Rate Last Admin    [COMPLETED] orphenadrine injection 60 mg  60 mg Intramuscular ED 1 Time Abrahan Hinojosa MD   60 mg at 02/14/23 2126       ROS     There were no vitals filed for this visit.    Past Surgical History:   Procedure Laterality Date    FRACTURE SURGERY      LEFT HEART CATHETERIZATION Left 04/25/2022    Procedure: Left heart cath;  Surgeon: Malachi Cormier DO;  Location: [facility] CATH LAB;  Service: Cardiology;  Laterality: Left;        Review of patient's allergies indicates:   Allergen Reactions    Meloxicam Swelling    Eggs [egg derived]     Statins-hmg-coa reductase inhibitors     Penicillins Rash        Ortho Exam :  Well-developed well-nourished  female no acute distress.  Alert oriented cooperative.  Neck is supple  without JVD.  Breathing is regular nonlabored.  Skin is warm dry no lesions seen.  Exam left wrist shows diffuse soft tissue swelling and ecchymosis.  Skin is intact.  There is tenderness palpation along distal radius metaphyseal region.      Assessment and Plan  Patient Active Problem List    Diagnosis Date Noted    Encephalopathy, metabolic 02/04/2023    SIADH (syndrome of inappropriate ADH production) 02/03/2023    Paroxysmal SVT (supraventricular tachycardia) 02/03/2023    Dehydration with hyponatremia 01/31/2023    Electrolyte abnormality 01/31/2023    Nonischemic cardiomyopathy 01/31/2023    Acute hyponatremia 01/31/2023    Acute hypokalemia 01/31/2023    Nausea and vomiting 01/31/2023    Complicated migraine 05/09/2022    Atrophic vaginitis 05/05/2022    Hypertension     Anxiety disorder, unspecified     Impression:  closed minimally displaced fracture left distal radius superimposed on carpal DJD with widened scapholunate gap.  Plan:  1.  Short-arm fiberglass cast applied to left upper extremity.  Activity restrictions outlined.  Recheck 2 weeks.  X-ray or sooner for interval problems.      Herbert Atwood M.D.

## 2023-02-21 NOTE — TELEPHONE ENCOUNTER
----- Message from Dirk Bar sent at 2/21/2023  4:03 PM CST -----  Regarding: Med refills  #UPDATE#    Pt forgot to mention that she also needs cetirizine. Thanks!   ----- Message -----  From: Yinka Montenegro  Sent: 2/21/2023   4:00 PM CST  To: , #    Pt is requesting a refill on ezetimibe. Send to [facility] in [place]. [phone].

## 2023-02-22 RX ORDER — EZETIMIBE 10 MG/1
10 TABLET ORAL NIGHTLY
Qty: 90 TABLET | Refills: 3 | Status: SHIPPED | OUTPATIENT
Start: 2023-02-22 | End: 2024-02-22

## 2023-02-22 RX ORDER — CETIRIZINE HYDROCHLORIDE 10 MG/1
10 TABLET ORAL DAILY
Qty: 90 TABLET | Refills: 1 | Status: SHIPPED | OUTPATIENT
Start: 2023-02-22

## 2023-02-27 ENCOUNTER — OFFICE VISIT (OUTPATIENT)
Dept: FAMILY MEDICINE | Facility: CLINIC | Age: 58
End: 2023-02-27
Payer: COMMERCIAL

## 2023-02-27 VITALS
BODY MASS INDEX: 25.3 KG/M2 | SYSTOLIC BLOOD PRESSURE: 129 MMHG | OXYGEN SATURATION: 95 % | HEART RATE: 100 BPM | TEMPERATURE: 99 F | WEIGHT: 142.81 LBS | HEIGHT: 63 IN | RESPIRATION RATE: 20 BRPM | DIASTOLIC BLOOD PRESSURE: 81 MMHG

## 2023-02-27 DIAGNOSIS — E87.8 ELECTROLYTE ABNORMALITY: ICD-10-CM

## 2023-02-27 DIAGNOSIS — F41.9 ANXIETY DISORDER, UNSPECIFIED TYPE: ICD-10-CM

## 2023-02-27 DIAGNOSIS — I10 PRIMARY HYPERTENSION: ICD-10-CM

## 2023-02-27 DIAGNOSIS — I47.10 PAROXYSMAL SVT (SUPRAVENTRICULAR TACHYCARDIA): ICD-10-CM

## 2023-02-27 PROCEDURE — 3066F PR DOCUMENTATION OF TREATMENT FOR NEPHROPATHY: ICD-10-PCS | Mod: ,,, | Performed by: SPECIALIST

## 2023-02-27 PROCEDURE — 99213 OFFICE O/P EST LOW 20 MIN: CPT | Mod: GC,,, | Performed by: SPECIALIST

## 2023-02-27 PROCEDURE — 3074F PR MOST RECENT SYSTOLIC BLOOD PRESSURE < 130 MM HG: ICD-10-PCS | Mod: ,,, | Performed by: SPECIALIST

## 2023-02-27 PROCEDURE — 3079F DIAST BP 80-89 MM HG: CPT | Mod: ,,, | Performed by: SPECIALIST

## 2023-02-27 PROCEDURE — 3008F PR BODY MASS INDEX (BMI) DOCUMENTED: ICD-10-PCS | Mod: ,,, | Performed by: SPECIALIST

## 2023-02-27 PROCEDURE — 3066F NEPHROPATHY DOC TX: CPT | Mod: ,,, | Performed by: SPECIALIST

## 2023-02-27 PROCEDURE — 3074F SYST BP LT 130 MM HG: CPT | Mod: ,,, | Performed by: SPECIALIST

## 2023-02-27 PROCEDURE — 3008F BODY MASS INDEX DOCD: CPT | Mod: ,,, | Performed by: SPECIALIST

## 2023-02-27 PROCEDURE — 99213 PR OFFICE/OUTPT VISIT, EST, LEVL III, 20-29 MIN: ICD-10-PCS | Mod: GC,,, | Performed by: SPECIALIST

## 2023-02-27 PROCEDURE — 3079F PR MOST RECENT DIASTOLIC BLOOD PRESSURE 80-89 MM HG: ICD-10-PCS | Mod: ,,, | Performed by: SPECIALIST

## 2023-02-27 PROCEDURE — 1111F PR DISCHARGE MEDS RECONCILED W/ CURRENT OUTPATIENT MED LIST: ICD-10-PCS | Mod: ,,, | Performed by: SPECIALIST

## 2023-02-27 PROCEDURE — 1111F DSCHRG MED/CURRENT MED MERGE: CPT | Mod: ,,, | Performed by: SPECIALIST

## 2023-02-28 NOTE — ASSESSMENT & PLAN NOTE
BP stable today in clinic  Continue taking Toprol and Entresto  Log BP daily and bring records next visit

## 2023-02-28 NOTE — PROGRESS NOTES
Subjective:       Patient ID: Flory Chacon is a 57 y.o. female.    Chief Complaint: Follow-up (hyponatremia)    Patient is a 58 y/o female with PMHx of HTN, Hyponatremia, anxiety and depression who presents to the clinic today for follow up. Few weeks ago the patient was hospitalized due to hyponatremia of 103. Patient states that today she is doing much better but she continues having balance issues and migraine which are not new symptoms as per patient. She reports recent falls ending up with a fracture of left wrist for which she is f/u with orthopedics. She reports that she has scheduled appointments with cardiology and nephrology in a few days. She states that she f/u at [facility] every month due to anxiety and depression. Today patient denies confusion, suicidal thoughts, SOB, chest pain, nausea, vomiting, urinary or bowel habit changes.        Current Outpatient Medications:     ABILIFY 2 mg Tab, Take 2 mg by mouth once daily., Disp: , Rfl:     ALPRAZolam (XANAX) 1 MG tablet, , Disp: , Rfl:     amLODIPine (NORVASC) 10 MG tablet, Take 1 tablet (10 mg total) by mouth once daily., Disp: 30 tablet, Rfl: 11    cetirizine (ZYRTEC) 10 MG tablet, Take 1 tablet (10 mg total) by mouth once daily., Disp: 90 tablet, Rfl: 1    chlorthalidone (HYGROTEN) 25 MG Tab, Take 25 mg by mouth every morning., Disp: , Rfl:     clindamycin (CLEOCIN) 300 MG capsule, clindamycin HCl 300 mg capsule  TAKE 1 CAPSULE BY MOUTH EVERY 8 HOURS FOR 10 DAYS, Disp: , Rfl:     cyanocobalamin 1,000 mcg/mL injection, cyanocobalamin (vit B-12) 1,000 mcg/mL injection solution  INJECT 1 ML ONCE DAILY INTRAMUSCULARLY FOR 7 DAYS AND THEN INJECT 1 ML ONCE A WEEK FOR 4 WEEKS AND THEN INJECT 1 ML ONCE EVERY MONTH, Disp: , Rfl:     cycloSPORINE (RESTASIS) 0.05 % ophthalmic emulsion, Restasis 0.05 % eye drops in a dropperette  INSTILL 1 DROP INTO EACH EYE EVERY 12 HOURS, Disp: , Rfl:     DULoxetine (CYMBALTA) 60 MG capsule, , Disp: , Rfl:     EMGALITY SYRINGE  120 mg/mL Syrg, INJECT SUBCUTANEOUS 2 ML THE FIRST MONTH AND THEN 1 ML ONCE MONTHLLY, Disp: , Rfl:     esomeprazole (NEXIUM) 40 mg GrPS, Take 40 mg by mouth., Disp: , Rfl:     estradioL (ESTRACE) 0.01 % (0.1 mg/gram) vaginal cream, Place 2 g vaginally once daily., Disp: 60 g, Rfl: 11    ezetimibe (ZETIA) 10 mg tablet, Take 1 tablet (10 mg total) by mouth every evening., Disp: 90 tablet, Rfl: 3    ferrous sulfate (FEROSUL) 220 mg (44 mg iron)/5 mL Elix, SMARTSI Milliliter(s) By Mouth Daily, Disp: , Rfl:     fluticasone propionate (FLONASE) 50 mcg/actuation nasal spray, fluticasone propionate 50 mcg/actuation nasal spray,suspension  USE 2 SPRAY(S) IN EACH NOSTRIL ONCE DAILY, Disp: , Rfl:     hydroCHLOROthiazide (MICROZIDE) 12.5 mg capsule, hydrochlorothiazide 12.5 mg capsule  TAKE 1 CAPSULE BY MOUTH ONCE DAILY IN THE MORNING FOR 90 DAYS, Disp: , Rfl:     hydrocortisone 2.5 % cream, APPLY CREAM TO AFFECTED AREA TO AFFECTED AREA ONCE DAILY FOR 5 DAYS TO UPPER BILATERAL EXTREMITIES, Disp: , Rfl:     levoFLOXacin (LEVAQUIN) 500 MG tablet, levofloxacin 500 mg tablet  TAKE 1 TABLET BY MOUTH ONCE DAILY FOR 5 DAYS, Disp: , Rfl:     lisinopriL (PRINIVIL,ZESTRIL) 30 MG tablet, lisinopril 30 mg tablet  TAKE 1 TABLET BY MOUTH ONCE DAILY, Disp: , Rfl:     loratadine (CLARITIN) 10 mg tablet, Take 10 mg by mouth., Disp: , Rfl:     metoprolol succinate (TOPROL-XL) 100 MG 24 hr tablet, Take 1 tablet (100 mg total) by mouth once daily., Disp: 30 tablet, Rfl: 11    montelukast (SINGULAIR) 10 mg tablet, Take 10 mg by mouth once daily., Disp: , Rfl:     nitrofurantoin, macrocrystal-monohydrate, (MACROBID) 100 MG capsule, nitrofurantoin monohydrate/macrocrystals 100 mg capsule  TAKE 1 CAPSULE BY MOUTH EVERY 12 HOURS FOR 7 DAYS, Disp: , Rfl:     NURTEC 75 mg odt, DISSOLVE 1 TABLET BY MOUTH ONCE DAILY AS NEEDED FOR HEADACHE, Disp: , Rfl:     ondansetron (ZOFRAN) 4 MG tablet, ondansetron HCl 4 mg tablet  TAKE 1 TABLET BY MOUTH AS NEEDED  FOR NAUSEA, Disp: , Rfl:     ondansetron (ZOFRAN-ODT) 4 MG TbDL, DISSOLVE 1 TABLET ON TONGUE ONCE DAILY, Disp: , Rfl:     polymyxin B sulf-trimethoprim (POLYTRIM) 10,000 unit- 1 mg/mL Drop, polymyxin B sulfate 10,000 unit-trimethoprim 1 mg/mL eye drops, Disp: , Rfl:     potassium citrate (UROCIT-K) 10 mEq (1,080 mg) TbSR, Urocit-K 10 10 mEq (1,080 mg) tablet,extended release  Take 1 tablet every day by oral route for 30 days., Disp: , Rfl:     progesterone (PROMETRIUM) 100 MG capsule, progesterone micronized 100 mg capsule  TAKE 1 CAPSULE BY MOUTH AT BEDTIME EVERY CYCLE ONCE DAILY, Disp: , Rfl:     sacubitriL-valsartan (ENTRESTO) 24-26 mg per tablet, Take 1 tablet by mouth 2 (two) times daily., Disp: , Rfl:     sodium chloride 1 gram tablet, Take 2 tablets (2 g total) by mouth 3 (three) times daily., Disp: 180 tablet, Rfl: 0    sulfamethoxazole-trimethoprim 800-160mg (BACTRIM DS) 800-160 mg Tab, sulfamethoxazole 800 mg-trimethoprim 160 mg tablet  TAKE 1 TABLET BY MOUTH TWICE DAILY FOR 3 DAYS, Disp: , Rfl:     zolpidem (AMBIEN) 10 mg Tab, Take 10 mg by mouth nightly as needed., Disp: , Rfl:     aspirin 81 MG Chew, Take 1 tablet (81 mg total) by mouth once daily., Disp: 30 tablet, Rfl: 0    Review of patient's allergies indicates:   Allergen Reactions    Meloxicam Swelling    Eggs [egg derived]     Statins-hmg-coa reductase inhibitors     Penicillins Rash       Past Medical History:   Diagnosis Date    Acute hypokalemia     Anxiety disorder, unspecified     Depression     Hypertension     Insomnia        Past Surgical History:   Procedure Laterality Date    FRACTURE SURGERY      LEFT HEART CATHETERIZATION Left 04/25/2022    Procedure: Left heart cath;  Surgeon: Malachi Cormier DO;  Location: [facility] CATH LAB;  Service: Cardiology;  Laterality: Left;       Family History   Problem Relation Age of Onset    Heart disease Father     Melanoma Maternal Uncle        Social History     Tobacco Use    Smoking status: Former      Packs/day: 1.00     Years: 20.00     Pack years: 20.00     Types: Cigarettes     Quit date: 2013     Years since quitting: 10.1    Smokeless tobacco: Never   Substance Use Topics    Alcohol use: Not Currently     Comment: socially in the past    Drug use: Never       Review of Systems   Constitutional:  Negative for activity change, appetite change, chills, fatigue and fever.   HENT:  Negative for nasal congestion, ear discharge, ear pain, hearing loss, mouth sores, rhinorrhea, sinus pressure/congestion, sneezing, sore throat and trouble swallowing.    Eyes:  Negative for pain, discharge, redness, itching and visual disturbance.   Respiratory:  Negative for cough, shortness of breath, wheezing and stridor.    Cardiovascular:  Negative for chest pain, palpitations and leg swelling.   Gastrointestinal:  Negative for abdominal distention, abdominal pain, blood in stool, change in bowel habit, constipation, diarrhea, nausea, vomiting and change in bowel habit.   Endocrine: Negative for polydipsia, polyphagia and polyuria.   Genitourinary:  Negative for decreased urine volume, difficulty urinating, dysuria, flank pain, frequency, hematuria and urgency.   Musculoskeletal:  Negative for arthralgias, leg pain, myalgias, neck pain and neck stiffness.   Integumentary:  Negative for color change, pallor, rash and wound.   Neurological:  Positive for headaches, coordination difficulties and coordination difficulties. Negative for dizziness, vertigo, tremors, seizures, speech difficulty, weakness, light-headedness and numbness.   Psychiatric/Behavioral:  Negative for behavioral problems, confusion, decreased concentration and sleep disturbance. The patient is not nervous/anxious.        Current Medications:   Medication List with Changes/Refills   Current Medications    ABILIFY 2 MG TAB    Take 2 mg by mouth once daily.       Start Date: 4/29/2022 End Date: --    ALPRAZOLAM (XANAX) 1 MG TABLET           Start Date: 9/14/2021  End Date: --    AMLODIPINE (NORVASC) 10 MG TABLET    Take 1 tablet (10 mg total) by mouth once daily.       Start Date: 2/10/2023 End Date: 2/10/2024    ASPIRIN 81 MG CHEW    Take 1 tablet (81 mg total) by mouth once daily.       Start Date: 2022 End Date: 2022    CETIRIZINE (ZYRTEC) 10 MG TABLET    Take 1 tablet (10 mg total) by mouth once daily.       Start Date: 2023 End Date: --    CHLORTHALIDONE (HYGROTEN) 25 MG TAB    Take 25 mg by mouth every morning.       Start Date: 2023 End Date: --    CLINDAMYCIN (CLEOCIN) 300 MG CAPSULE    clindamycin HCl 300 mg capsule   TAKE 1 CAPSULE BY MOUTH EVERY 8 HOURS FOR 10 DAYS       Start Date: --        End Date: --    CYANOCOBALAMIN 1,000 MCG/ML INJECTION    cyanocobalamin (vit B-12) 1,000 mcg/mL injection solution   INJECT 1 ML ONCE DAILY INTRAMUSCULARLY FOR 7 DAYS AND THEN INJECT 1 ML ONCE A WEEK FOR 4 WEEKS AND THEN INJECT 1 ML ONCE EVERY MONTH       Start Date: --        End Date: --    CYCLOSPORINE (RESTASIS) 0.05 % OPHTHALMIC EMULSION    Restasis 0.05 % eye drops in a dropperette   INSTILL 1 DROP INTO EACH EYE EVERY 12 HOURS       Start Date: --        End Date: --    DULOXETINE (CYMBALTA) 60 MG CAPSULE           Start Date: 2021 End Date: --    EMGALITY SYRINGE 120 MG/ML SYRG    INJECT SUBCUTANEOUS 2 ML THE FIRST MONTH AND THEN 1 ML ONCE MONTHLLY       Start Date: 2021  End Date: --    ESOMEPRAZOLE (NEXIUM) 40 MG GRPS    Take 40 mg by mouth.       Start Date: --        End Date: --    ESTRADIOL (ESTRACE) 0.01 % (0.1 MG/GRAM) VAGINAL CREAM    Place 2 g vaginally once daily.       Start Date: 2022  End Date: 2023    EZETIMIBE (ZETIA) 10 MG TABLET    Take 1 tablet (10 mg total) by mouth every evening.       Start Date: 2023 End Date: 2024    FERROUS SULFATE (FEROSUL) 220 MG (44 MG IRON)/5 ML ELIX    SMARTSI Milliliter(s) By Mouth Daily       Start Date: 2023  End Date: --    FLUTICASONE PROPIONATE (FLONASE) 50  MCG/ACTUATION NASAL SPRAY    fluticasone propionate 50 mcg/actuation nasal spray,suspension   USE 2 SPRAY(S) IN EACH NOSTRIL ONCE DAILY       Start Date: --        End Date: --    HYDROCHLOROTHIAZIDE (MICROZIDE) 12.5 MG CAPSULE    hydrochlorothiazide 12.5 mg capsule   TAKE 1 CAPSULE BY MOUTH ONCE DAILY IN THE MORNING FOR 90 DAYS       Start Date: --        End Date: --    HYDROCORTISONE 2.5 % CREAM    APPLY CREAM TO AFFECTED AREA TO AFFECTED AREA ONCE DAILY FOR 5 DAYS TO UPPER BILATERAL EXTREMITIES       Start Date: 8/24/2021 End Date: --    LEVOFLOXACIN (LEVAQUIN) 500 MG TABLET    levofloxacin 500 mg tablet   TAKE 1 TABLET BY MOUTH ONCE DAILY FOR 5 DAYS       Start Date: --        End Date: --    LISINOPRIL (PRINIVIL,ZESTRIL) 30 MG TABLET    lisinopril 30 mg tablet   TAKE 1 TABLET BY MOUTH ONCE DAILY       Start Date: --        End Date: --    LORATADINE (CLARITIN) 10 MG TABLET    Take 10 mg by mouth.       Start Date: --        End Date: --    METOPROLOL SUCCINATE (TOPROL-XL) 100 MG 24 HR TABLET    Take 1 tablet (100 mg total) by mouth once daily.       Start Date: 2/10/2023 End Date: 2/10/2024    MONTELUKAST (SINGULAIR) 10 MG TABLET    Take 10 mg by mouth once daily.       Start Date: 7/27/2021 End Date: --    NITROFURANTOIN, MACROCRYSTAL-MONOHYDRATE, (MACROBID) 100 MG CAPSULE    nitrofurantoin monohydrate/macrocrystals 100 mg capsule   TAKE 1 CAPSULE BY MOUTH EVERY 12 HOURS FOR 7 DAYS       Start Date: --        End Date: --    NURTEC 75 MG ODT    DISSOLVE 1 TABLET BY MOUTH ONCE DAILY AS NEEDED FOR HEADACHE       Start Date: 6/11/2021 End Date: --    ONDANSETRON (ZOFRAN) 4 MG TABLET    ondansetron HCl 4 mg tablet   TAKE 1 TABLET BY MOUTH AS NEEDED FOR NAUSEA       Start Date: --        End Date: --    ONDANSETRON (ZOFRAN-ODT) 4 MG TBDL    DISSOLVE 1 TABLET ON TONGUE ONCE DAILY       Start Date: 6/8/2021  End Date: --    POLYMYXIN B SULF-TRIMETHOPRIM (POLYTRIM) 10,000 UNIT- 1 MG/ML DROP    polymyxin B sulfate  "10,000 unit-trimethoprim 1 mg/mL eye drops       Start Date: --        End Date: --    POTASSIUM CITRATE (UROCIT-K) 10 MEQ (1,080 MG) TBSR    Urocit-K 10 10 mEq (1,080 mg) tablet,extended release   Take 1 tablet every day by oral route for 30 days.       Start Date: --        End Date: --    PROGESTERONE (PROMETRIUM) 100 MG CAPSULE    progesterone micronized 100 mg capsule   TAKE 1 CAPSULE BY MOUTH AT BEDTIME EVERY CYCLE ONCE DAILY       Start Date: --        End Date: --    SACUBITRIL-VALSARTAN (ENTRESTO) 24-26 MG PER TABLET    Take 1 tablet by mouth 2 (two) times daily.       Start Date: --        End Date: --    SODIUM CHLORIDE 1 GRAM TABLET    Take 2 tablets (2 g total) by mouth 3 (three) times daily.       Start Date: 2/9/2023  End Date: 3/11/2023    SULFAMETHOXAZOLE-TRIMETHOPRIM 800-160MG (BACTRIM DS) 800-160 MG TAB    sulfamethoxazole 800 mg-trimethoprim 160 mg tablet   TAKE 1 TABLET BY MOUTH TWICE DAILY FOR 3 DAYS       Start Date: --        End Date: --    ZOLPIDEM (AMBIEN) 10 MG TAB    Take 10 mg by mouth nightly as needed.       Start Date: 2/10/2023 End Date: --            Objective:        Vitals:    02/27/23 1456   BP: 129/81   BP Location: Right arm   Patient Position: Sitting   BP Method: Medium (Automatic)   Pulse: 100   Resp: 20   Temp: 98.6 °F (37 °C)   TempSrc: Oral   SpO2: 95%   Weight: 64.8 kg (142 lb 12.8 oz)   Height: 5' 3" (1.6 m)       Physical Exam  Vitals and nursing note reviewed.   Constitutional:       General: She is not in acute distress.     Appearance: Normal appearance. She is normal weight. She is not ill-appearing, toxic-appearing or diaphoretic.   HENT:      Head: Normocephalic and atraumatic.      Right Ear: External ear normal.      Left Ear: External ear normal.      Nose: Nose normal.      Mouth/Throat:      Mouth: Mucous membranes are moist.      Pharynx: Oropharynx is clear.   Eyes:      General: No scleral icterus.        Right eye: No discharge.         Left eye: No " discharge.      Extraocular Movements: Extraocular movements intact.      Conjunctiva/sclera: Conjunctivae normal.      Pupils: Pupils are equal, round, and reactive to light.   Cardiovascular:      Rate and Rhythm: Normal rate and regular rhythm.      Pulses: Normal pulses.      Heart sounds: Normal heart sounds. No murmur heard.  Pulmonary:      Effort: Pulmonary effort is normal. No respiratory distress.      Breath sounds: Normal breath sounds. No wheezing, rhonchi or rales.   Abdominal:      General: Abdomen is flat. Bowel sounds are normal. There is no distension.      Palpations: Abdomen is soft.      Tenderness: There is no abdominal tenderness. There is no right CVA tenderness, left CVA tenderness, guarding or rebound.   Musculoskeletal:         General: Normal range of motion.      Cervical back: Normal range of motion and neck supple.      Right lower leg: No edema.      Left lower leg: No edema.      Comments: Left wrist cast in place   Skin:     General: Skin is warm and dry.      Capillary Refill: Capillary refill takes less than 2 seconds.      Findings: No lesion or rash.   Neurological:      General: No focal deficit present.      Mental Status: She is alert and oriented to person, place, and time.      Cranial Nerves: No cranial nerve deficit.      Sensory: No sensory deficit.      Motor: No weakness.      Gait: Gait abnormal.   Psychiatric:         Mood and Affect: Mood normal.         Behavior: Behavior normal.         Thought Content: Thought content normal.           Lab Results   Component Value Date    WBC 10.84 02/13/2023    HGB 12.2 02/13/2023    HCT 36.0 (L) 02/13/2023     (H) 02/13/2023    CHOL 197 04/23/2022    TRIG 82 04/23/2022    HDL 85 (H) 04/23/2022    ALT 29 01/30/2023    AST 49 (H) 01/30/2023     (L) 02/13/2023    K 4.2 02/13/2023    CL 98 02/13/2023    CREATININE 0.92 02/13/2023    BUN 8 02/13/2023    CO2 29 02/13/2023    TSH 0.220 (L) 02/01/2023    INR 0.96  01/31/2023      Assessment:       1. Electrolyte abnormality    2. Paroxysmal SVT (supraventricular tachycardia)    3. Anxiety disorder, unspecified type    4. Primary hypertension        Plan:         Problem List Items Addressed This Visit          Psychiatric    Anxiety disorder, unspecified     Continue taking psych medications as per psychiatrist and continue to f/u at [place]            Cardiac/Vascular    Hypertension     BP stable today in clinic  Continue taking Toprol and Entresto  Log BP daily and bring records next visit         Paroxysmal SVT (supraventricular tachycardia)     Continue current medication regiment which includes Toprol ane Entresto.  Present to the scheduled appointment with cardiology in a few days.            Renal/    Electrolyte abnormality     Last sodium level 135. Patient reports significant improvement of symptoms. She reports having balance issues and migraine which are not new as per patient.  Encouraged to take OTC tylenol for migraines and use a cane to help stabilizing the gate and to help preventing falls  Present to the scheduled appointment with nephrology in a few days  RTC in 4 weeks or before in case no improvement or worsening of symptoms.              Follow up in about 4 weeks (around 3/27/2023).    Dave Nathan MD     Instructed patient that if symptoms fail to improve or worsen patient should seek immediate medical attention or report to the nearest emergency department. Patient expressed verbal agreement and understanding to this plan of care.

## 2023-02-28 NOTE — ASSESSMENT & PLAN NOTE
Last sodium level 135. Patient reports significant improvement of symptoms. She reports having balance issues and migraine which are not new as per patient.  Encouraged to take OTC tylenol for migraines and use a cane to help stabilizing the gate and to help preventing falls  Present to the scheduled appointment with nephrology in a few days  RTC in 4 weeks or before in case no improvement or worsening of symptoms.

## 2023-03-01 ENCOUNTER — OFFICE VISIT (OUTPATIENT)
Dept: NEPHROLOGY | Facility: CLINIC | Age: 58
End: 2023-03-01
Payer: COMMERCIAL

## 2023-03-01 VITALS
BODY MASS INDEX: 25.3 KG/M2 | DIASTOLIC BLOOD PRESSURE: 82 MMHG | WEIGHT: 142.81 LBS | HEIGHT: 63 IN | OXYGEN SATURATION: 92 % | HEART RATE: 97 BPM | TEMPERATURE: 98 F | RESPIRATION RATE: 18 BRPM | SYSTOLIC BLOOD PRESSURE: 114 MMHG

## 2023-03-01 DIAGNOSIS — E22.2 SIADH (SYNDROME OF INAPPROPRIATE ADH PRODUCTION): Primary | ICD-10-CM

## 2023-03-01 DIAGNOSIS — I10 ESSENTIAL HYPERTENSION: ICD-10-CM

## 2023-03-01 DIAGNOSIS — E87.1 HYPONATREMIA: ICD-10-CM

## 2023-03-01 PROCEDURE — 3066F NEPHROPATHY DOC TX: CPT | Mod: ,,, | Performed by: INTERNAL MEDICINE

## 2023-03-01 PROCEDURE — 3008F PR BODY MASS INDEX (BMI) DOCUMENTED: ICD-10-PCS | Mod: ,,, | Performed by: INTERNAL MEDICINE

## 2023-03-01 PROCEDURE — 4010F ACE/ARB THERAPY RXD/TAKEN: CPT | Mod: ,,, | Performed by: INTERNAL MEDICINE

## 2023-03-01 PROCEDURE — 3079F DIAST BP 80-89 MM HG: CPT | Mod: ,,, | Performed by: INTERNAL MEDICINE

## 2023-03-01 PROCEDURE — 3008F BODY MASS INDEX DOCD: CPT | Mod: ,,, | Performed by: INTERNAL MEDICINE

## 2023-03-01 PROCEDURE — 3074F SYST BP LT 130 MM HG: CPT | Mod: ,,, | Performed by: INTERNAL MEDICINE

## 2023-03-01 PROCEDURE — 3074F PR MOST RECENT SYSTOLIC BLOOD PRESSURE < 130 MM HG: ICD-10-PCS | Mod: ,,, | Performed by: INTERNAL MEDICINE

## 2023-03-01 PROCEDURE — 99214 PR OFFICE/OUTPT VISIT, EST, LEVL IV, 30-39 MIN: ICD-10-PCS | Mod: S$PBB,,, | Performed by: INTERNAL MEDICINE

## 2023-03-01 PROCEDURE — 99215 OFFICE O/P EST HI 40 MIN: CPT | Mod: PBBFAC | Performed by: INTERNAL MEDICINE

## 2023-03-01 PROCEDURE — 4010F PR ACE/ARB THEARPY RXD/TAKEN: ICD-10-PCS | Mod: ,,, | Performed by: INTERNAL MEDICINE

## 2023-03-01 PROCEDURE — 1111F DSCHRG MED/CURRENT MED MERGE: CPT | Mod: ,,, | Performed by: INTERNAL MEDICINE

## 2023-03-01 PROCEDURE — 1159F MED LIST DOCD IN RCRD: CPT | Mod: ,,, | Performed by: INTERNAL MEDICINE

## 2023-03-01 PROCEDURE — 1159F PR MEDICATION LIST DOCUMENTED IN MEDICAL RECORD: ICD-10-PCS | Mod: ,,, | Performed by: INTERNAL MEDICINE

## 2023-03-01 PROCEDURE — 1111F PR DISCHARGE MEDS RECONCILED W/ CURRENT OUTPATIENT MED LIST: ICD-10-PCS | Mod: ,,, | Performed by: INTERNAL MEDICINE

## 2023-03-01 PROCEDURE — 99214 OFFICE O/P EST MOD 30 MIN: CPT | Mod: S$PBB,,, | Performed by: INTERNAL MEDICINE

## 2023-03-01 PROCEDURE — 3079F PR MOST RECENT DIASTOLIC BLOOD PRESSURE 80-89 MM HG: ICD-10-PCS | Mod: ,,, | Performed by: INTERNAL MEDICINE

## 2023-03-01 PROCEDURE — 3066F PR DOCUMENTATION OF TREATMENT FOR NEPHROPATHY: ICD-10-PCS | Mod: ,,, | Performed by: INTERNAL MEDICINE

## 2023-03-01 NOTE — PROGRESS NOTES
[facility] History and Physical  Patient Name: Flory Chacon  MRN: [id number]  Age: 57 y.o.  : 1965    Date: 3/1/2023 3:17 PM    Chief Complaint: Follow up    HPI :   Ms Chacon presents to Nephrology clinic to establish care. She is followed by ALEA Sears as her primary care provider.     SIADH:  Seen in hospital on 23 with N/V. She went to ED and she was found to have a sodium level of 103. It was noted that she has history of Depression/Anxiety with numerous psych medications including abilify, cymbalta, xanax, ambian. She has a chronic hyponatremia with a sodium 126-133. She needed hypertonic saline with resolution of her hypernatremia. She was discharged home with salt tablets 2 g TID. Since discharge, her sodium levels have improved.     HTN: stable on toprol and entresto    Nephrology history:     Patient denies any CP, SOB, peripheral edema, dysuria, hematuria, changes in urinary habits, or increased frequency of urination. No FH of kidney disease, no nephrolithiasis, or recurrent UTIs. No OTC medications including NSAIDs. She did have a fall since her hospital discharge and she broke her arm.     Past Medical History:  has a past medical history of Acute hypokalemia, Anxiety disorder, unspecified, Depression, Hyperlipidemia, Hypertension, and Insomnia.     Past Surgical History:   has a past surgical history that includes Left heart catheterization (Left, 2022); Fracture surgery; Knee Arthroplasty (Left); and Knee Arthroplasty (Right).     Family History:  family history includes Heart disease in her father; Melanoma in her maternal uncle. No family history of kidney disease.     Social History:   reports that she quit smoking about 10 years ago. Her smoking use included cigarettes. She has a 20.00 pack-year smoking history. She has never used smokeless tobacco. She reports that she does not currently use alcohol. She reports  that she does not use drugs.    Allergies: is allergic to meloxicam, eggs [egg derived], statins-hmg-coa reductase inhibitors, and penicillins.     Medications: Reviewed including OTC medications, herbal supplements, and NSAIDS.     Old records have been reviewed.      Review of Systems:  ROS: A 10 point ROS was completed and found to be negative except for that mentioned above.          Physical Exam:  Vitals:    03/01/23 1450   BP: 114/82   Pulse: 97   Resp: 18   Temp: 98 °F (36.7 °C)       Constitutional: sitting in chair, in NAD  Eyes: EOMI, white sclera  ENMT: moist mucus membranes, nares patent  Cardiovascular: normal rate, S1/S2 noted, no edema  Respiratory: symmetrical chest expansion, CTA-B  Gastrointestinal: +BS, soft, NT/ND  Musculoskeletal: normal, no joint erythema/effusions  Skin: no rash, no purpura, warm extremities  Neurological: Alert and Oriented x 4, afocal    Labs:   Lab Results   Component Value Date     (L) 02/13/2023    K 4.2 02/13/2023    CREATININE 0.92 02/13/2023    ALT 29 01/30/2023    AST 49 (H) 01/30/2023    LDLCALC 96 04/23/2022    CHOL 197 04/23/2022    HDL 85 (H) 04/23/2022    TRIG 82 04/23/2022    TSH 0.220 (L) 02/01/2023    WBC 10.84 02/13/2023    HGB 12.2 02/13/2023    HCT 36.0 (L) 02/13/2023     (H) 02/13/2023        Otherwise Reviewed    Assessment/Plan:       SIADH: continue salt tablets 2 g TID    HTN: well controlled with current meds     RTC 6 months with CBC, RFP, UA, urine for Prot:creat ratio, PTH, Vit D      Alisha S. Parker, DO [facility]   03/01/2023

## 2023-03-06 ENCOUNTER — OFFICE VISIT (OUTPATIENT)
Dept: CARDIOLOGY | Facility: CLINIC | Age: 58
End: 2023-03-06
Payer: COMMERCIAL

## 2023-03-06 ENCOUNTER — HOSPITAL ENCOUNTER (OUTPATIENT)
Dept: CARDIOLOGY | Facility: HOSPITAL | Age: 58
Discharge: HOME | End: 2023-03-06
Attending: STUDENT IN AN ORGANIZED HEALTH CARE EDUCATION/TRAINING PROGRAM
Payer: COMMERCIAL

## 2023-03-06 VITALS
BODY MASS INDEX: 25.34 KG/M2 | OXYGEN SATURATION: 99 % | WEIGHT: 143 LBS | DIASTOLIC BLOOD PRESSURE: 80 MMHG | HEART RATE: 98 BPM | SYSTOLIC BLOOD PRESSURE: 100 MMHG | HEIGHT: 63 IN

## 2023-03-06 DIAGNOSIS — I47.10 SVT (SUPRAVENTRICULAR TACHYCARDIA): Primary | ICD-10-CM

## 2023-03-06 DIAGNOSIS — Z95.0 PRESENCE OF CARDIAC PACEMAKER: ICD-10-CM

## 2023-03-06 DIAGNOSIS — Z95.0 CARDIAC PACEMAKER: ICD-10-CM

## 2023-03-06 DIAGNOSIS — Z95.0 PRESENCE OF CARDIAC PACEMAKER: Primary | ICD-10-CM

## 2023-03-06 PROCEDURE — 93281 PM DEVICE PROGR EVAL MULTI: CPT | Mod: 26,,, | Performed by: STUDENT IN AN ORGANIZED HEALTH CARE EDUCATION/TRAINING PROGRAM

## 2023-03-06 PROCEDURE — 3008F PR BODY MASS INDEX (BMI) DOCUMENTED: ICD-10-PCS | Mod: ,,, | Performed by: STUDENT IN AN ORGANIZED HEALTH CARE EDUCATION/TRAINING PROGRAM

## 2023-03-06 PROCEDURE — 3079F PR MOST RECENT DIASTOLIC BLOOD PRESSURE 80-89 MM HG: ICD-10-PCS | Mod: ,,, | Performed by: STUDENT IN AN ORGANIZED HEALTH CARE EDUCATION/TRAINING PROGRAM

## 2023-03-06 PROCEDURE — 93281 PM DEVICE PROGR EVAL MULTI: CPT

## 2023-03-06 PROCEDURE — 3066F NEPHROPATHY DOC TX: CPT | Mod: ,,, | Performed by: STUDENT IN AN ORGANIZED HEALTH CARE EDUCATION/TRAINING PROGRAM

## 2023-03-06 PROCEDURE — 99214 OFFICE O/P EST MOD 30 MIN: CPT | Mod: S$PBB,,, | Performed by: STUDENT IN AN ORGANIZED HEALTH CARE EDUCATION/TRAINING PROGRAM

## 2023-03-06 PROCEDURE — 99215 OFFICE O/P EST HI 40 MIN: CPT | Mod: PBBFAC | Performed by: STUDENT IN AN ORGANIZED HEALTH CARE EDUCATION/TRAINING PROGRAM

## 2023-03-06 PROCEDURE — 3066F PR DOCUMENTATION OF TREATMENT FOR NEPHROPATHY: ICD-10-PCS | Mod: ,,, | Performed by: STUDENT IN AN ORGANIZED HEALTH CARE EDUCATION/TRAINING PROGRAM

## 2023-03-06 PROCEDURE — 3044F HG A1C LEVEL LT 7.0%: CPT | Mod: ,,, | Performed by: STUDENT IN AN ORGANIZED HEALTH CARE EDUCATION/TRAINING PROGRAM

## 2023-03-06 PROCEDURE — 4010F ACE/ARB THERAPY RXD/TAKEN: CPT | Mod: ,,, | Performed by: STUDENT IN AN ORGANIZED HEALTH CARE EDUCATION/TRAINING PROGRAM

## 2023-03-06 PROCEDURE — 4010F PR ACE/ARB THEARPY RXD/TAKEN: ICD-10-PCS | Mod: ,,, | Performed by: STUDENT IN AN ORGANIZED HEALTH CARE EDUCATION/TRAINING PROGRAM

## 2023-03-06 PROCEDURE — 3074F SYST BP LT 130 MM HG: CPT | Mod: ,,, | Performed by: STUDENT IN AN ORGANIZED HEALTH CARE EDUCATION/TRAINING PROGRAM

## 2023-03-06 PROCEDURE — 1111F DSCHRG MED/CURRENT MED MERGE: CPT | Mod: ,,, | Performed by: STUDENT IN AN ORGANIZED HEALTH CARE EDUCATION/TRAINING PROGRAM

## 2023-03-06 PROCEDURE — 93281 CARDIAC DEVICE CHECK - IN CLINIC & HOSPITAL: ICD-10-PCS | Mod: 26,,, | Performed by: STUDENT IN AN ORGANIZED HEALTH CARE EDUCATION/TRAINING PROGRAM

## 2023-03-06 PROCEDURE — 3074F PR MOST RECENT SYSTOLIC BLOOD PRESSURE < 130 MM HG: ICD-10-PCS | Mod: ,,, | Performed by: STUDENT IN AN ORGANIZED HEALTH CARE EDUCATION/TRAINING PROGRAM

## 2023-03-06 PROCEDURE — 3079F DIAST BP 80-89 MM HG: CPT | Mod: ,,, | Performed by: STUDENT IN AN ORGANIZED HEALTH CARE EDUCATION/TRAINING PROGRAM

## 2023-03-06 PROCEDURE — 3008F BODY MASS INDEX DOCD: CPT | Mod: ,,, | Performed by: STUDENT IN AN ORGANIZED HEALTH CARE EDUCATION/TRAINING PROGRAM

## 2023-03-06 PROCEDURE — 1111F PR DISCHARGE MEDS RECONCILED W/ CURRENT OUTPATIENT MED LIST: ICD-10-PCS | Mod: ,,, | Performed by: STUDENT IN AN ORGANIZED HEALTH CARE EDUCATION/TRAINING PROGRAM

## 2023-03-06 PROCEDURE — 99214 PR OFFICE/OUTPT VISIT, EST, LEVL IV, 30-39 MIN: ICD-10-PCS | Mod: S$PBB,,, | Performed by: STUDENT IN AN ORGANIZED HEALTH CARE EDUCATION/TRAINING PROGRAM

## 2023-03-06 PROCEDURE — 3044F PR MOST RECENT HEMOGLOBIN A1C LEVEL <7.0%: ICD-10-PCS | Mod: ,,, | Performed by: STUDENT IN AN ORGANIZED HEALTH CARE EDUCATION/TRAINING PROGRAM

## 2023-03-06 NOTE — PROGRESS NOTES
PCP: Saroj Pineda MD    Referring Provider:     Subjective:   Flory Chacon is a 57 y.o. female with hx of SVT status post ablation and heart failure with improved ejection fraction (HFimpEF) who presents for a follow-up visit.      Has previously been seen by Dr. Epperson and Dr. Cormier.  Hospitalized from 01/30 to 2/9/3 with nausea vomiting and severe hyponatremia (sodium 106).  Inpatient follow-up scheduled with me.    Doing okay from cardiac standpoint.  Does complain of episodes of dizziness- appears to be vertigo.  PPM functioning normally.     Fhx: family history of premature CAD    Shx:  Former smoker former smoker 20 pack year smoking history.  Quit in 2013.    EKG  02/03/2023:  Atrial sensed V paced rhythm  ECHO Results for orders placed during the hospital encounter of 01/30/23    Echo    Interpretation Summary  · The left ventricle is small with concentric remodeling and normal systolic function.  · The estimated ejection fraction is 60%.  · Normal right ventricular size with normal right ventricular systolic function.  · Mild tricuspid regurgitation.  · Normal central venous pressure (3 mmHg).  · The estimated PA systolic pressure is 28 mmHg.  · Compared to prior ECHo from 4/2022; LV function has improved from 40% to 60%    [facility] Results for orders placed during the hospital encounter of 04/23/22    Cardiac catheterization    Conclusion  · The left ventricular systolic function was normal.  · The left ventricular end diastolic pressure was normal.  · The estimated blood loss was none.  · The coronary arteries were normal..    The procedure log was documented by Documenter: Bonny Obando RN and verified by Malachi Cormier DO.    Date: 4/25/2022  Time: 1:11 PM    Normal coronary arteries, normal LV function.        Lab Results   Component Value Date     04/26/2023    K 4.5 04/26/2023     04/26/2023    CO2 28 04/26/2023    BUN 9 04/26/2023    CREATININE 1.02 04/26/2023    CALCIUM 9.0  04/26/2023    ANIONGAP 10 04/26/2023    EGFRNONAA 54 (L) 07/18/2022       Lab Results   Component Value Date    CHOL 197 04/23/2022     Lab Results   Component Value Date    HDL 85 (H) 04/23/2022     Lab Results   Component Value Date    LDLCALC 96 04/23/2022     Lab Results   Component Value Date    TRIG 82 04/23/2022     Lab Results   Component Value Date    CHOLHDL 2.3 04/23/2022       Lab Results   Component Value Date    WBC 10.03 04/26/2023    HGB 12.3 04/26/2023    HCT 37.6 (L) 04/26/2023    MCV 87.2 04/26/2023     (H) 04/26/2023           Current Outpatient Medications:     ABILIFY 2 mg Tab, Take 2 mg by mouth once daily., Disp: , Rfl:     ALPRAZolam (XANAX) 1 MG tablet, , Disp: , Rfl:     amLODIPine (NORVASC) 10 MG tablet, Take 1 tablet (10 mg total) by mouth once daily., Disp: 30 tablet, Rfl: 11    aspirin 81 MG Chew, Take 1 tablet (81 mg total) by mouth once daily., Disp: 30 tablet, Rfl: 0    cetirizine (ZYRTEC) 10 MG tablet, Take 1 tablet (10 mg total) by mouth once daily., Disp: 90 tablet, Rfl: 1    chlorthalidone (HYGROTEN) 25 MG Tab, Take 25 mg by mouth every morning., Disp: , Rfl:     cyanocobalamin 1,000 mcg/mL injection, cyanocobalamin (vit B-12) 1,000 mcg/mL injection solution  INJECT 1 ML ONCE DAILY INTRAMUSCULARLY FOR 7 DAYS AND THEN INJECT 1 ML ONCE A WEEK FOR 4 WEEKS AND THEN INJECT 1 ML ONCE EVERY MONTH, Disp: , Rfl:     cycloSPORINE (RESTASIS) 0.05 % ophthalmic emulsion, Restasis 0.05 % eye drops in a dropperette  INSTILL 1 DROP INTO EACH EYE EVERY 12 HOURS, Disp: , Rfl:     DULoxetine (CYMBALTA) 60 MG capsule, , Disp: , Rfl:     estradioL (ESTRACE) 0.01 % (0.1 mg/gram) vaginal cream, Place 2 g vaginally once daily., Disp: 60 g, Rfl: 11    ezetimibe (ZETIA) 10 mg tablet, Take 1 tablet (10 mg total) by mouth every evening., Disp: 90 tablet, Rfl: 3    fluticasone propionate (FLONASE) 50 mcg/actuation nasal spray, fluticasone propionate 50 mcg/actuation nasal spray,suspension  USE 2  "SPRAY(S) IN EACH NOSTRIL ONCE DAILY, Disp: , Rfl:     metoprolol succinate (TOPROL-XL) 100 MG 24 hr tablet, Take 1 tablet (100 mg total) by mouth once daily., Disp: 30 tablet, Rfl: 11    montelukast (SINGULAIR) 10 mg tablet, Take 10 mg by mouth once daily., Disp: , Rfl:     ondansetron (ZOFRAN) 4 MG tablet, ondansetron HCl 4 mg tablet  TAKE 1 TABLET BY MOUTH AS NEEDED FOR NAUSEA, Disp: , Rfl:     potassium citrate (UROCIT-K) 10 mEq (1,080 mg) TbSR, Urocit-K 10 10 mEq (1,080 mg) tablet,extended release  Take 1 tablet every day by oral route for 30 days., Disp: , Rfl:     sacubitriL-valsartan (ENTRESTO) 24-26 mg per tablet, Take 1 tablet by mouth 2 (two) times daily., Disp: , Rfl:     sodium chloride 1 gram tablet, Take 2 tablets (2 g total) by mouth 3 (three) times daily., Disp: 540 tablet, Rfl: 3    zolpidem (AMBIEN) 10 mg Tab, Take 10 mg by mouth nightly as needed., Disp: , Rfl:     atogepant (QULIPTA) 60 mg Tab, Take 60 mg by mouth once daily., Disp: 30 tablet, Rfl: 11    esomeprazole (NEXIUM) 40 MG capsule, 1 capsule., Disp: , Rfl:     ferrous sulfate 220 mg (44 mg iron)/5 mL solution, 7 ml drink with straw Orally daily for 30 days, Disp: , Rfl:     loratadine (CLARITIN) 10 mg tablet, Take 10 mg by mouth., Disp: , Rfl:     Review of Systems   Constitutional:  Negative for chills, diaphoresis, fever and malaise/fatigue.   Respiratory:  Negative for cough and shortness of breath.    Cardiovascular:  Negative for chest pain, palpitations, orthopnea, claudication, leg swelling and PND.   Gastrointestinal:  Negative for abdominal pain, heartburn, nausea and vomiting.   Neurological:  Negative for dizziness.       Objective:   /80 (Patient Position: Standing)   Pulse 98   Ht 5' 3" (1.6 m)   Wt 64.9 kg (143 lb)   SpO2 99%   BMI 25.33 kg/m²     Physical Exam  Constitutional:       General: She is not in acute distress.     Appearance: Normal appearance.   Cardiovascular:      Rate and Rhythm: Normal rate and " regular rhythm.      Pulses: Normal pulses.      Heart sounds: Normal heart sounds. No murmur heard.    No friction rub. No gallop.   Pulmonary:      Effort: Pulmonary effort is normal.      Breath sounds: Normal breath sounds. No wheezing or rales.   Musculoskeletal:      Right lower leg: No edema.      Left lower leg: No edema.   Skin:     General: Skin is warm and dry.   Neurological:      Mental Status: She is alert.         Assessment:     1. SVT (supraventricular tachycardia)        2. Cardiac pacemaker              Plan:   No problem-specific Assessment & Plan notes found for this encounter.      Dilated nonischemic cardiomyopathy  S/P Biventricular pacer implant at [facility]  LVEF 60% on echocardiogram  01/2023  Continue Toprol 100 mg daily and Entresto 04/20/2026 b.i.d.    SVT   S/p ablation    Hypertension   Blood pressure controlled    F/U in 2 months

## 2023-03-07 ENCOUNTER — TELEPHONE (OUTPATIENT)
Dept: FAMILY MEDICINE | Facility: CLINIC | Age: 58
End: 2023-03-07
Payer: COMMERCIAL

## 2023-03-07 DIAGNOSIS — Z47.89 ORTHOPEDIC AFTERCARE: Primary | ICD-10-CM

## 2023-03-07 NOTE — TELEPHONE ENCOUNTER
----- Message from Sri Knapp MA sent at 3/7/2023  9:24 AM CST -----  Patient called requesting medication refill of sodium chloride. WM in [place].  Also requesting call back from Provider or nurse to discuss her FMLA paperwork. States spoke with a Provider last night about extending her FMLA.

## 2023-03-07 NOTE — TELEPHONE ENCOUNTER
Attempted to contact pt with no answer. Unable to leave v/m. Medication has already been refilled by another provider and is at the pharmacy waiting for the patient.

## 2023-03-08 ENCOUNTER — OFFICE VISIT (OUTPATIENT)
Dept: ORTHOPEDICS | Facility: CLINIC | Age: 58
End: 2023-03-08
Payer: COMMERCIAL

## 2023-03-08 ENCOUNTER — HOSPITAL ENCOUNTER (OUTPATIENT)
Dept: RADIOLOGY | Facility: HOSPITAL | Age: 58
Discharge: HOME | End: 2023-03-08
Attending: ORTHOPAEDIC SURGERY
Payer: COMMERCIAL

## 2023-03-08 DIAGNOSIS — Z47.89 ORTHOPEDIC AFTERCARE: ICD-10-CM

## 2023-03-08 DIAGNOSIS — S52.502D CLOSED FRACTURE OF DISTAL END OF LEFT RADIUS WITH ROUTINE HEALING, UNSPECIFIED FRACTURE MORPHOLOGY, SUBSEQUENT ENCOUNTER: Primary | ICD-10-CM

## 2023-03-08 PROCEDURE — 99213 OFFICE O/P EST LOW 20 MIN: CPT | Mod: PBBFAC | Performed by: NURSE PRACTITIONER

## 2023-03-08 PROCEDURE — 99024 POSTOP FOLLOW-UP VISIT: CPT | Mod: ,,, | Performed by: NURSE PRACTITIONER

## 2023-03-08 PROCEDURE — 3066F PR DOCUMENTATION OF TREATMENT FOR NEPHROPATHY: ICD-10-PCS | Mod: ,,, | Performed by: NURSE PRACTITIONER

## 2023-03-08 PROCEDURE — 73110 X-RAY EXAM OF WRIST: CPT | Mod: TC,LT

## 2023-03-08 PROCEDURE — 1159F PR MEDICATION LIST DOCUMENTED IN MEDICAL RECORD: ICD-10-PCS | Mod: ,,, | Performed by: NURSE PRACTITIONER

## 2023-03-08 PROCEDURE — 3066F NEPHROPATHY DOC TX: CPT | Mod: ,,, | Performed by: NURSE PRACTITIONER

## 2023-03-08 PROCEDURE — 73110 XR WRIST COMPLETE 3 VIEWS LEFT: ICD-10-PCS | Mod: 26,LT,, | Performed by: ORTHOPAEDIC SURGERY

## 2023-03-08 PROCEDURE — 1160F PR REVIEW ALL MEDS BY PRESCRIBER/CLIN PHARMACIST DOCUMENTED: ICD-10-PCS | Mod: ,,, | Performed by: NURSE PRACTITIONER

## 2023-03-08 PROCEDURE — 73110 X-RAY EXAM OF WRIST: CPT | Mod: 26,LT,, | Performed by: ORTHOPAEDIC SURGERY

## 2023-03-08 PROCEDURE — 99024 PR POST-OP FOLLOW-UP VISIT: ICD-10-PCS | Mod: ,,, | Performed by: NURSE PRACTITIONER

## 2023-03-08 PROCEDURE — 1159F MED LIST DOCD IN RCRD: CPT | Mod: ,,, | Performed by: NURSE PRACTITIONER

## 2023-03-08 PROCEDURE — 1160F RVW MEDS BY RX/DR IN RCRD: CPT | Mod: ,,, | Performed by: NURSE PRACTITIONER

## 2023-03-08 NOTE — PATIENT INSTRUCTIONS
Safety guidelines and activity restrictions are discussed with the patient.  Mole skin placed on the cast around the thumb.  Continue elevation, ice and rest.  Given work excuse to remain off work until follow-up Dr. Atwood in 2 weeks with repeat x-rays or sooner as needed.

## 2023-03-08 NOTE — PROGRESS NOTES
57-year-old female presents ambulatory to orthopedic clinic for re-evaluation left distal forearm injury.  She reportedly fell on 12/14/2023 while attempting to break her fall on her outstretched left non dominant hand.  She had pain and swelling ecchymosis.  She was seen at [facility] where x-rays revealed transverse fracture of the distal radius metaphyseal region without significant displacement or shortness.  She was seen by Dr. Atwood on 02/15/2023.  She was placed in short-arm fiberglass cast.  She reports improvement pain symptoms.      X-ray: X-rays today of the left wrist AP and lateral view to view reviewed by Dr. Atwood.      View of the x-ray again transverse fracture of distal radius metaphyseal region is noted without significant displacement or shortening.    PE:  Short-arm fiberglass cast is well-fitting.  It is rubbing around the volar surface of the thumb.  Mole skin applied around the base of the thumb on the cast itself.  Moves all digits left hand.  Capillary refill all digits left hand less than 3 seconds.      Impression:  2 weeks following closed treatment of left distal radius fracture     Plan:  Safety guidelines and activity restrictions are discussed with the patient.  Mole skin placed on the cast around the thumb.  Continue elevation, ice and rest.  Given work excuse to remain off work until follow-up Dr. Atwood in 2 weeks with repeat x-rays or sooner as needed.

## 2023-03-08 NOTE — LETTER
March 8, 2023      [facility]  [address] [place] [address] [place] [address]  Phone: [phone]  Fax: [phone]       Patient: Flory Chacon   YOB: 1965  Date of Visit: 03/08/2023    To Whom It May Concern:    JAYRO Chacon  was at [facility] on 03/08/2023. The patient is currently off work. Return to work status to be determined at next appt in 2 weeks.  If you have any questions or concerns, or if I can be of further assistance, please do not hesitate to contact me.    Sincerely,  ALEA Dudley/  Celia Espinal RN

## 2023-03-20 LAB
BATTERY VOLTAGE (V): 3.08 V
ERI (V): 2.6 V
OHS CV DC PP MS1: 0.4 MS
OHS CV DC PP MS2: 0.4 MS
OHS CV DC PP MS3: 0.4 MS
OHS CV DC PP V1: 1.5 V
OHS CV DC PP V2: 2 V
OHS CV DC PP V3: 2.75 V

## 2023-03-21 DIAGNOSIS — Z47.89 ORTHOPEDIC AFTERCARE: Primary | ICD-10-CM

## 2023-03-22 ENCOUNTER — HOSPITAL ENCOUNTER (OUTPATIENT)
Dept: RADIOLOGY | Facility: HOSPITAL | Age: 58
Discharge: HOME | End: 2023-03-22
Attending: ORTHOPAEDIC SURGERY
Payer: COMMERCIAL

## 2023-03-22 ENCOUNTER — OFFICE VISIT (OUTPATIENT)
Dept: ORTHOPEDICS | Facility: CLINIC | Age: 58
End: 2023-03-22
Payer: COMMERCIAL

## 2023-03-22 DIAGNOSIS — Z47.89 ORTHOPEDIC AFTERCARE: ICD-10-CM

## 2023-03-22 DIAGNOSIS — S52.502D CLOSED FRACTURE OF DISTAL END OF LEFT RADIUS WITH ROUTINE HEALING, UNSPECIFIED FRACTURE MORPHOLOGY, SUBSEQUENT ENCOUNTER: Primary | ICD-10-CM

## 2023-03-22 PROCEDURE — 4010F PR ACE/ARB THEARPY RXD/TAKEN: ICD-10-PCS | Mod: ,,, | Performed by: NURSE PRACTITIONER

## 2023-03-22 PROCEDURE — 99024 PR POST-OP FOLLOW-UP VISIT: ICD-10-PCS | Mod: ,,, | Performed by: NURSE PRACTITIONER

## 2023-03-22 PROCEDURE — 73110 X-RAY EXAM OF WRIST: CPT | Mod: 26,LT,, | Performed by: ORTHOPAEDIC SURGERY

## 2023-03-22 PROCEDURE — 73110 X-RAY EXAM OF WRIST: CPT | Mod: TC,LT

## 2023-03-22 PROCEDURE — 1159F PR MEDICATION LIST DOCUMENTED IN MEDICAL RECORD: ICD-10-PCS | Mod: ,,, | Performed by: NURSE PRACTITIONER

## 2023-03-22 PROCEDURE — 1160F PR REVIEW ALL MEDS BY PRESCRIBER/CLIN PHARMACIST DOCUMENTED: ICD-10-PCS | Mod: ,,, | Performed by: NURSE PRACTITIONER

## 2023-03-22 PROCEDURE — 3066F PR DOCUMENTATION OF TREATMENT FOR NEPHROPATHY: ICD-10-PCS | Mod: ,,, | Performed by: NURSE PRACTITIONER

## 2023-03-22 PROCEDURE — 1160F RVW MEDS BY RX/DR IN RCRD: CPT | Mod: ,,, | Performed by: NURSE PRACTITIONER

## 2023-03-22 PROCEDURE — 99024 POSTOP FOLLOW-UP VISIT: CPT | Mod: ,,, | Performed by: NURSE PRACTITIONER

## 2023-03-22 PROCEDURE — 4010F ACE/ARB THERAPY RXD/TAKEN: CPT | Mod: ,,, | Performed by: NURSE PRACTITIONER

## 2023-03-22 PROCEDURE — 3066F NEPHROPATHY DOC TX: CPT | Mod: ,,, | Performed by: NURSE PRACTITIONER

## 2023-03-22 PROCEDURE — 1159F MED LIST DOCD IN RCRD: CPT | Mod: ,,, | Performed by: NURSE PRACTITIONER

## 2023-03-22 PROCEDURE — 99212 OFFICE O/P EST SF 10 MIN: CPT | Mod: PBBFAC | Performed by: NURSE PRACTITIONER

## 2023-03-22 PROCEDURE — 73110 XR WRIST COMPLETE 3 VIEWS LEFT: ICD-10-PCS | Mod: 26,LT,, | Performed by: ORTHOPAEDIC SURGERY

## 2023-03-22 NOTE — PATIENT INSTRUCTIONS
Safety guidelines and activity restrictions are discussed with the patient.  She verbalizes understanding.  She is placed in a velcro wrist splint.  She may work on range-of-motion exercises of the elbow wrist forearm and hand.  Leave splint on at all times.  Return orthopedic clinic in 2 weeks follow-up with me with repeat x-rays of the left forearm or sooner Dr. Atwood as needed.

## 2023-03-22 NOTE — LETTER
March 23, 2023      [facility]  [address] [place] [address] [place] [address]  Phone: [phone]  Fax: [phone]       Patient: Flory Chacon   YOB: 1965  Date of Visit: 03/23/2023    To Whom It May Concern:    JAYRO Chacon  was at [facility] on 03/22/2023. The patient is currently off work. Return to work status to be determined at next appt in 2 weeks. If you have any questions or concerns, or if I can be of further assistance, please do not hesitate to contact me.    Sincerely,    ALEA Dudley/  Celia Espinal RN

## 2023-03-22 NOTE — PROGRESS NOTES
57-year-old female patient presents ambulatory orthopedic clinic re-evaluation of her left forearm.  She is known to orthopedic clinic being treated for a left distal radius fracture of the distal metaphyseal region.  She reports improvement pain symptoms.  She is been treated thus far with short-arm fiberglass cast.    X-ray:  X-rays today left wrist AP, lateral oblique view reviewed by Dr. Atwood.      On review of the x-ray transverse fracture of the distal radius metaphyseal region again is noted without significant displacement or shortening.  Carpus are normally aligned.  There is radiographic evidence of callus formation.      PE:  Physical exam of the left upper extremity shoulder has normal contour.  She is able to fully extend her left elbow to 0° and flex approximately 130°.  She is able to supinate 90°.  She is able to pronate approximately 80°.  Cast removed.  Skin is warm, dry and intact.  No soft tissue swelling is noted.  There is mild tenderness palpation over the distal radius.  Left radial pulse 2/4.  Capillary refill all digits left hand less than 3 seconds.      Impression:  4 weeks following closed treatment of a left distal radius fracture     Plan:  Safety guidelines and activity restrictions are discussed with the patient.  She verbalizes understanding.  She is placed in a velcro wrist splint.  She may work on range-of-motion exercises of the elbow wrist forearm and hand.  Leave splint on at all times.  Return orthopedic clinic in 2 weeks follow-up with me with repeat x-rays of the left forearm or sooner Dr. Atwood as needed.

## 2023-03-23 DIAGNOSIS — Z95.0 PRESENCE OF BIVENTRICULAR CARDIAC PACEMAKER: Primary | ICD-10-CM

## 2023-03-27 ENCOUNTER — OFFICE VISIT (OUTPATIENT)
Dept: FAMILY MEDICINE | Facility: CLINIC | Age: 58
End: 2023-03-27
Payer: COMMERCIAL

## 2023-03-27 VITALS
HEIGHT: 63 IN | DIASTOLIC BLOOD PRESSURE: 90 MMHG | BODY MASS INDEX: 25.69 KG/M2 | TEMPERATURE: 98 F | OXYGEN SATURATION: 98 % | WEIGHT: 145 LBS | SYSTOLIC BLOOD PRESSURE: 123 MMHG | HEART RATE: 106 BPM | RESPIRATION RATE: 20 BRPM

## 2023-03-27 DIAGNOSIS — I10 ESSENTIAL HYPERTENSION: ICD-10-CM

## 2023-03-27 DIAGNOSIS — Z00.00 HEALTH CARE MAINTENANCE: Primary | ICD-10-CM

## 2023-03-27 DIAGNOSIS — G43.109 COMPLICATED MIGRAINE: ICD-10-CM

## 2023-03-27 PROCEDURE — 3008F BODY MASS INDEX DOCD: CPT | Mod: ,,, | Performed by: FAMILY MEDICINE

## 2023-03-27 PROCEDURE — 3074F SYST BP LT 130 MM HG: CPT | Mod: ,,, | Performed by: FAMILY MEDICINE

## 2023-03-27 PROCEDURE — 1159F MED LIST DOCD IN RCRD: CPT | Mod: ,,, | Performed by: FAMILY MEDICINE

## 2023-03-27 PROCEDURE — 3074F PR MOST RECENT SYSTOLIC BLOOD PRESSURE < 130 MM HG: ICD-10-PCS | Mod: ,,, | Performed by: FAMILY MEDICINE

## 2023-03-27 PROCEDURE — 1159F PR MEDICATION LIST DOCUMENTED IN MEDICAL RECORD: ICD-10-PCS | Mod: ,,, | Performed by: FAMILY MEDICINE

## 2023-03-27 PROCEDURE — 3080F PR MOST RECENT DIASTOLIC BLOOD PRESSURE >= 90 MM HG: ICD-10-PCS | Mod: ,,, | Performed by: FAMILY MEDICINE

## 2023-03-27 PROCEDURE — 3066F NEPHROPATHY DOC TX: CPT | Mod: ,,, | Performed by: FAMILY MEDICINE

## 2023-03-27 PROCEDURE — 99212 OFFICE O/P EST SF 10 MIN: CPT | Mod: GC,,, | Performed by: FAMILY MEDICINE

## 2023-03-27 PROCEDURE — 3008F PR BODY MASS INDEX (BMI) DOCUMENTED: ICD-10-PCS | Mod: ,,, | Performed by: FAMILY MEDICINE

## 2023-03-27 PROCEDURE — 99212 PR OFFICE/OUTPT VISIT, EST, LEVL II, 10-19 MIN: ICD-10-PCS | Mod: GC,,, | Performed by: FAMILY MEDICINE

## 2023-03-27 PROCEDURE — 4010F PR ACE/ARB THEARPY RXD/TAKEN: ICD-10-PCS | Mod: ,,, | Performed by: FAMILY MEDICINE

## 2023-03-27 PROCEDURE — 3066F PR DOCUMENTATION OF TREATMENT FOR NEPHROPATHY: ICD-10-PCS | Mod: ,,, | Performed by: FAMILY MEDICINE

## 2023-03-27 PROCEDURE — 3080F DIAST BP >= 90 MM HG: CPT | Mod: ,,, | Performed by: FAMILY MEDICINE

## 2023-03-27 PROCEDURE — 4010F ACE/ARB THERAPY RXD/TAKEN: CPT | Mod: ,,, | Performed by: FAMILY MEDICINE

## 2023-03-27 NOTE — ASSESSMENT & PLAN NOTE
Continue Ibuprofen  Patient was taking Emgalitiy but coupon  and patient feels medication is too expensive. Will look for alternative

## 2023-03-27 NOTE — LETTER
March 27, 2023      [facility]  [address]  [place] [address]  Phone: [phone]  Fax: [phone]       Patient: Flory Chacon   YOB: 1965  Date of Visit: 03/27/2023    To Whom It May Concern:    JAYRO Chacon  was at [facility] on 03/27/2023. The patient may return to work/school on 4/06//2023 with no restrictions. If you have any questions or concerns, or if I can be of further assistance, please do not hesitate to contact me.    Sincerely,    Saroj Pineda MD

## 2023-03-27 NOTE — PROGRESS NOTES
Subjective:       Patient ID: Flory Chacon is a 57 y.o. female.    Chief Complaint: Follow-up and Hypertension    This is a 57 y.o female who present today for HTN f/u. Patient states her blood pressure has been normal since last visit. Patient states her memory has improved but she forgets some words. Patient does endorse history of Migraines and taking Emgality. Patient now taking ibuprofen for her HA. Patient denies Fever, Fatigue, N/V/D, SOB or CP.     Plan for today is to refer patient for a mammogram and follow up with her in 3 months to reassess labs and BP.      Current Outpatient Medications:     ABILIFY 2 mg Tab, Take 2 mg by mouth once daily., Disp: , Rfl:     ALPRAZolam (XANAX) 1 MG tablet, , Disp: , Rfl:     amLODIPine (NORVASC) 10 MG tablet, Take 1 tablet (10 mg total) by mouth once daily., Disp: 30 tablet, Rfl: 11    cetirizine (ZYRTEC) 10 MG tablet, Take 1 tablet (10 mg total) by mouth once daily., Disp: 90 tablet, Rfl: 1    chlorthalidone (HYGROTEN) 25 MG Tab, Take 25 mg by mouth every morning., Disp: , Rfl:     cimetidine (TAGAMET ORAL), Take 200 mg by mouth once daily., Disp: , Rfl:     cyanocobalamin 1,000 mcg/mL injection, cyanocobalamin (vit B-12) 1,000 mcg/mL injection solution  INJECT 1 ML ONCE DAILY INTRAMUSCULARLY FOR 7 DAYS AND THEN INJECT 1 ML ONCE A WEEK FOR 4 WEEKS AND THEN INJECT 1 ML ONCE EVERY MONTH, Disp: , Rfl:     cycloSPORINE (RESTASIS) 0.05 % ophthalmic emulsion, Restasis 0.05 % eye drops in a dropperette  INSTILL 1 DROP INTO EACH EYE EVERY 12 HOURS, Disp: , Rfl:     DULoxetine (CYMBALTA) 60 MG capsule, , Disp: , Rfl:     estradioL (ESTRACE) 0.01 % (0.1 mg/gram) vaginal cream, Place 2 g vaginally once daily., Disp: 60 g, Rfl: 11    ezetimibe (ZETIA) 10 mg tablet, Take 1 tablet (10 mg total) by mouth every evening., Disp: 90 tablet, Rfl: 3    fluticasone propionate (FLONASE) 50 mcg/actuation nasal spray, fluticasone propionate 50 mcg/actuation nasal spray,suspension  USE 2  SPRAY(S) IN EACH NOSTRIL ONCE DAILY, Disp: , Rfl:     hydroCHLOROthiazide (MICROZIDE) 12.5 mg capsule, hydrochlorothiazide 12.5 mg capsule  TAKE 1 CAPSULE BY MOUTH ONCE DAILY IN THE MORNING FOR 90 DAYS, Disp: , Rfl:     hydrocortisone 2.5 % cream, APPLY CREAM TO AFFECTED AREA TO AFFECTED AREA ONCE DAILY FOR 5 DAYS TO UPPER BILATERAL EXTREMITIES, Disp: , Rfl:     loratadine (CLARITIN) 10 mg tablet, Take 10 mg by mouth., Disp: , Rfl:     metoprolol succinate (TOPROL-XL) 100 MG 24 hr tablet, Take 1 tablet (100 mg total) by mouth once daily., Disp: 30 tablet, Rfl: 11    montelukast (SINGULAIR) 10 mg tablet, Take 10 mg by mouth once daily., Disp: , Rfl:     ondansetron (ZOFRAN) 4 MG tablet, ondansetron HCl 4 mg tablet  TAKE 1 TABLET BY MOUTH AS NEEDED FOR NAUSEA, Disp: , Rfl:     potassium citrate (UROCIT-K) 10 mEq (1,080 mg) TbSR, Urocit-K 10 10 mEq (1,080 mg) tablet,extended release  Take 1 tablet every day by oral route for 30 days., Disp: , Rfl:     sacubitriL-valsartan (ENTRESTO) 24-26 mg per tablet, Take 1 tablet by mouth 2 (two) times daily., Disp: , Rfl:     sodium chloride 1 gram tablet, Take 2 tablets (2 g total) by mouth 3 (three) times daily., Disp: 540 tablet, Rfl: 3    zolpidem (AMBIEN) 10 mg Tab, Take 10 mg by mouth nightly as needed., Disp: , Rfl:     aspirin 81 MG Chew, Take 1 tablet (81 mg total) by mouth once daily., Disp: 30 tablet, Rfl: 0    Review of patient's allergies indicates:   Allergen Reactions    Meloxicam Swelling    Eggs [egg derived]     Statins-hmg-coa reductase inhibitors     Penicillins Rash       Past Medical History:   Diagnosis Date    Acute hypokalemia     Anxiety disorder, unspecified     Depression     Hyperlipidemia     Hypertension     Insomnia        Past Surgical History:   Procedure Laterality Date    FRACTURE SURGERY      KNEE ARTHROPLASTY Left     KNEE ARTHROPLASTY Right     LEFT HEART CATHETERIZATION Left 04/25/2022    Procedure: Left heart cath;  Surgeon: Malachi Cormier,  DO;  Location: [facility] CATH LAB;  Service: Cardiology;  Laterality: Left;       Family History   Problem Relation Age of Onset    Heart disease Father     Melanoma Maternal Uncle        Social History     Tobacco Use    Smoking status: Former     Packs/day: 1.00     Years: 20.00     Pack years: 20.00     Types: Cigarettes     Quit date: 2013     Years since quitting: 10.2    Smokeless tobacco: Never   Substance Use Topics    Alcohol use: Not Currently     Comment: socially in the past    Drug use: Never       Review of Systems   Constitutional:  Negative for fatigue and fever.   HENT:  Negative for nasal congestion, facial swelling, mouth dryness and goiter.    Eyes: Negative.    Respiratory:  Negative for cough and shortness of breath.    Cardiovascular: Negative.    Gastrointestinal:  Negative for abdominal distention, abdominal pain, anal bleeding, constipation and diarrhea.   Endocrine: Negative.    Genitourinary: Negative.    Musculoskeletal: Negative.    Integumentary:  Negative.   Allergic/Immunologic: Negative.    Neurological:  Positive for headaches.   Hematological: Negative.    Psychiatric/Behavioral: Negative.         Current Medications:   Medication List with Changes/Refills   Current Medications    ABILIFY 2 MG TAB    Take 2 mg by mouth once daily.       Start Date: 4/29/2022 End Date: --    ALPRAZOLAM (XANAX) 1 MG TABLET           Start Date: 9/14/2021 End Date: --    AMLODIPINE (NORVASC) 10 MG TABLET    Take 1 tablet (10 mg total) by mouth once daily.       Start Date: 2/10/2023 End Date: 2/10/2024    ASPIRIN 81 MG CHEW    Take 1 tablet (81 mg total) by mouth once daily.       Start Date: 4/28/2022 End Date: 3/6/2023    CETIRIZINE (ZYRTEC) 10 MG TABLET    Take 1 tablet (10 mg total) by mouth once daily.       Start Date: 2/22/2023 End Date: --    CHLORTHALIDONE (HYGROTEN) 25 MG TAB    Take 25 mg by mouth every morning.       Start Date: 1/21/2023 End Date: --    CIMETIDINE (TAGAMET ORAL)    Take  200 mg by mouth once daily.       Start Date: --        End Date: --    CYANOCOBALAMIN 1,000 MCG/ML INJECTION    cyanocobalamin (vit B-12) 1,000 mcg/mL injection solution   INJECT 1 ML ONCE DAILY INTRAMUSCULARLY FOR 7 DAYS AND THEN INJECT 1 ML ONCE A WEEK FOR 4 WEEKS AND THEN INJECT 1 ML ONCE EVERY MONTH       Start Date: --        End Date: --    CYCLOSPORINE (RESTASIS) 0.05 % OPHTHALMIC EMULSION    Restasis 0.05 % eye drops in a dropperette   INSTILL 1 DROP INTO EACH EYE EVERY 12 HOURS       Start Date: --        End Date: --    DULOXETINE (CYMBALTA) 60 MG CAPSULE           Start Date: 9/13/2021 End Date: --    ESTRADIOL (ESTRACE) 0.01 % (0.1 MG/GRAM) VAGINAL CREAM    Place 2 g vaginally once daily.       Start Date: 5/9/2022  End Date: 5/9/2023    EZETIMIBE (ZETIA) 10 MG TABLET    Take 1 tablet (10 mg total) by mouth every evening.       Start Date: 2/22/2023 End Date: 2/22/2024    FLUTICASONE PROPIONATE (FLONASE) 50 MCG/ACTUATION NASAL SPRAY    fluticasone propionate 50 mcg/actuation nasal spray,suspension   USE 2 SPRAY(S) IN EACH NOSTRIL ONCE DAILY       Start Date: --        End Date: --    HYDROCHLOROTHIAZIDE (MICROZIDE) 12.5 MG CAPSULE    hydrochlorothiazide 12.5 mg capsule   TAKE 1 CAPSULE BY MOUTH ONCE DAILY IN THE MORNING FOR 90 DAYS       Start Date: --        End Date: --    HYDROCORTISONE 2.5 % CREAM    APPLY CREAM TO AFFECTED AREA TO AFFECTED AREA ONCE DAILY FOR 5 DAYS TO UPPER BILATERAL EXTREMITIES       Start Date: 8/24/2021 End Date: --    LORATADINE (CLARITIN) 10 MG TABLET    Take 10 mg by mouth.       Start Date: --        End Date: --    METOPROLOL SUCCINATE (TOPROL-XL) 100 MG 24 HR TABLET    Take 1 tablet (100 mg total) by mouth once daily.       Start Date: 2/10/2023 End Date: 2/10/2024    MONTELUKAST (SINGULAIR) 10 MG TABLET    Take 10 mg by mouth once daily.       Start Date: 7/27/2021 End Date: --    ONDANSETRON (ZOFRAN) 4 MG TABLET    ondansetron HCl 4 mg tablet   TAKE 1 TABLET BY MOUTH AS  "NEEDED FOR NAUSEA       Start Date: --        End Date: --    POTASSIUM CITRATE (UROCIT-K) 10 MEQ (1,080 MG) TBSR    Urocit-K 10 10 mEq (1,080 mg) tablet,extended release   Take 1 tablet every day by oral route for 30 days.       Start Date: --        End Date: --    SACUBITRIL-VALSARTAN (ENTRESTO) 24-26 MG PER TABLET    Take 1 tablet by mouth 2 (two) times daily.       Start Date: --        End Date: --    SODIUM CHLORIDE 1 GRAM TABLET    Take 2 tablets (2 g total) by mouth 3 (three) times daily.       Start Date: 3/1/2023  End Date: 2/29/2024    ZOLPIDEM (AMBIEN) 10 MG TAB    Take 10 mg by mouth nightly as needed.       Start Date: 2/10/2023 End Date: --            Objective:        Vitals:    03/27/23 1435   BP: (!) 123/90   BP Location: Right arm   Patient Position: Sitting   BP Method: Medium (Automatic)   Pulse: 106   Resp: 20   Temp: 98.4 °F (36.9 °C)   TempSrc: Temporal   SpO2: 98%   Weight: 65.8 kg (145 lb)   Height: 5' 3" (1.6 m)       Physical Exam  Constitutional:       Appearance: She is normal weight.   HENT:      Head: Normocephalic.      Right Ear: Tympanic membrane normal.      Left Ear: Tympanic membrane normal.      Nose: Nose normal.      Mouth/Throat:      Mouth: Mucous membranes are moist.      Pharynx: Oropharynx is clear.   Eyes:      Conjunctiva/sclera: Conjunctivae normal.      Pupils: Pupils are equal, round, and reactive to light.   Cardiovascular:      Rate and Rhythm: Normal rate and regular rhythm.      Pulses: Normal pulses.      Heart sounds: Normal heart sounds.   Pulmonary:      Effort: Pulmonary effort is normal.      Breath sounds: Normal breath sounds.   Abdominal:      General: Abdomen is flat. Bowel sounds are normal.   Musculoskeletal:         General: Normal range of motion.      Cervical back: Normal range of motion.   Skin:     General: Skin is warm and dry.      Capillary Refill: Capillary refill takes less than 2 seconds.   Neurological:      General: No focal deficit " present.      Mental Status: She is alert and oriented to person, place, and time.   Psychiatric:         Mood and Affect: Mood normal.         Behavior: Behavior normal.           Lab Results   Component Value Date    WBC 10.84 2023    HGB 12.2 2023    HCT 36.0 (L) 2023     (H) 2023    CHOL 197 2022    TRIG 82 2022    HDL 85 (H) 2022    ALT 29 2023    AST 49 (H) 2023     (L) 2023    K 4.2 2023    CL 98 2023    CREATININE 0.92 2023    BUN 8 2023    CO2 29 2023    TSH 0.220 (L) 2023    INR 0.96 2023      Assessment:       1. Complicated migraine    2. Essential hypertension        Plan:         Problem List Items Addressed This Visit          Neuro    Complicated migraine     Continue Ibuprofen  Patient was taking Emgalitiy but coupon  and patient feels medication is too expensive. Will look for alternative            Cardiac/Vascular    Essential hypertension     /90  Will continue Norvasc 10 mg and Chlorthalidone 25mg              No follow-ups on file.    Saroj Pineda MD     Instructed patient that if symptoms fail to improve or worsen patient should seek immediate medical attention or report to the nearest emergency department. Patient expressed verbal agreement and understanding to this plan of care.

## 2023-03-28 ENCOUNTER — PATIENT MESSAGE (OUTPATIENT)
Dept: FAMILY MEDICINE | Facility: CLINIC | Age: 58
End: 2023-03-28
Payer: COMMERCIAL

## 2023-04-04 ENCOUNTER — OFFICE VISIT (OUTPATIENT)
Dept: NEUROLOGY | Facility: CLINIC | Age: 58
End: 2023-04-04
Payer: COMMERCIAL

## 2023-04-04 VITALS
HEART RATE: 92 BPM | SYSTOLIC BLOOD PRESSURE: 122 MMHG | OXYGEN SATURATION: 96 % | BODY MASS INDEX: 25.52 KG/M2 | DIASTOLIC BLOOD PRESSURE: 80 MMHG | WEIGHT: 144 LBS | HEIGHT: 63 IN

## 2023-04-04 DIAGNOSIS — R41.3 MEMORY IMPAIRMENT: ICD-10-CM

## 2023-04-04 DIAGNOSIS — E03.9 HYPOTHYROIDISM, UNSPECIFIED TYPE: ICD-10-CM

## 2023-04-04 DIAGNOSIS — R55 SYNCOPE, UNSPECIFIED SYNCOPE TYPE: Primary | ICD-10-CM

## 2023-04-04 DIAGNOSIS — E55.9 VITAMIN D DEFICIENCY: ICD-10-CM

## 2023-04-04 DIAGNOSIS — E53.8 VITAMIN B12 DEFICIENCY: ICD-10-CM

## 2023-04-04 DIAGNOSIS — Z47.89 ORTHOPEDIC AFTERCARE: Primary | ICD-10-CM

## 2023-04-04 PROCEDURE — 4010F PR ACE/ARB THEARPY RXD/TAKEN: ICD-10-PCS | Mod: ,,, | Performed by: NURSE PRACTITIONER

## 2023-04-04 PROCEDURE — 3079F DIAST BP 80-89 MM HG: CPT | Mod: ,,, | Performed by: NURSE PRACTITIONER

## 2023-04-04 PROCEDURE — 3079F PR MOST RECENT DIASTOLIC BLOOD PRESSURE 80-89 MM HG: ICD-10-PCS | Mod: ,,, | Performed by: NURSE PRACTITIONER

## 2023-04-04 PROCEDURE — 3066F PR DOCUMENTATION OF TREATMENT FOR NEPHROPATHY: ICD-10-PCS | Mod: ,,, | Performed by: NURSE PRACTITIONER

## 2023-04-04 PROCEDURE — 3074F SYST BP LT 130 MM HG: CPT | Mod: ,,, | Performed by: NURSE PRACTITIONER

## 2023-04-04 PROCEDURE — 3074F PR MOST RECENT SYSTOLIC BLOOD PRESSURE < 130 MM HG: ICD-10-PCS | Mod: ,,, | Performed by: NURSE PRACTITIONER

## 2023-04-04 PROCEDURE — 99213 PR OFFICE/OUTPT VISIT, EST, LEVL III, 20-29 MIN: ICD-10-PCS | Mod: S$PBB,,, | Performed by: NURSE PRACTITIONER

## 2023-04-04 PROCEDURE — 1159F PR MEDICATION LIST DOCUMENTED IN MEDICAL RECORD: ICD-10-PCS | Mod: ,,, | Performed by: NURSE PRACTITIONER

## 2023-04-04 PROCEDURE — 1160F PR REVIEW ALL MEDS BY PRESCRIBER/CLIN PHARMACIST DOCUMENTED: ICD-10-PCS | Mod: ,,, | Performed by: NURSE PRACTITIONER

## 2023-04-04 PROCEDURE — 99215 OFFICE O/P EST HI 40 MIN: CPT | Mod: PBBFAC | Performed by: NURSE PRACTITIONER

## 2023-04-04 PROCEDURE — 99213 OFFICE O/P EST LOW 20 MIN: CPT | Mod: S$PBB,,, | Performed by: NURSE PRACTITIONER

## 2023-04-04 PROCEDURE — 3008F PR BODY MASS INDEX (BMI) DOCUMENTED: ICD-10-PCS | Mod: ,,, | Performed by: NURSE PRACTITIONER

## 2023-04-04 PROCEDURE — 3066F NEPHROPATHY DOC TX: CPT | Mod: ,,, | Performed by: NURSE PRACTITIONER

## 2023-04-04 PROCEDURE — 4010F ACE/ARB THERAPY RXD/TAKEN: CPT | Mod: ,,, | Performed by: NURSE PRACTITIONER

## 2023-04-04 PROCEDURE — 1159F MED LIST DOCD IN RCRD: CPT | Mod: ,,, | Performed by: NURSE PRACTITIONER

## 2023-04-04 PROCEDURE — 3008F BODY MASS INDEX DOCD: CPT | Mod: ,,, | Performed by: NURSE PRACTITIONER

## 2023-04-04 PROCEDURE — 1160F RVW MEDS BY RX/DR IN RCRD: CPT | Mod: ,,, | Performed by: NURSE PRACTITIONER

## 2023-04-04 NOTE — PROGRESS NOTES
Subjective:       Patient ID: Flory Chacon is a 57 y.o. female     Chief Complaint:    No chief complaint on file.       Allergies:  Meloxicam, Eggs [egg derived], Statins-hmg-coa reductase inhibitors, and Penicillins    Current Medications:    Outpatient Encounter Medications as of 4/4/2023   Medication Sig Dispense Refill    ABILIFY 2 mg Tab Take 2 mg by mouth once daily.      ALPRAZolam (XANAX) 1 MG tablet       amLODIPine (NORVASC) 10 MG tablet Take 1 tablet (10 mg total) by mouth once daily. 30 tablet 11    cetirizine (ZYRTEC) 10 MG tablet Take 1 tablet (10 mg total) by mouth once daily. 90 tablet 1    chlorthalidone (HYGROTEN) 25 MG Tab Take 25 mg by mouth every morning.      cimetidine (TAGAMET ORAL) Take 200 mg by mouth once daily.      cyanocobalamin 1,000 mcg/mL injection cyanocobalamin (vit B-12) 1,000 mcg/mL injection solution   INJECT 1 ML ONCE DAILY INTRAMUSCULARLY FOR 7 DAYS AND THEN INJECT 1 ML ONCE A WEEK FOR 4 WEEKS AND THEN INJECT 1 ML ONCE EVERY MONTH      cycloSPORINE (RESTASIS) 0.05 % ophthalmic emulsion Restasis 0.05 % eye drops in a dropperette   INSTILL 1 DROP INTO EACH EYE EVERY 12 HOURS      DULoxetine (CYMBALTA) 60 MG capsule       estradioL (ESTRACE) 0.01 % (0.1 mg/gram) vaginal cream Place 2 g vaginally once daily. 60 g 11    ezetimibe (ZETIA) 10 mg tablet Take 1 tablet (10 mg total) by mouth every evening. 90 tablet 3    fluticasone propionate (FLONASE) 50 mcg/actuation nasal spray fluticasone propionate 50 mcg/actuation nasal spray,suspension   USE 2 SPRAY(S) IN EACH NOSTRIL ONCE DAILY      hydroCHLOROthiazide (MICROZIDE) 12.5 mg capsule hydrochlorothiazide 12.5 mg capsule   TAKE 1 CAPSULE BY MOUTH ONCE DAILY IN THE MORNING FOR 90 DAYS      hydrocortisone 2.5 % cream APPLY CREAM TO AFFECTED AREA TO AFFECTED AREA ONCE DAILY FOR 5 DAYS TO UPPER BILATERAL EXTREMITIES      loratadine (CLARITIN) 10 mg tablet Take 10 mg by mouth.      metoprolol succinate (TOPROL-XL) 100 MG 24 hr  tablet Take 1 tablet (100 mg total) by mouth once daily. 30 tablet 11    montelukast (SINGULAIR) 10 mg tablet Take 10 mg by mouth once daily.      ondansetron (ZOFRAN) 4 MG tablet ondansetron HCl 4 mg tablet   TAKE 1 TABLET BY MOUTH AS NEEDED FOR NAUSEA      potassium citrate (UROCIT-K) 10 mEq (1,080 mg) TbSR Urocit-K 10 10 mEq (1,080 mg) tablet,extended release   Take 1 tablet every day by oral route for 30 days.      sacubitriL-valsartan (ENTRESTO) 24-26 mg per tablet Take 1 tablet by mouth 2 (two) times daily.      sodium chloride 1 gram tablet Take 2 tablets (2 g total) by mouth 3 (three) times daily. 540 tablet 3    zolpidem (AMBIEN) 10 mg Tab Take 10 mg by mouth nightly as needed.      aspirin 81 MG Chew Take 1 tablet (81 mg total) by mouth once daily. 30 tablet 0    [DISCONTINUED] atenoloL (TENORMIN) 100 MG tablet Take 100 mg by mouth once daily. for 90 days      [DISCONTINUED] propranoloL (INDERAL) 10 MG tablet        No facility-administered encounter medications on file as of 4/4/2023.       History of Present Illness  55 y/o female following in neurology for reported syncope, now today reporting memory impairment    Was actually admitted to hospital in April 2022 with reported syncope, there is EEG noted done in April of 2022 which was negative.  MRI brain at that time did not indicate acute abnormalities, nor did carotid doppler report stenosis.  Her monitoring at that time was concerning for SVT, she was following with cardiology at [facility] at time of her initial visit with us.  She ended up having pacemaker placed due to the SVT and cardiology felt that this was the cause of the syncope events.  She denied any further complications since that surgery at her last visit, which was 9 months ago.    She declined the in home VEEG at time of the initial visit.    Recent primary care notes indicate she has recently been admitted and treated for hyponatremia, reported speech difficulty, but was  improving.  I see the admit 1/30/23 for sodium level of 106.  She has been referred to nephrology as well.  She is prescribed abilify, cymbalta, xanax, and ambien, which too can very well be contributing to reported memory complications, likely the biggest ones.  She had CT head 2/3/23 which was negative for acute abnormalities.  She reports had prior sleep study done and was negative, but she was not able to give me date or where it was done.  Doing MMSE here in clinic today was 27/30.  We will continue to monitor.  I encouraged her good sleep habits, noted in the past was positive for marijuana, she reports no longer using, but did for about 5 years.  It too is well known for short term memory complications.  I encouraged using crossword puzzles at home.         Review of Systems  Review of Systems   Constitutional:  Negative for diaphoresis and fever.   HENT:  Negative for congestion, hearing loss and tinnitus.    Eyes:  Negative for blurred vision, double vision, photophobia, discharge and redness.   Respiratory:  Negative for cough and shortness of breath.    Cardiovascular:  Negative for chest pain.   Gastrointestinal:  Negative for abdominal pain, nausea and vomiting.   Musculoskeletal:  Negative for back pain, joint pain, myalgias and neck pain.   Skin:  Negative for itching and rash.   Neurological:  Positive for loss of consciousness. Negative for dizziness, tremors, sensory change, speech change, focal weakness, seizures, weakness and headaches.   Psychiatric/Behavioral:  Positive for memory loss. Negative for depression and hallucinations. The patient does not have insomnia.    All other systems reviewed and are negative.   Objective:     NEUROLOGICAL EXAMINATION:     MENTAL STATUS   Oriented to person, place, and time.   Registration: recalls 3 of 3 objects. Recall at 5 minutes: recalls 3 of 3 objects.   Attention: normal. Concentration: normal.   Speech: speech is normal   Level of consciousness:  alert  Knowledge: good and consistent with education.   Normal comprehension.     CRANIAL NERVES     CN II   Visual fields full to confrontation.   Visual acuity: normal  Right visual field deficit: none  Left visual field deficit: none     CN III, IV, VI   Pupils are equal, round, and reactive to light.  Extraocular motions are normal.   Right pupil: Size: 3 mm. Shape: regular. Reactivity: brisk. Consensual response: intact. Accommodation: intact.   Left pupil: Size: 3 mm. Shape: regular. Reactivity: brisk. Consensual response: intact. Accommodation: intact.   CN III: no CN III palsy  CN VI: no CN VI palsy  Nystagmus: none   Diplopia: none  Upgaze: normal  Downgaze: normal  Conjugate gaze: present  Vestibulo-ocular reflex: present    CN V   Facial sensation intact.   Right facial sensation deficit: none  Left facial sensation deficit: none  Right corneal reflex: normal  Left corneal reflex: normal    CN VII   Facial expression full, symmetric.   Right facial weakness: none  Left facial weakness: none  Right taste: normal  Left taste: normal    CN VIII   CN VIII normal.   Hearing: intact    CN IX, X   CN IX normal.   CN X normal.   Palate: symmetric    CN XI   CN XI normal.   Right sternocleidomastoid strength: normal  Left sternocleidomastoid strength: normal  Right trapezius strength: normal  Left trapezius strength: normal    CN XII   CN XII normal.   Tongue: not atrophic  Fasciculations: absent  Tongue deviation: none    MOTOR EXAM   Muscle bulk: normal  Overall muscle tone: normal  Right arm tone: normal  Left arm tone: normal  Right arm pronator drift: absent  Left arm pronator drift: absent  Right leg tone: normal  Left leg tone: normal    Strength   Right neck flexion: 5/5  Left neck flexion: 5/5  Right neck extension: 5/5  Left neck extension: 5/5  Right deltoid: 5/5  Left deltoid: 5/5  Right biceps: 5/5  Left biceps: 5/5  Right triceps: 5/5  Left triceps: 5/5  Right wrist flexion: 5/5  Left wrist flexion:  5/5  Right wrist extension: 5/5  Left wrist extension: 5/5  Right interossei: 5/5  Left interossei: 5/5  Right iliopsoas: 5/5  Left iliopsoas: 5/5  Right quadriceps: 5/5  Left quadriceps: 5/5  Right hamstrin/5  Left hamstrin/5  Right anterior tibial: 5/5  Left anterior tibial: 5/5  Right posterior tibial: 5/5  Left posterior tibial: 5/5  Right gastroc: 5/5  Left gastroc: 5/5    REFLEXES     Reflexes   Right brachioradialis: 2+  Left brachioradialis: 2+  Right biceps: 2+  Left biceps: 2+  Right triceps: 2+  Left triceps: 2+  Right patellar: 2+  Left patellar: 2+  Right achilles: 2+  Left achilles: 2+  Right plantar: normal  Left plantar: normal  Right Otero: absent  Left Otero: absent  Right ankle clonus: absent  Left ankle clonus: absent  Right pendular knee jerk: absent  Left pendular knee jerk: absent    SENSORY EXAM   Light touch normal.   Right arm light touch: normal  Left arm light touch: normal  Right leg light touch: normal  Left leg light touch: normal  Vibration normal.   Right arm vibration: normal  Left arm vibration: normal  Right leg vibration: normal  Left leg vibration: normal  Proprioception normal.   Right arm proprioception: normal  Left arm proprioception: normal  Right leg proprioception: normal  Left leg proprioception: normal  Pinprick normal.   Right arm pinprick: normal  Left arm pinprick: normal  Right leg pinprick: normal  Left leg pinprick: normal  Graphesthesia: normal  Romberg: negative  Stereognosis: normal    GAIT AND COORDINATION     Gait  Gait: normal     Coordination   Finger to nose coordination: normal  Heel to shin coordination: normal  Tandem walking coordination: normal    Tremor   Resting tremor: absent  Intention tremor: absent  Action tremor: absent     Physical Exam  Vitals and nursing note reviewed.   Constitutional:       Appearance: Normal appearance.   HENT:      Head: Normocephalic.   Eyes:      Extraocular Movements: Extraocular movements intact and EOM  normal.      Pupils: Pupils are equal, round, and reactive to light.   Cardiovascular:      Rate and Rhythm: Normal rate and regular rhythm.   Pulmonary:      Effort: Pulmonary effort is normal.      Breath sounds: Normal breath sounds.   Musculoskeletal:         General: No swelling or tenderness. Normal range of motion.      Cervical back: Normal range of motion and neck supple.      Right lower leg: No edema.      Left lower leg: No edema.   Skin:     General: Skin is warm and dry.      Coloration: Skin is not jaundiced.      Findings: No rash.   Neurological:      General: No focal deficit present.      Mental Status: She is alert and oriented to person, place, and time.      GCS: GCS eye subscore is 4. GCS verbal subscore is 5. GCS motor subscore is 6.      Cranial Nerves: No cranial nerve deficit.      Sensory: No sensory deficit.      Motor: Motor function is intact. No weakness.      Coordination: Coordination is intact. Coordination normal. Finger-Nose-Finger Test, Heel to Shin Test and Romberg Test normal.      Gait: Gait is intact. Gait and tandem walk normal.      Deep Tendon Reflexes: Reflexes normal.      Reflex Scores:       Tricep reflexes are 2+ on the right side and 2+ on the left side.       Bicep reflexes are 2+ on the right side and 2+ on the left side.       Brachioradialis reflexes are 2+ on the right side and 2+ on the left side.       Patellar reflexes are 2+ on the right side and 2+ on the left side.       Achilles reflexes are 2+ on the right side and 2+ on the left side.  Psychiatric:         Mood and Affect: Mood normal.         Speech: Speech normal.         Behavior: Behavior normal.        Assessment:     Problem List Items Addressed This Visit    None  Visit Diagnoses       Syncope, unspecified syncope type    -  Primary    Memory impairment        Relevant Orders    CBC Auto Differential    Ammonia    Comprehensive Metabolic Panel    Urinalysis, Reflex to Urine Culture    Drug  Screen, Urine    Vitamin B12 deficiency        Relevant Orders    Folate    Vitamin B12    Hypothyroidism, unspecified type        Relevant Orders    TSH    Vitamin D deficiency        Relevant Orders    Vitamin D               Primary Diagnosis and ICD10  Syncope, unspecified syncope type [R55]    Plan:     Patient Instructions   Continue followup with cardiology  Notify clinic if any further syncope events  Keep followup with cardiology  Dementia panel  MMSE in clinic 27/30  Reviewed more recent CT head, no acute abnormalities    There are no discontinued medications.    Requested Prescriptions      No prescriptions requested or ordered in this encounter       Orders Placed This Encounter   Procedures    CBC Auto Differential    Ammonia    Comprehensive Metabolic Panel    Folate    TSH    Vitamin B12    Vitamin D    Urinalysis, Reflex to Urine Culture    Drug Screen, Urine

## 2023-04-04 NOTE — PATIENT INSTRUCTIONS
Continue followup with cardiology  Notify clinic if any further syncope events  Keep followup with cardiology  Dementia panel  MMSE in clinic 27/30  Reviewed more recent CT head, no acute abnormalities

## 2023-04-05 ENCOUNTER — OFFICE VISIT (OUTPATIENT)
Dept: ORTHOPEDICS | Facility: CLINIC | Age: 58
End: 2023-04-05
Payer: COMMERCIAL

## 2023-04-05 ENCOUNTER — HOSPITAL ENCOUNTER (OUTPATIENT)
Dept: RADIOLOGY | Facility: HOSPITAL | Age: 58
Discharge: HOME | End: 2023-04-05
Attending: ORTHOPAEDIC SURGERY
Payer: COMMERCIAL

## 2023-04-05 DIAGNOSIS — S52.502D CLOSED FRACTURE OF DISTAL END OF LEFT RADIUS WITH ROUTINE HEALING, UNSPECIFIED FRACTURE MORPHOLOGY, SUBSEQUENT ENCOUNTER: Primary | ICD-10-CM

## 2023-04-05 DIAGNOSIS — Z47.89 ORTHOPEDIC AFTERCARE: ICD-10-CM

## 2023-04-05 PROCEDURE — 99024 POSTOP FOLLOW-UP VISIT: CPT | Mod: ,,, | Performed by: NURSE PRACTITIONER

## 2023-04-05 PROCEDURE — 99024 PR POST-OP FOLLOW-UP VISIT: ICD-10-PCS | Mod: ,,, | Performed by: NURSE PRACTITIONER

## 2023-04-05 PROCEDURE — 73110 X-RAY EXAM OF WRIST: CPT | Mod: TC,LT

## 2023-04-05 PROCEDURE — 99213 OFFICE O/P EST LOW 20 MIN: CPT | Mod: PBBFAC | Performed by: NURSE PRACTITIONER

## 2023-04-05 PROCEDURE — 4010F ACE/ARB THERAPY RXD/TAKEN: CPT | Mod: ,,, | Performed by: NURSE PRACTITIONER

## 2023-04-05 PROCEDURE — 73110 X-RAY EXAM OF WRIST: CPT | Mod: 26,LT,, | Performed by: ORTHOPAEDIC SURGERY

## 2023-04-05 PROCEDURE — 1159F MED LIST DOCD IN RCRD: CPT | Mod: ,,, | Performed by: NURSE PRACTITIONER

## 2023-04-05 PROCEDURE — 1160F PR REVIEW ALL MEDS BY PRESCRIBER/CLIN PHARMACIST DOCUMENTED: ICD-10-PCS | Mod: ,,, | Performed by: NURSE PRACTITIONER

## 2023-04-05 PROCEDURE — 3066F NEPHROPATHY DOC TX: CPT | Mod: ,,, | Performed by: NURSE PRACTITIONER

## 2023-04-05 PROCEDURE — 1160F RVW MEDS BY RX/DR IN RCRD: CPT | Mod: ,,, | Performed by: NURSE PRACTITIONER

## 2023-04-05 PROCEDURE — 4010F PR ACE/ARB THEARPY RXD/TAKEN: ICD-10-PCS | Mod: ,,, | Performed by: NURSE PRACTITIONER

## 2023-04-05 PROCEDURE — 1159F PR MEDICATION LIST DOCUMENTED IN MEDICAL RECORD: ICD-10-PCS | Mod: ,,, | Performed by: NURSE PRACTITIONER

## 2023-04-05 PROCEDURE — 73110 XR WRIST COMPLETE 3 VIEWS LEFT: ICD-10-PCS | Mod: 26,LT,, | Performed by: ORTHOPAEDIC SURGERY

## 2023-04-05 PROCEDURE — 3066F PR DOCUMENTATION OF TREATMENT FOR NEPHROPATHY: ICD-10-PCS | Mod: ,,, | Performed by: NURSE PRACTITIONER

## 2023-04-05 NOTE — PROGRESS NOTES
57-year-old female presents ambulatory to orthopedic clinic re-evaluation left forearm.  She is known to Orthopedics being treated for left distal radius fracture of the distal metaphyseal region.  States she continues have some pain and discomfort.  She is been treated initially with a short-arm fiberglass cast and was converted to a Velcro wrist splint.  Splint was removed in x-ray presents with splint and hand.      X-ray: X-rays today left wrist AP, lateral oblique view reviewed by Dr. Atwood.      Review of the x-ray shows a transverse fracture of the distal radius metaphyseal region again is noted with out significant displacement or shortness of.  Carpus normally aligned.  There is radiographic evidence of callus formation.      Physical exam of the left wrist skin is warm dry and intact.  Left radial pulse 2/4.  Capillary refill digits left hand less 3 seconds.  She is able fully extend left elbow 0° flexion proximal 135°.  She is able to pronate supinate forearm 90°.  Range of motion of his is limited in both dorsal and volar flexion which to be expected.    Impression:  6 weeks following closed treatment of a left distal radius fracture     Plan:  Safety guidelines and activity restrictions are discussed with the patient.  She verbalizes understanding.  She is instructed on gentle range-of-motion exercises to perform.  She verbalized understanding.  She may wean herself from Velcro wrist splint over the next 2 weeks she feels comfortable.  May remove for bathing and sleep.  She was given a work release to return to work on 04/10/2023 without restrictions.  She is return orthopedic clinic in 2-3 weeks with re-evaluation left forearm/wrist or sooner as needed.

## 2023-04-05 NOTE — PATIENT INSTRUCTIONS
Safety guidelines and activity restrictions are discussed with the patient.  She verbalizes understanding.  She is instructed on gentle range-of-motion exercises to perform.  She verbalized understanding.  She may wean herself from Velcro wrist splint over the next 2 weeks she feels comfortable.  May remove for bathing and sleep.  She was given a work release to return to work on 04/10/2023 without restrictions.  She is return orthopedic clinic in 2-3 weeks with re-evaluation left forearm/wrist or sooner as needed.

## 2023-04-05 NOTE — LETTER
April 5, 2023      [facility]  [address] [place] [id number]  Phone: [phone]  Fax: [phone]       Patient: Flory Chacon   YOB: 1965  Date of Visit: 04/05/2023    To Whom It May Concern:    JAYRO Chacon  was at [facility] on 04/05/2023. The patient may return to work/school on 4/10/23 with no restrictions. If you have any questions or concerns, or if I can be of further assistance, please do not hesitate to contact me.    Sincerely,  ALEA Dudley/  Celia Espinal RN

## 2023-04-10 ENCOUNTER — TELEPHONE (OUTPATIENT)
Dept: NEUROLOGY | Facility: CLINIC | Age: 58
End: 2023-04-10
Payer: COMMERCIAL

## 2023-04-10 NOTE — TELEPHONE ENCOUNTER
I have been calling pt since last Wed. No answer no vm.          ----- Message from ALEA Mcconnell sent at 4/5/2023  2:02 PM CDT -----  Th only significant abnormality in her labs is the fact she is using marijuana still, which is well known to cause short term memory impairment.  She told me in the clinic she was not using it any longer, but she is. She needs to stop it if she wants to prevent further worsening in her short term memory.  No medication is recommended at this time.

## 2023-04-18 DIAGNOSIS — Z47.89 ORTHOPEDIC AFTERCARE: Primary | ICD-10-CM

## 2023-04-19 ENCOUNTER — HOSPITAL ENCOUNTER (OUTPATIENT)
Dept: RADIOLOGY | Facility: HOSPITAL | Age: 58
Discharge: HOME | End: 2023-04-19
Attending: ORTHOPAEDIC SURGERY
Payer: COMMERCIAL

## 2023-04-19 ENCOUNTER — OFFICE VISIT (OUTPATIENT)
Dept: ORTHOPEDICS | Facility: CLINIC | Age: 58
End: 2023-04-19
Payer: COMMERCIAL

## 2023-04-19 DIAGNOSIS — S52.502D CLOSED FRACTURE OF DISTAL END OF LEFT RADIUS WITH ROUTINE HEALING, UNSPECIFIED FRACTURE MORPHOLOGY, SUBSEQUENT ENCOUNTER: Primary | ICD-10-CM

## 2023-04-19 DIAGNOSIS — Z47.89 ORTHOPEDIC AFTERCARE: ICD-10-CM

## 2023-04-19 PROCEDURE — 3066F PR DOCUMENTATION OF TREATMENT FOR NEPHROPATHY: ICD-10-PCS | Mod: ,,, | Performed by: NURSE PRACTITIONER

## 2023-04-19 PROCEDURE — 73110 X-RAY EXAM OF WRIST: CPT | Mod: 26,LT,, | Performed by: ORTHOPAEDIC SURGERY

## 2023-04-19 PROCEDURE — 73110 XR WRIST COMPLETE 3 VIEWS LEFT: ICD-10-PCS | Mod: 26,LT,, | Performed by: ORTHOPAEDIC SURGERY

## 2023-04-19 PROCEDURE — 4010F ACE/ARB THERAPY RXD/TAKEN: CPT | Mod: ,,, | Performed by: NURSE PRACTITIONER

## 2023-04-19 PROCEDURE — 1159F MED LIST DOCD IN RCRD: CPT | Mod: ,,, | Performed by: NURSE PRACTITIONER

## 2023-04-19 PROCEDURE — 3066F NEPHROPATHY DOC TX: CPT | Mod: ,,, | Performed by: NURSE PRACTITIONER

## 2023-04-19 PROCEDURE — 73110 X-RAY EXAM OF WRIST: CPT | Mod: TC,LT

## 2023-04-19 PROCEDURE — 99024 POSTOP FOLLOW-UP VISIT: CPT | Mod: ,,, | Performed by: NURSE PRACTITIONER

## 2023-04-19 PROCEDURE — 4010F PR ACE/ARB THEARPY RXD/TAKEN: ICD-10-PCS | Mod: ,,, | Performed by: NURSE PRACTITIONER

## 2023-04-19 PROCEDURE — 1160F PR REVIEW ALL MEDS BY PRESCRIBER/CLIN PHARMACIST DOCUMENTED: ICD-10-PCS | Mod: ,,, | Performed by: NURSE PRACTITIONER

## 2023-04-19 PROCEDURE — 1159F PR MEDICATION LIST DOCUMENTED IN MEDICAL RECORD: ICD-10-PCS | Mod: ,,, | Performed by: NURSE PRACTITIONER

## 2023-04-19 PROCEDURE — 99215 OFFICE O/P EST HI 40 MIN: CPT | Mod: PBBFAC | Performed by: NURSE PRACTITIONER

## 2023-04-19 PROCEDURE — 99024 PR POST-OP FOLLOW-UP VISIT: ICD-10-PCS | Mod: ,,, | Performed by: NURSE PRACTITIONER

## 2023-04-19 PROCEDURE — 1160F RVW MEDS BY RX/DR IN RCRD: CPT | Mod: ,,, | Performed by: NURSE PRACTITIONER

## 2023-04-19 NOTE — PATIENT INSTRUCTIONS
Safety guidelines and activity restrictions discussed with patient.  Verbalized understanding.  Given work excuse to remain off work 2 weeks.  Given prescription for physical therapy for range of motion strengthening exercises of the left wrist 2 to 3 times a week x4 weeks.  We will have return orthopedic clinic in 3 weeks follow-up Dr. Atwood repeat x-ray or sooner as needed.

## 2023-04-19 NOTE — PROGRESS NOTES
57-year-old female presents for re-evaluation of her left forearm.  She is known to orthopedic clinic it being treated for left distal radius fracture of the metaphyseal region.  She continues have some pain and discomfort.  She was treated initially with a short-arm fiberglass cast converted Velcro wrist splint.  Last clinic visit on 04/05/2023 she was allowed slow progression of weaning the splint.  She states that she continues have some swelling.  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      X-ray:  X-rays today left forearm AP and lateral view reviewed by Dr. Atwood.      View x-ray fracture of the distal radius again is noted.  There is radiographic evidence of healing.    PE:  Physical exam left wrist there is some soft tissue swelling about the wrist and hand.  Left radial pulse 2/4.  Capillary refill all digits left hand less 3 seconds.  She has good dorsiflexion.  She has limited volar flexion.    Impression:  8 weeks following closed treatment of a left distal radius fracture     Plan:  Safety guidelines and activity restrictions discussed with patient.  Verbalized understanding.  Given work excuse to remain off work 2 weeks.  Given prescription for physical therapy for range of motion strengthening exercises of the left wrist 2 to 3 times a week x4 weeks.  We will have return orthopedic clinic in 3 weeks follow-up Dr. Atwood repeat x-ray or sooner as needed.      Addendum:  X-ray of the left wrist.  AP lateral view again demonstrates fracture with adequate position alignment.  There is radiographic evidence of healing.

## 2023-04-24 ENCOUNTER — OFFICE VISIT (OUTPATIENT)
Dept: FAMILY MEDICINE | Facility: CLINIC | Age: 58
End: 2023-04-24
Payer: COMMERCIAL

## 2023-04-24 VITALS
DIASTOLIC BLOOD PRESSURE: 89 MMHG | HEIGHT: 63 IN | WEIGHT: 144 LBS | RESPIRATION RATE: 19 BRPM | HEART RATE: 89 BPM | SYSTOLIC BLOOD PRESSURE: 129 MMHG | OXYGEN SATURATION: 99 % | TEMPERATURE: 97 F | BODY MASS INDEX: 25.52 KG/M2

## 2023-04-24 DIAGNOSIS — Z87.891 FORMER SMOKER: ICD-10-CM

## 2023-04-24 DIAGNOSIS — R42 VERTIGO: ICD-10-CM

## 2023-04-24 DIAGNOSIS — R41.3 MEMORY LOSS: ICD-10-CM

## 2023-04-24 DIAGNOSIS — Z13.1 DIABETES MELLITUS SCREENING: ICD-10-CM

## 2023-04-24 DIAGNOSIS — Z00.00 HEALTH CARE MAINTENANCE: ICD-10-CM

## 2023-04-24 DIAGNOSIS — Z11.59 NEED FOR HEPATITIS C SCREENING TEST: Primary | ICD-10-CM

## 2023-04-24 DIAGNOSIS — R42 DIZZINESS: ICD-10-CM

## 2023-04-24 DIAGNOSIS — Z11.4 ENCOUNTER FOR SCREENING FOR HIV: ICD-10-CM

## 2023-04-24 PROCEDURE — 3008F PR BODY MASS INDEX (BMI) DOCUMENTED: ICD-10-PCS | Mod: ,,, | Performed by: FAMILY MEDICINE

## 2023-04-24 PROCEDURE — 4010F ACE/ARB THERAPY RXD/TAKEN: CPT | Mod: ,,, | Performed by: FAMILY MEDICINE

## 2023-04-24 PROCEDURE — 1160F PR REVIEW ALL MEDS BY PRESCRIBER/CLIN PHARMACIST DOCUMENTED: ICD-10-PCS | Mod: ,,, | Performed by: FAMILY MEDICINE

## 2023-04-24 PROCEDURE — 3066F PR DOCUMENTATION OF TREATMENT FOR NEPHROPATHY: ICD-10-PCS | Mod: ,,, | Performed by: FAMILY MEDICINE

## 2023-04-24 PROCEDURE — 3079F PR MOST RECENT DIASTOLIC BLOOD PRESSURE 80-89 MM HG: ICD-10-PCS | Mod: ,,, | Performed by: FAMILY MEDICINE

## 2023-04-24 PROCEDURE — 1159F MED LIST DOCD IN RCRD: CPT | Mod: ,,, | Performed by: FAMILY MEDICINE

## 2023-04-24 PROCEDURE — 3074F PR MOST RECENT SYSTOLIC BLOOD PRESSURE < 130 MM HG: ICD-10-PCS | Mod: ,,, | Performed by: FAMILY MEDICINE

## 2023-04-24 PROCEDURE — 99213 OFFICE O/P EST LOW 20 MIN: CPT | Mod: ,,, | Performed by: FAMILY MEDICINE

## 2023-04-24 PROCEDURE — 99213 PR OFFICE/OUTPT VISIT, EST, LEVL III, 20-29 MIN: ICD-10-PCS | Mod: ,,, | Performed by: FAMILY MEDICINE

## 2023-04-24 PROCEDURE — 3079F DIAST BP 80-89 MM HG: CPT | Mod: ,,, | Performed by: FAMILY MEDICINE

## 2023-04-24 PROCEDURE — 3008F BODY MASS INDEX DOCD: CPT | Mod: ,,, | Performed by: FAMILY MEDICINE

## 2023-04-24 PROCEDURE — 3074F SYST BP LT 130 MM HG: CPT | Mod: ,,, | Performed by: FAMILY MEDICINE

## 2023-04-24 PROCEDURE — 1160F RVW MEDS BY RX/DR IN RCRD: CPT | Mod: ,,, | Performed by: FAMILY MEDICINE

## 2023-04-24 PROCEDURE — 3066F NEPHROPATHY DOC TX: CPT | Mod: ,,, | Performed by: FAMILY MEDICINE

## 2023-04-24 PROCEDURE — 1159F PR MEDICATION LIST DOCUMENTED IN MEDICAL RECORD: ICD-10-PCS | Mod: ,,, | Performed by: FAMILY MEDICINE

## 2023-04-24 PROCEDURE — 4010F PR ACE/ARB THEARPY RXD/TAKEN: ICD-10-PCS | Mod: ,,, | Performed by: FAMILY MEDICINE

## 2023-04-24 RX ORDER — ESOMEPRAZOLE MAGNESIUM 40 MG/1
1 CAPSULE, DELAYED RELEASE ORAL
COMMUNITY

## 2023-04-24 RX ORDER — FERROUS SULFATE 220 (44)/5
SOLUTION, ORAL ORAL
COMMUNITY
Start: 2022-10-01

## 2023-04-24 NOTE — PROGRESS NOTES
Subjective:       Patient ID: Flory Chacon is a 57 y.o. female.    Chief Complaint: Memory Loss (Since Jan 2023) and Dizziness (Since Jan 2023)    This is a 57 y.o female who present today with c/o short term memory loss and dizziness that has increased over the last 4-5 months. Patient recently has been off balance and broken her wrist. Patient states she has some vision difficulties at times and plans to see her ophthalmologist soon. Patient also states she has been having more dizziness and migraines and they mostly seem to be worse when she is at work. Patient works for the [employer] and feels really stress and disrespected daily. Patient denies fever, N/V/D, SOB or CP.     Plan for today is to test for reasons of memory loss and dizziness. CBC, CMP, TSH, B12, Folate, Carotid US. Health Maintenance Hep C, Hem A1C, HIV, Low dose chest CT.      Current Outpatient Medications:     ABILIFY 2 mg Tab, Take 2 mg by mouth once daily., Disp: , Rfl:     ALPRAZolam (XANAX) 1 MG tablet, , Disp: , Rfl:     amLODIPine (NORVASC) 10 MG tablet, Take 1 tablet (10 mg total) by mouth once daily., Disp: 30 tablet, Rfl: 11    cetirizine (ZYRTEC) 10 MG tablet, Take 1 tablet (10 mg total) by mouth once daily., Disp: 90 tablet, Rfl: 1    chlorthalidone (HYGROTEN) 25 MG Tab, Take 25 mg by mouth every morning., Disp: , Rfl:     cyanocobalamin 1,000 mcg/mL injection, cyanocobalamin (vit B-12) 1,000 mcg/mL injection solution  INJECT 1 ML ONCE DAILY INTRAMUSCULARLY FOR 7 DAYS AND THEN INJECT 1 ML ONCE A WEEK FOR 4 WEEKS AND THEN INJECT 1 ML ONCE EVERY MONTH, Disp: , Rfl:     cycloSPORINE (RESTASIS) 0.05 % ophthalmic emulsion, Restasis 0.05 % eye drops in a dropperette  INSTILL 1 DROP INTO EACH EYE EVERY 12 HOURS, Disp: , Rfl:     DULoxetine (CYMBALTA) 60 MG capsule, , Disp: , Rfl:     esomeprazole (NEXIUM) 40 MG capsule, 1 capsule., Disp: , Rfl:     estradioL (ESTRACE) 0.01 % (0.1 mg/gram) vaginal cream, Place 2 g vaginally once daily., Disp:  60 g, Rfl: 11    ezetimibe (ZETIA) 10 mg tablet, Take 1 tablet (10 mg total) by mouth every evening., Disp: 90 tablet, Rfl: 3    ferrous sulfate 220 mg (44 mg iron)/5 mL solution, 7 ml drink with straw Orally daily for 30 days, Disp: , Rfl:     fluticasone propionate (FLONASE) 50 mcg/actuation nasal spray, fluticasone propionate 50 mcg/actuation nasal spray,suspension  USE 2 SPRAY(S) IN EACH NOSTRIL ONCE DAILY, Disp: , Rfl:     loratadine (CLARITIN) 10 mg tablet, Take 10 mg by mouth., Disp: , Rfl:     metoprolol succinate (TOPROL-XL) 100 MG 24 hr tablet, Take 1 tablet (100 mg total) by mouth once daily., Disp: 30 tablet, Rfl: 11    montelukast (SINGULAIR) 10 mg tablet, Take 10 mg by mouth once daily., Disp: , Rfl:     ondansetron (ZOFRAN) 4 MG tablet, ondansetron HCl 4 mg tablet  TAKE 1 TABLET BY MOUTH AS NEEDED FOR NAUSEA, Disp: , Rfl:     potassium citrate (UROCIT-K) 10 mEq (1,080 mg) TbSR, Urocit-K 10 10 mEq (1,080 mg) tablet,extended release  Take 1 tablet every day by oral route for 30 days., Disp: , Rfl:     sacubitriL-valsartan (ENTRESTO) 24-26 mg per tablet, Take 1 tablet by mouth 2 (two) times daily., Disp: , Rfl:     sodium chloride 1 gram tablet, Take 2 tablets (2 g total) by mouth 3 (three) times daily., Disp: 540 tablet, Rfl: 3    zolpidem (AMBIEN) 10 mg Tab, Take 10 mg by mouth nightly as needed., Disp: , Rfl:     aspirin 81 MG Chew, Take 1 tablet (81 mg total) by mouth once daily., Disp: 30 tablet, Rfl: 0    Review of patient's allergies indicates:   Allergen Reactions    Meloxicam Swelling    Egg      Other reaction(s): Unknown    Eggs [egg derived]     Statins-hmg-coa reductase inhibitors      Other reaction(s): Unknown    Penicillins Rash       Past Medical History:   Diagnosis Date    Acute hypokalemia     Anxiety disorder, unspecified     Depression     Hyperlipidemia     Hypertension     Insomnia        Past Surgical History:   Procedure Laterality Date    FRACTURE SURGERY      KNEE ARTHROPLASTY  Left     KNEE ARTHROPLASTY Right     LEFT HEART CATHETERIZATION Left 04/25/2022    Procedure: Left heart cath;  Surgeon: Malachi Cormier DO;  [facility] C[facility] LAB;  Service: Cardiology;  Laterality: Left;       Family History   Problem Relation Age of Onset    Heart disease Father     Melanoma Maternal Uncle        Social History     Tobacco Use    Smoking status: Former     Packs/day: 1.00     Years: 20.00     Pack years: 20.00     Types: Cigarettes     Quit date: 2013     Years since quitting: 10.3    Smokeless tobacco: Never   Substance Use Topics    Alcohol use: Not Currently     Comment: socially in the past    Drug use: Never       Review of Systems   Constitutional:  Positive for fatigue. Negative for fever.   HENT:  Negative for nasal congestion, nosebleeds and sinus pressure/congestion.    Eyes:  Negative for pain, discharge and itching.   Respiratory:  Negative for cough, choking and shortness of breath.    Cardiovascular: Negative.    Gastrointestinal:  Negative for abdominal distention, abdominal pain, anal bleeding, constipation and diarrhea.   Endocrine: Negative for cold intolerance, heat intolerance and polydipsia.   Genitourinary:  Negative for bladder incontinence, difficulty urinating, dyspareunia, frequency, pelvic pain and vaginal bleeding.   Musculoskeletal:  Negative for arthralgias, joint swelling and joint deformity.   Allergic/Immunologic: Negative.    Neurological:  Positive for dizziness and headaches. Negative for vertigo.   Hematological: Negative.    Psychiatric/Behavioral: Negative.         Current Medications:   Medication List with Changes/Refills   Current Medications    ABILIFY 2 MG TAB    Take 2 mg by mouth once daily.       Start Date: 4/29/2022 End Date: --    ALPRAZOLAM (XANAX) 1 MG TABLET           Start Date: 9/14/2021 End Date: --    AMLODIPINE (NORVASC) 10 MG TABLET    Take 1 tablet (10 mg total) by mouth once daily.       Start Date: 2/10/2023 End Date: 2/10/2024     ASPIRIN 81 MG CHEW    Take 1 tablet (81 mg total) by mouth once daily.       Start Date: 4/28/2022 End Date: 3/6/2023    CETIRIZINE (ZYRTEC) 10 MG TABLET    Take 1 tablet (10 mg total) by mouth once daily.       Start Date: 2/22/2023 End Date: --    CHLORTHALIDONE (HYGROTEN) 25 MG TAB    Take 25 mg by mouth every morning.       Start Date: 1/21/2023 End Date: --    CYANOCOBALAMIN 1,000 MCG/ML INJECTION    cyanocobalamin (vit B-12) 1,000 mcg/mL injection solution   INJECT 1 ML ONCE DAILY INTRAMUSCULARLY FOR 7 DAYS AND THEN INJECT 1 ML ONCE A WEEK FOR 4 WEEKS AND THEN INJECT 1 ML ONCE EVERY MONTH       Start Date: --        End Date: --    CYCLOSPORINE (RESTASIS) 0.05 % OPHTHALMIC EMULSION    Restasis 0.05 % eye drops in a dropperette   INSTILL 1 DROP INTO EACH EYE EVERY 12 HOURS       Start Date: --        End Date: --    DULOXETINE (CYMBALTA) 60 MG CAPSULE           Start Date: 9/13/2021 End Date: --    ESOMEPRAZOLE (NEXIUM) 40 MG CAPSULE    1 capsule.       Start Date: --        End Date: --    ESTRADIOL (ESTRACE) 0.01 % (0.1 MG/GRAM) VAGINAL CREAM    Place 2 g vaginally once daily.       Start Date: 5/9/2022  End Date: 5/9/2023    EZETIMIBE (ZETIA) 10 MG TABLET    Take 1 tablet (10 mg total) by mouth every evening.       Start Date: 2/22/2023 End Date: 2/22/2024    FERROUS SULFATE 220 MG (44 MG IRON)/5 ML SOLUTION    7 ml drink with straw Orally daily for 30 days       Start Date: 10/1/2022 End Date: --    FLUTICASONE PROPIONATE (FLONASE) 50 MCG/ACTUATION NASAL SPRAY    fluticasone propionate 50 mcg/actuation nasal spray,suspension   USE 2 SPRAY(S) IN EACH NOSTRIL ONCE DAILY       Start Date: --        End Date: --    LORATADINE (CLARITIN) 10 MG TABLET    Take 10 mg by mouth.       Start Date: --        End Date: --    METOPROLOL SUCCINATE (TOPROL-XL) 100 MG 24 HR TABLET    Take 1 tablet (100 mg total) by mouth once daily.       Start Date: 2/10/2023 End Date: 2/10/2024    MONTELUKAST (SINGULAIR) 10 MG TABLET    " Take 10 mg by mouth once daily.       Start Date: 7/27/2021 End Date: --    ONDANSETRON (ZOFRAN) 4 MG TABLET    ondansetron HCl 4 mg tablet   TAKE 1 TABLET BY MOUTH AS NEEDED FOR NAUSEA       Start Date: --        End Date: --    POTASSIUM CITRATE (UROCIT-K) 10 MEQ (1,080 MG) TBSR    Urocit-K 10 10 mEq (1,080 mg) tablet,extended release   Take 1 tablet every day by oral route for 30 days.       Start Date: --        End Date: --    SACUBITRIL-VALSARTAN (ENTRESTO) 24-26 MG PER TABLET    Take 1 tablet by mouth 2 (two) times daily.       Start Date: --        End Date: --    SODIUM CHLORIDE 1 GRAM TABLET    Take 2 tablets (2 g total) by mouth 3 (three) times daily.       Start Date: 3/1/2023  End Date: 2/29/2024    ZOLPIDEM (AMBIEN) 10 MG TAB    Take 10 mg by mouth nightly as needed.       Start Date: 2/10/2023 End Date: --   Discontinued Medications    HYDROCHLOROTHIAZIDE (MICROZIDE) 12.5 MG CAPSULE    hydrochlorothiazide 12.5 mg capsule   TAKE 1 CAPSULE BY MOUTH ONCE DAILY IN THE MORNING FOR 90 DAYS       Start Date: --        End Date: 4/24/2023    HYDROCORTISONE 2.5 % CREAM    APPLY CREAM TO AFFECTED AREA TO AFFECTED AREA ONCE DAILY FOR 5 DAYS TO UPPER BILATERAL EXTREMITIES       Start Date: 8/24/2021 End Date: 4/24/2023            Objective:        Vitals:    04/24/23 1353 04/24/23 1407   BP: (!) 134/92 129/89   Pulse: 97 89   Resp: 19    Temp: 97.2 °F (36.2 °C)    TempSrc: Temporal    SpO2: 99%    Weight: 65.3 kg (144 lb)    Height: 5' 3" (1.6 m)        Physical Exam  Constitutional:       Appearance: Normal appearance. She is normal weight.   HENT:      Head: Normocephalic.      Right Ear: Tympanic membrane normal.      Left Ear: Tympanic membrane normal.      Nose: Nose normal.      Mouth/Throat:      Mouth: Mucous membranes are moist.      Pharynx: Oropharynx is clear.   Eyes:      Conjunctiva/sclera: Conjunctivae normal.      Pupils: Pupils are equal, round, and reactive to light.   Cardiovascular:      Rate " and Rhythm: Normal rate and regular rhythm.      Pulses: Normal pulses.      Heart sounds: Normal heart sounds.   Pulmonary:      Effort: Pulmonary effort is normal.      Breath sounds: Normal breath sounds.   Abdominal:      General: Abdomen is flat. Bowel sounds are normal.   Musculoskeletal:         General: Normal range of motion.      Cervical back: Normal range of motion.   Skin:     General: Skin is warm and dry.      Capillary Refill: Capillary refill takes less than 2 seconds.   Neurological:      Mental Status: She is alert and oriented to person, place, and time.   Psychiatric:         Mood and Affect: Mood normal.         Behavior: Behavior normal.           Lab Results   Component Value Date    WBC 11.34 (H) 04/04/2023    HGB 13.3 04/04/2023    HCT 40.9 04/04/2023     (H) 04/04/2023    CHOL 197 04/23/2022    TRIG 82 04/23/2022    HDL 85 (H) 04/23/2022    ALT 19 04/04/2023    AST 19 04/04/2023     04/04/2023    K 4.2 04/04/2023     04/04/2023    CREATININE 1.06 (H) 04/04/2023    BUN 11 04/04/2023    CO2 27 04/04/2023    TSH 1.110 04/04/2023    INR 0.96 01/31/2023      Assessment:       1. Need for hepatitis C screening test    2. Encounter for screening for HIV    3. Diabetes mellitus screening    4. Former smoker    5. Vertigo    6. Memory loss    7. Health care maintenance    8. Dizziness        Plan:         Problem List Items Addressed This Visit          Neuro    Memory loss     TSH, Free T4  Vitamin B12, Folate, CBC, CMP           Relevant Orders    CBC Auto Differential    Comprehensive Metabolic Panel    TSH    T4, Free    Vitamin B12 & Folate    Radiology US Carotid Bilateral       Other    Health care maintenance     Hep C antibiody  HIV rapid test  Low dose chest CT  Hgb A1C             Dizziness     Carotid US  CBC, CMP,               Other Visit Diagnoses       Need for hepatitis C screening test    -  Primary    Relevant Orders    Hepatitis C Antibody    Encounter for  screening for HIV        Relevant Orders    HIV 1/2 Ag/Ab (4th Gen)    Diabetes mellitus screening        Relevant Orders    Hemoglobin A1C    Former smoker        Relevant Orders    CT Chest Low Dose Diagnostic    Vertigo        Relevant Orders    CBC Auto Differential    Comprehensive Metabolic Panel    TSH    T4, Free    Vitamin B12 & Folate    Radiology US Carotid Bilateral              No follow-ups on file.    Saroj Pineda MD     Instructed patient that if symptoms fail to improve or worsen patient should seek immediate medical attention or report to the nearest emergency department. Patient expressed verbal agreement and understanding to this plan of care.

## 2023-04-24 NOTE — ASSESSMENT & PLAN NOTE
Routine hep C screen due, order added. Please schedule.   TSH, Free T4  Vitamin B12, Folate, CBC, CMP

## 2023-04-26 ENCOUNTER — OFFICE VISIT (OUTPATIENT)
Dept: NEUROLOGY | Facility: CLINIC | Age: 58
End: 2023-04-26
Payer: COMMERCIAL

## 2023-04-26 ENCOUNTER — HOSPITAL ENCOUNTER (OUTPATIENT)
Dept: RADIOLOGY | Facility: HOSPITAL | Age: 58
Discharge: HOME | End: 2023-04-26
Attending: FAMILY MEDICINE
Payer: COMMERCIAL

## 2023-04-26 VITALS
BODY MASS INDEX: 26.05 KG/M2 | WEIGHT: 147 LBS | DIASTOLIC BLOOD PRESSURE: 74 MMHG | HEIGHT: 63 IN | OXYGEN SATURATION: 96 % | HEART RATE: 93 BPM | SYSTOLIC BLOOD PRESSURE: 116 MMHG

## 2023-04-26 DIAGNOSIS — G31.84 MILD NEUROCOGNITIVE DISORDER: Primary | ICD-10-CM

## 2023-04-26 DIAGNOSIS — Z00.00 HEALTH CARE MAINTENANCE: ICD-10-CM

## 2023-04-26 DIAGNOSIS — Z86.69 HX OF MIGRAINE HEADACHES: ICD-10-CM

## 2023-04-26 PROCEDURE — 3074F SYST BP LT 130 MM HG: CPT | Mod: ,,, | Performed by: NURSE PRACTITIONER

## 2023-04-26 PROCEDURE — 3078F DIAST BP <80 MM HG: CPT | Mod: ,,, | Performed by: NURSE PRACTITIONER

## 2023-04-26 PROCEDURE — 77067 SCR MAMMO BI INCL CAD: CPT | Mod: TC

## 2023-04-26 PROCEDURE — 1160F PR REVIEW ALL MEDS BY PRESCRIBER/CLIN PHARMACIST DOCUMENTED: ICD-10-PCS | Mod: ,,, | Performed by: NURSE PRACTITIONER

## 2023-04-26 PROCEDURE — 4010F ACE/ARB THERAPY RXD/TAKEN: CPT | Mod: ,,, | Performed by: NURSE PRACTITIONER

## 2023-04-26 PROCEDURE — 4010F PR ACE/ARB THEARPY RXD/TAKEN: ICD-10-PCS | Mod: ,,, | Performed by: NURSE PRACTITIONER

## 2023-04-26 PROCEDURE — 3066F PR DOCUMENTATION OF TREATMENT FOR NEPHROPATHY: ICD-10-PCS | Mod: ,,, | Performed by: NURSE PRACTITIONER

## 2023-04-26 PROCEDURE — 1160F RVW MEDS BY RX/DR IN RCRD: CPT | Mod: ,,, | Performed by: NURSE PRACTITIONER

## 2023-04-26 PROCEDURE — 99213 PR OFFICE/OUTPT VISIT, EST, LEVL III, 20-29 MIN: ICD-10-PCS | Mod: S$PBB,,, | Performed by: NURSE PRACTITIONER

## 2023-04-26 PROCEDURE — 1159F MED LIST DOCD IN RCRD: CPT | Mod: ,,, | Performed by: NURSE PRACTITIONER

## 2023-04-26 PROCEDURE — 3066F NEPHROPATHY DOC TX: CPT | Mod: ,,, | Performed by: NURSE PRACTITIONER

## 2023-04-26 PROCEDURE — 3008F BODY MASS INDEX DOCD: CPT | Mod: ,,, | Performed by: NURSE PRACTITIONER

## 2023-04-26 PROCEDURE — 3008F PR BODY MASS INDEX (BMI) DOCUMENTED: ICD-10-PCS | Mod: ,,, | Performed by: NURSE PRACTITIONER

## 2023-04-26 PROCEDURE — 99215 OFFICE O/P EST HI 40 MIN: CPT | Mod: PBBFAC | Performed by: NURSE PRACTITIONER

## 2023-04-26 PROCEDURE — 99213 OFFICE O/P EST LOW 20 MIN: CPT | Mod: S$PBB,,, | Performed by: NURSE PRACTITIONER

## 2023-04-26 PROCEDURE — 3074F PR MOST RECENT SYSTOLIC BLOOD PRESSURE < 130 MM HG: ICD-10-PCS | Mod: ,,, | Performed by: NURSE PRACTITIONER

## 2023-04-26 PROCEDURE — 1159F PR MEDICATION LIST DOCUMENTED IN MEDICAL RECORD: ICD-10-PCS | Mod: ,,, | Performed by: NURSE PRACTITIONER

## 2023-04-26 PROCEDURE — 3078F PR MOST RECENT DIASTOLIC BLOOD PRESSURE < 80 MM HG: ICD-10-PCS | Mod: ,,, | Performed by: NURSE PRACTITIONER

## 2023-04-26 RX ORDER — ATOGEPANT 60 MG/1
60 TABLET ORAL DAILY
Qty: 30 TABLET | Refills: 11 | Status: SHIPPED | OUTPATIENT
Start: 2023-04-26 | End: 2023-11-05 | Stop reason: SDUPTHER

## 2023-04-26 NOTE — PATIENT INSTRUCTIONS
Continue followup with cardiology  Notify clinic if any further syncope events  Reviewed her recent labs and recommend avoid use of marijuana  MMSE in clinic 27/30  Try Qulipta 60mg daily

## 2023-04-27 ENCOUNTER — CLINICAL SUPPORT (OUTPATIENT)
Dept: REHABILITATION | Facility: HOSPITAL | Age: 58
End: 2023-04-27
Payer: COMMERCIAL

## 2023-04-27 DIAGNOSIS — S52.502D CLOSED FRACTURE OF DISTAL END OF LEFT RADIUS WITH ROUTINE HEALING, UNSPECIFIED FRACTURE MORPHOLOGY, SUBSEQUENT ENCOUNTER: ICD-10-CM

## 2023-04-27 PROCEDURE — 97161 PT EVAL LOW COMPLEX 20 MIN: CPT | Mod: PN

## 2023-04-27 PROCEDURE — 97110 THERAPEUTIC EXERCISES: CPT | Mod: PN

## 2023-04-27 NOTE — PLAN OF CARE
[facility]   Physical Therapy Initial Evaluation    Date: 4/27/2023   Name: Criselda Chacon  Clinic Number: [id number]    Therapy Diagnosis:   Encounter Diagnosis   Name Primary?    Closed fracture of distal end of left radius with routine healing, unspecified fracture morphology, subsequent encounter      Physician: Kenney Block FNP    Physician Orders: PT Eval and Treat    Medical Diagnosis from Referral: left colles fx distal radius fx   Evaluation Date: 4/27/2023  Updated Plan of Care Due : 5/26/2023   Authorization Period Expiration: [employer] cross   Plan of Care Expiration: end of the year 50 visits   Visit # / Visits authorized: 1/ 50    Time In: 1445  Time Out: 1530  Total Appointment Time (timed & untimed codes): 45 minutes    Precautions: Standard    Subjective   Date of onset: pt states she fell and fx her left wrist about the second week of February . Pt states she has to wear a cast for about a month . Pt states she has pain all the time . Pt voices she has pain with  and  picking up items. Pt voices decreased wrist flexion and extension. Pt voices she is right hand dominant .  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    Medications:   Criselda has a current medication list which includes the following prescription(s): abilify, alprazolam, amlodipine, aspirin, qulipta, cetirizine, chlorthalidone, cyanocobalamin, cyclosporine, duloxetine, esomeprazole, estradiol, ezetimibe, ferrous sulfate,  fluticasone propionate, loratadine, metoprolol succinate, montelukast, ondansetron, potassium citrate, entresto, sodium chloride, zolpidem, [DISCONTINUED] atenolol, and [DISCONTINUED] propranolol.    Allergies:   Review of patient's allergies indicates:   Allergen Reactions    Meloxicam Swelling    Egg      Other reaction(s): Unknown    Eggs [egg derived]     Statins-hmg-coa reductase inhibitors      Other reaction(s): Unknown    Penicillins Rash        Imaging, bone scan films:      Prior Therapy: left wrist   Social History:   lives with their family  Occupation:    Prior Level of Function: independent   Current Level of Function:  decreased range of motion and strength.      Pain:  Current 3/10, worst 9/10, best 3/10   Location: left wrists       Description: Aching, Dull, Grabbing, Tight, and Deep  Aggravating Factors: gripping , wrist extension , wrist flexion   Easing Factors: rest    Patients goals: be able to return to work     Objective       Observation :     Forward Head/Rounded Shoulders :  [] Yes   [] No   Scapular Winging :  [] Yes   [] No   Incision :        Comments :     Cervical Spine Clearing :        Range of Motion/Strength :                     Left Extremity                                                                        Right Extremity     AROM   PROM  Strength                  Location   AROM    PROM   Strength    50 55  3 Wrist flexion  85 90 5   45 50 3  Wrist extension    55 60 5     3 Ulnar deviation  25  5     3 Radial deviation  10  5   8    pos 2 dynamometer 35  5   8   Lateral pinch  10  5   2   3 finger pinch  5  5            140  4   Elbow         Flexion 140  5   0  4                      Extension 0  5   85  4                      Pron/Supination 85  5        Functional Impairments : decreased flexion , extension , decreased  left hand       Clinical Tests :     Ligamentous Stability :       Other :    Palpation :  pt voices pain wrist joint .                       Limitation/Restriction for FOTO  Survey    Therapist reviewed FOTO scores for Criselda Chacon on 4/27/2023.   FOTO documents entered into [employer] - see Media section.    Limitation Score: 41%         TREATMENT         CRISELDA received the treatments listed below:  THERAPEUTIC EXERCISES to develop strength, endurance, ROM, flexibility, and posture for 30 minutes including home ex program         Home Exercises and Patient Education Provided    Education provided:   - range of motion     Written Home Exercises Provided: Patient instructed to cont prior HEP.  Exercises were reviewed and CRISELDA was able to demonstrate them prior to the end of the session.  CRISELDA demonstrated good  understanding of the education provided.     See EMR under Patient Instructions for exercises provided 4/27/2023.    Assessment   Criselda is a 57 y.o. female referred to outpatient Physical Therapy with a medical diagnosis of left wrist fx . Patient presents with decreased range of motion and strength left wrist     Patient prognosis is Excellent.   Patientt will benefit from skilled outpatient Physical Therapy to address the deficits stated above and in the chart below, provide patient /family education, and to maximize patientt's level of independence.     Plan of care discussed with patient: Yes  Patient's spiritual, cultural and educational needs considered and patient is agreeable to the plan of care and goals as stated below:     Anticipated Barriers for therapy: medical diagnosis of left wrist fx . Patient presents with decreased range of motion and strength left wrist     Goals:  Short Term Goals: 4 weeks   Pt will be able to increase left  to 20lb   Increase  left wrist extension to 50 degrees   Increase left wrist flexion to 70 degrees   Increase be able to type with left hand     Long Term Goals: 8 weeks   Pt will be able to return to work   Be able to increase wrist extension to 70  Increase flexion to 80  degrees   Increase  to 30lb     Plan   Plan of care Certification: 4/27/2023 to 6/26/2023 .    Outpatient Physical Therapy 2 times weekly for 8 weeks to include the following interventions: Neuromuscular Re-ed, Patient Education, and Therapeutic Exercise, modalities as needed.     Plan of care has been reestablished with Tete MACKEY, Abbie Gorman LPTA, Namrata Leonard LPTA  and Rachell Garcias LPTA.  .     Herbert Hall, PT

## 2023-05-03 ENCOUNTER — CLINICAL SUPPORT (OUTPATIENT)
Dept: REHABILITATION | Facility: HOSPITAL | Age: 58
End: 2023-05-03
Payer: COMMERCIAL

## 2023-05-03 DIAGNOSIS — S52.532A CLOSED COLLES' FRACTURE OF LEFT RADIUS, INITIAL ENCOUNTER: Primary | ICD-10-CM

## 2023-05-03 PROCEDURE — 97110 THERAPEUTIC EXERCISES: CPT | Mod: PN,CQ

## 2023-05-03 NOTE — PROGRESS NOTES
Physical Therapy Treatment Note     Name: Criselda Chacon  Clinic Number: [id number]    Therapy Diagnosis:   Encounter Diagnosis   Name Primary?    Closed Colles' fracture of left radius, initial encounter Yes     Physician: Kenney Block FNP    Visit Date: 5/3/2023    Physician Orders: PT Eval and Treat    Medical Diagnosis from Referral: left colles fx distal radius fx   Evaluation Date: 4/27/2023  Updated Plan of Care Due : 5/26/2023   Authorization Period Expiration: [employer] cross   Plan of Care Expiration: end of the year 50 visits   Visit # / Visits authorized: 2/ 50  PTA Visit #: 1    Time In: 1230  Time Out: 1305  Total Billable Time: 35 minutes    Precautions: Standard    Received Plan of Care per Herbert Hall PT     Subjective     Pt reports: no c/o pain; has taken ibuprofen today.  She was compliant with home exercise program.  Response to previous treatment: not bad  Functional change: ongoing     Pain: 0/10  Location: left wrist      Objective     CRISELDA received therapeutic exercises to develop strength and flexibility for 35 minutes including:  Ube x 5 minutes   BUE weightbearing on table x 10 repetitions   Green web  and finger extension x 15 repetitions each  Green web wrist flexion and extension x 15 repetitions each  Power gripper x 20 repetitions   Yellow pinch  with each finger x 10 repetitions each  Wrist flexion and extension with 1# weight x 15-20 repetitions each  Self stretch for flexion and extension, 3x20 second hold each    Range of motion measures:   Wrist flexion   60/70 degrees   Wrist extension  50/60 degrees    strength  10lbs (from 8 degrees)      Home Exercises Provided and Patient Education Provided     Education provided: continue current home exercise program, pain free    Written Home Exercises Provided: Patient instructed to cont prior HEP.  Exercises were reviewed and CRISELDA was able to demonstrate them prior to the end of the session.  CRISELDA demonstrated  good  understanding of the education provided.     See EMR under Patient Instructions for exercises provided  4/27/2023 .    Assessment     Improving range of motion and strength  CRISELDA Is progressing well towards her goals.   Pt prognosis is Excellent.     Pt will continue to benefit from skilled outpatient physical therapy to address the deficits listed in the problem list box on initial evaluation, provide pt/family education and to maximize pt's level of independence in the home and community environment.      Anticipated barriers to physical therapy: home exercise program compliance    Goals:  Short Term Goals: 4 weeks   Pt will be able to increase left  to 20lb   Increase  left wrist extension to 50 degrees   Increase left wrist flexion to 70 degrees   Increase be able to type with left hand      Long Term Goals: 8 weeks   Pt will be able to return to work   Be able to increase wrist extension to 70  Increase flexion to 80 degrees   Increase  to 30lb      Plan     Plan of care Certification: 4/27/2023 to 6/26/2023 .     Outpatient Physical Therapy 2 times weekly for 8 weeks to include the following interventions: Neuromuscular Re-ed, Patient Education, and Therapeutic Exercise, modalities as needed.     Continue and progress as pt able   Abbie Gorman, PTA  5/3/2023

## 2023-05-09 ENCOUNTER — CLINICAL SUPPORT (OUTPATIENT)
Dept: REHABILITATION | Facility: HOSPITAL | Age: 58
End: 2023-05-09
Payer: COMMERCIAL

## 2023-05-09 DIAGNOSIS — S52.532A CLOSED COLLES' FRACTURE OF LEFT RADIUS, INITIAL ENCOUNTER: Primary | ICD-10-CM

## 2023-05-09 PROCEDURE — 97140 MANUAL THERAPY 1/> REGIONS: CPT | Mod: PN,CQ

## 2023-05-09 PROCEDURE — 97110 THERAPEUTIC EXERCISES: CPT | Mod: PN,CQ

## 2023-05-09 NOTE — PROGRESS NOTES
Physical Therapy Treatment Note     Name: Criselda Chacon  Clinic Number: [id number]    Therapy Diagnosis:   Encounter Diagnosis   Name Primary?    Closed Colles' fracture of left radius, initial encounter Yes     Physician: Kenney Block FNP    Visit Date: 5/9/2023    Physician Orders: PT Eval and Treat    Medical Diagnosis from Referral: left colles fx distal radius fx   Evaluation Date: 4/27/2023  Updated Plan of Care Due : 5/26/2023   Authorization Period Expiration: [employer] cross   Plan of Care Expiration: end of the year 50 visits   Visit # / Visits authorized: 3/ 50  PTA Visit #: 2    Time In: 1239 (9 minutes late for appt)  Time Out: 1305  Total Billable Time: 26 minutes (pt needed to leave to get back to work)    Precautions: Standard      Subjective     Pt reports: pain as noted; didn't wear brace last night, can't wear at work; took ibuprofen this am  She was compliant with home exercise program.  Response to previous treatment: not bad  Functional change: ongoing     Pain: 5/10  Location: left wrist      Objective     CRISELDA received therapeutic exercises to develop strength and flexibility for 18 minutes including:  Ube x 3 minutes   BUE weightbearing on table x 6 repetitions   Green web  and finger extension x 15 repetitions each  Green web wrist flexion and extension x 15 repetitions each  Power gripper x 20 repetitions   Red pinch  with each finger x 6 repetitions each  Wrist flexion and extension with 1# weight x 15-20 repetitions each  Self stretch for flexion and extension, 3x20 second hold each    Manual therapy x 8 minutes for flexion and extension stretching, including gentle wrist mobs    Range of motion measures:   Wrist flexion   75/80 degrees    Wrist extension  70/72 degrees    strength  12lbs (from 10 degrees)      Home Exercises Provided and Patient Education Provided     Education provided: continue current home exercise program, pain free; ice for pain management      Written Home Exercises Provided: Patient instructed to cont prior HEP.  Exercises were reviewed and CRISELDA was able to demonstrate them prior to the end of the session.  CRISELDA demonstrated good  understanding of the education provided.     See EMR under Patient Instructions for exercises provided  4/27/2023 .    Assessment     Continues to improve range of motion and strength as noted in spite of pain  CRISELDA Is progressing well towards her goals.   Pt prognosis is Excellent.     Pt will continue to benefit from skilled outpatient physical therapy to address the deficits listed in the problem list box on initial evaluation, provide pt/family education and to maximize pt's level of independence in the home and community environment.      Anticipated barriers to physical therapy: home exercise program compliance    Goals:  Short Term Goals: 4 weeks   Pt will be able to increase left  to 20lb   Increase  left wrist extension to 50 degrees MET  Increase left wrist flexion to 70 degrees MET  Increase be able to type with left hand      Long Term Goals: 8 weeks   Pt will be able to return to work MET  Be able to increase wrist extension to 70  Increase flexion to 80 degrees   Increase  to 30lb      Plan     Plan of care Certification: 4/27/2023 to 6/26/2023 .     Outpatient Physical Therapy 2 times weekly for 8 weeks to include the following interventions: Neuromuscular Re-ed, Patient Education, and Therapeutic Exercise, modalities as needed.     Continue and progress as pt able   Abbie Gorman, PTA  5/9/2023

## 2023-05-15 ENCOUNTER — OFFICE VISIT (OUTPATIENT)
Dept: FAMILY MEDICINE | Facility: CLINIC | Age: 58
End: 2023-05-15
Payer: COMMERCIAL

## 2023-05-15 VITALS
DIASTOLIC BLOOD PRESSURE: 80 MMHG | HEART RATE: 94 BPM | RESPIRATION RATE: 18 BRPM | WEIGHT: 147 LBS | SYSTOLIC BLOOD PRESSURE: 111 MMHG | HEIGHT: 63 IN | BODY MASS INDEX: 26.05 KG/M2 | TEMPERATURE: 99 F | OXYGEN SATURATION: 97 %

## 2023-05-15 DIAGNOSIS — R41.3 MEMORY LOSS: ICD-10-CM

## 2023-05-15 DIAGNOSIS — Z28.21 VACCINATION DECLINED: ICD-10-CM

## 2023-05-15 PROCEDURE — 3008F BODY MASS INDEX DOCD: CPT | Mod: ,,, | Performed by: FAMILY MEDICINE

## 2023-05-15 PROCEDURE — 4010F ACE/ARB THERAPY RXD/TAKEN: CPT | Mod: ,,, | Performed by: FAMILY MEDICINE

## 2023-05-15 PROCEDURE — 99212 PR OFFICE/OUTPT VISIT, EST, LEVL II, 10-19 MIN: ICD-10-PCS | Mod: ,,, | Performed by: FAMILY MEDICINE

## 2023-05-15 PROCEDURE — 3044F PR MOST RECENT HEMOGLOBIN A1C LEVEL <7.0%: ICD-10-PCS | Mod: ,,, | Performed by: FAMILY MEDICINE

## 2023-05-15 PROCEDURE — 4010F PR ACE/ARB THEARPY RXD/TAKEN: ICD-10-PCS | Mod: ,,, | Performed by: FAMILY MEDICINE

## 2023-05-15 PROCEDURE — 3074F PR MOST RECENT SYSTOLIC BLOOD PRESSURE < 130 MM HG: ICD-10-PCS | Mod: ,,, | Performed by: FAMILY MEDICINE

## 2023-05-15 PROCEDURE — 1159F MED LIST DOCD IN RCRD: CPT | Mod: ,,, | Performed by: FAMILY MEDICINE

## 2023-05-15 PROCEDURE — 99212 OFFICE O/P EST SF 10 MIN: CPT | Mod: ,,, | Performed by: FAMILY MEDICINE

## 2023-05-15 PROCEDURE — 3066F NEPHROPATHY DOC TX: CPT | Mod: ,,, | Performed by: FAMILY MEDICINE

## 2023-05-15 PROCEDURE — 3044F HG A1C LEVEL LT 7.0%: CPT | Mod: ,,, | Performed by: FAMILY MEDICINE

## 2023-05-15 PROCEDURE — 3008F PR BODY MASS INDEX (BMI) DOCUMENTED: ICD-10-PCS | Mod: ,,, | Performed by: FAMILY MEDICINE

## 2023-05-15 PROCEDURE — 1159F PR MEDICATION LIST DOCUMENTED IN MEDICAL RECORD: ICD-10-PCS | Mod: ,,, | Performed by: FAMILY MEDICINE

## 2023-05-15 PROCEDURE — 3079F DIAST BP 80-89 MM HG: CPT | Mod: ,,, | Performed by: FAMILY MEDICINE

## 2023-05-15 PROCEDURE — 3066F PR DOCUMENTATION OF TREATMENT FOR NEPHROPATHY: ICD-10-PCS | Mod: ,,, | Performed by: FAMILY MEDICINE

## 2023-05-15 PROCEDURE — 3074F SYST BP LT 130 MM HG: CPT | Mod: ,,, | Performed by: FAMILY MEDICINE

## 2023-05-15 PROCEDURE — 3079F PR MOST RECENT DIASTOLIC BLOOD PRESSURE 80-89 MM HG: ICD-10-PCS | Mod: ,,, | Performed by: FAMILY MEDICINE

## 2023-05-15 NOTE — PROGRESS NOTES
Subjective:       Patient ID: Flory Chacon is a 57 y.o. female.    Chief Complaint: Follow-up (Memory and dizziness )    This is a 57 year old female who presents today for memory and dizziness follow up. Patient states since our last visit her memory has improved and dizziness is gone. Patient has no other needs today. Patient denies vaccinations today and will consider next visit. Patient denies fever fatigue, N/V/D, shortness of breath or chest pain.       Current Outpatient Medications:     ABILIFY 2 mg Tab, Take 2 mg by mouth once daily., Disp: , Rfl:     ALPRAZolam (XANAX) 1 MG tablet, , Disp: , Rfl:     amLODIPine (NORVASC) 10 MG tablet, Take 1 tablet (10 mg total) by mouth once daily., Disp: 30 tablet, Rfl: 11    atogepant (QULIPTA) 60 mg Tab, Take 60 mg by mouth once daily., Disp: 30 tablet, Rfl: 11    cetirizine (ZYRTEC) 10 MG tablet, Take 1 tablet (10 mg total) by mouth once daily., Disp: 90 tablet, Rfl: 1    chlorthalidone (HYGROTEN) 25 MG Tab, Take 25 mg by mouth every morning., Disp: , Rfl:     cyanocobalamin 1,000 mcg/mL injection, cyanocobalamin (vit B-12) 1,000 mcg/mL injection solution  INJECT 1 ML ONCE DAILY INTRAMUSCULARLY FOR 7 DAYS AND THEN INJECT 1 ML ONCE A WEEK FOR 4 WEEKS AND THEN INJECT 1 ML ONCE EVERY MONTH, Disp: , Rfl:     cycloSPORINE (RESTASIS) 0.05 % ophthalmic emulsion, Restasis 0.05 % eye drops in a dropperette  INSTILL 1 DROP INTO EACH EYE EVERY 12 HOURS, Disp: , Rfl:     DULoxetine (CYMBALTA) 60 MG capsule, , Disp: , Rfl:     esomeprazole (NEXIUM) 40 MG capsule, 1 capsule., Disp: , Rfl:     ezetimibe (ZETIA) 10 mg tablet, Take 1 tablet (10 mg total) by mouth every evening., Disp: 90 tablet, Rfl: 3    ferrous sulfate 220 mg (44 mg iron)/5 mL solution, 7 ml drink with straw Orally daily for 30 days, Disp: , Rfl:     fluticasone propionate (FLONASE) 50 mcg/actuation nasal spray, fluticasone propionate 50 mcg/actuation nasal spray,suspension  USE 2 SPRAY(S) IN EACH NOSTRIL ONCE  DAILY, Disp: , Rfl:     loratadine (CLARITIN) 10 mg tablet, Take 10 mg by mouth., Disp: , Rfl:     metoprolol succinate (TOPROL-XL) 100 MG 24 hr tablet, Take 1 tablet (100 mg total) by mouth once daily., Disp: 30 tablet, Rfl: 11    montelukast (SINGULAIR) 10 mg tablet, Take 10 mg by mouth once daily., Disp: , Rfl:     ondansetron (ZOFRAN) 4 MG tablet, ondansetron HCl 4 mg tablet  TAKE 1 TABLET BY MOUTH AS NEEDED FOR NAUSEA, Disp: , Rfl:     potassium citrate (UROCIT-K) 10 mEq (1,080 mg) TbSR, Urocit-K 10 10 mEq (1,080 mg) tablet,extended release  Take 1 tablet every day by oral route for 30 days., Disp: , Rfl:     sacubitriL-valsartan (ENTRESTO) 24-26 mg per tablet, Take 1 tablet by mouth 2 (two) times daily., Disp: , Rfl:     sodium chloride 1 gram tablet, Take 2 tablets (2 g total) by mouth 3 (three) times daily., Disp: 540 tablet, Rfl: 3    zolpidem (AMBIEN) 10 mg Tab, Take 10 mg by mouth nightly as needed., Disp: , Rfl:     aspirin 81 MG Chew, Take 1 tablet (81 mg total) by mouth once daily., Disp: 30 tablet, Rfl: 0    estradioL (ESTRACE) 0.01 % (0.1 mg/gram) vaginal cream, Place 2 g vaginally once daily., Disp: 60 g, Rfl: 11    Review of patient's allergies indicates:   Allergen Reactions    Meloxicam Swelling    Egg      Other reaction(s): Unknown    Eggs [egg derived]     Statins-hmg-coa reductase inhibitors      Other reaction(s): Unknown    Penicillins Rash       Past Medical History:   Diagnosis Date    Acute hypokalemia     Anxiety disorder, unspecified     Depression     Hyperlipidemia     Hypertension     Insomnia        Past Surgical History:   Procedure Laterality Date    FRACTURE SURGERY      KNEE ARTHROPLASTY Left     KNEE ARTHROPLASTY Right     LEFT HEART CATHETERIZATION Left 04/25/2022    Procedure: Left heart cath;  Surgeon: Malachi Cormier DO;  Location: [facility] CATH LAB;  Service: Cardiology;  Laterality: Left;       Family History   Problem Relation Age of Onset    Heart disease Father      Breast cancer Maternal Aunt     Melanoma Maternal Uncle     Breast cancer Maternal Grandmother        Social History     Tobacco Use    Smoking status: Former     Packs/day: 1.00     Years: 20.00     Pack years: 20.00     Types: Cigarettes     Quit date: 2013     Years since quitting: 10.3    Smokeless tobacco: Never   Substance Use Topics    Alcohol use: Not Currently     Comment: socially in the past    Drug use: Never       Review of Systems   Constitutional:  Negative for fatigue and fever.   HENT:  Negative for nasal congestion, nosebleeds and sinus pressure/congestion.    Eyes:  Negative for pain, discharge and itching.   Respiratory:  Negative for cough, choking and shortness of breath.    Cardiovascular: Negative.    Gastrointestinal:  Negative for abdominal distention, abdominal pain, anal bleeding, constipation and diarrhea.   Endocrine: Negative for cold intolerance, heat intolerance and polydipsia.   Genitourinary:  Negative for bladder incontinence, difficulty urinating, dyspareunia, frequency, pelvic pain and vaginal bleeding.   Musculoskeletal:  Negative for arthralgias, joint swelling and joint deformity.   Allergic/Immunologic: Negative.    Neurological:  Negative for dizziness, vertigo and headaches.   Hematological: Negative.    Psychiatric/Behavioral: Negative.         Current Medications:   Medication List with Changes/Refills   Current Medications    ABILIFY 2 MG TAB    Take 2 mg by mouth once daily.       Start Date: 4/29/2022 End Date: --    ALPRAZOLAM (XANAX) 1 MG TABLET           Start Date: 9/14/2021 End Date: --    AMLODIPINE (NORVASC) 10 MG TABLET    Take 1 tablet (10 mg total) by mouth once daily.       Start Date: 2/10/2023 End Date: 2/10/2024    ASPIRIN 81 MG CHEW    Take 1 tablet (81 mg total) by mouth once daily.       Start Date: 4/28/2022 End Date: 3/6/2023    ATOGEPANT (QULIPTA) 60 MG TAB    Take 60 mg by mouth once daily.       Start Date: 4/26/2023 End Date: --    CETIRIZINE  (ZYRTEC) 10 MG TABLET    Take 1 tablet (10 mg total) by mouth once daily.       Start Date: 2/22/2023 End Date: --    CHLORTHALIDONE (HYGROTEN) 25 MG TAB    Take 25 mg by mouth every morning.       Start Date: 1/21/2023 End Date: --    CYANOCOBALAMIN 1,000 MCG/ML INJECTION    cyanocobalamin (vit B-12) 1,000 mcg/mL injection solution   INJECT 1 ML ONCE DAILY INTRAMUSCULARLY FOR 7 DAYS AND THEN INJECT 1 ML ONCE A WEEK FOR 4 WEEKS AND THEN INJECT 1 ML ONCE EVERY MONTH       Start Date: --        End Date: --    CYCLOSPORINE (RESTASIS) 0.05 % OPHTHALMIC EMULSION    Restasis 0.05 % eye drops in a dropperette   INSTILL 1 DROP INTO EACH EYE EVERY 12 HOURS       Start Date: --        End Date: --    DULOXETINE (CYMBALTA) 60 MG CAPSULE           Start Date: 9/13/2021 End Date: --    ESOMEPRAZOLE (NEXIUM) 40 MG CAPSULE    1 capsule.       Start Date: --        End Date: --    ESTRADIOL (ESTRACE) 0.01 % (0.1 MG/GRAM) VAGINAL CREAM    Place 2 g vaginally once daily.       Start Date: 5/9/2022  End Date: 5/9/2023    EZETIMIBE (ZETIA) 10 MG TABLET    Take 1 tablet (10 mg total) by mouth every evening.       Start Date: 2/22/2023 End Date: 2/22/2024    FERROUS SULFATE 220 MG (44 MG IRON)/5 ML SOLUTION    7 ml drink with straw Orally daily for 30 days       Start Date: 10/1/2022 End Date: --    FLUTICASONE PROPIONATE (FLONASE) 50 MCG/ACTUATION NASAL SPRAY    fluticasone propionate 50 mcg/actuation nasal spray,suspension   USE 2 SPRAY(S) IN EACH NOSTRIL ONCE DAILY       Start Date: --        End Date: --    LORATADINE (CLARITIN) 10 MG TABLET    Take 10 mg by mouth.       Start Date: --        End Date: --    METOPROLOL SUCCINATE (TOPROL-XL) 100 MG 24 HR TABLET    Take 1 tablet (100 mg total) by mouth once daily.       Start Date: 2/10/2023 End Date: 2/10/2024    MONTELUKAST (SINGULAIR) 10 MG TABLET    Take 10 mg by mouth once daily.       Start Date: 7/27/2021 End Date: --    ONDANSETRON (ZOFRAN) 4 MG TABLET    ondansetron HCl 4 mg  "tablet   TAKE 1 TABLET BY MOUTH AS NEEDED FOR NAUSEA       Start Date: --        End Date: --    POTASSIUM CITRATE (UROCIT-K) 10 MEQ (1,080 MG) TBSR    Urocit-K 10 10 mEq (1,080 mg) tablet,extended release   Take 1 tablet every day by oral route for 30 days.       Start Date: --        End Date: --    SACUBITRIL-VALSARTAN (ENTRESTO) 24-26 MG PER TABLET    Take 1 tablet by mouth 2 (two) times daily.       Start Date: --        End Date: --    SODIUM CHLORIDE 1 GRAM TABLET    Take 2 tablets (2 g total) by mouth 3 (three) times daily.       Start Date: 3/1/2023  End Date: 2/29/2024    ZOLPIDEM (AMBIEN) 10 MG TAB    Take 10 mg by mouth nightly as needed.       Start Date: 2/10/2023 End Date: --            Objective:        Vitals:    05/15/23 1516   BP: 111/80   Pulse: 94   Resp: 18   Temp: 98.7 °F (37.1 °C)   TempSrc: Temporal   SpO2: 97%   Weight: 66.7 kg (147 lb)   Height: 5' 3" (1.6 m)       Physical Exam  Constitutional:       Appearance: Normal appearance. She is normal weight.   HENT:      Head: Normocephalic.      Right Ear: Tympanic membrane normal.      Left Ear: Tympanic membrane normal.      Nose: Nose normal.      Mouth/Throat:      Mouth: Mucous membranes are moist.      Pharynx: Oropharynx is clear.   Eyes:      Conjunctiva/sclera: Conjunctivae normal.      Pupils: Pupils are equal, round, and reactive to light.   Cardiovascular:      Rate and Rhythm: Normal rate and regular rhythm.      Pulses: Normal pulses.      Heart sounds: Normal heart sounds.   Pulmonary:      Effort: Pulmonary effort is normal.      Breath sounds: Normal breath sounds.   Abdominal:      General: Abdomen is flat. Bowel sounds are normal.   Musculoskeletal:         General: Normal range of motion.      Cervical back: Normal range of motion.   Skin:     General: Skin is warm and dry.      Capillary Refill: Capillary refill takes less than 2 seconds.   Neurological:      Mental Status: She is alert and oriented to person, place, and " time.   Psychiatric:         Mood and Affect: Mood normal.         Behavior: Behavior normal.           Lab Results   Component Value Date    WBC 10.03 04/26/2023    HGB 12.3 04/26/2023    HCT 37.6 (L) 04/26/2023     (H) 04/26/2023    CHOL 197 04/23/2022    TRIG 82 04/23/2022    HDL 85 (H) 04/23/2022    ALT 16 04/26/2023    AST 20 04/26/2023     04/26/2023    K 4.5 04/26/2023     04/26/2023    CREATININE 1.02 04/26/2023    BUN 9 04/26/2023    CO2 28 04/26/2023    TSH 1.090 04/26/2023    INR 0.96 01/31/2023    HGBA1C 5.3 04/26/2023      Assessment:       1. Vaccination declined    2. Memory loss        Plan:         Problem List Items Addressed This Visit          Neuro    Memory loss     Improved              Other    Vaccination declined     Patient denied shingles, Tetanus and Covid vaccines today  Will consider next visit                Follow up in about 3 months (around 8/15/2023) for wellness.    Saroj Pineda MD     Instructed patient that if symptoms fail to improve or worsen patient should seek immediate medical attention or report to the nearest emergency department. Patient expressed verbal agreement and understanding to this plan of care.

## 2023-05-15 NOTE — PATIENT INSTRUCTIONS
Josesito Ruth,     If you are due for any health screening(s) below please notify me so we can arrange them to be ordered and scheduled to maintain your health. Most healthy patients complete it. Don't lose out on improving your health.     Tests to Keep You Healthy    Mammogram: Met on 4/26/2023  Colon Cancer Screening: DUE  Cervical Cancer Screening: Met on    Last Blood Pressure <= 139/89 (5/15/2023): Yes         Colon Cancer Screening    Of cancers affecting both men and women, colorectal cancer is the third leading cancer killer in the [place]. But it doesnt have to be. Screening can prevent colorectal cancer or find it at an early stage when treatment often leads to a cure.    A colonoscopy is the preferred test for detecting colon cancer. It is needed only once every 10 years if results are negative. While sedated, a flexible, lighted tube with a tiny camera is inserted into the rectum and advanced through the colon to look for cancers. An alternative screening test that is used at home and returned to the lab may also be used. It detects hidden blood in bowel movements which could indicate cancer in the colon. If results are positive, you will need a colonoscopy to determine if the blood is a sign of cancer. This type of follow up (diagnostic) colonoscopy usually requires additional copays as required by your insurance provider. Please contact your PCP if you have any questions.      Dear Flory:    Your [facility] Team is dedicated to helping you stay healthy with regularly scheduled recommended screenings.  Scheduling routine screenings is important to maintaining good health. Our records indicate that you may be overdue for your screening pap smear. A pap smear screening can help identify patients at risk for developing cervical cancer at an early stage, when it is most likely to be successfully treated.    We encourage you to schedule your appointment with your womens health provider or some  primary care providers also perform this screening.    If you have completed or scheduled your pap smear screening outside of [facility], please notify your primary care team so we can update your health record.      If you have questions or would like to schedule your screening, please contact your primary care clinic.    Sincerely,    Saroj Pineda MD and your [facility]

## 2023-05-15 NOTE — LETTER
May 17, 2023      [facility]  [address]  [facility] [place] [address]  Phone: [phone]  Fax: [phone]       Patient: Flory Chacon   YOB: 1965  Date of Visit: 05/15/2023  To Whom It May Concern:    JAYRO Chacon  was at [facility] on 05/15/2023. The patient may return to work/school on 05/16/2023 with no restrictions. If you have any questions or concerns, or if I can be of further assistance, please do not hesitate to contact me.    Sincerely,  MD Talha Stearns RN

## 2023-05-15 NOTE — PROGRESS NOTES
Health Maintenance Due  Topic Date Due   TETANUS VACCINE      REFUSED    Colorectal Cancer Screening     SCOPE DONE ON 2022 AT [facility]    LDCT Lung Screen  Never done   Shingles Vaccine (1 of 2)    REFUSED    COVID-19 Vaccine (3 - Booster for Pfizer series) REFUSED

## 2023-05-16 ENCOUNTER — CLINICAL SUPPORT (OUTPATIENT)
Dept: REHABILITATION | Facility: HOSPITAL | Age: 58
End: 2023-05-16
Payer: COMMERCIAL

## 2023-05-16 ENCOUNTER — PATIENT MESSAGE (OUTPATIENT)
Dept: FAMILY MEDICINE | Facility: CLINIC | Age: 58
End: 2023-05-16
Payer: COMMERCIAL

## 2023-05-16 DIAGNOSIS — S52.532S CLOSED COLLES' FRACTURE OF LEFT RADIUS, SEQUELA: Primary | ICD-10-CM

## 2023-05-16 PROBLEM — S52.532A FRACTURE, COLLES, LEFT, CLOSED: Status: RESOLVED | Noted: 2023-02-15 | Resolved: 2023-05-16

## 2023-05-16 PROCEDURE — 97110 THERAPEUTIC EXERCISES: CPT | Mod: PN

## 2023-05-16 NOTE — PLAN OF CARE
Physical Therapy Treatment Note     Name: Criselda Chacon  Clinic Number: [id number]    Therapy Diagnosis:   Encounter Diagnosis   Name Primary?    Closed Colles' fracture of left radius, sequela Yes     Physician: Kenney Block FNP    Visit Date: 5/16/2023    Physician Orders: PT Eval and Treat    Medical Diagnosis from Referral: left colles fx distal radius fx   Evaluation Date: 4/27/2023  Updated Plan of Care Due : 5/26/2023   Authorization Period Expiration: blue cross   Plan of Care Expiration: end of the year 50 visits   Visit # / Visits authorized: 4/ 50  PTA Visit #:     Time In: 1230  Time Out: 1305  Total Billable Time: 35 minutes (pt needed to leave to get back to work)    Precautions: Standard      Subjective     Pt reports: worse thing is with gripping still     Response to previous treatment: not bad  Functional change: ongoing     Pain: 6/10  Location: left wrist      Objective     CRISELDA received therapeutic exercises to develop strength and flexibility for 18 minutes including:  Ube x 5  minutes   BUE weightbearing on table x 6 repetitions   Green web  and finger extension x 15 repetitions each  Green web wrist flexion and extension x 15 repetitions each  Power gripper x 20 repetitions   Red pinch  with each finger x 6 repetitions each  Wrist flexion and extension with 1# weight x 15-20 repetitions each  Self stretch for flexion and extension, 3x20 second hold each  Weight shifting x 10 reps   30lb     Manual therapy x 8 minutes for flexion and extension stretching, including gentle wrist mobs    Range of motion measures:   Wrist flexion   75/90 degrees    Wrist extension  65/72 degrees    strength  40lbs    Weight bearing   30lb     Home Exercises Provided and Patient Education Provided     Education provided: continue current home exercise program, pain free; ice for pain management     Written Home Exercises Provided: Patient instructed to cont prior HEP.  Exercises were  reviewed and FLORY was able to demonstrate them prior to the end of the session.  FLORY demonstrated good  understanding of the education provided.     See EMR under Patient Instructions for exercises provided 4/27/2023.    Assessment     Continues to improve range of motion and strength as noted in spite of pain  FLORY Is progressing well towards her goals.   Pt prognosis is Excellent.     Pt will continue to benefit from skilled outpatient physical therapy to address the deficits listed in the problem list box on initial evaluation, provide pt/family education and to maximize pt's level of independence in the home and community environment.      Anticipated barriers to physical therapy: home exercise program compliance    Goals:  Short Term Goals: 4 weeks   Pt will be able to increase left  to 20lb   Increase  left wrist extension to 50 degrees MET  Increase left wrist flexion to 70 degrees MET  Increase be able to type with left hand      Long Term Goals: 8 weeks   Pt will be able to return to work MET  Be able to increase wrist extension to 70  Increase flexion to 80 degrees   Increase  to 30lb      Plan     Outpatient Therapy Discharge Summary     Name: Flory Chacon  Clinic Number: [id number]    Therapy Diagnosis:   Encounter Diagnosis   Name Primary?    Closed Colles' fracture of left radius, sequela Yes     Physician: Kenney Block FNP       Assessment    Goals:  Pt met goals     Discharge reason: Patient has met all of his/her goals    Plan   This patient is discharged from Physical Therapy.  Herbert Hall, PT

## 2023-05-16 NOTE — PROGRESS NOTES
Physical Therapy Treatment Note     Name: Criselda Chacon  Clinic Number: [id number]    Therapy Diagnosis:   Encounter Diagnosis   Name Primary?    Closed Colles' fracture of left radius, sequela Yes     Physician: Kenney Block FNP    Visit Date: 5/16/2023    Physician Orders: PT Eval and Treat    Medical Diagnosis from Referral: left colles fx distal radius fx   Evaluation Date: 4/27/2023  Updated Plan of Care Due : 5/26/2023   Authorization Period Expiration: blue cross   Plan of Care Expiration: end of the year 50 visits   Visit # / Visits authorized: 4/ 50  PTA Visit #:     Time In: 1230  Time Out: 1305  Total Billable Time: 35 minutes (pt needed to leave to get back to work)    Precautions: Standard      Subjective     Pt reports: worse thing is with gripping still     Response to previous treatment: not bad  Functional change: ongoing     Pain: 6/10  Location: left wrist      Objective     CRISELDA received therapeutic exercises to develop strength and flexibility for 18 minutes including:  Ube x 5  minutes   BUE weightbearing on table x 6 repetitions   Green web  and finger extension x 15 repetitions each  Green web wrist flexion and extension x 15 repetitions each  Power gripper x 20 repetitions   Red pinch  with each finger x 6 repetitions each  Wrist flexion and extension with 1# weight x 15-20 repetitions each  Self stretch for flexion and extension, 3x20 second hold each  Weight shifting x 10 reps   30lb     Manual therapy x 8 minutes for flexion and extension stretching, including gentle wrist mobs    Range of motion measures:   Wrist flexion   75/90 degrees    Wrist extension  65/72 degrees    strength  40lbs    Weight bearing   30lb     Home Exercises Provided and Patient Education Provided     Education provided: continue current home exercise program, pain free; ice for pain management     Written Home Exercises Provided: Patient instructed to cont prior HEP.  Exercises were  reviewed and CRISELDA was able to demonstrate them prior to the end of the session.  CRISELDA demonstrated good  understanding of the education provided.     See EMR under Patient Instructions for exercises provided  4/27/2023 .    Assessment     Continues to improve range of motion and strength as noted in spite of pain  CRISELDA Is progressing well towards her goals.   Pt prognosis is Excellent.     Pt will continue to benefit from skilled outpatient physical therapy to address the deficits listed in the problem list box on initial evaluation, provide pt/family education and to maximize pt's level of independence in the home and community environment.      Anticipated barriers to physical therapy: home exercise program compliance    Goals:  Short Term Goals: 4 weeks   Pt will be able to increase left  to 20lb   Increase  left wrist extension to 50 degrees MET  Increase left wrist flexion to 70 degrees MET  Increase be able to type with left hand      Long Term Goals: 8 weeks   Pt will be able to return to work MET  Be able to increase wrist extension to 70  Increase flexion to 80 degrees   Increase  to 30lb      Plan     Plan of care Certification: 4/27/2023 to 6/26/2023 .     Outpatient Physical Therapy 2 times weekly for 8 weeks to include the following interventions: Neuromuscular Re-ed, Patient Education, and Therapeutic Exercise, modalities as needed.        See d/c   Herbert Hall, PT  5/16/2023

## 2023-05-24 ENCOUNTER — OFFICE VISIT (OUTPATIENT)
Dept: CARDIOLOGY | Facility: CLINIC | Age: 58
End: 2023-05-24
Payer: COMMERCIAL

## 2023-05-24 VITALS
WEIGHT: 146 LBS | HEART RATE: 110 BPM | BODY MASS INDEX: 25.87 KG/M2 | DIASTOLIC BLOOD PRESSURE: 80 MMHG | SYSTOLIC BLOOD PRESSURE: 110 MMHG | RESPIRATION RATE: 18 BRPM | HEIGHT: 63 IN

## 2023-05-24 DIAGNOSIS — I10 ESSENTIAL HYPERTENSION: Primary | ICD-10-CM

## 2023-05-24 DIAGNOSIS — I10 ESSENTIAL HYPERTENSION: ICD-10-CM

## 2023-05-24 DIAGNOSIS — R06.02 SHORTNESS OF BREATH: Primary | ICD-10-CM

## 2023-05-24 PROCEDURE — 4010F PR ACE/ARB THEARPY RXD/TAKEN: ICD-10-PCS | Mod: ,,, | Performed by: STUDENT IN AN ORGANIZED HEALTH CARE EDUCATION/TRAINING PROGRAM

## 2023-05-24 PROCEDURE — 3008F PR BODY MASS INDEX (BMI) DOCUMENTED: ICD-10-PCS | Mod: ,,, | Performed by: STUDENT IN AN ORGANIZED HEALTH CARE EDUCATION/TRAINING PROGRAM

## 2023-05-24 PROCEDURE — 99215 OFFICE O/P EST HI 40 MIN: CPT | Mod: PBBFAC | Performed by: STUDENT IN AN ORGANIZED HEALTH CARE EDUCATION/TRAINING PROGRAM

## 2023-05-24 PROCEDURE — 3079F PR MOST RECENT DIASTOLIC BLOOD PRESSURE 80-89 MM HG: ICD-10-PCS | Mod: ,,, | Performed by: STUDENT IN AN ORGANIZED HEALTH CARE EDUCATION/TRAINING PROGRAM

## 2023-05-24 PROCEDURE — 93010 EKG 12-LEAD: ICD-10-PCS | Mod: S$PBB,,, | Performed by: STUDENT IN AN ORGANIZED HEALTH CARE EDUCATION/TRAINING PROGRAM

## 2023-05-24 PROCEDURE — 3066F NEPHROPATHY DOC TX: CPT | Mod: ,,, | Performed by: STUDENT IN AN ORGANIZED HEALTH CARE EDUCATION/TRAINING PROGRAM

## 2023-05-24 PROCEDURE — 1159F PR MEDICATION LIST DOCUMENTED IN MEDICAL RECORD: ICD-10-PCS | Mod: ,,, | Performed by: STUDENT IN AN ORGANIZED HEALTH CARE EDUCATION/TRAINING PROGRAM

## 2023-05-24 PROCEDURE — 93010 ELECTROCARDIOGRAM REPORT: CPT | Mod: S$PBB,,, | Performed by: STUDENT IN AN ORGANIZED HEALTH CARE EDUCATION/TRAINING PROGRAM

## 2023-05-24 PROCEDURE — 3044F PR MOST RECENT HEMOGLOBIN A1C LEVEL <7.0%: ICD-10-PCS | Mod: ,,, | Performed by: STUDENT IN AN ORGANIZED HEALTH CARE EDUCATION/TRAINING PROGRAM

## 2023-05-24 PROCEDURE — 3066F PR DOCUMENTATION OF TREATMENT FOR NEPHROPATHY: ICD-10-PCS | Mod: ,,, | Performed by: STUDENT IN AN ORGANIZED HEALTH CARE EDUCATION/TRAINING PROGRAM

## 2023-05-24 PROCEDURE — 99214 PR OFFICE/OUTPT VISIT, EST, LEVL IV, 30-39 MIN: ICD-10-PCS | Mod: S$PBB,,, | Performed by: STUDENT IN AN ORGANIZED HEALTH CARE EDUCATION/TRAINING PROGRAM

## 2023-05-24 PROCEDURE — 1159F MED LIST DOCD IN RCRD: CPT | Mod: ,,, | Performed by: STUDENT IN AN ORGANIZED HEALTH CARE EDUCATION/TRAINING PROGRAM

## 2023-05-24 PROCEDURE — 3074F PR MOST RECENT SYSTOLIC BLOOD PRESSURE < 130 MM HG: ICD-10-PCS | Mod: ,,, | Performed by: STUDENT IN AN ORGANIZED HEALTH CARE EDUCATION/TRAINING PROGRAM

## 2023-05-24 PROCEDURE — 3008F BODY MASS INDEX DOCD: CPT | Mod: ,,, | Performed by: STUDENT IN AN ORGANIZED HEALTH CARE EDUCATION/TRAINING PROGRAM

## 2023-05-24 PROCEDURE — 4010F ACE/ARB THERAPY RXD/TAKEN: CPT | Mod: ,,, | Performed by: STUDENT IN AN ORGANIZED HEALTH CARE EDUCATION/TRAINING PROGRAM

## 2023-05-24 PROCEDURE — 99214 OFFICE O/P EST MOD 30 MIN: CPT | Mod: S$PBB,,, | Performed by: STUDENT IN AN ORGANIZED HEALTH CARE EDUCATION/TRAINING PROGRAM

## 2023-05-24 PROCEDURE — 93005 ELECTROCARDIOGRAM TRACING: CPT | Mod: PBBFAC | Performed by: STUDENT IN AN ORGANIZED HEALTH CARE EDUCATION/TRAINING PROGRAM

## 2023-05-24 PROCEDURE — 3074F SYST BP LT 130 MM HG: CPT | Mod: ,,, | Performed by: STUDENT IN AN ORGANIZED HEALTH CARE EDUCATION/TRAINING PROGRAM

## 2023-05-24 PROCEDURE — 3079F DIAST BP 80-89 MM HG: CPT | Mod: ,,, | Performed by: STUDENT IN AN ORGANIZED HEALTH CARE EDUCATION/TRAINING PROGRAM

## 2023-05-24 PROCEDURE — 3044F HG A1C LEVEL LT 7.0%: CPT | Mod: ,,, | Performed by: STUDENT IN AN ORGANIZED HEALTH CARE EDUCATION/TRAINING PROGRAM

## 2023-05-24 NOTE — PROGRESS NOTES
PCP: Saroj Pineda MD    Referring Provider:     Subjective:   Flory Chacon is a 57 y.o. female with hx of SVT status post ablation and heart failure with improved ejection fraction (HFimpEF) who presents for a follow-up visit.      5/24/23: c/o shortness of breath with exertion and fatigue. Still has issues with dizziness, feels off balance. No falls recently. No syncope.     03/06/23:Has previously been seen by Dr. Epperson and Dr. Cormier.  Hospitalized from 01/30 to 2/9/3 with nausea vomiting and severe hyponatremia (sodium 106).  Inpatient follow-up scheduled with me.  Doing okay from cardiac standpoint.  Does complain of episodes of dizziness- appears to be vertigo.  PPM functioning normally.     Fhx: family history of premature CAD    Shx:  Former smoker former smoker 20 pack year smoking history.  Quit in 2013.    EKG  02/03/2023:  Atrial sensed V paced rhythm  ECHO Results for orders placed during the hospital encounter of 01/30/23    Echo    Interpretation Summary  · The left ventricle is small with concentric remodeling and normal systolic function.  · The estimated ejection fraction is 60%.  · Normal right ventricular size with normal right ventricular systolic function.  · Mild tricuspid regurgitation.  · Normal central venous pressure (3 mmHg).  · The estimated PA systolic pressure is 28 mmHg.  · Compared to prior ECHo from 4/2022; LV function has improved from 40% to 60%    [facility] Results for orders placed during the hospital encounter of 04/23/22    Cardiac catheterization    Conclusion  · The left ventricular systolic function was normal.  · The left ventricular end diastolic pressure was normal.  · The estimated blood loss was none.  · The coronary arteries were normal..    The procedure log was documented by Documenter: Bonny Obando RN and verified by Malachi Cormier DO.    Date: 4/25/2022  Time: 1:11 PM    Normal coronary arteries, normal LV function.        Lab Results   Component Value  Date     04/26/2023    K 4.5 04/26/2023     04/26/2023    CO2 28 04/26/2023    BUN 9 04/26/2023    CREATININE 1.02 04/26/2023    CALCIUM 9.0 04/26/2023    ANIONGAP 10 04/26/2023    EGFRNONAA 54 (L) 07/18/2022       Lab Results   Component Value Date    CHOL 197 04/23/2022     Lab Results   Component Value Date    HDL 85 (H) 04/23/2022     Lab Results   Component Value Date    LDLCALC 96 04/23/2022     Lab Results   Component Value Date    TRIG 82 04/23/2022     Lab Results   Component Value Date    CHOLHDL 2.3 04/23/2022       Lab Results   Component Value Date    WBC 10.03 04/26/2023    HGB 12.3 04/26/2023    HCT 37.6 (L) 04/26/2023    MCV 87.2 04/26/2023     (H) 04/26/2023           Current Outpatient Medications:     ABILIFY 2 mg Tab, Take 2 mg by mouth once daily., Disp: , Rfl:     ALPRAZolam (XANAX) 1 MG tablet, , Disp: , Rfl:     amLODIPine (NORVASC) 10 MG tablet, Take 1 tablet (10 mg total) by mouth once daily., Disp: 30 tablet, Rfl: 11    atogepant (QULIPTA) 60 mg Tab, Take 60 mg by mouth once daily., Disp: 30 tablet, Rfl: 11    cetirizine (ZYRTEC) 10 MG tablet, Take 1 tablet (10 mg total) by mouth once daily., Disp: 90 tablet, Rfl: 1    cyanocobalamin 1,000 mcg/mL injection, cyanocobalamin (vit B-12) 1,000 mcg/mL injection solution  INJECT 1 ML ONCE DAILY INTRAMUSCULARLY FOR 7 DAYS AND THEN INJECT 1 ML ONCE A WEEK FOR 4 WEEKS AND THEN INJECT 1 ML ONCE EVERY MONTH, Disp: , Rfl:     DULoxetine (CYMBALTA) 60 MG capsule, , Disp: , Rfl:     ezetimibe (ZETIA) 10 mg tablet, Take 1 tablet (10 mg total) by mouth every evening., Disp: 90 tablet, Rfl: 3    potassium citrate (UROCIT-K) 10 mEq (1,080 mg) TbSR, Urocit-K 10 10 mEq (1,080 mg) tablet,extended release  Take 1 tablet every day by oral route for 30 days., Disp: , Rfl:     sacubitriL-valsartan (ENTRESTO) 24-26 mg per tablet, Take 1 tablet by mouth 2 (two) times daily., Disp: , Rfl:     sodium chloride 1 gram tablet, Take 2 tablets (2 g total)  "by mouth 3 (three) times daily., Disp: 540 tablet, Rfl: 3    zolpidem (AMBIEN) 10 mg Tab, Take 10 mg by mouth nightly as needed., Disp: , Rfl:     aspirin 81 MG Chew, Take 1 tablet (81 mg total) by mouth once daily., Disp: 30 tablet, Rfl: 0    chlorthalidone (HYGROTEN) 25 MG Tab, Take 25 mg by mouth every morning., Disp: , Rfl:     cycloSPORINE (RESTASIS) 0.05 % ophthalmic emulsion, Restasis 0.05 % eye drops in a dropperette  INSTILL 1 DROP INTO EACH EYE EVERY 12 HOURS, Disp: , Rfl:     esomeprazole (NEXIUM) 40 MG capsule, 1 capsule., Disp: , Rfl:     estradioL (ESTRACE) 0.01 % (0.1 mg/gram) vaginal cream, Place 2 g vaginally once daily., Disp: 60 g, Rfl: 11    ferrous sulfate 220 mg (44 mg iron)/5 mL solution, 7 ml drink with straw Orally daily for 30 days, Disp: , Rfl:     fluticasone propionate (FLONASE) 50 mcg/actuation nasal spray, fluticasone propionate 50 mcg/actuation nasal spray,suspension  USE 2 SPRAY(S) IN EACH NOSTRIL ONCE DAILY, Disp: , Rfl:     loratadine (CLARITIN) 10 mg tablet, Take 10 mg by mouth., Disp: , Rfl:     metoprolol succinate (TOPROL-XL) 100 MG 24 hr tablet, Take 1 tablet (100 mg total) by mouth once daily. (Patient not taking: Reported on 5/24/2023), Disp: 30 tablet, Rfl: 11    montelukast (SINGULAIR) 10 mg tablet, Take 10 mg by mouth once daily., Disp: , Rfl:     ondansetron (ZOFRAN) 4 MG tablet, ondansetron HCl 4 mg tablet  TAKE 1 TABLET BY MOUTH AS NEEDED FOR NAUSEA, Disp: , Rfl:     Review of Systems   Constitutional:  Negative for chills, diaphoresis, fever and malaise/fatigue.   Respiratory:  Negative for cough and shortness of breath.    Cardiovascular:  Negative for chest pain, palpitations, orthopnea, claudication, leg swelling and PND.   Gastrointestinal:  Negative for abdominal pain, heartburn, nausea and vomiting.   Neurological:  Negative for dizziness.       Objective:   /80   Pulse 110   Resp 18   Ht 5' 3" (1.6 m)   Wt 66.2 kg (146 lb)   BMI 25.86 kg/m² "     Physical Exam  Constitutional:       General: She is not in acute distress.     Appearance: Normal appearance.   Cardiovascular:      Rate and Rhythm: Normal rate and regular rhythm.      Pulses: Normal pulses.      Heart sounds: Normal heart sounds. No murmur heard.    No friction rub. No gallop.   Pulmonary:      Effort: Pulmonary effort is normal.      Breath sounds: Normal breath sounds. No wheezing or rales.   Musculoskeletal:      Right lower leg: No edema.      Left lower leg: No edema.   Skin:     General: Skin is warm and dry.   Neurological:      Mental Status: She is alert.         Assessment:     1. Shortness of breath  NT-Pro Natriuretic Peptide      2. Essential hypertension  EKG 12-lead              Plan:   No problem-specific Assessment & Plan notes found for this encounter.      Dilated nonischemic cardiomyopathy  S/P Biventricular pacer implant at [facility]  LVEF 60% on echocardiogram  01/2023  Appears she has not been taking Toprol 100 mg daily- not in medication bottles- resume Toprol 100 mg daily  Continue Entresto 04/20/2026 b.i.d.  Complaining of shortness of breath on exertion.  Appears euvolemic on examination.  Check NT proBNP.    SVT   S/p ablation  Rare palpitations  Resume Toprol as above     Hypertension   Blood pressure controlled- 110/80    F/U in 3 months

## 2023-05-25 ENCOUNTER — TELEPHONE (OUTPATIENT)
Dept: NEUROLOGY | Facility: CLINIC | Age: 58
End: 2023-05-25
Payer: COMMERCIAL

## 2023-05-25 NOTE — TELEPHONE ENCOUNTER
"Called pt x 2 and left message regarding PA for Emgality, whether approved or not. Ask to call her insurance company to see it med was approved and call the office back.          ----- Message from ALEA Mcconnell sent at 5/25/2023  9:39 AM CDT -----  Can you please contact her, we are trying to get her a PA for Emgality, but [place] got a statement that the PA was "resolved" but that is as far as she can go.  I have no way of knowing if approved or not and they won't tell us, the patient has to call them.  Have her let us know what they tell her.    "

## 2023-05-26 ENCOUNTER — PATIENT MESSAGE (OUTPATIENT)
Dept: FAMILY MEDICINE | Facility: CLINIC | Age: 58
End: 2023-05-26
Payer: COMMERCIAL

## 2023-06-12 ENCOUNTER — PATIENT OUTREACH (OUTPATIENT)
Dept: ADMINISTRATIVE | Facility: HOSPITAL | Age: 58
End: 2023-06-12

## 2023-06-12 ENCOUNTER — PATIENT MESSAGE (OUTPATIENT)
Dept: ADMINISTRATIVE | Facility: HOSPITAL | Age: 58
End: 2023-06-12

## 2023-06-12 NOTE — PROGRESS NOTES
Message sent to pt because according to her las note she had her c scope at [place] but according to them if she had it done in 2022 then it should be in Epic. So reached out to pt for clarification.

## 2023-06-27 ENCOUNTER — TELEPHONE (OUTPATIENT)
Dept: NEPHROLOGY | Facility: CLINIC | Age: 58
End: 2023-06-27
Payer: COMMERCIAL

## 2023-06-27 NOTE — TELEPHONE ENCOUNTER
Spoke w/ , I let her know that since the PCP gave her abx, then to just take those and if they seem to not work, then return to the doctor that she seen and get another round.

## 2023-07-18 ENCOUNTER — OFFICE VISIT (OUTPATIENT)
Dept: FAMILY MEDICINE | Facility: CLINIC | Age: 58
End: 2023-07-18
Payer: COMMERCIAL

## 2023-07-18 VITALS
HEART RATE: 84 BPM | TEMPERATURE: 98 F | OXYGEN SATURATION: 98 % | RESPIRATION RATE: 16 BRPM | WEIGHT: 146 LBS | SYSTOLIC BLOOD PRESSURE: 116 MMHG | HEIGHT: 63 IN | BODY MASS INDEX: 25.87 KG/M2 | DIASTOLIC BLOOD PRESSURE: 84 MMHG

## 2023-07-18 DIAGNOSIS — R42 DIZZINESS: ICD-10-CM

## 2023-07-18 DIAGNOSIS — J02.9 SORE THROAT: Primary | ICD-10-CM

## 2023-07-18 PROCEDURE — 3044F PR MOST RECENT HEMOGLOBIN A1C LEVEL <7.0%: ICD-10-PCS | Mod: ,,, | Performed by: FAMILY MEDICINE

## 2023-07-18 PROCEDURE — 3008F PR BODY MASS INDEX (BMI) DOCUMENTED: ICD-10-PCS | Mod: ,,, | Performed by: FAMILY MEDICINE

## 2023-07-18 PROCEDURE — 3066F PR DOCUMENTATION OF TREATMENT FOR NEPHROPATHY: ICD-10-PCS | Mod: ,,, | Performed by: FAMILY MEDICINE

## 2023-07-18 PROCEDURE — 3066F NEPHROPATHY DOC TX: CPT | Mod: ,,, | Performed by: FAMILY MEDICINE

## 2023-07-18 PROCEDURE — 1159F MED LIST DOCD IN RCRD: CPT | Mod: ,,, | Performed by: FAMILY MEDICINE

## 2023-07-18 PROCEDURE — 3079F DIAST BP 80-89 MM HG: CPT | Mod: ,,, | Performed by: FAMILY MEDICINE

## 2023-07-18 PROCEDURE — 1159F PR MEDICATION LIST DOCUMENTED IN MEDICAL RECORD: ICD-10-PCS | Mod: ,,, | Performed by: FAMILY MEDICINE

## 2023-07-18 PROCEDURE — 3044F HG A1C LEVEL LT 7.0%: CPT | Mod: ,,, | Performed by: FAMILY MEDICINE

## 2023-07-18 PROCEDURE — 3074F SYST BP LT 130 MM HG: CPT | Mod: ,,, | Performed by: FAMILY MEDICINE

## 2023-07-18 PROCEDURE — 99212 PR OFFICE/OUTPT VISIT, EST, LEVL II, 10-19 MIN: ICD-10-PCS | Mod: GC,,, | Performed by: FAMILY MEDICINE

## 2023-07-18 PROCEDURE — 4010F ACE/ARB THERAPY RXD/TAKEN: CPT | Mod: ,,, | Performed by: FAMILY MEDICINE

## 2023-07-18 PROCEDURE — 99212 OFFICE O/P EST SF 10 MIN: CPT | Mod: GC,,, | Performed by: FAMILY MEDICINE

## 2023-07-18 PROCEDURE — 3008F BODY MASS INDEX DOCD: CPT | Mod: ,,, | Performed by: FAMILY MEDICINE

## 2023-07-18 PROCEDURE — 3074F PR MOST RECENT SYSTOLIC BLOOD PRESSURE < 130 MM HG: ICD-10-PCS | Mod: ,,, | Performed by: FAMILY MEDICINE

## 2023-07-18 PROCEDURE — 4010F PR ACE/ARB THEARPY RXD/TAKEN: ICD-10-PCS | Mod: ,,, | Performed by: FAMILY MEDICINE

## 2023-07-18 PROCEDURE — 3079F PR MOST RECENT DIASTOLIC BLOOD PRESSURE 80-89 MM HG: ICD-10-PCS | Mod: ,,, | Performed by: FAMILY MEDICINE

## 2023-07-18 RX ORDER — NITROFURANTOIN 25; 75 MG/1; MG/1
100 CAPSULE ORAL EVERY 12 HOURS
COMMUNITY
Start: 2023-06-29 | End: 2023-12-21 | Stop reason: ALTCHOICE

## 2023-07-18 NOTE — LETTER
July 18, 2023      [facility]  [address]  [place] [address]  Phone: [phone]  Fax: [phone]       Patient: Flory Chacon   YOB: 1965  Date of Visit: 07/18/2023    To Whom It May Concern:    JAYRO Chacon  was at [facility] on 07/18/2023. The patient may return to work/school on 7/21/23 with no restrictions. If you have any questions or concerns, or if I can be of further assistance, please do not hesitate to contact me.    Sincerely,    Charlie Maldonado, DO

## 2023-07-18 NOTE — PROGRESS NOTES
Subjective:       Patient ID: Flory Chacon is a 57 y.o. female.    Chief Complaint: Dizziness, Migraine, and Shortness of Breath (Pt states having some shortness of breath )    This is a 57 year old female who presents today for migraine and dizziness. Patient states she has had dizziness since Sunday with a migraine. She reports she has noticed when she gets up and moves around the dizziness worsens. She reports no fever or chills at this time. She does states she feel like she has some sinus congestion and pressure present.        Current Outpatient Medications:     ABILIFY 2 mg Tab, Take 2 mg by mouth once daily., Disp: , Rfl:     ALPRAZolam (XANAX) 1 MG tablet, , Disp: , Rfl:     amLODIPine (NORVASC) 10 MG tablet, Take 1 tablet (10 mg total) by mouth once daily., Disp: 30 tablet, Rfl: 11    atogepant (QULIPTA) 60 mg Tab, Take 60 mg by mouth once daily., Disp: 30 tablet, Rfl: 11    cetirizine (ZYRTEC) 10 MG tablet, Take 1 tablet (10 mg total) by mouth once daily., Disp: 90 tablet, Rfl: 1    chlorthalidone (HYGROTEN) 25 MG Tab, Take 25 mg by mouth every morning., Disp: , Rfl:     cyanocobalamin 1,000 mcg/mL injection, cyanocobalamin (vit B-12) 1,000 mcg/mL injection solution  INJECT 1 ML ONCE DAILY INTRAMUSCULARLY FOR 7 DAYS AND THEN INJECT 1 ML ONCE A WEEK FOR 4 WEEKS AND THEN INJECT 1 ML ONCE EVERY MONTH, Disp: , Rfl:     cycloSPORINE (RESTASIS) 0.05 % ophthalmic emulsion, Restasis 0.05 % eye drops in a dropperette  INSTILL 1 DROP INTO EACH EYE EVERY 12 HOURS, Disp: , Rfl:     DULoxetine (CYMBALTA) 60 MG capsule, , Disp: , Rfl:     esomeprazole (NEXIUM) 40 MG capsule, 1 capsule., Disp: , Rfl:     ezetimibe (ZETIA) 10 mg tablet, Take 1 tablet (10 mg total) by mouth every evening., Disp: 90 tablet, Rfl: 3    fluticasone propionate (FLONASE) 50 mcg/actuation nasal spray, fluticasone propionate 50 mcg/actuation nasal spray,suspension  USE 2 SPRAY(S) IN EACH NOSTRIL ONCE DAILY, Disp: , Rfl:     loratadine  (CLARITIN) 10 mg tablet, Take 10 mg by mouth., Disp: , Rfl:     montelukast (SINGULAIR) 10 mg tablet, Take 10 mg by mouth once daily., Disp: , Rfl:     ondansetron (ZOFRAN) 4 MG tablet, ondansetron HCl 4 mg tablet  TAKE 1 TABLET BY MOUTH AS NEEDED FOR NAUSEA, Disp: , Rfl:     potassium citrate (UROCIT-K) 10 mEq (1,080 mg) TbSR, Urocit-K 10 10 mEq (1,080 mg) tablet,extended release  Take 1 tablet every day by oral route for 30 days., Disp: , Rfl:     sacubitriL-valsartan (ENTRESTO) 24-26 mg per tablet, Take 1 tablet by mouth 2 (two) times daily., Disp: , Rfl:     sodium chloride 1 gram tablet, Take 2 tablets (2 g total) by mouth 3 (three) times daily., Disp: 540 tablet, Rfl: 3    zolpidem (AMBIEN) 10 mg Tab, Take 10 mg by mouth nightly as needed., Disp: , Rfl:     aspirin 81 MG Chew, Take 1 tablet (81 mg total) by mouth once daily., Disp: 30 tablet, Rfl: 0    estradioL (ESTRACE) 0.01 % (0.1 mg/gram) vaginal cream, Place 2 g vaginally once daily., Disp: 60 g, Rfl: 11    ferrous sulfate 220 mg (44 mg iron)/5 mL solution, 7 ml drink with straw Orally daily for 30 days, Disp: , Rfl:     metoprolol succinate (TOPROL-XL) 100 MG 24 hr tablet, Take 1 tablet (100 mg total) by mouth once daily. (Patient not taking: Reported on 5/24/2023), Disp: 30 tablet, Rfl: 11    nitrofurantoin, macrocrystal-monohydrate, (MACROBID) 100 MG capsule, Take 100 mg by mouth every 12 (twelve) hours., Disp: , Rfl:     Review of patient's allergies indicates:   Allergen Reactions    Meloxicam Swelling    Egg      Other reaction(s): Unknown    Eggs [egg derived]     Statins-hmg-coa reductase inhibitors      Other reaction(s): Unknown    Penicillins Rash       Past Medical History:   Diagnosis Date    Acute hypokalemia     Anxiety disorder, unspecified     Depression     Hyperlipidemia     Hypertension     Insomnia        Past Surgical History:   Procedure Laterality Date    FRACTURE SURGERY      KNEE ARTHROPLASTY Left     KNEE ARTHROPLASTY Right      LEFT HEART CATHETERIZATION Left 04/25/2022    Procedure: Left heart cath;  Surgeon: Malachi Cormier DO;  Location: [facility] CATH LAB;  Service: Cardiology;  Laterality: Left;       Family History   Problem Relation Age of Onset    Heart disease Father     Breast cancer Maternal Aunt     Melanoma Maternal Uncle     Breast cancer Maternal Grandmother        Social History     Tobacco Use    Smoking status: Former     Packs/day: 1.00     Years: 20.00     Pack years: 20.00     Types: Cigarettes     Quit date: 2013     Years since quitting: 10.5    Smokeless tobacco: Never   Substance Use Topics    Alcohol use: Not Currently     Comment: socially in the past    Drug use: Never       Review of Systems   Constitutional:  Negative for fatigue and fever.   HENT:  Positive for nasal congestion and sinus pressure/congestion. Negative for nosebleeds.    Eyes:  Negative for pain, discharge and itching.   Respiratory:  Negative for cough, choking and shortness of breath.    Cardiovascular: Negative.    Gastrointestinal:  Negative for abdominal distention, abdominal pain, anal bleeding, constipation and diarrhea.   Musculoskeletal:  Negative for arthralgias, joint swelling and joint deformity.   Allergic/Immunologic: Negative.    Neurological:  Positive for dizziness, vertigo and headaches.   Hematological: Negative.    Psychiatric/Behavioral: Negative.         Current Medications:   Medication List with Changes/Refills   Current Medications    ABILIFY 2 MG TAB    Take 2 mg by mouth once daily.       Start Date: 4/29/2022 End Date: --    ALPRAZOLAM (XANAX) 1 MG TABLET           Start Date: 9/14/2021 End Date: --    AMLODIPINE (NORVASC) 10 MG TABLET    Take 1 tablet (10 mg total) by mouth once daily.       Start Date: 2/10/2023 End Date: 2/10/2024    ASPIRIN 81 MG CHEW    Take 1 tablet (81 mg total) by mouth once daily.       Start Date: 4/28/2022 End Date: 3/6/2023    ATOGEPANT (QULIPTA) 60 MG TAB    Take 60 mg by mouth once  daily.       Start Date: 4/26/2023 End Date: --    CETIRIZINE (ZYRTEC) 10 MG TABLET    Take 1 tablet (10 mg total) by mouth once daily.       Start Date: 2/22/2023 End Date: --    CHLORTHALIDONE (HYGROTEN) 25 MG TAB    Take 25 mg by mouth every morning.       Start Date: 1/21/2023 End Date: --    CYANOCOBALAMIN 1,000 MCG/ML INJECTION    cyanocobalamin (vit B-12) 1,000 mcg/mL injection solution   INJECT 1 ML ONCE DAILY INTRAMUSCULARLY FOR 7 DAYS AND THEN INJECT 1 ML ONCE A WEEK FOR 4 WEEKS AND THEN INJECT 1 ML ONCE EVERY MONTH       Start Date: --        End Date: --    CYCLOSPORINE (RESTASIS) 0.05 % OPHTHALMIC EMULSION    Restasis 0.05 % eye drops in a dropperette   INSTILL 1 DROP INTO EACH EYE EVERY 12 HOURS       Start Date: --        End Date: --    DULOXETINE (CYMBALTA) 60 MG CAPSULE           Start Date: 9/13/2021 End Date: --    ESOMEPRAZOLE (NEXIUM) 40 MG CAPSULE    1 capsule.       Start Date: --        End Date: --    ESTRADIOL (ESTRACE) 0.01 % (0.1 MG/GRAM) VAGINAL CREAM    Place 2 g vaginally once daily.       Start Date: 5/9/2022  End Date: 5/9/2023    EZETIMIBE (ZETIA) 10 MG TABLET    Take 1 tablet (10 mg total) by mouth every evening.       Start Date: 2/22/2023 End Date: 2/22/2024    FERROUS SULFATE 220 MG (44 MG IRON)/5 ML SOLUTION    7 ml drink with straw Orally daily for 30 days       Start Date: 10/1/2022 End Date: --    FLUTICASONE PROPIONATE (FLONASE) 50 MCG/ACTUATION NASAL SPRAY    fluticasone propionate 50 mcg/actuation nasal spray,suspension   USE 2 SPRAY(S) IN EACH NOSTRIL ONCE DAILY       Start Date: --        End Date: --    LORATADINE (CLARITIN) 10 MG TABLET    Take 10 mg by mouth.       Start Date: --        End Date: --    METOPROLOL SUCCINATE (TOPROL-XL) 100 MG 24 HR TABLET    Take 1 tablet (100 mg total) by mouth once daily.       Start Date: 2/10/2023 End Date: 2/10/2024    MONTELUKAST (SINGULAIR) 10 MG TABLET    Take 10 mg by mouth once daily.       Start Date: 7/27/2021 End Date:  "--    NITROFURANTOIN, MACROCRYSTAL-MONOHYDRATE, (MACROBID) 100 MG CAPSULE    Take 100 mg by mouth every 12 (twelve) hours.       Start Date: 6/29/2023 End Date: --    ONDANSETRON (ZOFRAN) 4 MG TABLET    ondansetron HCl 4 mg tablet   TAKE 1 TABLET BY MOUTH AS NEEDED FOR NAUSEA       Start Date: --        End Date: --    POTASSIUM CITRATE (UROCIT-K) 10 MEQ (1,080 MG) TBSR    Urocit-K 10 10 mEq (1,080 mg) tablet,extended release   Take 1 tablet every day by oral route for 30 days.       Start Date: --        End Date: --    SACUBITRIL-VALSARTAN (ENTRESTO) 24-26 MG PER TABLET    Take 1 tablet by mouth 2 (two) times daily.       Start Date: --        End Date: --    SODIUM CHLORIDE 1 GRAM TABLET    Take 2 tablets (2 g total) by mouth 3 (three) times daily.       Start Date: 3/1/2023  End Date: 2/29/2024    ZOLPIDEM (AMBIEN) 10 MG TAB    Take 10 mg by mouth nightly as needed.       Start Date: 2/10/2023 End Date: --            Objective:        Vitals:    07/18/23 0841   BP: 116/84   Pulse: 84   Resp: 16   Temp: 98.2 °F (36.8 °C)   TempSrc: Temporal   SpO2: 98%   Weight: 66.2 kg (146 lb)   Height: 5' 3" (1.6 m)       Physical Exam  Constitutional:       Appearance: Normal appearance. She is normal weight.   HENT:      Head: Normocephalic.      Right Ear: Tympanic membrane normal.      Left Ear: Tympanic membrane normal.      Nose:      Right Turbinates: Enlarged.      Left Turbinates: Enlarged.      Right Sinus: Maxillary sinus tenderness present.      Left Sinus: Maxillary sinus tenderness present.      Mouth/Throat:      Mouth: Mucous membranes are moist.      Pharynx: Oropharynx is clear.   Eyes:      Conjunctiva/sclera: Conjunctivae normal.      Pupils: Pupils are equal, round, and reactive to light.   Cardiovascular:      Rate and Rhythm: Normal rate and regular rhythm.      Pulses: Normal pulses.      Heart sounds: Normal heart sounds.   Pulmonary:      Effort: Pulmonary effort is normal.      Breath sounds: Normal " breath sounds.   Abdominal:      General: Abdomen is flat. Bowel sounds are normal.   Musculoskeletal:         General: Normal range of motion.      Cervical back: Normal range of motion.   Skin:     General: Skin is warm and dry.      Capillary Refill: Capillary refill takes less than 2 seconds.   Neurological:      Mental Status: She is alert and oriented to person, place, and time.   Psychiatric:         Mood and Affect: Mood normal.         Behavior: Behavior normal.           Lab Results   Component Value Date    WBC 10.03 04/26/2023    HGB 12.3 04/26/2023    HCT 37.6 (L) 04/26/2023     (H) 04/26/2023    CHOL 197 04/23/2022    TRIG 82 04/23/2022    HDL 85 (H) 04/23/2022    ALT 16 04/26/2023    AST 20 04/26/2023     04/26/2023    K 4.5 04/26/2023     04/26/2023    CREATININE 1.02 04/26/2023    BUN 9 04/26/2023    CO2 28 04/26/2023    TSH 1.090 04/26/2023    INR 0.96 01/31/2023    HGBA1C 5.3 04/26/2023      Assessment:       1. Dizziness        Plan:         Problem List Items Addressed This Visit          Other    Dizziness     Tilt table preformed and was determined to be normal, patient most likely has an inner ear infection but cannot rule out medication side effects due to her extensive med list. Spoke with patient to discuss with cecille her med list and if she needed to be on all the meds she currently is taking.               Follow up in about 1 week (around 7/25/2023), or if symptoms worsen or fail to improve.    Charlie Maldonado DO     Instructed patient that if symptoms fail to improve or worsen patient should seek immediate medical attention or report to the nearest emergency department. Patient expressed verbal agreement and understanding to this plan of care.

## 2023-07-18 NOTE — ASSESSMENT & PLAN NOTE
Tilt table preformed and was determined to be normal, patient most likely has an inner ear infection but cannot rule out medication side effects due to her extensive med list. Spoke with patient to discuss with cecille her med list and if she needed to be on all the meds she currently is taking.

## 2023-07-21 ENCOUNTER — OFFICE VISIT (OUTPATIENT)
Dept: FAMILY MEDICINE | Facility: CLINIC | Age: 58
End: 2023-07-21
Payer: COMMERCIAL

## 2023-07-21 ENCOUNTER — PATIENT MESSAGE (OUTPATIENT)
Dept: ADMINISTRATIVE | Facility: HOSPITAL | Age: 58
End: 2023-07-21

## 2023-07-21 VITALS
TEMPERATURE: 98 F | SYSTOLIC BLOOD PRESSURE: 117 MMHG | HEIGHT: 63 IN | BODY MASS INDEX: 25.87 KG/M2 | OXYGEN SATURATION: 98 % | WEIGHT: 146 LBS | HEART RATE: 120 BPM | DIASTOLIC BLOOD PRESSURE: 89 MMHG

## 2023-07-21 DIAGNOSIS — R07.9 CHEST PAIN, UNSPECIFIED TYPE: ICD-10-CM

## 2023-07-21 DIAGNOSIS — Z12.39 ENCOUNTER FOR SCREENING FOR MALIGNANT NEOPLASM OF BREAST, UNSPECIFIED SCREENING MODALITY: ICD-10-CM

## 2023-07-21 DIAGNOSIS — R42 DIZZINESS: Primary | ICD-10-CM

## 2023-07-21 LAB
ALBUMIN SERPL BCP-MCNC: 4.2 G/DL (ref 3.5–5)
ALBUMIN/GLOB SERPL: 1.1 {RATIO}
ALP SERPL-CCNC: 123 U/L (ref 46–118)
ALT SERPL W P-5'-P-CCNC: 18 U/L (ref 13–56)
ANION GAP SERPL CALCULATED.3IONS-SCNC: 15 MMOL/L (ref 7–16)
AST SERPL W P-5'-P-CCNC: 18 U/L (ref 15–37)
BASOPHILS # BLD AUTO: 0.13 K/UL (ref 0–0.2)
BASOPHILS NFR BLD AUTO: 1.4 % (ref 0–1)
BILIRUB SERPL-MCNC: 0.4 MG/DL (ref ?–1.2)
BUN SERPL-MCNC: 8 MG/DL (ref 7–18)
BUN/CREAT SERPL: 7 (ref 6–20)
CALCIUM SERPL-MCNC: 9.5 MG/DL (ref 8.5–10.1)
CHLORIDE SERPL-SCNC: 104 MMOL/L (ref 98–107)
CO2 SERPL-SCNC: 25 MMOL/L (ref 21–32)
CREAT SERPL-MCNC: 1.09 MG/DL (ref 0.55–1.02)
DIFFERENTIAL METHOD BLD: ABNORMAL
EGFR (NO RACE VARIABLE) (RUSH/TITUS): 59 ML/MIN/1.73M2
EKG 12-LEAD: NORMAL
EOSINOPHIL # BLD AUTO: 0.25 K/UL (ref 0–0.5)
EOSINOPHIL NFR BLD AUTO: 2.8 % (ref 1–4)
ERYTHROCYTE [DISTWIDTH] IN BLOOD BY AUTOMATED COUNT: 13.6 % (ref 11.5–14.5)
GLOBULIN SER-MCNC: 3.7 G/DL (ref 2–4)
GLUCOSE SERPL-MCNC: 96 MG/DL (ref 74–106)
HCT VFR BLD AUTO: 42.2 % (ref 38–47)
HGB BLD-MCNC: 13.4 G/DL (ref 12–16)
IMM GRANULOCYTES # BLD AUTO: 0.03 K/UL (ref 0–0.04)
IMM GRANULOCYTES NFR BLD: 0.3 % (ref 0–0.4)
LYMPHOCYTES # BLD AUTO: 3.01 K/UL (ref 1–4.8)
LYMPHOCYTES NFR BLD AUTO: 33.1 % (ref 27–41)
MCH RBC QN AUTO: 26.8 PG (ref 27–31)
MCHC RBC AUTO-ENTMCNC: 31.8 G/DL (ref 32–36)
MCV RBC AUTO: 84.4 FL (ref 80–96)
MONOCYTES # BLD AUTO: 0.61 K/UL (ref 0–0.8)
MONOCYTES NFR BLD AUTO: 6.7 % (ref 2–6)
MPC BLD CALC-MCNC: 9.8 FL (ref 9.4–12.4)
NEUTROPHILS # BLD AUTO: 5.05 K/UL (ref 1.8–7.7)
NEUTROPHILS NFR BLD AUTO: 55.7 % (ref 53–65)
NRBC # BLD AUTO: 0 X10E3/UL
NRBC, AUTO (.00): 0 %
NT-PROBNP SERPL-MCNC: 29 PG/ML (ref 1–125)
PLATELET # BLD AUTO: 584 K/UL (ref 150–400)
POTASSIUM SERPL-SCNC: 3.8 MMOL/L (ref 3.5–5.1)
PR INTERVAL: 154
PROT SERPL-MCNC: 7.9 G/DL (ref 6.4–8.2)
PRT AXES: NORMAL
QRS DURATION: 118
QT/QTC: NORMAL
RBC # BLD AUTO: 5 M/UL (ref 4.2–5.4)
SODIUM SERPL-SCNC: 140 MMOL/L (ref 136–145)
TSH SERPL DL<=0.005 MIU/L-ACNC: 0.32 UIU/ML (ref 0.36–3.74)
VENTRICULAR RATE: 97
WBC # BLD AUTO: 9.08 K/UL (ref 4.5–11)

## 2023-07-21 PROCEDURE — 4010F PR ACE/ARB THEARPY RXD/TAKEN: ICD-10-PCS | Mod: ,,, | Performed by: FAMILY MEDICINE

## 2023-07-21 PROCEDURE — 4010F ACE/ARB THERAPY RXD/TAKEN: CPT | Mod: ,,, | Performed by: FAMILY MEDICINE

## 2023-07-21 PROCEDURE — 3044F PR MOST RECENT HEMOGLOBIN A1C LEVEL <7.0%: ICD-10-PCS | Mod: ,,, | Performed by: FAMILY MEDICINE

## 2023-07-21 PROCEDURE — 1159F MED LIST DOCD IN RCRD: CPT | Mod: ,,, | Performed by: FAMILY MEDICINE

## 2023-07-21 PROCEDURE — 1159F PR MEDICATION LIST DOCUMENTED IN MEDICAL RECORD: ICD-10-PCS | Mod: ,,, | Performed by: FAMILY MEDICINE

## 2023-07-21 PROCEDURE — 93000 POCT EKG 12-LEAD: ICD-10-PCS | Mod: GC,,, | Performed by: FAMILY MEDICINE

## 2023-07-21 PROCEDURE — 3066F NEPHROPATHY DOC TX: CPT | Mod: ,,, | Performed by: FAMILY MEDICINE

## 2023-07-21 PROCEDURE — 80050 GENERAL HEALTH PANEL: CPT | Mod: ,,, | Performed by: CLINICAL MEDICAL LABORATORY

## 2023-07-21 PROCEDURE — 3008F PR BODY MASS INDEX (BMI) DOCUMENTED: ICD-10-PCS | Mod: ,,, | Performed by: FAMILY MEDICINE

## 2023-07-21 PROCEDURE — 3066F PR DOCUMENTATION OF TREATMENT FOR NEPHROPATHY: ICD-10-PCS | Mod: ,,, | Performed by: FAMILY MEDICINE

## 2023-07-21 PROCEDURE — 93000 ELECTROCARDIOGRAM COMPLETE: CPT | Mod: GC,,, | Performed by: FAMILY MEDICINE

## 2023-07-21 PROCEDURE — 3074F SYST BP LT 130 MM HG: CPT | Mod: ,,, | Performed by: FAMILY MEDICINE

## 2023-07-21 PROCEDURE — 83880 NT-PRO NATRIURETIC PEPTIDE: ICD-10-PCS | Mod: ,,, | Performed by: CLINICAL MEDICAL LABORATORY

## 2023-07-21 PROCEDURE — 3044F HG A1C LEVEL LT 7.0%: CPT | Mod: ,,, | Performed by: FAMILY MEDICINE

## 2023-07-21 PROCEDURE — 3079F PR MOST RECENT DIASTOLIC BLOOD PRESSURE 80-89 MM HG: ICD-10-PCS | Mod: ,,, | Performed by: FAMILY MEDICINE

## 2023-07-21 PROCEDURE — 99213 OFFICE O/P EST LOW 20 MIN: CPT | Mod: GC,,, | Performed by: FAMILY MEDICINE

## 2023-07-21 PROCEDURE — 3079F DIAST BP 80-89 MM HG: CPT | Mod: ,,, | Performed by: FAMILY MEDICINE

## 2023-07-21 PROCEDURE — 99213 PR OFFICE/OUTPT VISIT, EST, LEVL III, 20-29 MIN: ICD-10-PCS | Mod: GC,,, | Performed by: FAMILY MEDICINE

## 2023-07-21 PROCEDURE — 80050 PR  GENERAL HEALTH PANEL: ICD-10-PCS | Mod: ,,, | Performed by: CLINICAL MEDICAL LABORATORY

## 2023-07-21 PROCEDURE — 3008F BODY MASS INDEX DOCD: CPT | Mod: ,,, | Performed by: FAMILY MEDICINE

## 2023-07-21 PROCEDURE — 83880 ASSAY OF NATRIURETIC PEPTIDE: CPT | Mod: ,,, | Performed by: CLINICAL MEDICAL LABORATORY

## 2023-07-21 PROCEDURE — 3074F PR MOST RECENT SYSTOLIC BLOOD PRESSURE < 130 MM HG: ICD-10-PCS | Mod: ,,, | Performed by: FAMILY MEDICINE

## 2023-07-21 NOTE — ASSESSMENT & PLAN NOTE
Contacted Dr. Pompa, patients cardiologist office and left a message so patient can be seen as soon as possible.   Ordered labs including troponin. Will review and if abnormal will send to ED  EKG was done and showed Pacemaker rhythm and nothing extremely abnormal   Chest xray did not show acute events. Pacemaker was read as in the corrent place but it does look a little bit unusual to me.

## 2023-07-21 NOTE — LETTER
July 21, 2023      [facility]  [address]  [place] [address]  Phone: [phone]  Fax: [phone]       Patient: Flory Chacon   YOB: 1965  Date of Visit: 07/21/2023    To Whom It May Concern:    JAYRO Chacon  was at [facility] on 07/21/2023. The patient may return to work/school on 07/24/2023 with no restrictions. If you have any questions or concerns, or if I can be of further assistance, please do not hesitate to contact me.    Sincerely,  MD Talha Doan RN

## 2023-07-21 NOTE — PROGRESS NOTES
Subjective     Patient ID: Flory Chacon is a 57 y.o. female.    Chief Complaint: Dizziness, Nausea, and Headache (Came Monday, same symptoms, no relief )    57 year old female with PMHX of CHF, pacemaker came in for dizziness. Patient was in the clinic on Monday and dizziness did not improve therefore she came back again. Patient takes a lot of psych medications that are prescribed by cecille and these meds can cause dizziness. Tilt table test was negative on Monday. Orthostatic vitals showed blood pressure staying the same but HR increased from 90 to 120. Patient has a pacemaker. Patient reports occasional chest pains that started a week ago, happens 2 times a day and lasts for 30 seconds each time. It does not radiate. Patient reports no room spinning, no ringing in the ear. Dizziness is not constant but it is frequently intermittent.   Review of Systems   Constitutional:  Negative for fatigue and fever.   HENT:  Positive for nasal congestion and sinus pressure/congestion. Negative for nosebleeds.    Eyes:  Negative for pain, discharge and itching.   Respiratory:  Negative for cough, choking and shortness of breath.    Cardiovascular: Negative.    Gastrointestinal:  Negative for abdominal distention, abdominal pain, anal bleeding, constipation and diarrhea.   Musculoskeletal:  Negative for arthralgias, joint swelling and joint deformity.   Allergic/Immunologic: Negative.    Neurological:  Positive for dizziness and headaches. Negative for vertigo.   Hematological: Negative.    Psychiatric/Behavioral: Negative.          Objective   Vitals:    07/21/23 0944   BP: 117/89   Pulse: (!) 120   Temp:        Physical Exam  Constitutional:       General: She is not in acute distress.     Appearance: Normal appearance. She is normal weight. She is not ill-appearing or toxic-appearing.   HENT:      Head: Normocephalic.      Right Ear: Tympanic membrane, ear canal and external ear normal.      Left Ear: Tympanic membrane, ear  canal and external ear normal.      Nose:      Right Turbinates: Enlarged.      Left Turbinates: Enlarged.      Right Sinus: Maxillary sinus tenderness present.      Left Sinus: Maxillary sinus tenderness present.      Mouth/Throat:      Mouth: Mucous membranes are moist.      Pharynx: Oropharynx is clear.   Eyes:      Conjunctiva/sclera: Conjunctivae normal.      Pupils: Pupils are equal, round, and reactive to light.   Cardiovascular:      Rate and Rhythm: Normal rate and regular rhythm.      Pulses: Normal pulses.      Heart sounds: Normal heart sounds. No murmur heard.    No friction rub.   Pulmonary:      Effort: Pulmonary effort is normal. No respiratory distress.      Breath sounds: Normal breath sounds. No wheezing.   Abdominal:      General: Abdomen is flat. Bowel sounds are normal. There is no distension.   Musculoskeletal:         General: Normal range of motion.      Cervical back: Normal range of motion.   Skin:     General: Skin is warm and dry.      Capillary Refill: Capillary refill takes less than 2 seconds.   Neurological:      Mental Status: She is alert and oriented to person, place, and time.   Psychiatric:         Mood and Affect: Mood normal.         Behavior: Behavior normal.        Assessment and Plan     1. Dizziness  Assessment & Plan:  Contacted Dr. Pompa, patients cardiologist office and left a message so patient can be seen as soon as possible.   Ordered labs including troponin. Will review and if abnormal will send to ED  EKG was done and showed Pacemaker rhythm and nothing extremely abnormal   Chest xray did not show acute events. Pacemaker was read as in the corrent place but it does look a little bit unusual to me.     Orders:  -     POCT EKG 12-LEAD (Manually Resulted by Ordering Provider)  -     NT-Pro Natriuretic Peptide; Future; Expected date: 07/21/2023  -     Troponin I; Future; Expected date: 07/21/2023  -     TSH; Future; Expected date: 07/21/2023  -     Comprehensive  Metabolic Panel; Future; Expected date: 07/21/2023  -     CBC Auto Differential; Future; Expected date: 07/21/2023  -     X-Ray Chest PA And Lateral; Future; Expected date: 07/21/2023    2. Chest pain, unspecified type  -     NT-Pro Natriuretic Peptide; Future; Expected date: 07/21/2023  -     Troponin I; Future; Expected date: 07/21/2023  -     TSH; Future; Expected date: 07/21/2023  -     Comprehensive Metabolic Panel; Future; Expected date: 07/21/2023  -     CBC Auto Differential; Future; Expected date: 07/21/2023  -     X-Ray Chest PA And Lateral; Future; Expected date: 07/21/2023    3. Encounter for screening for malignant neoplasm of breast, unspecified screening modality        Dizziness  -     POCT EKG 12-LEAD (Manually Resulted by Ordering Provider)  -     NT-Pro Natriuretic Peptide; Future; Expected date: 07/21/2023  -     Troponin I; Future; Expected date: 07/21/2023  -     TSH; Future; Expected date: 07/21/2023  -     Comprehensive Metabolic Panel; Future; Expected date: 07/21/2023  -     CBC Auto Differential; Future; Expected date: 07/21/2023  -     X-Ray Chest PA And Lateral; Future; Expected date: 07/21/2023    Chest pain, unspecified type  -     NT-Pro Natriuretic Peptide; Future; Expected date: 07/21/2023  -     Troponin I; Future; Expected date: 07/21/2023  -     TSH; Future; Expected date: 07/21/2023  -     Comprehensive Metabolic Panel; Future; Expected date: 07/21/2023  -     CBC Auto Differential; Future; Expected date: 07/21/2023  -     X-Ray Chest PA And Lateral; Future; Expected date: 07/21/2023    Encounter for screening for malignant neoplasm of breast, unspecified screening modality               No follow-ups on file.

## 2023-07-24 PROBLEM — Z00.00 HEALTH CARE MAINTENANCE: Status: RESOLVED | Noted: 2023-04-24 | Resolved: 2023-07-24

## 2023-08-21 ENCOUNTER — OFFICE VISIT (OUTPATIENT)
Dept: FAMILY MEDICINE | Facility: CLINIC | Age: 58
End: 2023-08-21
Payer: COMMERCIAL

## 2023-08-21 VITALS
WEIGHT: 146 LBS | BODY MASS INDEX: 25.87 KG/M2 | RESPIRATION RATE: 17 BRPM | HEIGHT: 63 IN | OXYGEN SATURATION: 98 % | HEART RATE: 126 BPM | TEMPERATURE: 97 F | DIASTOLIC BLOOD PRESSURE: 87 MMHG | SYSTOLIC BLOOD PRESSURE: 130 MMHG

## 2023-08-21 DIAGNOSIS — R79.89 LOW TSH LEVEL: Primary | ICD-10-CM

## 2023-08-21 DIAGNOSIS — R00.0 SINUS TACHYCARDIA: ICD-10-CM

## 2023-08-21 LAB
T3FREE SERPL-MCNC: 2.5 PG/ML (ref 2.18–3.98)
T4 FREE SERPL-MCNC: 1.06 NG/DL (ref 0.76–1.46)
TSH SERPL DL<=0.005 MIU/L-ACNC: 0.52 UIU/ML (ref 0.36–3.74)

## 2023-08-21 PROCEDURE — 84439 ASSAY OF FREE THYROXINE: CPT | Mod: ,,, | Performed by: CLINICAL MEDICAL LABORATORY

## 2023-08-21 PROCEDURE — 3075F SYST BP GE 130 - 139MM HG: CPT | Mod: ,,, | Performed by: FAMILY MEDICINE

## 2023-08-21 PROCEDURE — 3066F PR DOCUMENTATION OF TREATMENT FOR NEPHROPATHY: ICD-10-PCS | Mod: ,,, | Performed by: FAMILY MEDICINE

## 2023-08-21 PROCEDURE — 99213 PR OFFICE/OUTPT VISIT, EST, LEVL III, 20-29 MIN: ICD-10-PCS | Mod: ,,, | Performed by: FAMILY MEDICINE

## 2023-08-21 PROCEDURE — 3008F PR BODY MASS INDEX (BMI) DOCUMENTED: ICD-10-PCS | Mod: ,,, | Performed by: FAMILY MEDICINE

## 2023-08-21 PROCEDURE — 84481 FREE ASSAY (FT-3): CPT | Mod: ,,, | Performed by: CLINICAL MEDICAL LABORATORY

## 2023-08-21 PROCEDURE — 3044F HG A1C LEVEL LT 7.0%: CPT | Mod: ,,, | Performed by: FAMILY MEDICINE

## 2023-08-21 PROCEDURE — 4010F ACE/ARB THERAPY RXD/TAKEN: CPT | Mod: ,,, | Performed by: FAMILY MEDICINE

## 2023-08-21 PROCEDURE — 3075F PR MOST RECENT SYSTOLIC BLOOD PRESS GE 130-139MM HG: ICD-10-PCS | Mod: ,,, | Performed by: FAMILY MEDICINE

## 2023-08-21 PROCEDURE — 3044F PR MOST RECENT HEMOGLOBIN A1C LEVEL <7.0%: ICD-10-PCS | Mod: ,,, | Performed by: FAMILY MEDICINE

## 2023-08-21 PROCEDURE — 3008F BODY MASS INDEX DOCD: CPT | Mod: ,,, | Performed by: FAMILY MEDICINE

## 2023-08-21 PROCEDURE — 84443 ASSAY THYROID STIM HORMONE: CPT | Mod: ,,, | Performed by: CLINICAL MEDICAL LABORATORY

## 2023-08-21 PROCEDURE — 1159F MED LIST DOCD IN RCRD: CPT | Mod: ,,, | Performed by: FAMILY MEDICINE

## 2023-08-21 PROCEDURE — 99213 OFFICE O/P EST LOW 20 MIN: CPT | Mod: ,,, | Performed by: FAMILY MEDICINE

## 2023-08-21 PROCEDURE — 84443 TSH: ICD-10-PCS | Mod: ,,, | Performed by: CLINICAL MEDICAL LABORATORY

## 2023-08-21 PROCEDURE — 84481 T3, FREE: ICD-10-PCS | Mod: ,,, | Performed by: CLINICAL MEDICAL LABORATORY

## 2023-08-21 PROCEDURE — 3079F DIAST BP 80-89 MM HG: CPT | Mod: ,,, | Performed by: FAMILY MEDICINE

## 2023-08-21 PROCEDURE — 4010F PR ACE/ARB THEARPY RXD/TAKEN: ICD-10-PCS | Mod: ,,, | Performed by: FAMILY MEDICINE

## 2023-08-21 PROCEDURE — 3079F PR MOST RECENT DIASTOLIC BLOOD PRESSURE 80-89 MM HG: ICD-10-PCS | Mod: ,,, | Performed by: FAMILY MEDICINE

## 2023-08-21 PROCEDURE — 1159F PR MEDICATION LIST DOCUMENTED IN MEDICAL RECORD: ICD-10-PCS | Mod: ,,, | Performed by: FAMILY MEDICINE

## 2023-08-21 PROCEDURE — 3066F NEPHROPATHY DOC TX: CPT | Mod: ,,, | Performed by: FAMILY MEDICINE

## 2023-08-21 PROCEDURE — 84439 T4, FREE: ICD-10-PCS | Mod: ,,, | Performed by: CLINICAL MEDICAL LABORATORY

## 2023-08-21 NOTE — LETTER
August 23, 2023      [facility]  [address]  [place] [address]  Phone: [phone]  Fax: [phone]       Patient: Flory Chacon   YOB: 1965  Date of Visit: 08/21/2023    To Whom It May Concern:    JAYRO Chacon  was at [facility] on 08/23/2023. The patient may return to work/school on 08/22/2023 with no restrictions. If you have any questions or concerns, or if I can be of further assistance, please do not hesitate to contact me.    Sincerely,    Talha Craig RN

## 2023-08-22 NOTE — PROGRESS NOTES
Subjective:       Patient ID: Flory Chacon is a 57 y.o. female.    Chief Complaint: 3 MONTH FOLLOW UP     This is a 57 y.o female who presents today for Dizziness and Migraine Follow up. Patient was recently seen by Neurology and started on Qulipta. Patient states she went from having 5 migraines a week to 2 migraines a month a great improvement noted. Patient states no dizziness has been noted. Patient is noted to have elevated HR. Patient denies HA, fever, fatigue, N/V/D, shortness of breath and chest pain.      Plan for today get TSH, free T3, T4 because of elevated HR.           Current Outpatient Medications:     ABILIFY 2 mg Tab, Take 2 mg by mouth once daily., Disp: , Rfl:     ALPRAZolam (XANAX) 1 MG tablet, , Disp: , Rfl:     amLODIPine (NORVASC) 10 MG tablet, Take 1 tablet (10 mg total) by mouth once daily., Disp: 30 tablet, Rfl: 11    atogepant (QULIPTA) 60 mg Tab, Take 60 mg by mouth once daily., Disp: 30 tablet, Rfl: 11    cetirizine (ZYRTEC) 10 MG tablet, Take 1 tablet (10 mg total) by mouth once daily., Disp: 90 tablet, Rfl: 1    chlorthalidone (HYGROTEN) 25 MG Tab, Take 25 mg by mouth every morning., Disp: , Rfl:     cyanocobalamin 1,000 mcg/mL injection, cyanocobalamin (vit B-12) 1,000 mcg/mL injection solution  INJECT 1 ML ONCE DAILY INTRAMUSCULARLY FOR 7 DAYS AND THEN INJECT 1 ML ONCE A WEEK FOR 4 WEEKS AND THEN INJECT 1 ML ONCE EVERY MONTH, Disp: , Rfl:     cycloSPORINE (RESTASIS) 0.05 % ophthalmic emulsion, Restasis 0.05 % eye drops in a dropperette  INSTILL 1 DROP INTO EACH EYE EVERY 12 HOURS, Disp: , Rfl:     DULoxetine (CYMBALTA) 60 MG capsule, , Disp: , Rfl:     esomeprazole (NEXIUM) 40 MG capsule, 1 capsule., Disp: , Rfl:     ezetimibe (ZETIA) 10 mg tablet, Take 1 tablet (10 mg total) by mouth every evening., Disp: 90 tablet, Rfl: 3    ferrous sulfate 220 mg (44 mg iron)/5 mL solution, 7 ml drink with straw Orally daily for 30 days, Disp: , Rfl:     fluticasone propionate (FLONASE) 50  mcg/actuation nasal spray, fluticasone propionate 50 mcg/actuation nasal spray,suspension  USE 2 SPRAY(S) IN EACH NOSTRIL ONCE DAILY, Disp: , Rfl:     loratadine (CLARITIN) 10 mg tablet, Take 10 mg by mouth., Disp: , Rfl:     montelukast (SINGULAIR) 10 mg tablet, Take 10 mg by mouth once daily., Disp: , Rfl:     nitrofurantoin, macrocrystal-monohydrate, (MACROBID) 100 MG capsule, Take 100 mg by mouth every 12 (twelve) hours., Disp: , Rfl:     ondansetron (ZOFRAN) 4 MG tablet, ondansetron HCl 4 mg tablet  TAKE 1 TABLET BY MOUTH AS NEEDED FOR NAUSEA, Disp: , Rfl:     potassium citrate (UROCIT-K) 10 mEq (1,080 mg) TbSR, Urocit-K 10 10 mEq (1,080 mg) tablet,extended release  Take 1 tablet every day by oral route for 30 days., Disp: , Rfl:     sacubitriL-valsartan (ENTRESTO) 24-26 mg per tablet, Take 1 tablet by mouth 2 (two) times daily., Disp: , Rfl:     sodium chloride 1 gram tablet, Take 2 tablets (2 g total) by mouth 3 (three) times daily., Disp: 540 tablet, Rfl: 3    aspirin 81 MG Chew, Take 1 tablet (81 mg total) by mouth once daily., Disp: 30 tablet, Rfl: 0    estradioL (ESTRACE) 0.01 % (0.1 mg/gram) vaginal cream, Place 2 g vaginally once daily., Disp: 60 g, Rfl: 11    metoprolol succinate (TOPROL-XL) 100 MG 24 hr tablet, Take 1 tablet (100 mg total) by mouth once daily. (Patient not taking: Reported on 5/24/2023), Disp: 30 tablet, Rfl: 11    zolpidem (AMBIEN) 10 mg Tab, Take 10 mg by mouth nightly as needed., Disp: , Rfl:     Review of patient's allergies indicates:   Allergen Reactions    Meloxicam Swelling    Egg      Other reaction(s): Unknown    Eggs [egg derived]     Statins-hmg-coa reductase inhibitors      Other reaction(s): Unknown    Penicillins Rash       Past Medical History:   Diagnosis Date    Acute hypokalemia     Anxiety disorder, unspecified     Depression     Hyperlipidemia     Hypertension     Insomnia        Past Surgical History:   Procedure Laterality Date    FRACTURE SURGERY      KNEE  ARTHROPLASTY Left     KNEE ARTHROPLASTY Right     LEFT HEART CATHETERIZATION Left 04/25/2022    Procedure: Left heart cath;  Surgeon: Malachi Cormier DO;  Location: [facility] CATH LAB;  Service: Cardiology;  Laterality: Left;       Family History   Problem Relation Age of Onset    Heart disease Father     Breast cancer Maternal Aunt     Melanoma Maternal Uncle     Breast cancer Maternal Grandmother        Social History     Tobacco Use    Smoking status: Former     Current packs/day: 0.00     Average packs/day: 1 pack/day for 20.0 years (20.0 ttl pk-yrs)     Types: Cigarettes     Start date: 1993     Quit date: 2013     Years since quitting: 10.6    Smokeless tobacco: Never   Substance Use Topics    Alcohol use: Not Currently     Comment: socially in the past    Drug use: Never       Review of Systems   Constitutional:  Negative for fatigue and fever.   HENT:  Positive for nasal congestion and sinus pressure/congestion. Negative for nosebleeds.    Eyes:  Negative for pain, discharge and itching.   Respiratory:  Negative for cough, choking and shortness of breath.    Cardiovascular: Negative.    Gastrointestinal:  Negative for abdominal distention, abdominal pain, anal bleeding, constipation and diarrhea.   Musculoskeletal:  Negative for arthralgias, joint swelling and joint deformity.   Allergic/Immunologic: Negative.    Neurological:  Negative for dizziness, vertigo and headaches.   Hematological: Negative.    Psychiatric/Behavioral: Negative.           Current Medications:   Medication List with Changes/Refills   Current Medications    ABILIFY 2 MG TAB    Take 2 mg by mouth once daily.       Start Date: 4/29/2022 End Date: --    ALPRAZOLAM (XANAX) 1 MG TABLET           Start Date: 9/14/2021 End Date: --    AMLODIPINE (NORVASC) 10 MG TABLET    Take 1 tablet (10 mg total) by mouth once daily.       Start Date: 2/10/2023 End Date: 2/10/2024    ASPIRIN 81 MG CHEW    Take 1 tablet (81 mg total) by mouth once daily.        Start Date: 4/28/2022 End Date: 3/6/2023    ATOGEPANT (QULIPTA) 60 MG TAB    Take 60 mg by mouth once daily.       Start Date: 4/26/2023 End Date: --    CETIRIZINE (ZYRTEC) 10 MG TABLET    Take 1 tablet (10 mg total) by mouth once daily.       Start Date: 2/22/2023 End Date: --    CHLORTHALIDONE (HYGROTEN) 25 MG TAB    Take 25 mg by mouth every morning.       Start Date: 1/21/2023 End Date: --    CYANOCOBALAMIN 1,000 MCG/ML INJECTION    cyanocobalamin (vit B-12) 1,000 mcg/mL injection solution   INJECT 1 ML ONCE DAILY INTRAMUSCULARLY FOR 7 DAYS AND THEN INJECT 1 ML ONCE A WEEK FOR 4 WEEKS AND THEN INJECT 1 ML ONCE EVERY MONTH       Start Date: --        End Date: --    CYCLOSPORINE (RESTASIS) 0.05 % OPHTHALMIC EMULSION    Restasis 0.05 % eye drops in a dropperette   INSTILL 1 DROP INTO EACH EYE EVERY 12 HOURS       Start Date: --        End Date: --    DULOXETINE (CYMBALTA) 60 MG CAPSULE           Start Date: 9/13/2021 End Date: --    ESOMEPRAZOLE (NEXIUM) 40 MG CAPSULE    1 capsule.       Start Date: --        End Date: --    ESTRADIOL (ESTRACE) 0.01 % (0.1 MG/GRAM) VAGINAL CREAM    Place 2 g vaginally once daily.       Start Date: 5/9/2022  End Date: 5/9/2023    EZETIMIBE (ZETIA) 10 MG TABLET    Take 1 tablet (10 mg total) by mouth every evening.       Start Date: 2/22/2023 End Date: 2/22/2024    FERROUS SULFATE 220 MG (44 MG IRON)/5 ML SOLUTION    7 ml drink with straw Orally daily for 30 days       Start Date: 10/1/2022 End Date: --    FLUTICASONE PROPIONATE (FLONASE) 50 MCG/ACTUATION NASAL SPRAY    fluticasone propionate 50 mcg/actuation nasal spray,suspension   USE 2 SPRAY(S) IN EACH NOSTRIL ONCE DAILY       Start Date: --        End Date: --    LORATADINE (CLARITIN) 10 MG TABLET    Take 10 mg by mouth.       Start Date: --        End Date: --    METOPROLOL SUCCINATE (TOPROL-XL) 100 MG 24 HR TABLET    Take 1 tablet (100 mg total) by mouth once daily.       Start Date: 2/10/2023 End Date: 2/10/2024     "MONTELUKAST (SINGULAIR) 10 MG TABLET    Take 10 mg by mouth once daily.       Start Date: 7/27/2021 End Date: --    NITROFURANTOIN, MACROCRYSTAL-MONOHYDRATE, (MACROBID) 100 MG CAPSULE    Take 100 mg by mouth every 12 (twelve) hours.       Start Date: 6/29/2023 End Date: --    ONDANSETRON (ZOFRAN) 4 MG TABLET    ondansetron HCl 4 mg tablet   TAKE 1 TABLET BY MOUTH AS NEEDED FOR NAUSEA       Start Date: --        End Date: --    POTASSIUM CITRATE (UROCIT-K) 10 MEQ (1,080 MG) TBSR    Urocit-K 10 10 mEq (1,080 mg) tablet,extended release   Take 1 tablet every day by oral route for 30 days.       Start Date: --        End Date: --    SACUBITRIL-VALSARTAN (ENTRESTO) 24-26 MG PER TABLET    Take 1 tablet by mouth 2 (two) times daily.       Start Date: --        End Date: --    SODIUM CHLORIDE 1 GRAM TABLET    Take 2 tablets (2 g total) by mouth 3 (three) times daily.       Start Date: 3/1/2023  End Date: 2/29/2024    ZOLPIDEM (AMBIEN) 10 MG TAB    Take 10 mg by mouth nightly as needed.       Start Date: 2/10/2023 End Date: --            Objective:        Vitals:    08/21/23 1543   BP: 130/87   Pulse: (!) 126   Resp: 17   Temp: 97.4 °F (36.3 °C)   TempSrc: Temporal   SpO2: 98%   Weight: 66.2 kg (146 lb)   Height: 5' 3" (1.6 m)       Physical Exam  Constitutional:       General: She is not in acute distress.     Appearance: Normal appearance. She is normal weight. She is not ill-appearing or toxic-appearing.   HENT:      Head: Normocephalic.      Right Ear: Tympanic membrane, ear canal and external ear normal.      Left Ear: Tympanic membrane, ear canal and external ear normal.      Nose:      Right Turbinates: Enlarged.      Left Turbinates: Enlarged.      Right Sinus: Maxillary sinus tenderness present.      Left Sinus: Maxillary sinus tenderness present.      Mouth/Throat:      Mouth: Mucous membranes are moist.      Pharynx: Oropharynx is clear.   Eyes:      Conjunctiva/sclera: Conjunctivae normal.      Pupils: Pupils are " equal, round, and reactive to light.   Cardiovascular:      Rate and Rhythm: Normal rate and regular rhythm.      Pulses: Normal pulses.      Heart sounds: Normal heart sounds. No murmur heard.     No friction rub.   Pulmonary:      Effort: Pulmonary effort is normal. No respiratory distress.      Breath sounds: Normal breath sounds. No wheezing.   Abdominal:      General: Abdomen is flat. Bowel sounds are normal. There is no distension.   Musculoskeletal:         General: Normal range of motion.      Cervical back: Normal range of motion.   Skin:     General: Skin is warm and dry.      Capillary Refill: Capillary refill takes less than 2 seconds.   Neurological:      Mental Status: She is alert and oriented to person, place, and time.   Psychiatric:         Mood and Affect: Mood normal.         Behavior: Behavior normal.               Lab Results   Component Value Date    WBC 9.08 07/21/2023    HGB 13.4 07/21/2023    HCT 42.2 07/21/2023     (H) 07/21/2023    CHOL 197 04/23/2022    TRIG 82 04/23/2022    HDL 85 (H) 04/23/2022    ALT 18 07/21/2023    AST 18 07/21/2023     07/21/2023    K 3.8 07/21/2023     07/21/2023    CREATININE 1.09 (H) 07/21/2023    BUN 8 07/21/2023    CO2 25 07/21/2023    TSH 0.520 08/21/2023    INR 0.96 01/31/2023    HGBA1C 5.3 04/26/2023      Assessment:       1. Low TSH level    2. Sinus tachycardia        Plan:         Problem List Items Addressed This Visit          Cardiac/Vascular    Sinus tachycardia     TSH, free 3, T4  Will continue to monitor heart rate          Other Visit Diagnoses       Low TSH level    -  Primary    Relevant Orders    TSH (Completed)    T3, Free (Completed)    T4, Free (Completed)              No follow-ups on file.    Saroj Pineda MD     Instructed patient that if symptoms fail to improve or worsen patient should seek immediate medical attention or report to the nearest emergency department. Patient expressed verbal agreement and understanding  to this plan of care.

## 2023-08-28 ENCOUNTER — OFFICE VISIT (OUTPATIENT)
Dept: CARDIOLOGY | Facility: CLINIC | Age: 58
End: 2023-08-28
Payer: COMMERCIAL

## 2023-08-28 VITALS — WEIGHT: 140 LBS | BODY MASS INDEX: 24.8 KG/M2 | HEIGHT: 63 IN

## 2023-08-28 DIAGNOSIS — I47.10 PAROXYSMAL SVT (SUPRAVENTRICULAR TACHYCARDIA): Primary | ICD-10-CM

## 2023-08-28 PROCEDURE — 3044F HG A1C LEVEL LT 7.0%: CPT | Mod: S$GLB,,, | Performed by: STUDENT IN AN ORGANIZED HEALTH CARE EDUCATION/TRAINING PROGRAM

## 2023-08-28 PROCEDURE — 4010F PR ACE/ARB THEARPY RXD/TAKEN: ICD-10-PCS | Mod: S$GLB,,, | Performed by: STUDENT IN AN ORGANIZED HEALTH CARE EDUCATION/TRAINING PROGRAM

## 2023-08-28 PROCEDURE — 93000 EKG 12-LEAD: ICD-10-PCS | Mod: S$GLB,,, | Performed by: STUDENT IN AN ORGANIZED HEALTH CARE EDUCATION/TRAINING PROGRAM

## 2023-08-28 PROCEDURE — 4010F ACE/ARB THERAPY RXD/TAKEN: CPT | Mod: S$GLB,,, | Performed by: STUDENT IN AN ORGANIZED HEALTH CARE EDUCATION/TRAINING PROGRAM

## 2023-08-28 PROCEDURE — 1159F PR MEDICATION LIST DOCUMENTED IN MEDICAL RECORD: ICD-10-PCS | Mod: S$GLB,,, | Performed by: STUDENT IN AN ORGANIZED HEALTH CARE EDUCATION/TRAINING PROGRAM

## 2023-08-28 PROCEDURE — 99214 PR OFFICE/OUTPT VISIT, EST, LEVL IV, 30-39 MIN: ICD-10-PCS | Mod: S$GLB,,, | Performed by: STUDENT IN AN ORGANIZED HEALTH CARE EDUCATION/TRAINING PROGRAM

## 2023-08-28 PROCEDURE — 3066F NEPHROPATHY DOC TX: CPT | Mod: S$GLB,,, | Performed by: STUDENT IN AN ORGANIZED HEALTH CARE EDUCATION/TRAINING PROGRAM

## 2023-08-28 PROCEDURE — 93000 ELECTROCARDIOGRAM COMPLETE: CPT | Mod: S$GLB,,, | Performed by: STUDENT IN AN ORGANIZED HEALTH CARE EDUCATION/TRAINING PROGRAM

## 2023-08-28 PROCEDURE — 3008F BODY MASS INDEX DOCD: CPT | Mod: S$GLB,,, | Performed by: STUDENT IN AN ORGANIZED HEALTH CARE EDUCATION/TRAINING PROGRAM

## 2023-08-28 PROCEDURE — 3066F PR DOCUMENTATION OF TREATMENT FOR NEPHROPATHY: ICD-10-PCS | Mod: S$GLB,,, | Performed by: STUDENT IN AN ORGANIZED HEALTH CARE EDUCATION/TRAINING PROGRAM

## 2023-08-28 PROCEDURE — 3008F PR BODY MASS INDEX (BMI) DOCUMENTED: ICD-10-PCS | Mod: S$GLB,,, | Performed by: STUDENT IN AN ORGANIZED HEALTH CARE EDUCATION/TRAINING PROGRAM

## 2023-08-28 PROCEDURE — 3044F PR MOST RECENT HEMOGLOBIN A1C LEVEL <7.0%: ICD-10-PCS | Mod: S$GLB,,, | Performed by: STUDENT IN AN ORGANIZED HEALTH CARE EDUCATION/TRAINING PROGRAM

## 2023-08-28 PROCEDURE — 99215 OFFICE O/P EST HI 40 MIN: CPT | Mod: PBBFAC | Performed by: STUDENT IN AN ORGANIZED HEALTH CARE EDUCATION/TRAINING PROGRAM

## 2023-08-28 PROCEDURE — 1159F MED LIST DOCD IN RCRD: CPT | Mod: S$GLB,,, | Performed by: STUDENT IN AN ORGANIZED HEALTH CARE EDUCATION/TRAINING PROGRAM

## 2023-08-28 PROCEDURE — 99214 OFFICE O/P EST MOD 30 MIN: CPT | Mod: S$GLB,,, | Performed by: STUDENT IN AN ORGANIZED HEALTH CARE EDUCATION/TRAINING PROGRAM

## 2023-08-28 NOTE — LETTER
August 28, 2023      [facility]  [address] [facility] [place] [address]  Phone: [phone]  Fax: [phone]       Patient: Flory Chacon   YOB: 1965  Date of Visit: 08/28/2023    To Whom It May Concern:    JAYRO Chacon  was at [facility] on 08/28/2023. The patient may return to work on 08/29/2023. If you have any questions or concerns, or if I can be of further assistance, please do not hesitate to contact me.    Sincerely,            Doni Barreto LPN

## 2023-08-28 NOTE — PROGRESS NOTES
PCP: Saroj Pineda MD    Referring Provider:     Subjective:   Flory Chacon is a 57 y.o. female with hx of SVT status post ablation and heart failure with improved ejection fraction (HFimpEF) who presents for a follow-up visit.      8/28/23:  Complaints of NYHA class II dyspnea on exertion.  Denies chest pain/tightness, lightheadedness or syncope.  She was instructed to resume metoprolol at the last visit.  However, she has not picked up by medication from her pharmacy and it is not in her medication bottles.    5/24/23: c/o shortness of breath with exertion and fatigue. Still has issues with dizziness, feels off balance. No falls recently. No syncope.     03/06/23:Has previously been seen by Dr. Epperson and Dr. Cormier.  Hospitalized from 01/30 to 2/9/3 with nausea vomiting and severe hyponatremia (sodium 106).  Inpatient follow-up scheduled with me.  Doing okay from cardiac standpoint.  Does complain of episodes of dizziness- appears to be vertigo.  PPM functioning normally.     Fhx: family history of premature CAD    Shx:  Former smoker former smoker 20 pack year smoking history.  Quit in 2013.    EKG  02/03/2023:  Atrial sensed V paced rhythm  ECHO Results for orders placed during the hospital encounter of 01/30/23    Echo    Interpretation Summary  · The left ventricle is small with concentric remodeling and normal systolic function.  · The estimated ejection fraction is 60%.  · Normal right ventricular size with normal right ventricular systolic function.  · Mild tricuspid regurgitation.  · Normal central venous pressure (3 mmHg).  · The estimated PA systolic pressure is 28 mmHg.  · Compared to prior ECHo from 4/2022; LV function has improved from 40% to 60%    [facility] Results for orders placed during the hospital encounter of 04/23/22    Cardiac catheterization    Conclusion  · The left ventricular systolic function was normal.  · The left ventricular end diastolic pressure was normal.  · The estimated blood  loss was none.  · The coronary arteries were normal..    The procedure log was documented by Documenter: Bonny Obando RN and verified by Malachi Cormier DO.    Date: 4/25/2022  Time: 1:11 PM    Normal coronary arteries, normal LV function.        Lab Results   Component Value Date     07/21/2023    K 3.8 07/21/2023     07/21/2023    CO2 25 07/21/2023    BUN 8 07/21/2023    CREATININE 1.09 (H) 07/21/2023    CALCIUM 9.5 07/21/2023    ANIONGAP 15 07/21/2023    EGFRNONAA 54 (L) 07/18/2022       Lab Results   Component Value Date    CHOL 197 04/23/2022     Lab Results   Component Value Date    HDL 85 (H) 04/23/2022     Lab Results   Component Value Date    LDLCALC 96 04/23/2022     Lab Results   Component Value Date    TRIG 82 04/23/2022     Lab Results   Component Value Date    CHOLHDL 2.3 04/23/2022       Lab Results   Component Value Date    WBC 9.08 07/21/2023    HGB 13.4 07/21/2023    HCT 42.2 07/21/2023    MCV 84.4 07/21/2023     (H) 07/21/2023           Current Outpatient Medications:     ABILIFY 2 mg Tab, Take 2 mg by mouth once daily., Disp: , Rfl:     ALPRAZolam (XANAX) 1 MG tablet, , Disp: , Rfl:     amLODIPine (NORVASC) 10 MG tablet, Take 1 tablet (10 mg total) by mouth once daily., Disp: 30 tablet, Rfl: 11    aspirin 81 MG Chew, Take 1 tablet (81 mg total) by mouth once daily., Disp: 30 tablet, Rfl: 0    atogepant (QULIPTA) 60 mg Tab, Take 60 mg by mouth once daily., Disp: 30 tablet, Rfl: 11    cetirizine (ZYRTEC) 10 MG tablet, Take 1 tablet (10 mg total) by mouth once daily., Disp: 90 tablet, Rfl: 1    cyanocobalamin 1,000 mcg/mL injection, cyanocobalamin (vit B-12) 1,000 mcg/mL injection solution  INJECT 1 ML ONCE DAILY INTRAMUSCULARLY FOR 7 DAYS AND THEN INJECT 1 ML ONCE A WEEK FOR 4 WEEKS AND THEN INJECT 1 ML ONCE EVERY MONTH, Disp: , Rfl:     cycloSPORINE (RESTASIS) 0.05 % ophthalmic emulsion, Restasis 0.05 % eye drops in a dropperette  INSTILL 1 DROP INTO EACH EYE EVERY 12  HOURS, Disp: , Rfl:     DULoxetine (CYMBALTA) 60 MG capsule, , Disp: , Rfl:     ezetimibe (ZETIA) 10 mg tablet, Take 1 tablet (10 mg total) by mouth every evening., Disp: 90 tablet, Rfl: 3    fluticasone propionate (FLONASE) 50 mcg/actuation nasal spray, fluticasone propionate 50 mcg/actuation nasal spray,suspension  USE 2 SPRAY(S) IN EACH NOSTRIL ONCE DAILY, Disp: , Rfl:     loratadine (CLARITIN) 10 mg tablet, Take 10 mg by mouth., Disp: , Rfl:     montelukast (SINGULAIR) 10 mg tablet, Take 10 mg by mouth once daily., Disp: , Rfl:     ondansetron (ZOFRAN) 4 MG tablet, ondansetron HCl 4 mg tablet  TAKE 1 TABLET BY MOUTH AS NEEDED FOR NAUSEA, Disp: , Rfl:     potassium citrate (UROCIT-K) 10 mEq (1,080 mg) TbSR, Urocit-K 10 10 mEq (1,080 mg) tablet,extended release  Take 1 tablet every day by oral route for 30 days., Disp: , Rfl:     sacubitriL-valsartan (ENTRESTO) 24-26 mg per tablet, Take 1 tablet by mouth 2 (two) times daily., Disp: , Rfl:     sodium chloride 1 gram tablet, Take 2 tablets (2 g total) by mouth 3 (three) times daily., Disp: 540 tablet, Rfl: 3    zolpidem (AMBIEN) 10 mg Tab, Take 10 mg by mouth nightly as needed., Disp: , Rfl:     chlorthalidone (HYGROTEN) 25 MG Tab, Take 25 mg by mouth every morning., Disp: , Rfl:     esomeprazole (NEXIUM) 40 MG capsule, 1 capsule., Disp: , Rfl:     estradioL (ESTRACE) 0.01 % (0.1 mg/gram) vaginal cream, Place 2 g vaginally once daily. (Patient not taking: Reported on 8/28/2023), Disp: 60 g, Rfl: 11    ferrous sulfate 220 mg (44 mg iron)/5 mL solution, 7 ml drink with straw Orally daily for 30 days, Disp: , Rfl:     metoprolol succinate (TOPROL-XL) 100 MG 24 hr tablet, Take 1 tablet (100 mg total) by mouth once daily. (Patient not taking: Reported on 5/24/2023), Disp: 30 tablet, Rfl: 11    nitrofurantoin, macrocrystal-monohydrate, (MACROBID) 100 MG capsule, Take 100 mg by mouth every 12 (twelve) hours., Disp: , Rfl:     Review of Systems   Constitutional:  Negative  "for chills, diaphoresis, fever and malaise/fatigue.   Respiratory:  Negative for cough and shortness of breath.    Cardiovascular:  Negative for chest pain, palpitations, orthopnea, claudication, leg swelling and PND.   Gastrointestinal:  Negative for abdominal pain, heartburn, nausea and vomiting.   Neurological:  Negative for dizziness.         Objective:   Ht 5' 3" (1.6 m)   Wt 63.5 kg (140 lb)   BMI 24.80 kg/m²     Physical Exam  Constitutional:       General: She is not in acute distress.     Appearance: Normal appearance.   Cardiovascular:      Rate and Rhythm: Normal rate and regular rhythm.      Pulses: Normal pulses.      Heart sounds: Normal heart sounds. No murmur heard.     No friction rub. No gallop.   Pulmonary:      Effort: Pulmonary effort is normal.      Breath sounds: Normal breath sounds. No wheezing or rales.   Musculoskeletal:      Right lower leg: No edema.      Left lower leg: No edema.   Skin:     General: Skin is warm and dry.   Neurological:      Mental Status: She is alert.           Assessment:     1. Paroxysmal SVT (supraventricular tachycardia)  EKG 12-lead    EKG 12-lead              Plan:   No problem-specific Assessment & Plan notes found for this encounter.      Dilated nonischemic cardiomyopathy  S/P Biventricular pacer implant at [facility]1983  LVEF 60% on echocardiogram  01/2023  Appears she has not been taking Toprol 100 mg daily- not in medication bottles- resume Toprol 100 mg daily  Continue Entresto 24-26 mg  b.i.d.  Euvolemic on exam     SVT   S/p ablation  Resume Toprol as above     Hypertension   Blood pressure controlled- 110/80    F/U in 3 months        "

## 2023-10-06 DIAGNOSIS — N39.0 RECURRENT UTI: Primary | ICD-10-CM

## 2023-11-05 DIAGNOSIS — Z86.69 HX OF MIGRAINE HEADACHES: ICD-10-CM

## 2023-11-07 RX ORDER — ATOGEPANT 60 MG/1
60 TABLET ORAL DAILY
Qty: 30 TABLET | Refills: 5 | Status: SHIPPED | OUTPATIENT
Start: 2023-11-07 | End: 2023-11-09

## 2023-11-08 NOTE — TELEPHONE ENCOUNTER
----- Message from Dirk Bar sent at 11/7/2023  3:42 PM CST -----  Regarding: Med refill  Potassium citrate    [facility] in [place]    Thanks!

## 2023-11-09 DIAGNOSIS — Z86.69 HX OF MIGRAINE HEADACHES: Primary | ICD-10-CM

## 2023-11-09 RX ORDER — GALCANEZUMAB 120 MG/ML
120 INJECTION, SOLUTION SUBCUTANEOUS
Qty: 1 EACH | Refills: 11 | Status: SHIPPED | OUTPATIENT
Start: 2023-11-09

## 2023-12-04 ENCOUNTER — OFFICE VISIT (OUTPATIENT)
Dept: FAMILY MEDICINE | Facility: CLINIC | Age: 58
End: 2023-12-04
Payer: COMMERCIAL

## 2023-12-04 ENCOUNTER — OFFICE VISIT (OUTPATIENT)
Dept: NEUROLOGY | Facility: CLINIC | Age: 58
End: 2023-12-04
Payer: COMMERCIAL

## 2023-12-04 VITALS
HEIGHT: 63 IN | BODY MASS INDEX: 24.27 KG/M2 | DIASTOLIC BLOOD PRESSURE: 84 MMHG | OXYGEN SATURATION: 99 % | WEIGHT: 137 LBS | SYSTOLIC BLOOD PRESSURE: 120 MMHG | HEART RATE: 77 BPM

## 2023-12-04 VITALS
SYSTOLIC BLOOD PRESSURE: 127 MMHG | RESPIRATION RATE: 18 BRPM | BODY MASS INDEX: 24.19 KG/M2 | HEART RATE: 95 BPM | TEMPERATURE: 97 F | OXYGEN SATURATION: 95 % | WEIGHT: 136.5 LBS | HEIGHT: 63 IN | DIASTOLIC BLOOD PRESSURE: 87 MMHG

## 2023-12-04 DIAGNOSIS — G31.84 MILD NEUROCOGNITIVE DISORDER: ICD-10-CM

## 2023-12-04 DIAGNOSIS — Z86.69 HX OF MIGRAINE HEADACHES: Primary | ICD-10-CM

## 2023-12-04 DIAGNOSIS — R09.81 NASAL CONGESTION: Primary | ICD-10-CM

## 2023-12-04 PROCEDURE — 3008F PR BODY MASS INDEX (BMI) DOCUMENTED: ICD-10-PCS | Mod: S$GLB,,, | Performed by: NURSE PRACTITIONER

## 2023-12-04 PROCEDURE — 3079F DIAST BP 80-89 MM HG: CPT | Mod: ,,, | Performed by: SPECIALIST

## 2023-12-04 PROCEDURE — 4010F PR ACE/ARB THEARPY RXD/TAKEN: ICD-10-PCS | Mod: S$GLB,,, | Performed by: NURSE PRACTITIONER

## 2023-12-04 PROCEDURE — 3066F NEPHROPATHY DOC TX: CPT | Mod: ,,, | Performed by: SPECIALIST

## 2023-12-04 PROCEDURE — 3074F SYST BP LT 130 MM HG: CPT | Mod: S$GLB,,, | Performed by: NURSE PRACTITIONER

## 2023-12-04 PROCEDURE — 3008F BODY MASS INDEX DOCD: CPT | Mod: S$GLB,,, | Performed by: NURSE PRACTITIONER

## 2023-12-04 PROCEDURE — 99212 PR OFFICE/OUTPT VISIT, EST, LEVL II, 10-19 MIN: ICD-10-PCS | Mod: S$GLB,,, | Performed by: NURSE PRACTITIONER

## 2023-12-04 PROCEDURE — 3066F PR DOCUMENTATION OF TREATMENT FOR NEPHROPATHY: ICD-10-PCS | Mod: ,,, | Performed by: SPECIALIST

## 2023-12-04 PROCEDURE — 3044F PR MOST RECENT HEMOGLOBIN A1C LEVEL <7.0%: ICD-10-PCS | Mod: S$GLB,,, | Performed by: NURSE PRACTITIONER

## 2023-12-04 PROCEDURE — 1159F PR MEDICATION LIST DOCUMENTED IN MEDICAL RECORD: ICD-10-PCS | Mod: S$GLB,,, | Performed by: NURSE PRACTITIONER

## 2023-12-04 PROCEDURE — 4010F ACE/ARB THERAPY RXD/TAKEN: CPT | Mod: ,,, | Performed by: SPECIALIST

## 2023-12-04 PROCEDURE — 1160F PR REVIEW ALL MEDS BY PRESCRIBER/CLIN PHARMACIST DOCUMENTED: ICD-10-PCS | Mod: S$GLB,,, | Performed by: NURSE PRACTITIONER

## 2023-12-04 PROCEDURE — 3066F PR DOCUMENTATION OF TREATMENT FOR NEPHROPATHY: ICD-10-PCS | Mod: S$GLB,,, | Performed by: NURSE PRACTITIONER

## 2023-12-04 PROCEDURE — 99213 OFFICE O/P EST LOW 20 MIN: CPT | Mod: GC,,, | Performed by: SPECIALIST

## 2023-12-04 PROCEDURE — 99215 OFFICE O/P EST HI 40 MIN: CPT | Mod: PBBFAC | Performed by: NURSE PRACTITIONER

## 2023-12-04 PROCEDURE — 1159F MED LIST DOCD IN RCRD: CPT | Mod: S$GLB,,, | Performed by: NURSE PRACTITIONER

## 2023-12-04 PROCEDURE — 3066F NEPHROPATHY DOC TX: CPT | Mod: S$GLB,,, | Performed by: NURSE PRACTITIONER

## 2023-12-04 PROCEDURE — 99212 OFFICE O/P EST SF 10 MIN: CPT | Mod: S$GLB,,, | Performed by: NURSE PRACTITIONER

## 2023-12-04 PROCEDURE — 99213 PR OFFICE/OUTPT VISIT, EST, LEVL III, 20-29 MIN: ICD-10-PCS | Mod: GC,,, | Performed by: SPECIALIST

## 2023-12-04 PROCEDURE — 3008F BODY MASS INDEX DOCD: CPT | Mod: ,,, | Performed by: SPECIALIST

## 2023-12-04 PROCEDURE — 4010F PR ACE/ARB THEARPY RXD/TAKEN: ICD-10-PCS | Mod: ,,, | Performed by: SPECIALIST

## 2023-12-04 PROCEDURE — 3074F PR MOST RECENT SYSTOLIC BLOOD PRESSURE < 130 MM HG: ICD-10-PCS | Mod: S$GLB,,, | Performed by: NURSE PRACTITIONER

## 2023-12-04 PROCEDURE — 3044F PR MOST RECENT HEMOGLOBIN A1C LEVEL <7.0%: ICD-10-PCS | Mod: ,,, | Performed by: SPECIALIST

## 2023-12-04 PROCEDURE — 3079F DIAST BP 80-89 MM HG: CPT | Mod: S$GLB,,, | Performed by: NURSE PRACTITIONER

## 2023-12-04 PROCEDURE — 3079F PR MOST RECENT DIASTOLIC BLOOD PRESSURE 80-89 MM HG: ICD-10-PCS | Mod: ,,, | Performed by: SPECIALIST

## 2023-12-04 PROCEDURE — 3079F PR MOST RECENT DIASTOLIC BLOOD PRESSURE 80-89 MM HG: ICD-10-PCS | Mod: S$GLB,,, | Performed by: NURSE PRACTITIONER

## 2023-12-04 PROCEDURE — 3074F SYST BP LT 130 MM HG: CPT | Mod: ,,, | Performed by: SPECIALIST

## 2023-12-04 PROCEDURE — 3074F PR MOST RECENT SYSTOLIC BLOOD PRESSURE < 130 MM HG: ICD-10-PCS | Mod: ,,, | Performed by: SPECIALIST

## 2023-12-04 PROCEDURE — 4010F ACE/ARB THERAPY RXD/TAKEN: CPT | Mod: S$GLB,,, | Performed by: NURSE PRACTITIONER

## 2023-12-04 PROCEDURE — 3008F PR BODY MASS INDEX (BMI) DOCUMENTED: ICD-10-PCS | Mod: ,,, | Performed by: SPECIALIST

## 2023-12-04 PROCEDURE — 1160F RVW MEDS BY RX/DR IN RCRD: CPT | Mod: S$GLB,,, | Performed by: NURSE PRACTITIONER

## 2023-12-04 PROCEDURE — 3044F HG A1C LEVEL LT 7.0%: CPT | Mod: S$GLB,,, | Performed by: NURSE PRACTITIONER

## 2023-12-04 PROCEDURE — 3044F HG A1C LEVEL LT 7.0%: CPT | Mod: ,,, | Performed by: SPECIALIST

## 2023-12-04 RX ORDER — IPRATROPIUM BROMIDE 21 UG/1
2 SPRAY, METERED NASAL 3 TIMES DAILY
Qty: 30 ML | Refills: 0 | Status: SHIPPED | OUTPATIENT
Start: 2023-12-04 | End: 2024-03-04

## 2023-12-04 NOTE — PATIENT INSTRUCTIONS
Continue followup with cardiology  Notify clinic if any further syncope events  Go to pharmacy to get prescription for emgality  Notify clinic if any complication getting medication

## 2023-12-04 NOTE — PROGRESS NOTES
Subjective:       Patient ID: Flory Chacon is a 58 y.o. female     Chief Complaint:    Chief Complaint   Patient presents with    Migraine     Qulipta coupon ran out        Allergies:  Meloxicam, Egg, Eggs [egg derived], Statins-hmg-coa reductase inhibitors, and Penicillins    Current Medications:    Outpatient Encounter Medications as of 12/4/2023   Medication Sig Dispense Refill    ABILIFY 2 mg Tab Take 2 mg by mouth once daily.      ALPRAZolam (XANAX) 1 MG tablet       amLODIPine (NORVASC) 10 MG tablet Take 1 tablet (10 mg total) by mouth once daily. 30 tablet 11    aspirin 81 MG Chew Take 1 tablet (81 mg total) by mouth once daily. 30 tablet 0    cetirizine (ZYRTEC) 10 MG tablet Take 1 tablet (10 mg total) by mouth once daily. 90 tablet 1    chlorthalidone (HYGROTEN) 25 MG Tab Take 25 mg by mouth every morning.      cyanocobalamin 1,000 mcg/mL injection cyanocobalamin (vit B-12) 1,000 mcg/mL injection solution   INJECT 1 ML ONCE DAILY INTRAMUSCULARLY FOR 7 DAYS AND THEN INJECT 1 ML ONCE A WEEK FOR 4 WEEKS AND THEN INJECT 1 ML ONCE EVERY MONTH      cycloSPORINE (RESTASIS) 0.05 % ophthalmic emulsion Restasis 0.05 % eye drops in a dropperette   INSTILL 1 DROP INTO EACH EYE EVERY 12 HOURS      DULoxetine (CYMBALTA) 60 MG capsule       esomeprazole (NEXIUM) 40 MG capsule 1 capsule.      ezetimibe (ZETIA) 10 mg tablet Take 1 tablet (10 mg total) by mouth every evening. 90 tablet 3    ferrous sulfate 220 mg (44 mg iron)/5 mL solution 7 ml drink with straw Orally daily for 30 days      fluticasone propionate (FLONASE) 50 mcg/actuation nasal spray fluticasone propionate 50 mcg/actuation nasal spray,suspension   USE 2 SPRAY(S) IN EACH NOSTRIL ONCE DAILY      ipratropium (ATROVENT) 21 mcg (0.03 %) nasal spray 2 sprays by Each Nostril route 3 (three) times daily. 30 mL 0    metoprolol succinate (TOPROL-XL) 100 MG 24 hr tablet Take 1 tablet (100 mg total) by mouth once daily. 30 tablet 11    montelukast (SINGULAIR) 10  All findings of colonoscopy to be reviewed with pt by GI mg tablet Take 10 mg by mouth once daily.      ondansetron (ZOFRAN) 4 MG tablet ondansetron HCl 4 mg tablet   TAKE 1 TABLET BY MOUTH AS NEEDED FOR NAUSEA      potassium citrate (UROCIT-K) 10 mEq (1,080 mg) TbSR Urocit-K 10 10 mEq (1,080 mg) tablet,extended release   Take 1 tablet every day by oral route for 30 days.      sacubitriL-valsartan (ENTRESTO) 24-26 mg per tablet Take 1 tablet by mouth 2 (two) times daily.      sodium chloride 1 gram tablet Take 2 tablets (2 g total) by mouth 3 (three) times daily. 540 tablet 3    zolpidem (AMBIEN) 10 mg Tab Take 10 mg by mouth nightly as needed.      estradioL (ESTRACE) 0.01 % (0.1 mg/gram) vaginal cream Place 2 g vaginally once daily. (Patient not taking: Reported on 8/28/2023) 60 g 11    galcanezumab-gnlm (EMGALITY PEN) 120 mg/mL PnIj Inject 120 mg into the skin every 28 days. (Patient not taking: Reported on 12/4/2023) 1 each 11    loratadine (CLARITIN) 10 mg tablet Take 10 mg by mouth.      nitrofurantoin, macrocrystal-monohydrate, (MACROBID) 100 MG capsule Take 100 mg by mouth every 12 (twelve) hours.      [DISCONTINUED] atenoloL (TENORMIN) 100 MG tablet Take 100 mg by mouth once daily. for 90 days      [DISCONTINUED] propranoloL (INDERAL) 10 MG tablet        No facility-administered encounter medications on file as of 12/4/2023.       History of Present Illness  59 y/o female following in neurology for reported syncope, reporting memory impairment, and migraine headaches.    Last clinic visit 5 months ago    Was actually admitted to hospital in April 2022 with reported syncope, there is EEG noted done in April of 2022 which was negative.  MRI brain at that time did not indicate acute abnormalities, nor did carotid doppler report stenosis.  Her monitoring at that time was concerning for SVT, she was following with cardiology at [facility] at time of her initial visit with us.  She ended up having pacemaker placed due to the SVT and cardiology felt that this was  "the cause of the syncope events.  She denied any further complications since that surgery.    She declined the in home VEEG at time of the initial visit.    She is prescribed abilify, cymbalta, xanax, and ambien, which too can very well be contributing to reported memory complications, likely the biggest ones.  She had CT head 2/3/23 which was negative for acute abnormalities.  She reports had prior sleep study done and was negative, but she was not able to give me date or where it was done.  Doing MMSE here in clinic initially was 27/30.  I did obtain labs including UDS, which showed marijuana, which one of the biggest complications of its use is chronic memory impairment, so in conjunction with the listed medications is likely the source of her cognitive impairment and not a dementia.    She does have migraines, but topamax is not option due to memory impairment and already on metoprolol which we use for migraines.  Elavil is not option as she is already on anti-depressant treatment.  Her insurance denied the Qulipta.  I submitted for PA for emgality which the insurance reported it was "resolved" which did not answer if it was approved or not.  We left her several messages in May of 2023 to contact her insurance to see about the status but have not heard from her until today.  Actually we did another PA 11/7/23, denied Qulipta again and recommended emgality or aimovig, PA again for emgality was "resolved", I sent in Rx we tried again to call her but she did not answer the phone.  The Rx is at pharmacy, I am giving her emgality card and she is to go to pharmacy to get medication.           Review of Systems  Review of Systems   Constitutional:  Negative for diaphoresis and fever.   HENT:  Negative for congestion, hearing loss and tinnitus.    Eyes:  Negative for blurred vision, double vision, photophobia, discharge and redness.   Respiratory:  Negative for cough and shortness of breath.    Cardiovascular:  Negative " for chest pain.   Gastrointestinal:  Negative for abdominal pain, nausea and vomiting.   Musculoskeletal:  Negative for back pain, joint pain, myalgias and neck pain.   Skin:  Negative for itching and rash.   Neurological:  Positive for loss of consciousness. Negative for dizziness, tremors, sensory change, speech change, focal weakness, seizures, weakness and headaches.   Psychiatric/Behavioral:  Positive for memory loss. Negative for depression and hallucinations. The patient does not have insomnia.    All other systems reviewed and are negative.     Objective:     NEUROLOGICAL EXAMINATION:     MENTAL STATUS   Oriented to person, place, and time.   Registration: recalls 3 of 3 objects. Recall at 5 minutes: recalls 3 of 3 objects.   Attention: normal. Concentration: normal.   Speech: speech is normal   Level of consciousness: alert  Knowledge: good and consistent with education.   Normal comprehension.     CRANIAL NERVES     CN II   Visual fields full to confrontation.   Visual acuity: normal  Right visual field deficit: none  Left visual field deficit: none     CN III, IV, VI   Pupils are equal, round, and reactive to light.  Extraocular motions are normal.   Right pupil: Size: 3 mm. Shape: regular. Reactivity: brisk. Consensual response: intact. Accommodation: intact.   Left pupil: Size: 3 mm. Shape: regular. Reactivity: brisk. Consensual response: intact. Accommodation: intact.   CN III: no CN III palsy  CN VI: no CN VI palsy  Nystagmus: none   Diplopia: none  Upgaze: normal  Downgaze: normal  Conjugate gaze: present  Vestibulo-ocular reflex: present    CN V   Facial sensation intact.   Right facial sensation deficit: none  Left facial sensation deficit: none  Right corneal reflex: normal  Left corneal reflex: normal    CN VII   Facial expression full, symmetric.   Right facial weakness: none  Left facial weakness: none  Right taste: normal  Left taste: normal    CN VIII   CN VIII normal.   Hearing: intact    CN  IX, X   CN IX normal.   CN X normal.   Palate: symmetric    CN XI   CN XI normal.   Right sternocleidomastoid strength: normal  Left sternocleidomastoid strength: normal  Right trapezius strength: normal  Left trapezius strength: normal    CN XII   CN XII normal.   Tongue: not atrophic  Fasciculations: absent  Tongue deviation: none    MOTOR EXAM   Muscle bulk: normal  Overall muscle tone: normal  Right arm tone: normal  Left arm tone: normal  Right arm pronator drift: absent  Left arm pronator drift: absent  Right leg tone: normal  Left leg tone: normal    Strength   Right neck flexion: 5/5  Left neck flexion: 5/5  Right neck extension: 5/5  Left neck extension: 5/5  Right deltoid: 5/5  Left deltoid: 5/5  Right biceps: 5/5  Left biceps: 5/5  Right triceps: 5/5  Left triceps: 5/5  Right wrist flexion: 5/5  Left wrist flexion: 5/5  Right wrist extension: 5/5  Left wrist extension: 5/5  Right interossei: 5/5  Left interossei: 5/5  Right iliopsoas: 5/5  Left iliopsoas: 5/5  Right quadriceps: 5/5  Left quadriceps: 5/5  Right hamstrin/5  Left hamstrin/5  Right anterior tibial: 5/5  Left anterior tibial: 5/5  Right posterior tibial: 5/5  Left posterior tibial: 5/5  Right gastroc: 5/5  Left gastroc: 5/5    REFLEXES     Reflexes   Right brachioradialis: 2+  Left brachioradialis: 2+  Right biceps: 2+  Left biceps: 2+  Right triceps: 2+  Left triceps: 2+  Right patellar: 2+  Left patellar: 2+  Right achilles: 2+  Left achilles: 2+  Right plantar: normal  Left plantar: normal  Right Otero: absent  Left Otero: absent  Right ankle clonus: absent  Left ankle clonus: absent  Right pendular knee jerk: absent  Left pendular knee jerk: absent    SENSORY EXAM   Light touch normal.   Right arm light touch: normal  Left arm light touch: normal  Right leg light touch: normal  Left leg light touch: normal  Vibration normal.   Right arm vibration: normal  Left arm vibration: normal  Right leg vibration: normal  Left leg  vibration: normal  Proprioception normal.   Right arm proprioception: normal  Left arm proprioception: normal  Right leg proprioception: normal  Left leg proprioception: normal  Pinprick normal.   Right arm pinprick: normal  Left arm pinprick: normal  Right leg pinprick: normal  Left leg pinprick: normal  Graphesthesia: normal  Romberg: negative  Stereognosis: normal    GAIT AND COORDINATION     Gait  Gait: normal     Coordination   Finger to nose coordination: normal  Heel to shin coordination: normal  Tandem walking coordination: normal    Tremor   Resting tremor: absent  Intention tremor: absent  Action tremor: absent       Physical Exam  Vitals and nursing note reviewed.   Constitutional:       Appearance: Normal appearance.   HENT:      Head: Normocephalic.   Eyes:      Extraocular Movements: Extraocular movements intact and EOM normal.      Pupils: Pupils are equal, round, and reactive to light.   Cardiovascular:      Rate and Rhythm: Normal rate and regular rhythm.   Pulmonary:      Effort: Pulmonary effort is normal.      Breath sounds: Normal breath sounds.   Musculoskeletal:         General: No swelling or tenderness. Normal range of motion.      Cervical back: Normal range of motion and neck supple.      Right lower leg: No edema.      Left lower leg: No edema.   Skin:     General: Skin is warm and dry.      Coloration: Skin is not jaundiced.      Findings: No rash.   Neurological:      General: No focal deficit present.      Mental Status: She is alert and oriented to person, place, and time.      GCS: GCS eye subscore is 4. GCS verbal subscore is 5. GCS motor subscore is 6.      Cranial Nerves: No cranial nerve deficit.      Sensory: No sensory deficit.      Motor: Motor function is intact. No weakness.      Coordination: Coordination is intact. Coordination normal. Finger-Nose-Finger Test, Heel to Shin Test and Romberg Test normal.      Gait: Gait is intact. Gait and tandem walk normal.      Deep  Tendon Reflexes: Reflexes normal.      Reflex Scores:       Tricep reflexes are 2+ on the right side and 2+ on the left side.       Bicep reflexes are 2+ on the right side and 2+ on the left side.       Brachioradialis reflexes are 2+ on the right side and 2+ on the left side.       Patellar reflexes are 2+ on the right side and 2+ on the left side.       Achilles reflexes are 2+ on the right side and 2+ on the left side.  Psychiatric:         Mood and Affect: Mood normal.         Speech: Speech normal.         Behavior: Behavior normal.          Assessment:     Problem List Items Addressed This Visit          Neuro    Hx of migraine headaches - Primary    Mild neurocognitive disorder              Primary Diagnosis and ICD10  Hx of migraine headaches [Z86.69]    Plan:     Patient Instructions   Continue followup with cardiology  Notify clinic if any further syncope events  Go to pharmacy to get prescription for emgality  Notify clinic if any complication getting medication      There are no discontinued medications.    Requested Prescriptions      No prescriptions requested or ordered in this encounter       No orders of the defined types were placed in this encounter.

## 2023-12-05 NOTE — PROGRESS NOTES
Subjective:       Patient ID: Flory Chacon is a 58 y.o. female.    Chief Complaint: Follow-up (Low TSH ) and Nasal Congestion    This is an 58 y.o female who presents today with nasal congestion for the last two weeks. She denies any other symptoms. Patient states she has been taking OTC decongestion. Patient denies HA, fever, fatigue, N/V/D, shortness of breath or chest pain.           Current Outpatient Medications:     ABILIFY 2 mg Tab, Take 2 mg by mouth once daily., Disp: , Rfl:     ALPRAZolam (XANAX) 1 MG tablet, , Disp: , Rfl:     amLODIPine (NORVASC) 10 MG tablet, Take 1 tablet (10 mg total) by mouth once daily., Disp: 30 tablet, Rfl: 11    aspirin 81 MG Chew, Take 1 tablet (81 mg total) by mouth once daily., Disp: 30 tablet, Rfl: 0    cetirizine (ZYRTEC) 10 MG tablet, Take 1 tablet (10 mg total) by mouth once daily., Disp: 90 tablet, Rfl: 1    chlorthalidone (HYGROTEN) 25 MG Tab, Take 25 mg by mouth every morning., Disp: , Rfl:     cyanocobalamin 1,000 mcg/mL injection, cyanocobalamin (vit B-12) 1,000 mcg/mL injection solution  INJECT 1 ML ONCE DAILY INTRAMUSCULARLY FOR 7 DAYS AND THEN INJECT 1 ML ONCE A WEEK FOR 4 WEEKS AND THEN INJECT 1 ML ONCE EVERY MONTH, Disp: , Rfl:     cycloSPORINE (RESTASIS) 0.05 % ophthalmic emulsion, Restasis 0.05 % eye drops in a dropperette  INSTILL 1 DROP INTO EACH EYE EVERY 12 HOURS, Disp: , Rfl:     DULoxetine (CYMBALTA) 60 MG capsule, , Disp: , Rfl:     esomeprazole (NEXIUM) 40 MG capsule, 1 capsule., Disp: , Rfl:     ezetimibe (ZETIA) 10 mg tablet, Take 1 tablet (10 mg total) by mouth every evening., Disp: 90 tablet, Rfl: 3    ferrous sulfate 220 mg (44 mg iron)/5 mL solution, 7 ml drink with straw Orally daily for 30 days, Disp: , Rfl:     fluticasone propionate (FLONASE) 50 mcg/actuation nasal spray, fluticasone propionate 50 mcg/actuation nasal spray,suspension  USE 2 SPRAY(S) IN EACH NOSTRIL ONCE DAILY, Disp: , Rfl:     metoprolol succinate (TOPROL-XL) 100 MG 24 hr  tablet, Take 1 tablet (100 mg total) by mouth once daily., Disp: 30 tablet, Rfl: 11    montelukast (SINGULAIR) 10 mg tablet, Take 10 mg by mouth once daily., Disp: , Rfl:     ondansetron (ZOFRAN) 4 MG tablet, ondansetron HCl 4 mg tablet  TAKE 1 TABLET BY MOUTH AS NEEDED FOR NAUSEA, Disp: , Rfl:     potassium citrate (UROCIT-K) 10 mEq (1,080 mg) TbSR, Urocit-K 10 10 mEq (1,080 mg) tablet,extended release  Take 1 tablet every day by oral route for 30 days., Disp: , Rfl:     sacubitriL-valsartan (ENTRESTO) 24-26 mg per tablet, Take 1 tablet by mouth 2 (two) times daily., Disp: , Rfl:     sodium chloride 1 gram tablet, Take 2 tablets (2 g total) by mouth 3 (three) times daily., Disp: 540 tablet, Rfl: 3    zolpidem (AMBIEN) 10 mg Tab, Take 10 mg by mouth nightly as needed., Disp: , Rfl:     estradioL (ESTRACE) 0.01 % (0.1 mg/gram) vaginal cream, Place 2 g vaginally once daily. (Patient not taking: Reported on 8/28/2023), Disp: 60 g, Rfl: 11    galcanezumab-gnlm (EMGALITY PEN) 120 mg/mL PnIj, Inject 120 mg into the skin every 28 days. (Patient not taking: Reported on 12/4/2023), Disp: 1 each, Rfl: 11    ipratropium (ATROVENT) 21 mcg (0.03 %) nasal spray, 2 sprays by Each Nostril route 3 (three) times daily., Disp: 30 mL, Rfl: 0    loratadine (CLARITIN) 10 mg tablet, Take 10 mg by mouth., Disp: , Rfl:     nitrofurantoin, macrocrystal-monohydrate, (MACROBID) 100 MG capsule, Take 100 mg by mouth every 12 (twelve) hours., Disp: , Rfl:     Review of patient's allergies indicates:   Allergen Reactions    Meloxicam Swelling    Egg      Other reaction(s): Unknown    Eggs [egg derived]     Statins-hmg-coa reductase inhibitors      Other reaction(s): Unknown    Penicillins Rash       Past Medical History:   Diagnosis Date    Acute hypokalemia     Anxiety disorder, unspecified     Depression     Hyperlipidemia     Hypertension     Insomnia        Past Surgical History:   Procedure Laterality Date    FRACTURE SURGERY      KNEE  ARTHROPLASTY Left     KNEE ARTHROPLASTY Right     LEFT HEART CATHETERIZATION Left 04/25/2022    Procedure: Left heart cath;  Surgeon: Malachi Cormier DO;  Location: [facility] CATH LAB;  Service: Cardiology;  Laterality: Left;       Family History   Problem Relation Age of Onset    Heart disease Father     Breast cancer Maternal Aunt     Melanoma Maternal Uncle     Breast cancer Maternal Grandmother        Social History     Tobacco Use    Smoking status: Former     Current packs/day: 0.00     Average packs/day: 1 pack/day for 20.0 years (20.0 ttl pk-yrs)     Types: Cigarettes     Start date: 1993     Quit date: 2013     Years since quitting: 10.9    Smokeless tobacco: Never   Substance Use Topics    Alcohol use: Not Currently     Comment: socially in the past    Drug use: Never       Review of Systems   Constitutional:  Negative for fatigue and fever.   HENT:  Positive for nasal congestion and sinus pressure/congestion. Negative for hearing loss, mouth dryness, sneezing and sore throat.    Eyes:  Negative for pain and discharge.   Respiratory:  Negative for cough and shortness of breath.    Cardiovascular:  Negative for chest pain and claudication.   Gastrointestinal:  Negative for abdominal distention, abdominal pain, anal bleeding, change in bowel habit, constipation and diarrhea.   Endocrine: Negative for cold intolerance and heat intolerance.   Genitourinary:  Negative for bladder incontinence, difficulty urinating, dyspareunia, dysuria, genital sores and hot flashes.   Musculoskeletal:  Negative for arthralgias, back pain, joint swelling, leg pain and joint deformity.   Integumentary:  Negative for rash and wound.   Allergic/Immunologic: Negative for environmental allergies, food allergies and immunocompromised state.   Neurological:  Negative for light-headedness, headaches and coordination difficulties.   Hematological: Negative.    Psychiatric/Behavioral: Negative.           Current Medications:   Medication  List with Changes/Refills   New Medications    IPRATROPIUM (ATROVENT) 21 MCG (0.03 %) NASAL SPRAY    2 sprays by Each Nostril route 3 (three) times daily.       Start Date: 12/4/2023 End Date: --   Current Medications    ABILIFY 2 MG TAB    Take 2 mg by mouth once daily.       Start Date: 4/29/2022 End Date: --    ALPRAZOLAM (XANAX) 1 MG TABLET           Start Date: 9/14/2021 End Date: --    AMLODIPINE (NORVASC) 10 MG TABLET    Take 1 tablet (10 mg total) by mouth once daily.       Start Date: 2/10/2023 End Date: 2/10/2024    ASPIRIN 81 MG CHEW    Take 1 tablet (81 mg total) by mouth once daily.       Start Date: 4/28/2022 End Date: 12/4/2023    CETIRIZINE (ZYRTEC) 10 MG TABLET    Take 1 tablet (10 mg total) by mouth once daily.       Start Date: 2/22/2023 End Date: --    CHLORTHALIDONE (HYGROTEN) 25 MG TAB    Take 25 mg by mouth every morning.       Start Date: 1/21/2023 End Date: --    CYANOCOBALAMIN 1,000 MCG/ML INJECTION    cyanocobalamin (vit B-12) 1,000 mcg/mL injection solution   INJECT 1 ML ONCE DAILY INTRAMUSCULARLY FOR 7 DAYS AND THEN INJECT 1 ML ONCE A WEEK FOR 4 WEEKS AND THEN INJECT 1 ML ONCE EVERY MONTH       Start Date: --        End Date: --    CYCLOSPORINE (RESTASIS) 0.05 % OPHTHALMIC EMULSION    Restasis 0.05 % eye drops in a dropperette   INSTILL 1 DROP INTO EACH EYE EVERY 12 HOURS       Start Date: --        End Date: --    DULOXETINE (CYMBALTA) 60 MG CAPSULE           Start Date: 9/13/2021 End Date: --    ESOMEPRAZOLE (NEXIUM) 40 MG CAPSULE    1 capsule.       Start Date: --        End Date: --    ESTRADIOL (ESTRACE) 0.01 % (0.1 MG/GRAM) VAGINAL CREAM    Place 2 g vaginally once daily.       Start Date: 5/9/2022  End Date: 5/9/2023    EZETIMIBE (ZETIA) 10 MG TABLET    Take 1 tablet (10 mg total) by mouth every evening.       Start Date: 2/22/2023 End Date: 2/22/2024    FERROUS SULFATE 220 MG (44 MG IRON)/5 ML SOLUTION    7 ml drink with straw Orally daily for 30 days       Start Date: 10/1/2022  "End Date: --    FLUTICASONE PROPIONATE (FLONASE) 50 MCG/ACTUATION NASAL SPRAY    fluticasone propionate 50 mcg/actuation nasal spray,suspension   USE 2 SPRAY(S) IN EACH NOSTRIL ONCE DAILY       Start Date: --        End Date: --    GALCANEZUMAB-GNLM (EMGALITY PEN) 120 MG/ML PNIJ    Inject 120 mg into the skin every 28 days.       Start Date: 11/9/2023 End Date: --    LORATADINE (CLARITIN) 10 MG TABLET    Take 10 mg by mouth.       Start Date: --        End Date: --    METOPROLOL SUCCINATE (TOPROL-XL) 100 MG 24 HR TABLET    Take 1 tablet (100 mg total) by mouth once daily.       Start Date: 2/10/2023 End Date: 2/10/2024    MONTELUKAST (SINGULAIR) 10 MG TABLET    Take 10 mg by mouth once daily.       Start Date: 7/27/2021 End Date: --    NITROFURANTOIN, MACROCRYSTAL-MONOHYDRATE, (MACROBID) 100 MG CAPSULE    Take 100 mg by mouth every 12 (twelve) hours.       Start Date: 6/29/2023 End Date: --    ONDANSETRON (ZOFRAN) 4 MG TABLET    ondansetron HCl 4 mg tablet   TAKE 1 TABLET BY MOUTH AS NEEDED FOR NAUSEA       Start Date: --        End Date: --    POTASSIUM CITRATE (UROCIT-K) 10 MEQ (1,080 MG) TBSR    Urocit-K 10 10 mEq (1,080 mg) tablet,extended release   Take 1 tablet every day by oral route for 30 days.       Start Date: --        End Date: --    SACUBITRIL-VALSARTAN (ENTRESTO) 24-26 MG PER TABLET    Take 1 tablet by mouth 2 (two) times daily.       Start Date: --        End Date: --    SODIUM CHLORIDE 1 GRAM TABLET    Take 2 tablets (2 g total) by mouth 3 (three) times daily.       Start Date: 3/1/2023  End Date: 2/29/2024    ZOLPIDEM (AMBIEN) 10 MG TAB    Take 10 mg by mouth nightly as needed.       Start Date: 2/10/2023 End Date: --            Objective:        Vitals:    12/04/23 1407   BP: 127/87   BP Location: Right arm   Patient Position: Sitting   BP Method: Large (Automatic)   Pulse: 95   Resp: 18   Temp: 97.2 °F (36.2 °C)   TempSrc: Temporal   SpO2: 95%   Weight: 61.9 kg (136 lb 8 oz)   Height: 5' 3" (1.6 " m)       Physical Exam  Constitutional:       Appearance: She is obese.   HENT:      Head: Normocephalic and atraumatic.      Right Ear: Tympanic membrane normal.      Left Ear: Tympanic membrane normal.      Nose: Nose normal.      Mouth/Throat:      Mouth: Mucous membranes are moist.      Pharynx: Oropharynx is clear.   Eyes:      Conjunctiva/sclera: Conjunctivae normal.      Pupils: Pupils are equal, round, and reactive to light.   Cardiovascular:      Rate and Rhythm: Normal rate and regular rhythm.      Pulses: Normal pulses.      Heart sounds: Normal heart sounds.   Pulmonary:      Effort: Pulmonary effort is normal.      Breath sounds: Normal breath sounds.   Abdominal:      General: Abdomen is flat. Bowel sounds are normal.   Genitourinary:     General: Normal vulva.   Musculoskeletal:         General: Normal range of motion.      Cervical back: Normal range of motion.   Skin:     General: Skin is warm and dry.      Capillary Refill: Capillary refill takes less than 2 seconds.   Neurological:      General: No focal deficit present.      Mental Status: She is alert and oriented to person, place, and time.   Psychiatric:         Mood and Affect: Mood normal.               Lab Results   Component Value Date    WBC 9.08 07/21/2023    HGB 13.4 07/21/2023    HCT 42.2 07/21/2023     (H) 07/21/2023    CHOL 197 04/23/2022    TRIG 82 04/23/2022    HDL 85 (H) 04/23/2022    ALT 18 07/21/2023    AST 18 07/21/2023     07/21/2023    K 3.8 07/21/2023     07/21/2023    CREATININE 1.09 (H) 07/21/2023    BUN 8 07/21/2023    CO2 25 07/21/2023    TSH 0.520 08/21/2023    INR 0.96 01/31/2023    HGBA1C 5.3 04/26/2023      Assessment:       1. Nasal congestion        Plan:         Problem List Items Addressed This Visit          ENT    Nasal congestion - Primary     Atrovent nasal spray           Relevant Medications    ipratropium (ATROVENT) 21 mcg (0.03 %) nasal spray         Follow up in about 3 months (around  3/4/2024).    Saroj Pineda MD     Instructed patient that if symptoms fail to improve or worsen patient should seek immediate medical attention or report to the nearest emergency department. Patient expressed verbal agreement and understanding to this plan of care.

## 2023-12-18 NOTE — PROGRESS NOTES
Subjective     Patient ID: Flory Chacon is a 58 y.o. female.    Chief Complaint: No chief complaint on file.    This pleasant 58 year old female presents to the clinic as a new patient referral from PATO Rogel for recurrent UTI's. There are no culture results in EPIC and none was sent with the referral.  We contacted the referring provider's office and they faxed over the culture results. We received 5 urine culture results and one culture in October 2023 was significant and grew >100,000 Klebsiella pneumoniae.  The other cultures showed insignificant growth. These culture results can be reviewed in  in EPIC. She reports frequency, urgency, nocturia 2 times a night, and mixed urinary incontinence. Her PVR is 0 mls.  She denies dysuria, hematuria, or incomplete bladder emptying.  She denies any fever, chills, nausea or vomiting. She denies abdominal, bladder or back pain. She states she has a history of kidney stones but denies any stone symptoms today. She does report she has a pacemaker.  Records indicate she has been on 5 different antibiotics since September 2023.  I will culture her urine and treat if indicated.  We discussed trying Hiprex as outlined in the plan if her culture is negative.  She denies glaucoma but desires not to try an OAB medication at this time.  We will consider the Urological work up in the future if indicated. We discussed doing the kegel exercises to help with the stress incontinence.  She denies smoking or drinking alcohol and states she has been through menopause.  I discussed the plan in detail with the patient and she is in agreement with the plan.  All her questions were answered at today's visit.   ---------------------------------------------------------------------------------------------------------------------------------  [December 21, 2023].        Review of Systems   Constitutional:  Negative for activity change and fever.   HENT:  Negative for hearing loss  and trouble swallowing.    Eyes:  Negative for visual disturbance.   Respiratory:  Negative for cough, shortness of breath and wheezing.    Cardiovascular:  Negative for chest pain.   Gastrointestinal:  Negative for abdominal pain, diarrhea, nausea and vomiting.   Endocrine: Negative for polyuria.   Genitourinary:  Positive for frequency, nocturia and urgency. Negative for bladder incontinence, decreased urine volume, difficulty urinating, dysuria, enuresis, flank pain and hematuria.        UTI's    Musculoskeletal:  Negative for back pain and gait problem.   Integumentary:  Negative for rash.   Neurological:  Negative for speech difficulty and weakness.   Psychiatric/Behavioral:  Negative for behavioral problems and confusion.         Objective     Physical Exam  Vitals and nursing note reviewed. Exam conducted with a chaperone present.   Constitutional:       General: She is not in acute distress.     Appearance: Normal appearance. She is not ill-appearing, toxic-appearing or diaphoretic.   HENT:      Head: Normocephalic.   Eyes:      Extraocular Movements: Extraocular movements intact.   Cardiovascular:      Rate and Rhythm: Normal rate and regular rhythm.      Heart sounds: Normal heart sounds.   Pulmonary:      Effort: Pulmonary effort is normal.      Breath sounds: Normal breath sounds. No wheezing, rhonchi or rales.   Abdominal:      General: Bowel sounds are normal.      Palpations: Abdomen is soft.      Tenderness: There is no abdominal tenderness. There is no right CVA tenderness, left CVA tenderness, guarding or rebound.   Musculoskeletal:         General: Normal range of motion.      Cervical back: Normal range of motion. No rigidity.   Skin:     General: Skin is warm and dry.   Neurological:      General: No focal deficit present.      Mental Status: She is alert and oriented to person, place, and time.      Motor: No weakness.      Coordination: Coordination normal.      Gait: Gait normal.    Psychiatric:         Mood and Affect: Mood normal.         Behavior: Behavior normal.         Thought Content: Thought content normal.        Assessment and Plan     Problem List Items Addressed This Visit          Renal/    Urinary tract infection without hematuria - Primary    Relevant Medications    methenamine (HIPREX) 1 gram Tab    Other Relevant Orders    Urine culture    Frequency of urination    Relevant Orders    Urine culture    Urgency of urination    Relevant Orders    Urine culture    Nocturia    Relevant Orders    Urine culture        Urine Culture   Rx Methenamine Hippurate (Hiprex) 1 gm take one tablet twice a day   Consider Urological work up in the future if indicated   Follow up with Urology NP BLESSING Ramirez in 3 months or sooner if needed

## 2023-12-21 ENCOUNTER — OFFICE VISIT (OUTPATIENT)
Dept: UROLOGY | Facility: CLINIC | Age: 58
End: 2023-12-21
Payer: COMMERCIAL

## 2023-12-21 VITALS
SYSTOLIC BLOOD PRESSURE: 110 MMHG | HEIGHT: 63 IN | BODY MASS INDEX: 23.04 KG/M2 | HEART RATE: 76 BPM | DIASTOLIC BLOOD PRESSURE: 62 MMHG | RESPIRATION RATE: 18 BRPM | TEMPERATURE: 98 F | OXYGEN SATURATION: 94 % | WEIGHT: 130 LBS

## 2023-12-21 DIAGNOSIS — R39.15 URGENCY OF URINATION: ICD-10-CM

## 2023-12-21 DIAGNOSIS — R35.1 NOCTURIA: ICD-10-CM

## 2023-12-21 DIAGNOSIS — N39.0 URINARY TRACT INFECTION WITHOUT HEMATURIA, SITE UNSPECIFIED: Primary | ICD-10-CM

## 2023-12-21 DIAGNOSIS — N39.0 RECURRENT UTI: ICD-10-CM

## 2023-12-21 DIAGNOSIS — R35.0 FREQUENCY OF URINATION: ICD-10-CM

## 2023-12-21 PROBLEM — Z98.890 H/O CARDIAC RADIOFREQUENCY ABLATION: Status: ACTIVE | Noted: 2022-07-20

## 2023-12-21 PROBLEM — R31.9 BLOOD IN URINE: Status: ACTIVE | Noted: 2022-07-27

## 2023-12-21 PROCEDURE — 87086 CULTURE, URINE: ICD-10-PCS | Mod: ,,, | Performed by: CLINICAL MEDICAL LABORATORY

## 2023-12-21 PROCEDURE — 1160F RVW MEDS BY RX/DR IN RCRD: CPT | Mod: S$GLB,,, | Performed by: NURSE PRACTITIONER

## 2023-12-21 PROCEDURE — 4010F PR ACE/ARB THEARPY RXD/TAKEN: ICD-10-PCS | Mod: S$GLB,,, | Performed by: NURSE PRACTITIONER

## 2023-12-21 PROCEDURE — 3044F PR MOST RECENT HEMOGLOBIN A1C LEVEL <7.0%: ICD-10-PCS | Mod: S$GLB,,, | Performed by: NURSE PRACTITIONER

## 2023-12-21 PROCEDURE — 3074F PR MOST RECENT SYSTOLIC BLOOD PRESSURE < 130 MM HG: ICD-10-PCS | Mod: S$GLB,,, | Performed by: NURSE PRACTITIONER

## 2023-12-21 PROCEDURE — 99215 OFFICE O/P EST HI 40 MIN: CPT | Mod: PBBFAC | Performed by: NURSE PRACTITIONER

## 2023-12-21 PROCEDURE — 3074F SYST BP LT 130 MM HG: CPT | Mod: S$GLB,,, | Performed by: NURSE PRACTITIONER

## 2023-12-21 PROCEDURE — 3008F PR BODY MASS INDEX (BMI) DOCUMENTED: ICD-10-PCS | Mod: S$GLB,,, | Performed by: NURSE PRACTITIONER

## 2023-12-21 PROCEDURE — 99203 PR OFFICE/OUTPT VISIT, NEW, LEVL III, 30-44 MIN: ICD-10-PCS | Mod: S$GLB,,, | Performed by: NURSE PRACTITIONER

## 2023-12-21 PROCEDURE — 3078F PR MOST RECENT DIASTOLIC BLOOD PRESSURE < 80 MM HG: ICD-10-PCS | Mod: S$GLB,,, | Performed by: NURSE PRACTITIONER

## 2023-12-21 PROCEDURE — 3078F DIAST BP <80 MM HG: CPT | Mod: S$GLB,,, | Performed by: NURSE PRACTITIONER

## 2023-12-21 PROCEDURE — 1160F PR REVIEW ALL MEDS BY PRESCRIBER/CLIN PHARMACIST DOCUMENTED: ICD-10-PCS | Mod: S$GLB,,, | Performed by: NURSE PRACTITIONER

## 2023-12-21 PROCEDURE — 99203 OFFICE O/P NEW LOW 30 MIN: CPT | Mod: S$GLB,,, | Performed by: NURSE PRACTITIONER

## 2023-12-21 PROCEDURE — 87086 URINE CULTURE/COLONY COUNT: CPT | Mod: ,,, | Performed by: CLINICAL MEDICAL LABORATORY

## 2023-12-21 PROCEDURE — 1159F MED LIST DOCD IN RCRD: CPT | Mod: S$GLB,,, | Performed by: NURSE PRACTITIONER

## 2023-12-21 PROCEDURE — 1159F PR MEDICATION LIST DOCUMENTED IN MEDICAL RECORD: ICD-10-PCS | Mod: S$GLB,,, | Performed by: NURSE PRACTITIONER

## 2023-12-21 PROCEDURE — 3044F HG A1C LEVEL LT 7.0%: CPT | Mod: S$GLB,,, | Performed by: NURSE PRACTITIONER

## 2023-12-21 PROCEDURE — 3008F BODY MASS INDEX DOCD: CPT | Mod: S$GLB,,, | Performed by: NURSE PRACTITIONER

## 2023-12-21 PROCEDURE — 3066F PR DOCUMENTATION OF TREATMENT FOR NEPHROPATHY: ICD-10-PCS | Mod: S$GLB,,, | Performed by: NURSE PRACTITIONER

## 2023-12-21 PROCEDURE — 3066F NEPHROPATHY DOC TX: CPT | Mod: S$GLB,,, | Performed by: NURSE PRACTITIONER

## 2023-12-21 PROCEDURE — 4010F ACE/ARB THERAPY RXD/TAKEN: CPT | Mod: S$GLB,,, | Performed by: NURSE PRACTITIONER

## 2023-12-21 RX ORDER — METHENAMINE HIPPURATE 1000 MG/1
TABLET ORAL
Qty: 60 TABLET | Refills: 6 | Status: SHIPPED | OUTPATIENT
Start: 2023-12-21 | End: 2023-12-21

## 2023-12-21 RX ORDER — METHENAMINE HIPPURATE 1000 MG/1
TABLET ORAL
Qty: 60 TABLET | Refills: 6 | Status: SHIPPED | OUTPATIENT
Start: 2023-12-21

## 2023-12-21 NOTE — PATIENT INSTRUCTIONS
Urine Culture   Rx Methenamine Hippurate (Hiprex) 1 gm take one tablet twice a day   Consider Urological work up in the future if indicated   Mary lundberg   Follow up with Urology NP BLESSING Ramirez in 3 months or sooner if needed

## 2023-12-23 LAB — UA COMPLETE W REFLEX CULTURE PNL UR: NO GROWTH

## 2023-12-26 ENCOUNTER — TELEPHONE (OUTPATIENT)
Dept: UROLOGY | Facility: CLINIC | Age: 58
End: 2023-12-26
Payer: COMMERCIAL

## 2023-12-26 NOTE — TELEPHONE ENCOUNTER
I called Mrs. Chacon to notify her of her negative urine culture.  She is doing well and voiced no problems.

## 2023-12-27 RX ORDER — POTASSIUM CITRATE 10 MEQ/1
TABLET, EXTENDED RELEASE ORAL
Qty: 30 TABLET | Refills: 1 | OUTPATIENT
Start: 2023-12-27 | End: 2024-03-04

## 2024-01-02 RX ORDER — SACUBITRIL AND VALSARTAN 24; 26 MG/1; MG/1
1 TABLET, FILM COATED ORAL 2 TIMES DAILY
Qty: 60 TABLET | Refills: 0 | Status: SHIPPED | OUTPATIENT
Start: 2024-01-02 | End: 2024-02-01 | Stop reason: SDUPTHER

## 2024-01-02 NOTE — TELEPHONE ENCOUNTER
----- Message from Dirk Bar sent at 1/2/2024 12:22 PM CST -----  Regarding: Med refill  Entresto    [facility] in [place]    Please and thanks!

## 2024-01-26 NOTE — PATIENT INSTRUCTIONS
Please resume Toprol  mg daily   Follow-up in 3 months  
50 y.o female with HTN, presents after witnessed seizure, recurrent episodes while in the ED

## 2024-02-01 RX ORDER — SACUBITRIL AND VALSARTAN 24; 26 MG/1; MG/1
1 TABLET, FILM COATED ORAL 2 TIMES DAILY
Qty: 60 TABLET | Refills: 1 | Status: SHIPPED | OUTPATIENT
Start: 2024-02-01 | End: 2024-06-05 | Stop reason: SDUPTHER

## 2024-02-23 NOTE — ASSESSMENT & PLAN NOTE
Continue current medication regiment which includes Toprol ane Entresto.  Present to the scheduled appointment with cardiology in a few days.   no

## 2024-02-29 NOTE — ASSESSMENT & PLAN NOTE
[facility]  Main Office: [address]  [address]  [place] [address]       CHIEF COMPLAINT:   Right kidney stones    HISTORY:   This is a 47-year-old woman with no prior stone history who was in the emergency department in November with right flank pain.  She had a CT scan performed that showed right kidney stones and hydronephrosis.  She actually had the stones diagnosed about a month previous to that in [facility].  She says that she passed 2 small stones a few days later and she has had no pain or problems since that time.  She currently feels well.      PAST MEDICAL HISTORY:   No past medical history on file.    PAST SURGICAL HISTORY:   No past surgical history on file.    FAMILY HISTORY:   No family history on file.    SOCIAL HISTORY:   Social History     Tobacco Use    Smoking status: Former     Packs/day: 0.25     Years: 1.00     Additional pack years: 0.00     Total pack years: 0.25     Types: Cigarettes    Smokeless tobacco: Never   Substance Use Topics    Alcohol use: Not on file        ALLERGIES:  No Known Allergies    MEDICATIONS:     Current Outpatient Medications:     amLODIPine (NORVASC) 5 MG tablet, , Disp: , Rfl:     buPROPion (WELLBUTRIN XL) 150 MG 24 hr tablet, Take 150 mg by mouth every morning, Disp: , Rfl:     diazepam (VALIUM) 5 MG tablet, Take 1 tablet (5 mg) by mouth every 6 hours as needed (Vertigo), Disp: 10 tablet, Rfl: 0    REVIEW OF SYSTEMS:  Allergic/Immunologic: negative  Constitutional Symptoms: negative   Fever: none   Weight loss: none   Other: none  Hematologic/Lymphatic: negative  Integumentary: negative   Breast: negative   Hair: negative   Skin: negative   Skin: negative  Eyes: negative  Ears/Nose/Throat: negative  Respiratory: negative  Cardiovascular: negative  Gastrointestinal: negative  Genitourinary: negative  Musculoskeletal: negative  Neurologic: negative  Psychiatric: negative  Reproductive System: negative  Endocrine: negative      PHYSICAL  Stop HRT for atrophic vaginitis   Start Topical estrogens   EXAM:    General: Alert and oriented to time, place, and self. In NAD   HEENT: Head AT/NC, EOMI, CN Grossly intact   Lungs: no respiratory distress, or pursed lip breathing   Heart: No obvious jugular venous distension present   Musculoskeltal: Normal movements. Normal appearing musculature  Skin: no suspicious lesions or rashes   Neuro: Alert, oriented, speech and mentation normal; moving all 4 extremities equally.   Psych: affect and mood normal      Laboratory Studies:     Imaging Studies: I personally reviewed her CT scan images from November.  At that time she had hydronephrosis and bladder 2 small stones in the right renal pelvis.  She has another larger calcification in the periphery of the right kidney that looks more likely to be a parenchymal stone or stone in a calyceal diverticulum.      CLINICAL IMPRESSION:   Right kidney stones    PLAN:   We discussed her CT scan results.  I had initially thought that perhaps she had a UPJ obstruction as her hydronephrosis appeared out of proportion to the location of her stones.  However, she has passed 2 stones since that time (she caught them in the strainer) so UPJ obstruction would seem less likely here.  The stone in the periphery of her right kidney is likely not clinically significant and not likely contiguous with the collecting system, so I recommended observation.    I recommended that in May (6 months after previous CT scan) we repeat a CT scan to make certain no new stones are forming.  We will get this arranged for her.      Mat Mccain M.D.      Virtual Visit Details    Type of service:  Video Visit     Originating Location (pt. Location): Home    Distant Location (provider location):  On-site  Platform used for Video Visit: Janeen

## 2024-03-04 ENCOUNTER — OFFICE VISIT (OUTPATIENT)
Dept: CARDIOLOGY | Facility: CLINIC | Age: 59
End: 2024-03-04
Payer: COMMERCIAL

## 2024-03-04 ENCOUNTER — HOSPITAL ENCOUNTER (OUTPATIENT)
Dept: CARDIOLOGY | Facility: HOSPITAL | Age: 59
Discharge: HOME | End: 2024-03-04
Attending: STUDENT IN AN ORGANIZED HEALTH CARE EDUCATION/TRAINING PROGRAM
Payer: COMMERCIAL

## 2024-03-04 VITALS
OXYGEN SATURATION: 99 % | HEART RATE: 85 BPM | BODY MASS INDEX: 23.04 KG/M2 | DIASTOLIC BLOOD PRESSURE: 88 MMHG | HEIGHT: 63 IN | SYSTOLIC BLOOD PRESSURE: 138 MMHG | WEIGHT: 130 LBS

## 2024-03-04 DIAGNOSIS — Z95.0 PRESENCE OF BIVENTRICULAR CARDIAC PACEMAKER: ICD-10-CM

## 2024-03-04 DIAGNOSIS — Z95.0 PRESENCE OF CARDIAC PACEMAKER: Primary | ICD-10-CM

## 2024-03-04 DIAGNOSIS — R07.9 CHEST PAIN, UNSPECIFIED TYPE: ICD-10-CM

## 2024-03-04 DIAGNOSIS — I47.10 PAROXYSMAL SVT (SUPRAVENTRICULAR TACHYCARDIA): ICD-10-CM

## 2024-03-04 DIAGNOSIS — R07.9 CHEST PAIN, UNSPECIFIED TYPE: Primary | ICD-10-CM

## 2024-03-04 DIAGNOSIS — I10 HYPERTENSION, UNSPECIFIED TYPE: ICD-10-CM

## 2024-03-04 DIAGNOSIS — I42.8 NONISCHEMIC CARDIOMYOPATHY: ICD-10-CM

## 2024-03-04 DIAGNOSIS — I47.10 SVT (SUPRAVENTRICULAR TACHYCARDIA): Primary | ICD-10-CM

## 2024-03-04 PROCEDURE — 93000 ELECTROCARDIOGRAM COMPLETE: CPT | Mod: S$GLB,,, | Performed by: STUDENT IN AN ORGANIZED HEALTH CARE EDUCATION/TRAINING PROGRAM

## 2024-03-04 PROCEDURE — 93281 PM DEVICE PROGR EVAL MULTI: CPT

## 2024-03-04 PROCEDURE — 3075F SYST BP GE 130 - 139MM HG: CPT | Mod: S$GLB,,, | Performed by: NURSE PRACTITIONER

## 2024-03-04 PROCEDURE — 99215 OFFICE O/P EST HI 40 MIN: CPT | Mod: PBBFAC,25 | Performed by: NURSE PRACTITIONER

## 2024-03-04 PROCEDURE — 3008F BODY MASS INDEX DOCD: CPT | Mod: S$GLB,,, | Performed by: NURSE PRACTITIONER

## 2024-03-04 PROCEDURE — 99213 OFFICE O/P EST LOW 20 MIN: CPT | Mod: S$GLB,,, | Performed by: NURSE PRACTITIONER

## 2024-03-04 PROCEDURE — 4010F ACE/ARB THERAPY RXD/TAKEN: CPT | Mod: S$GLB,,, | Performed by: NURSE PRACTITIONER

## 2024-03-04 PROCEDURE — 1159F MED LIST DOCD IN RCRD: CPT | Mod: S$GLB,,, | Performed by: NURSE PRACTITIONER

## 2024-03-04 PROCEDURE — 3079F DIAST BP 80-89 MM HG: CPT | Mod: S$GLB,,, | Performed by: NURSE PRACTITIONER

## 2024-03-04 PROCEDURE — 93281 PM DEVICE PROGR EVAL MULTI: CPT | Mod: 26,,, | Performed by: INTERNAL MEDICINE

## 2024-03-04 RX ORDER — METOPROLOL SUCCINATE 100 MG/1
200 TABLET, EXTENDED RELEASE ORAL DAILY
Qty: 60 TABLET | Refills: 11 | Status: SHIPPED | OUTPATIENT
Start: 2024-03-04 | End: 2025-03-04

## 2024-03-04 NOTE — PROGRESS NOTES
PCP: Saroj Pineda MD    Referring Provider:     Subjective:   Flory Chacon is a 58 y.o. female with hx of SVT status post ablation and heart failure with improved ejection fraction (HFimpEF) who presents for a follow-up visit.      3/4/24:     8/28/23:  Complaints of NYHA class II dyspnea on exertion.  Denies chest pain/tightness, lightheadedness or syncope.  She was instructed to resume metoprolol at the last visit.  However, she has not picked up by medication from her pharmacy and it is not in her medication bottles.    5/24/23: c/o shortness of breath with exertion and fatigue. Still has issues with dizziness, feels off balance. No falls recently. No syncope.     03/06/23:Has previously been seen by Dr. Epperson and Dr. Cormier.  Hospitalized from 01/30 to 2/9/3 with nausea vomiting and severe hyponatremia (sodium 106).  Inpatient follow-up scheduled with me.  Doing okay from cardiac standpoint.  Does complain of episodes of dizziness- appears to be vertigo.  PPM functioning normally.     Fhx: family history of premature CAD    Shx:  Former smoker former smoker 20 pack year smoking history.  Quit in 2013.    EKG  02/03/2023:  Atrial sensed V paced rhythm  ECHO Results for orders placed during the hospital encounter of 01/30/23    Echo    Interpretation Summary  · The left ventricle is small with concentric remodeling and normal systolic function.  · The estimated ejection fraction is 60%.  · Normal right ventricular size with normal right ventricular systolic function.  · Mild tricuspid regurgitation.  · Normal central venous pressure (3 mmHg).  · The estimated PA systolic pressure is 28 mmHg.  · Compared to prior ECHo from 4/2022; LV function has improved from 40% to 60%    [facility] Results for orders placed during the hospital encounter of 04/23/22    Cardiac catheterization    Conclusion  · The left ventricular systolic function was normal.  · The left ventricular end diastolic pressure was normal.  · The  estimated blood loss was none.  · The coronary arteries were normal..    The procedure log was documented by Documenter: Bonny Obando RN and verified by Malachi Cormier DO.    Date: 4/25/2022  Time: 1:11 PM    Normal coronary arteries, normal LV function.        Lab Results   Component Value Date     07/21/2023    K 3.8 07/21/2023     07/21/2023    CO2 25 07/21/2023    BUN 8 07/21/2023    CREATININE 1.09 (H) 07/21/2023    CALCIUM 9.5 07/21/2023    ANIONGAP 15 07/21/2023    EGFRNONAA 54 (L) 07/18/2022       Lab Results   Component Value Date    CHOL 197 04/23/2022     Lab Results   Component Value Date    HDL 85 (H) 04/23/2022     Lab Results   Component Value Date    LDLCALC 96 04/23/2022     Lab Results   Component Value Date    TRIG 82 04/23/2022     Lab Results   Component Value Date    CHOLHDL 2.3 04/23/2022       Lab Results   Component Value Date    WBC 9.08 07/21/2023    HGB 13.4 07/21/2023    HCT 42.2 07/21/2023    MCV 84.4 07/21/2023     (H) 07/21/2023           Current Outpatient Medications:     ALPRAZolam (XANAX) 1 MG tablet, , Disp: , Rfl:     cetirizine (ZYRTEC) 10 MG tablet, Take 1 tablet (10 mg total) by mouth once daily., Disp: 90 tablet, Rfl: 1    chlorthalidone (HYGROTEN) 25 MG Tab, Take 25 mg by mouth every morning., Disp: , Rfl:     cyanocobalamin 1,000 mcg/mL injection, cyanocobalamin (vit B-12) 1,000 mcg/mL injection solution  INJECT 1 ML ONCE DAILY INTRAMUSCULARLY FOR 7 DAYS AND THEN INJECT 1 ML ONCE A WEEK FOR 4 WEEKS AND THEN INJECT 1 ML ONCE EVERY MONTH, Disp: , Rfl:     cycloSPORINE (RESTASIS) 0.05 % ophthalmic emulsion, Restasis 0.05 % eye drops in a dropperette  INSTILL 1 DROP INTO EACH EYE EVERY 12 HOURS, Disp: , Rfl:     DULoxetine (CYMBALTA) 60 MG capsule, , Disp: , Rfl:     esomeprazole (NEXIUM) 40 MG capsule, 1 capsule., Disp: , Rfl:     fluticasone propionate (FLONASE) 50 mcg/actuation nasal spray, fluticasone propionate 50 mcg/actuation nasal  "spray,suspension  USE 2 SPRAY(S) IN EACH NOSTRIL ONCE DAILY, Disp: , Rfl:     galcanezumab-gnlm (EMGALITY PEN) 120 mg/mL PnIj, Inject 120 mg into the skin every 28 days., Disp: 1 each, Rfl: 11    ondansetron (ZOFRAN) 4 MG tablet, ondansetron HCl 4 mg tablet  TAKE 1 TABLET BY MOUTH AS NEEDED FOR NAUSEA, Disp: , Rfl:     sacubitriL-valsartan (ENTRESTO) 24-26 mg per tablet, Take 1 tablet by mouth 2 (two) times daily., Disp: 60 tablet, Rfl: 1    zolpidem (AMBIEN) 10 mg Tab, Take 10 mg by mouth nightly as needed., Disp: , Rfl:     ABILIFY 2 mg Tab, Take 2 mg by mouth once daily., Disp: , Rfl:     amLODIPine (NORVASC) 10 MG tablet, Take 1 tablet (10 mg total) by mouth once daily., Disp: 30 tablet, Rfl: 11    aspirin 81 MG Chew, Take 1 tablet (81 mg total) by mouth once daily., Disp: 30 tablet, Rfl: 0    ezetimibe (ZETIA) 10 mg tablet, Take 1 tablet (10 mg total) by mouth every evening., Disp: 90 tablet, Rfl: 3    ferrous sulfate 220 mg (44 mg iron)/5 mL solution, 7 ml drink with straw Orally daily for 30 days, Disp: , Rfl:     methenamine (HIPREX) 1 gram Tab, Take one tablet twice a day, Disp: 60 tablet, Rfl: 6    metoprolol succinate (TOPROL-XL) 100 MG 24 hr tablet, Take 2 tablets (200 mg total) by mouth once daily., Disp: 60 tablet, Rfl: 11    montelukast (SINGULAIR) 10 mg tablet, Take 10 mg by mouth once daily., Disp: , Rfl:     Review of Systems   Constitutional:  Negative for chills, diaphoresis, fever and malaise/fatigue.   Respiratory:  Negative for cough and shortness of breath.    Cardiovascular:  Negative for chest pain, palpitations, orthopnea, claudication, leg swelling and PND.   Gastrointestinal:  Negative for abdominal pain, heartburn, nausea and vomiting.   Musculoskeletal:  Negative for falls.   Neurological:  Negative for dizziness, focal weakness and weakness.         Objective:   /88 (BP Location: Left arm, Patient Position: Sitting)   Pulse 85   Ht 5' 3" (1.6 m)   Wt 59 kg (130 lb)   SpO2 " 99%   BMI 23.03 kg/m²     Physical Exam  Constitutional:       General: She is not in acute distress.     Appearance: Normal appearance.   Cardiovascular:      Rate and Rhythm: Normal rate and regular rhythm.      Pulses: Normal pulses.      Heart sounds: Normal heart sounds. No murmur heard.     No friction rub. No gallop.   Pulmonary:      Effort: Pulmonary effort is normal.      Breath sounds: Normal breath sounds. No wheezing or rales.   Musculoskeletal:      Cervical back: Neck supple. No rigidity.      Right lower leg: No edema.      Left lower leg: No edema.   Skin:     General: Skin is warm and dry.   Neurological:      Mental Status: She is alert.           Assessment:     1. SVT (supraventricular tachycardia)  metoprolol succinate (TOPROL-XL) 100 MG 24 hr tablet      2. Nonischemic cardiomyopathy        3. Paroxysmal SVT (supraventricular tachycardia)        4. Hypertension, unspecified type                Plan:   No problem-specific Assessment & Plan notes found for this encounter.      Dilated nonischemic cardiomyopathy  [facility]  LVEF 60% on echocardiogram  02/2023  Continue Entresto 24-26 mg  b.i.d.  Euvolemic on exam   Increase Toprol XL to 200mg daily for SVT    SVT   S/p ablation  Per PPM interrogation today, has had 1,112 episodes of SVT  Increase Toprol XL to 200mg daily    Hypertension   Blood pressure controlled    F/U in 6 months with Dr. Pompa and Pacemaker clinic

## 2024-03-04 NOTE — PATIENT INSTRUCTIONS
Increase Metoprolol XL to 200mg daily  Follow up with Dr. Pompa in September (same as Pacemaker appt date)

## 2024-03-06 ENCOUNTER — TELEPHONE (OUTPATIENT)
Dept: CARDIOLOGY | Facility: CLINIC | Age: 59
End: 2024-03-06
Payer: COMMERCIAL

## 2024-03-06 NOTE — LETTER
March 6, 2024      [facility] - Card[facility]  [address]  [place] [address]  Phone: [phone]  Fax: [phone]       Patient: Flory Chacon   YOB: 1965  Date of Visit: 03/06/2024    To Whom It May Concern:    JAYRO Chacon  was at [facility] on 03/06/2024. The patient may return to work on 03/06/2024. If you have any questions or concerns, or if I can be of further assistance, please do not hesitate to contact me.    Sincerely,                  Doni Barreto LPN

## 2024-03-07 ENCOUNTER — OFFICE VISIT (OUTPATIENT)
Dept: NEUROLOGY | Facility: CLINIC | Age: 59
End: 2024-03-07
Payer: COMMERCIAL

## 2024-03-07 VITALS
SYSTOLIC BLOOD PRESSURE: 130 MMHG | OXYGEN SATURATION: 97 % | BODY MASS INDEX: 24.98 KG/M2 | HEIGHT: 63 IN | DIASTOLIC BLOOD PRESSURE: 78 MMHG | HEART RATE: 78 BPM | WEIGHT: 141 LBS

## 2024-03-07 DIAGNOSIS — G31.84 MILD NEUROCOGNITIVE DISORDER: Primary | ICD-10-CM

## 2024-03-07 DIAGNOSIS — Z86.69 HX OF MIGRAINE HEADACHES: ICD-10-CM

## 2024-03-07 DIAGNOSIS — G43.119 INTRACTABLE MIGRAINE WITH AURA WITHOUT STATUS MIGRAINOSUS: ICD-10-CM

## 2024-03-07 PROCEDURE — 99213 OFFICE O/P EST LOW 20 MIN: CPT | Mod: S$GLB,,, | Performed by: NURSE PRACTITIONER

## 2024-03-07 PROCEDURE — 99215 OFFICE O/P EST HI 40 MIN: CPT | Mod: PBBFAC | Performed by: NURSE PRACTITIONER

## 2024-03-07 PROCEDURE — 3078F DIAST BP <80 MM HG: CPT | Mod: S$GLB,,, | Performed by: NURSE PRACTITIONER

## 2024-03-07 PROCEDURE — 3075F SYST BP GE 130 - 139MM HG: CPT | Mod: S$GLB,,, | Performed by: NURSE PRACTITIONER

## 2024-03-07 PROCEDURE — 3008F BODY MASS INDEX DOCD: CPT | Mod: S$GLB,,, | Performed by: NURSE PRACTITIONER

## 2024-03-07 PROCEDURE — 1160F RVW MEDS BY RX/DR IN RCRD: CPT | Mod: S$GLB,,, | Performed by: NURSE PRACTITIONER

## 2024-03-07 PROCEDURE — 1159F MED LIST DOCD IN RCRD: CPT | Mod: S$GLB,,, | Performed by: NURSE PRACTITIONER

## 2024-03-07 PROCEDURE — 4010F ACE/ARB THERAPY RXD/TAKEN: CPT | Mod: S$GLB,,, | Performed by: NURSE PRACTITIONER

## 2024-03-07 RX ORDER — HYDROXYZINE PAMOATE 25 MG/1
25 CAPSULE ORAL 4 TIMES DAILY
Qty: 30 CAPSULE | Refills: 1 | Status: SHIPPED | OUTPATIENT
Start: 2024-03-07

## 2024-03-07 NOTE — PATIENT INSTRUCTIONS
Continue followup with cardiology  Notify clinic if any further syncope events  Continue current emgality injections  Vistaril as directed as needed for migraine headaches

## 2024-03-07 NOTE — PROGRESS NOTES
Subjective:       Patient ID: Flory Chacon is a 58 y.o. female     Chief Complaint:    Chief Complaint   Patient presents with    Follow-up        Allergies:  Meloxicam, Egg, Eggs [egg derived], Statins-hmg-coa reductase inhibitors, and Penicillins    Current Medications:    Outpatient Encounter Medications as of 3/7/2024   Medication Sig Dispense Refill    ABILIFY 2 mg Tab Take 2 mg by mouth once daily.      ALPRAZolam (XANAX) 1 MG tablet       cetirizine (ZYRTEC) 10 MG tablet Take 1 tablet (10 mg total) by mouth once daily. 90 tablet 1    chlorthalidone (HYGROTEN) 25 MG Tab Take 25 mg by mouth every morning.      cyanocobalamin 1,000 mcg/mL injection cyanocobalamin (vit B-12) 1,000 mcg/mL injection solution   INJECT 1 ML ONCE DAILY INTRAMUSCULARLY FOR 7 DAYS AND THEN INJECT 1 ML ONCE A WEEK FOR 4 WEEKS AND THEN INJECT 1 ML ONCE EVERY MONTH      cycloSPORINE (RESTASIS) 0.05 % ophthalmic emulsion Restasis 0.05 % eye drops in a dropperette   INSTILL 1 DROP INTO EACH EYE EVERY 12 HOURS      DULoxetine (CYMBALTA) 60 MG capsule       esomeprazole (NEXIUM) 40 MG capsule 1 capsule.      fluticasone propionate (FLONASE) 50 mcg/actuation nasal spray fluticasone propionate 50 mcg/actuation nasal spray,suspension   USE 2 SPRAY(S) IN EACH NOSTRIL ONCE DAILY      galcanezumab-gnlm (EMGALITY PEN) 120 mg/mL PnIj Inject 120 mg into the skin every 28 days. 1 each 11    methenamine (HIPREX) 1 gram Tab Take one tablet twice a day 60 tablet 6    metoprolol succinate (TOPROL-XL) 100 MG 24 hr tablet Take 2 tablets (200 mg total) by mouth once daily. 60 tablet 11    ondansetron (ZOFRAN) 4 MG tablet ondansetron HCl 4 mg tablet   TAKE 1 TABLET BY MOUTH AS NEEDED FOR NAUSEA      sacubitriL-valsartan (ENTRESTO) 24-26 mg per tablet Take 1 tablet by mouth 2 (two) times daily. 60 tablet 1    zolpidem (AMBIEN) 10 mg Tab Take 10 mg by mouth nightly as needed.      amLODIPine (NORVASC) 10 MG tablet Take 1 tablet (10 mg total) by mouth once  daily. 30 tablet 11    aspirin 81 MG Chew Take 1 tablet (81 mg total) by mouth once daily. 30 tablet 0    ezetimibe (ZETIA) 10 mg tablet Take 1 tablet (10 mg total) by mouth every evening. 90 tablet 3    ferrous sulfate 220 mg (44 mg iron)/5 mL solution 7 ml drink with straw Orally daily for 30 days      hydrOXYzine pamoate (VISTARIL) 25 MG Cap Take 1 capsule (25 mg total) by mouth 4 (four) times daily. 30 capsule 1    montelukast (SINGULAIR) 10 mg tablet Take 10 mg by mouth once daily.      [] sodium chloride 1 gram tablet Take 2 tablets (2 g total) by mouth 3 (three) times daily. 540 tablet 3    [DISCONTINUED] atenoloL (TENORMIN) 100 MG tablet Take 100 mg by mouth once daily. for 90 days      [DISCONTINUED] estradioL (ESTRACE) 0.01 % (0.1 mg/gram) vaginal cream Place 2 g vaginally once daily. (Patient not taking: Reported on 2023) 60 g 11    [DISCONTINUED] ipratropium (ATROVENT) 21 mcg (0.03 %) nasal spray 2 sprays by Each Nostril route 3 (three) times daily. (Patient not taking: Reported on 3/4/2024) 30 mL 0    [DISCONTINUED] loratadine (CLARITIN) 10 mg tablet Take 10 mg by mouth.      [DISCONTINUED] metoprolol succinate (TOPROL-XL) 100 MG 24 hr tablet Take 1 tablet (100 mg total) by mouth once daily. 30 tablet 11    [DISCONTINUED] potassium citrate (UROCIT-K) 10 mEq (1,080 mg) TbSR Urocit-K 10 10 mEq (1,080 mg) tablet,extended release   Take 1 tablet every day by oral route for 30 days. (Patient not taking: Reported on 3/4/2024) 30 tablet 1    [DISCONTINUED] propranoloL (INDERAL) 10 MG tablet        No facility-administered encounter medications on file as of 3/7/2024.       History of Present Illness  59 y/o female following in neurology for reported syncope, reporting memory impairment, and migraine headaches.    Was actually admitted to hospital in 2022 with reported syncope, there is EEG noted done in 2022 which was negative.  MRI brain at that time did not indicate acute  "abnormalities, nor did carotid doppler report stenosis.  Her monitoring at that time was concerning for SVT, she was following with cardiology at [facility] at time of her initial visit with us.  She ended up having pacemaker placed due to the SVT and cardiology felt that this was the cause of the syncope events.  She denied any further complications since that surgery.    She declined the in home VEEG at time of the initial visit.    She is prescribed abilify, cymbalta, xanax, and ambien, which too can very well be contributing to reported memory complications, likely the biggest ones.  She had CT head 2/3/23 which was negative for acute abnormalities.  She reports had prior sleep study done and was negative, but she was not able to give me date or where it was done.  MMSE here in clinic initially was 27/30.  I did obtain labs including UDS, which showed marijuana, which one of the biggest complications of its use is chronic memory impairment, so in conjunction with the listed medications is likely the source of her cognitive impairment and not a dementia.    She does have migraines, but topamax is not option due to memory impairment and already on metoprolol which we use for migraines.  Elavil is not option as she is already on anti-depressant treatment.  Her insurance denied the Qulipta.  I submitted for PA for emgality which the insurance reported it was "resolved" but we tried several times to get her on phone but she did not answer.  At her last visit I told her the Rx was at the pharmacy, and gave her an rx card to go and obtain it.  Now today she reports on the injections she has about 3 migraine days per month, so much better.  She was prior reporting about 15 headache days per month.           Review of Systems  Review of Systems   Constitutional:  Negative for diaphoresis and fever.   HENT:  Negative for congestion, hearing loss and tinnitus.    Eyes:  Negative for blurred vision, double vision, " photophobia, discharge and redness.   Respiratory:  Negative for cough and shortness of breath.    Cardiovascular:  Negative for chest pain.   Gastrointestinal:  Negative for abdominal pain, nausea and vomiting.   Musculoskeletal:  Negative for back pain, joint pain, myalgias and neck pain.   Skin:  Negative for itching and rash.   Neurological:  Positive for loss of consciousness. Negative for dizziness, tremors, sensory change, speech change, focal weakness, seizures, weakness and headaches.   Psychiatric/Behavioral:  Positive for memory loss. Negative for depression and hallucinations. The patient does not have insomnia.    All other systems reviewed and are negative.     Objective:     NEUROLOGICAL EXAMINATION:     MENTAL STATUS   Oriented to person, place, and time.   Registration: recalls 3 of 3 objects. Recall at 5 minutes: recalls 3 of 3 objects.   Attention: normal. Concentration: normal.   Speech: speech is normal   Level of consciousness: alert  Knowledge: good and consistent with education.   Normal comprehension.     CRANIAL NERVES     CN II   Visual fields full to confrontation.   Visual acuity: normal  Right visual field deficit: none  Left visual field deficit: none     CN III, IV, VI   Pupils are equal, round, and reactive to light.  Extraocular motions are normal.   Right pupil: Size: 3 mm. Shape: regular. Reactivity: brisk. Consensual response: intact. Accommodation: intact.   Left pupil: Size: 3 mm. Shape: regular. Reactivity: brisk. Consensual response: intact. Accommodation: intact.   CN III: no CN III palsy  CN VI: no CN VI palsy  Nystagmus: none   Diplopia: none  Upgaze: normal  Downgaze: normal  Conjugate gaze: present  Vestibulo-ocular reflex: present    CN V   Facial sensation intact.   Right facial sensation deficit: none  Left facial sensation deficit: none  Right corneal reflex: normal  Left corneal reflex: normal    CN VII   Facial expression full, symmetric.   Right facial weakness:  none  Left facial weakness: none  Right taste: normal  Left taste: normal    CN VIII   CN VIII normal.   Hearing: intact    CN IX, X   CN IX normal.   CN X normal.   Palate: symmetric    CN XI   CN XI normal.   Right sternocleidomastoid strength: normal  Left sternocleidomastoid strength: normal  Right trapezius strength: normal  Left trapezius strength: normal    CN XII   CN XII normal.   Tongue: not atrophic  Fasciculations: absent  Tongue deviation: none    MOTOR EXAM   Muscle bulk: normal  Overall muscle tone: normal  Right arm tone: normal  Left arm tone: normal  Right arm pronator drift: absent  Left arm pronator drift: absent  Right leg tone: normal  Left leg tone: normal    Strength   Right neck flexion: 5/5  Left neck flexion: 5/5  Right neck extension: 5/5  Left neck extension: 5/5  Right deltoid: 5/5  Left deltoid: 5/5  Right biceps: 5/5  Left biceps: 5/5  Right triceps: 5/5  Left triceps: 5/5  Right wrist flexion: 5/5  Left wrist flexion: 5/5  Right wrist extension: 5/5  Left wrist extension: 5/5  Right interossei: 5/5  Left interossei: 5/5  Right iliopsoas: 5/5  Left iliopsoas: 5/5  Right quadriceps: 5/5  Left quadriceps: 5/5  Right hamstrin/5  Left hamstrin/5  Right anterior tibial: 5/5  Left anterior tibial: 5/5  Right posterior tibial: 5/5  Left posterior tibial: 5/5  Right gastroc: 5/5  Left gastroc: 5/5    REFLEXES     Reflexes   Right brachioradialis: 2+  Left brachioradialis: 2+  Right biceps: 2+  Left biceps: 2+  Right triceps: 2+  Left triceps: 2+  Right patellar: 2+  Left patellar: 2+  Right achilles: 2+  Left achilles: 2+  Right plantar: normal  Left plantar: normal  Right Otero: absent  Left Otero: absent  Right ankle clonus: absent  Left ankle clonus: absent  Right pendular knee jerk: absent  Left pendular knee jerk: absent    SENSORY EXAM   Light touch normal.   Right arm light touch: normal  Left arm light touch: normal  Right leg light touch: normal  Left leg light touch:  normal  Vibration normal.   Right arm vibration: normal  Left arm vibration: normal  Right leg vibration: normal  Left leg vibration: normal  Proprioception normal.   Right arm proprioception: normal  Left arm proprioception: normal  Right leg proprioception: normal  Left leg proprioception: normal  Pinprick normal.   Right arm pinprick: normal  Left arm pinprick: normal  Right leg pinprick: normal  Left leg pinprick: normal  Graphesthesia: normal  Romberg: negative  Stereognosis: normal    GAIT AND COORDINATION     Gait  Gait: normal     Coordination   Finger to nose coordination: normal  Heel to shin coordination: normal  Tandem walking coordination: normal    Tremor   Resting tremor: absent  Intention tremor: absent  Action tremor: absent       Physical Exam  Vitals and nursing note reviewed.   Constitutional:       Appearance: Normal appearance.   HENT:      Head: Normocephalic.   Eyes:      Extraocular Movements: Extraocular movements intact and EOM normal.      Pupils: Pupils are equal, round, and reactive to light.   Cardiovascular:      Rate and Rhythm: Normal rate and regular rhythm.   Pulmonary:      Effort: Pulmonary effort is normal.      Breath sounds: Normal breath sounds.   Musculoskeletal:         General: No swelling or tenderness. Normal range of motion.      Cervical back: Normal range of motion and neck supple.      Right lower leg: No edema.      Left lower leg: No edema.   Skin:     General: Skin is warm and dry.      Coloration: Skin is not jaundiced.      Findings: No rash.   Neurological:      General: No focal deficit present.      Mental Status: She is alert and oriented to person, place, and time.      GCS: GCS eye subscore is 4. GCS verbal subscore is 5. GCS motor subscore is 6.      Cranial Nerves: No cranial nerve deficit.      Sensory: No sensory deficit.      Motor: Motor function is intact. No weakness.      Coordination: Coordination is intact. Coordination normal.  Finger-Nose-Finger Test, Heel to Shin Test and Romberg Test normal.      Gait: Gait is intact. Gait and tandem walk normal.      Deep Tendon Reflexes: Reflexes normal.      Reflex Scores:       Tricep reflexes are 2+ on the right side and 2+ on the left side.       Bicep reflexes are 2+ on the right side and 2+ on the left side.       Brachioradialis reflexes are 2+ on the right side and 2+ on the left side.       Patellar reflexes are 2+ on the right side and 2+ on the left side.       Achilles reflexes are 2+ on the right side and 2+ on the left side.  Psychiatric:         Mood and Affect: Mood normal.         Speech: Speech normal.         Behavior: Behavior normal.          Assessment:     Problem List Items Addressed This Visit          Neuro    Hx of migraine headaches    Mild neurocognitive disorder - Primary     Other Visit Diagnoses       Intractable migraine with aura without status migrainosus        Relevant Medications    hydrOXYzine pamoate (VISTARIL) 25 MG Cap                     Primary Diagnosis and ICD10  Mild neurocognitive disorder [G31.84]    Plan:     Patient Instructions   Continue followup with cardiology  Notify clinic if any further syncope events  Continue current emgality injections  Vistaril as directed as needed for migraine headaches    There are no discontinued medications.    Requested Prescriptions     Signed Prescriptions Disp Refills    hydrOXYzine pamoate (VISTARIL) 25 MG Cap 30 capsule 1     Sig: Take 1 capsule (25 mg total) by mouth 4 (four) times daily.       No orders of the defined types were placed in this encounter.

## 2024-03-25 PROBLEM — N39.0 URINARY TRACT INFECTION WITHOUT HEMATURIA: Status: RESOLVED | Noted: 2023-12-21 | Resolved: 2024-03-25

## 2024-03-25 LAB
OHS QRS DURATION: 76 MS
OHS QTC CALCULATION: 438 MS

## 2024-04-03 ENCOUNTER — TELEPHONE (OUTPATIENT)
Dept: CARDIOLOGY | Facility: CLINIC | Age: 59
End: 2024-04-03
Payer: COMMERCIAL

## 2024-04-08 NOTE — PROGRESS NOTES
Subjective     Patient ID: Flory Chacon is a 58 y.o. female.    Chief Complaint: No chief complaint on file.    This pleasant 58 year old female presents to the clinic as a new patient referral from PATO Rogel for recurrent UTI's. There are no culture results in EPIC and none was sent with the referral.  We contacted the referring provider's office and they faxed over the culture results. We received 5 urine culture results and one culture in October 2023 was significant and grew >100,000 Klebsiella pneumoniae.  The other cultures showed insignificant growth. These culture results can be reviewed in  in EPIC. She reports frequency, urgency, nocturia 2 times a night, and mixed urinary incontinence. Her PVR is 0 mls.  She denies dysuria, hematuria, or incomplete bladder emptying.  She denies any fever, chills, nausea or vomiting. She denies abdominal, bladder or back pain. She states she has a history of kidney stones but denies any stone symptoms today. She does report she has a pacemaker.  Records indicate she has been on 5 different antibiotics since September 2023.  I will culture her urine and treat if indicated.  We discussed trying Hiprex as outlined in the plan if her culture is negative.  She denies glaucoma but desires not to try an OAB medication at this time.  We will consider the Urological work up in the future if indicated. We discussed doing the kegel exercises to help with the stress incontinence.  She denies smoking or drinking alcohol and states she has been through menopause.  I discussed the plan in detail with the patient and she is in agreement with the plan.  All her questions were answered at today's visit.   ---------------------------------------------------------------------------------------------------------------------------------  [December 21, 2023].            This pleasant 58 year old female presents to the clinic for follow up of  recurrent UTI's and OAB.   Patient  states she is doing good. She does not feel like she has a UTI.  She was started on Hiprex 1 gm twice a day for UTI suppression at the last visit.  She is tolerating this medication without side effects.  She reports frequency, urgency, nocturia 3 to 4 times a night, and mixed urinary incontinence. Her PVR was 000 mls at the last visit.  She denies dysuria, hematuria, or incomplete bladder emptying.  She denies any fever, chills, nausea or vomiting. She denies abdominal, bladder or back pain. She states she has a history of kidney stones but denies any stone symptoms today. She does report she has a pacemaker.  She denies glaucoma.  I discussed adding the Oxybutynin XL 15 mg one at bedtime for the OAB and the side effects.  She was encouraged to read up on the side effects.  We will consider the Urological work up in the future if indicated. We discussed doing the kegel exercises at the last visit to help with the stress incontinence.  She denies smoking or drinking alcohol and states she has been through menopause. She was given a work excuse for today.  I discussed the plan in detail with the patient and she is in agreement with the plan.  All her questions were answered at today's visit.   -------------------------------------------------------------------------------------------------------------  [April 12, 2024].       Review of Systems   Constitutional:  Negative for activity change and fever.   HENT:  Negative for hearing loss and trouble swallowing.    Eyes:  Negative for visual disturbance.   Respiratory:  Negative for cough, shortness of breath and wheezing.    Cardiovascular:  Negative for chest pain.   Gastrointestinal:  Negative for abdominal pain, diarrhea, nausea and vomiting.   Endocrine: Negative for polyuria.   Genitourinary:  Positive for frequency, nocturia and urgency. Negative for bladder incontinence, decreased urine volume, difficulty urinating, dysuria, enuresis, flank pain and hematuria.         UTI's        OAB    Musculoskeletal:  Negative for back pain and gait problem.   Integumentary:  Negative for rash.   Neurological:  Negative for speech difficulty and weakness.   Psychiatric/Behavioral:  Negative for behavioral problems and confusion.           Objective     Physical Exam  Vitals and nursing note reviewed.   Constitutional:       General: She is not in acute distress.     Appearance: Normal appearance. She is not ill-appearing, toxic-appearing or diaphoretic.   HENT:      Head: Normocephalic.   Eyes:      Extraocular Movements: Extraocular movements intact.   Cardiovascular:      Rate and Rhythm: Normal rate and regular rhythm.      Heart sounds: Normal heart sounds.   Pulmonary:      Effort: Pulmonary effort is normal.      Breath sounds: Normal breath sounds. No wheezing, rhonchi or rales.   Abdominal:      General: Bowel sounds are normal.      Palpations: Abdomen is soft.      Tenderness: There is no abdominal tenderness. There is no right CVA tenderness, left CVA tenderness, guarding or rebound.   Musculoskeletal:         General: Normal range of motion.      Cervical back: Normal range of motion. No rigidity.   Skin:     General: Skin is warm and dry.   Neurological:      General: No focal deficit present.      Mental Status: She is alert and oriented to person, place, and time.      Motor: No weakness.      Coordination: Coordination normal.      Gait: Gait normal.   Psychiatric:         Mood and Affect: Mood normal.         Behavior: Behavior normal.         Thought Content: Thought content normal.        Assessment and Plan     Problem List Items Addressed This Visit          Renal/    Frequency of urination - Primary    Urgency of urination    Nocturia    OAB (overactive bladder)    Relevant Medications    oxybutynin (DITROPAN XL) 15 MG TR24    Frequent UTI        Continue Methenamine Hippurate (Hiprex) 1 gm take one tablet twice a day every day (stop this medication if on  antibiotics and restart when antibiotics are completed)   Rx Oxybutynin (Ditropan) XL 15 mg take one tablet at bedtime -- discussed side effects -- read up on side effects   Work excuse for today   Stay hydrated   Follow up with Urology PATO Ramirez in 3 months or sooner if needed

## 2024-04-12 ENCOUNTER — OFFICE VISIT (OUTPATIENT)
Dept: UROLOGY | Facility: CLINIC | Age: 59
End: 2024-04-12
Payer: COMMERCIAL

## 2024-04-12 VITALS
BODY MASS INDEX: 26.05 KG/M2 | TEMPERATURE: 99 F | HEIGHT: 63 IN | HEART RATE: 83 BPM | WEIGHT: 147 LBS | SYSTOLIC BLOOD PRESSURE: 130 MMHG | DIASTOLIC BLOOD PRESSURE: 95 MMHG | OXYGEN SATURATION: 100 %

## 2024-04-12 DIAGNOSIS — N32.81 OAB (OVERACTIVE BLADDER): ICD-10-CM

## 2024-04-12 DIAGNOSIS — R39.15 URGENCY OF URINATION: ICD-10-CM

## 2024-04-12 DIAGNOSIS — R35.1 NOCTURIA: ICD-10-CM

## 2024-04-12 DIAGNOSIS — N39.0 FREQUENT UTI: ICD-10-CM

## 2024-04-12 DIAGNOSIS — R35.0 FREQUENCY OF URINATION: Primary | ICD-10-CM

## 2024-04-12 PROCEDURE — 1159F MED LIST DOCD IN RCRD: CPT | Mod: S$GLB,,, | Performed by: NURSE PRACTITIONER

## 2024-04-12 PROCEDURE — 99213 OFFICE O/P EST LOW 20 MIN: CPT | Mod: S$GLB,,, | Performed by: NURSE PRACTITIONER

## 2024-04-12 PROCEDURE — 3075F SYST BP GE 130 - 139MM HG: CPT | Mod: S$GLB,,, | Performed by: NURSE PRACTITIONER

## 2024-04-12 PROCEDURE — 1160F RVW MEDS BY RX/DR IN RCRD: CPT | Mod: S$GLB,,, | Performed by: NURSE PRACTITIONER

## 2024-04-12 PROCEDURE — 3080F DIAST BP >= 90 MM HG: CPT | Mod: S$GLB,,, | Performed by: NURSE PRACTITIONER

## 2024-04-12 PROCEDURE — 99215 OFFICE O/P EST HI 40 MIN: CPT | Mod: PBBFAC | Performed by: NURSE PRACTITIONER

## 2024-04-12 PROCEDURE — 3008F BODY MASS INDEX DOCD: CPT | Mod: S$GLB,,, | Performed by: NURSE PRACTITIONER

## 2024-04-12 PROCEDURE — 4010F ACE/ARB THERAPY RXD/TAKEN: CPT | Mod: S$GLB,,, | Performed by: NURSE PRACTITIONER

## 2024-04-12 RX ORDER — SODIUM CHLORIDE 1 G/1
2000 TABLET ORAL 3 TIMES DAILY
COMMUNITY
Start: 2024-01-08 | End: 2024-05-13 | Stop reason: SDUPTHER

## 2024-04-12 RX ORDER — OXYBUTYNIN CHLORIDE 15 MG/1
TABLET, EXTENDED RELEASE ORAL
Qty: 90 TABLET | Refills: 3 | Status: SHIPPED | OUTPATIENT
Start: 2024-04-12

## 2024-04-12 RX ORDER — LORATADINE 10 MG/1
10 TABLET ORAL DAILY
COMMUNITY

## 2024-04-12 NOTE — PATIENT INSTRUCTIONS
Continue Methenamine Hippurate (Hiprex) 1 gm take one tablet twice a day every day (stop this medication if on antibiotics and restart when antibiotics are completed)   Rx Oxybutynin (Ditropan) XL 15 mg take one tablet at bedtime -- discussed side effects -- read up on side effects   Work excuse for today   Stay hydrated   Follow up with Urology NP BLESSING Ramirez in 3 months or sooner if needed

## 2024-04-22 DIAGNOSIS — E53.8 VITAMIN B12 DEFICIENCY: Primary | ICD-10-CM

## 2024-04-22 RX ORDER — CYANOCOBALAMIN 1000 UG/ML
1000 INJECTION, SOLUTION INTRAMUSCULAR; SUBCUTANEOUS
Qty: 1 ML | Refills: 3 | Status: SHIPPED | OUTPATIENT
Start: 2024-04-22 | End: 2024-08-20

## 2024-05-01 ENCOUNTER — HOSPITAL ENCOUNTER (OUTPATIENT)
Dept: RADIOLOGY | Facility: HOSPITAL | Age: 59
Discharge: HOME | End: 2024-05-01
Payer: COMMERCIAL

## 2024-05-01 VITALS — BODY MASS INDEX: 26.58 KG/M2 | WEIGHT: 150 LBS | HEIGHT: 63 IN

## 2024-05-01 DIAGNOSIS — Z12.31 OTHER SCREENING MAMMOGRAM: ICD-10-CM

## 2024-05-01 PROCEDURE — 77063 BREAST TOMOSYNTHESIS BI: CPT | Mod: TC

## 2024-05-13 ENCOUNTER — OFFICE VISIT (OUTPATIENT)
Dept: FAMILY MEDICINE | Facility: CLINIC | Age: 59
End: 2024-05-13
Payer: COMMERCIAL

## 2024-05-13 VITALS
HEART RATE: 84 BPM | HEIGHT: 63 IN | RESPIRATION RATE: 20 BRPM | SYSTOLIC BLOOD PRESSURE: 110 MMHG | DIASTOLIC BLOOD PRESSURE: 70 MMHG | WEIGHT: 148 LBS | BODY MASS INDEX: 26.22 KG/M2

## 2024-05-13 DIAGNOSIS — G47.00 INSOMNIA, UNSPECIFIED TYPE: Primary | ICD-10-CM

## 2024-05-13 DIAGNOSIS — F41.9 ANXIETY DISORDER, UNSPECIFIED TYPE: ICD-10-CM

## 2024-05-13 DIAGNOSIS — E78.2 MIXED HYPERLIPIDEMIA: ICD-10-CM

## 2024-05-13 DIAGNOSIS — E03.9 HYPOTHYROIDISM, UNSPECIFIED TYPE: ICD-10-CM

## 2024-05-13 DIAGNOSIS — Z00.00 HEALTHCARE MAINTENANCE: ICD-10-CM

## 2024-05-13 DIAGNOSIS — E87.1 HYPONATREMIA: ICD-10-CM

## 2024-05-13 DIAGNOSIS — Z00.00 HEALTH CARE MAINTENANCE: ICD-10-CM

## 2024-05-13 LAB
ALBUMIN SERPL BCP-MCNC: 4.2 G/DL (ref 3.5–5)
ALBUMIN/GLOB SERPL: 1.3 {RATIO}
ALP SERPL-CCNC: 138 U/L (ref 50–130)
ALT SERPL W P-5'-P-CCNC: 21 U/L (ref 13–56)
ANION GAP SERPL CALCULATED.3IONS-SCNC: 16 MMOL/L (ref 7–16)
AST SERPL W P-5'-P-CCNC: 22 U/L (ref 15–37)
BASOPHILS # BLD AUTO: 0.12 K/UL (ref 0–0.2)
BASOPHILS NFR BLD AUTO: 1.3 % (ref 0–1)
BILIRUB SERPL-MCNC: 0.4 MG/DL (ref ?–1.2)
BUN SERPL-MCNC: 5 MG/DL (ref 7–18)
BUN/CREAT SERPL: 6 (ref 6–20)
CALCIUM SERPL-MCNC: 8.9 MG/DL (ref 8.5–10.1)
CHLORIDE SERPL-SCNC: 97 MMOL/L (ref 98–107)
CHOLEST SERPL-MCNC: 206 MG/DL (ref 0–200)
CHOLEST/HDLC SERPL: 3.2 {RATIO}
CO2 SERPL-SCNC: 25 MMOL/L (ref 21–32)
CREAT SERPL-MCNC: 0.87 MG/DL (ref 0.55–1.02)
DIFFERENTIAL METHOD BLD: ABNORMAL
EGFR (NO RACE VARIABLE) (RUSH/TITUS): 76 ML/MIN/1.73M2
EOSINOPHIL # BLD AUTO: 0.12 K/UL (ref 0–0.5)
EOSINOPHIL NFR BLD AUTO: 1.3 % (ref 1–4)
ERYTHROCYTE [DISTWIDTH] IN BLOOD BY AUTOMATED COUNT: 12.8 % (ref 11.5–14.5)
GLOBULIN SER-MCNC: 3.2 G/DL (ref 2–4)
GLUCOSE SERPL-MCNC: 76 MG/DL (ref 74–106)
HCT VFR BLD AUTO: 35.6 % (ref 38–47)
HDLC SERPL-MCNC: 65 MG/DL (ref 40–60)
HGB BLD-MCNC: 11.5 G/DL (ref 12–16)
IMM GRANULOCYTES # BLD AUTO: 0.05 K/UL (ref 0–0.04)
IMM GRANULOCYTES NFR BLD: 0.6 % (ref 0–0.4)
LDLC SERPL CALC-MCNC: 94 MG/DL
LDLC/HDLC SERPL: 1.4 {RATIO}
LYMPHOCYTES # BLD AUTO: 2.54 K/UL (ref 1–4.8)
LYMPHOCYTES NFR BLD AUTO: 28.5 % (ref 27–41)
MCH RBC QN AUTO: 26.8 PG (ref 27–31)
MCHC RBC AUTO-ENTMCNC: 32.3 G/DL (ref 32–36)
MCV RBC AUTO: 83 FL (ref 80–96)
MONOCYTES # BLD AUTO: 0.7 K/UL (ref 0–0.8)
MONOCYTES NFR BLD AUTO: 7.9 % (ref 2–6)
MPC BLD CALC-MCNC: 9.9 FL (ref 9.4–12.4)
NEUTROPHILS # BLD AUTO: 5.38 K/UL (ref 1.8–7.7)
NEUTROPHILS NFR BLD AUTO: 60.4 % (ref 53–65)
NONHDLC SERPL-MCNC: 141 MG/DL
NRBC # BLD AUTO: 0 X10E3/UL
NRBC, AUTO (.00): 0 %
PLATELET # BLD AUTO: 464 K/UL (ref 150–400)
POTASSIUM SERPL-SCNC: 4 MMOL/L (ref 3.5–5.1)
PROT SERPL-MCNC: 7.4 G/DL (ref 6.4–8.2)
RBC # BLD AUTO: 4.29 M/UL (ref 4.2–5.4)
SODIUM SERPL-SCNC: 134 MMOL/L (ref 136–145)
T4 FREE SERPL-MCNC: 1.06 NG/DL (ref 0.76–1.46)
TRIGL SERPL-MCNC: 233 MG/DL (ref 35–150)
TSH SERPL DL<=0.005 MIU/L-ACNC: 0.82 UIU/ML (ref 0.36–3.74)
VLDLC SERPL-MCNC: 47 MG/DL
WBC # BLD AUTO: 8.91 K/UL (ref 4.5–11)

## 2024-05-13 PROCEDURE — 4010F ACE/ARB THERAPY RXD/TAKEN: CPT | Mod: ,,, | Performed by: FAMILY MEDICINE

## 2024-05-13 PROCEDURE — 80050 GENERAL HEALTH PANEL: CPT | Mod: ,,, | Performed by: CLINICAL MEDICAL LABORATORY

## 2024-05-13 PROCEDURE — 1159F MED LIST DOCD IN RCRD: CPT | Mod: ,,, | Performed by: FAMILY MEDICINE

## 2024-05-13 PROCEDURE — 3074F SYST BP LT 130 MM HG: CPT | Mod: ,,, | Performed by: FAMILY MEDICINE

## 2024-05-13 PROCEDURE — 84439 ASSAY OF FREE THYROXINE: CPT | Mod: ,,, | Performed by: CLINICAL MEDICAL LABORATORY

## 2024-05-13 PROCEDURE — 3078F DIAST BP <80 MM HG: CPT | Mod: ,,, | Performed by: FAMILY MEDICINE

## 2024-05-13 PROCEDURE — 80061 LIPID PANEL: CPT | Mod: ,,, | Performed by: CLINICAL MEDICAL LABORATORY

## 2024-05-13 PROCEDURE — 99214 OFFICE O/P EST MOD 30 MIN: CPT | Mod: ,,, | Performed by: FAMILY MEDICINE

## 2024-05-13 PROCEDURE — 3008F BODY MASS INDEX DOCD: CPT | Mod: ,,, | Performed by: FAMILY MEDICINE

## 2024-05-13 RX ORDER — SODIUM CHLORIDE 1 G/1
2000 TABLET ORAL 3 TIMES DAILY
Qty: 180 TABLET | Refills: 2 | Status: SHIPPED | OUTPATIENT
Start: 2024-05-13 | End: 2024-08-11

## 2024-05-13 RX ORDER — TRAZODONE HYDROCHLORIDE 50 MG/1
50 TABLET ORAL NIGHTLY
Qty: 14 TABLET | Refills: 0 | Status: SHIPPED | OUTPATIENT
Start: 2024-05-13 | End: 2024-05-27

## 2024-05-13 RX ORDER — EZETIMIBE 10 MG/1
10 TABLET ORAL NIGHTLY
Qty: 90 TABLET | Refills: 3 | Status: SHIPPED | OUTPATIENT
Start: 2024-05-13 | End: 2025-05-13

## 2024-05-13 NOTE — ASSESSMENT & PLAN NOTE
Patient c/o insomnia for the week  E: No new stressors. No new medication. Recently lost Job  A: Insomnia  T: Will order Trazodone for two weeks. Patient will see Psychiatrist soon for psych medication optimization.   CBC pending

## 2024-05-13 NOTE — PROGRESS NOTES
Subjective:       Patient ID: Flory Chacon is a 58 y.o. female.    Chief Complaint: Follow-up (Room 6 patient c/o insomnia, needs refill for zetia and ambian.)    This is an 58 y.o female with PMH Anxiety, HTN, and Non ischemic cardiomyopathy with request for medication refill. She states has no other complaints today. No HA, fever, fatigue, N/V/D shortness of breath or chest pain.           Current Outpatient Medications:     ABILIFY 2 mg Tab, Take 2 mg by mouth once daily., Disp: , Rfl:     ALPRAZolam (XANAX) 1 MG tablet, Take 1 mg by mouth 2 (two) times daily as needed., Disp: , Rfl:     cetirizine (ZYRTEC) 10 MG tablet, Take 1 tablet (10 mg total) by mouth once daily., Disp: 90 tablet, Rfl: 1    chlorthalidone (HYGROTEN) 25 MG Tab, Take 25 mg by mouth every morning., Disp: , Rfl:     cyanocobalamin 1,000 mcg/mL injection, Inject 1 mL (1,000 mcg total) into the muscle every 30 days., Disp: 1 mL, Rfl: 3    cycloSPORINE (RESTASIS) 0.05 % ophthalmic emulsion, Restasis 0.05 % eye drops in a dropperette  INSTILL 1 DROP INTO EACH EYE EVERY 12 HOURS, Disp: , Rfl:     DULoxetine (CYMBALTA) 60 MG capsule, Take 60 mg by mouth once daily., Disp: , Rfl:     esomeprazole (NEXIUM) 40 MG capsule, Take 40 mg by mouth once daily., Disp: , Rfl:     fluticasone propionate (FLONASE) 50 mcg/actuation nasal spray, fluticasone propionate 50 mcg/actuation nasal spray,suspension  USE 2 SPRAY(S) IN EACH NOSTRIL ONCE DAILY, Disp: , Rfl:     galcanezumab-gnlm (EMGALITY PEN) 120 mg/mL PnIj, Inject 120 mg into the skin every 28 days., Disp: 1 each, Rfl: 11    hydrOXYzine pamoate (VISTARIL) 25 MG Cap, Take 1 capsule (25 mg total) by mouth 4 (four) times daily., Disp: 30 capsule, Rfl: 1    loratadine (CLARITIN) 10 mg tablet, Take 10 mg by mouth once daily., Disp: , Rfl:     methenamine (HIPREX) 1 gram Tab, Take one tablet twice a day, Disp: 60 tablet, Rfl: 6    metoprolol succinate (TOPROL-XL) 100 MG 24 hr tablet, Take 2 tablets (200 mg  total) by mouth once daily., Disp: 60 tablet, Rfl: 11    montelukast (SINGULAIR) 10 mg tablet, Take 10 mg by mouth once daily., Disp: , Rfl:     ondansetron (ZOFRAN) 4 MG tablet, ondansetron HCl 4 mg tablet  TAKE 1 TABLET BY MOUTH AS NEEDED FOR NAUSEA, Disp: , Rfl:     oxybutynin (DITROPAN XL) 15 MG TR24, Take one tablet at bedtime, Disp: 90 tablet, Rfl: 3    sacubitriL-valsartan (ENTRESTO) 24-26 mg per tablet, Take 1 tablet by mouth 2 (two) times daily., Disp: 60 tablet, Rfl: 1    zolpidem (AMBIEN) 10 mg Tab, Take 10 mg by mouth nightly as needed., Disp: , Rfl:     amLODIPine (NORVASC) 10 MG tablet, Take 1 tablet (10 mg total) by mouth once daily., Disp: 30 tablet, Rfl: 11    aspirin 81 MG Chew, Take 1 tablet (81 mg total) by mouth once daily., Disp: 30 tablet, Rfl: 0    ezetimibe (ZETIA) 10 mg tablet, Take 1 tablet (10 mg total) by mouth every evening., Disp: 90 tablet, Rfl: 3    ferrous sulfate 220 mg (44 mg iron)/5 mL solution, 7 ml drink with straw Orally daily for 30 days (Patient not taking: Reported on 4/12/2024), Disp: , Rfl:     sodium chloride 1,000 mg TbSO oral tablet, Take 2 tablets (2,000 mg total) by mouth 3 (three) times daily., Disp: 180 tablet, Rfl: 2    traZODone (DESYREL) 50 MG tablet, Take 1 tablet (50 mg total) by mouth every evening. for 14 days, Disp: 14 tablet, Rfl: 0    Review of patient's allergies indicates:   Allergen Reactions    Meloxicam Swelling    Egg      Other reaction(s): Unknown    Eggs [egg derived]     Statins-hmg-coa reductase inhibitors      Other reaction(s): Unknown    Penicillins Rash       Past Medical History:   Diagnosis Date    Acute hypokalemia     Anxiety disorder, unspecified     Depression     Hyperlipidemia     Hypertension     Insomnia        Past Surgical History:   Procedure Laterality Date    FRACTURE SURGERY      KNEE ARTHROPLASTY Left     KNEE ARTHROPLASTY Right     LEFT HEART CATHETERIZATION Left 04/25/2022    Procedure: Left heart cath;  Surgeon: Malachi MURGUIA  DO Casa;  Location: [facility] CATH LAB;  Service: Cardiology;  Laterality: Left;       Family History   Problem Relation Name Age of Onset    Heart disease Father      Breast cancer Maternal Aunt      Melanoma Maternal Uncle      Breast cancer Maternal Grandmother         Social History     Tobacco Use    Smoking status: Former     Current packs/day: 0.00     Average packs/day: 1 pack/day for 20.0 years (20.0 ttl pk-yrs)     Types: Cigarettes     Start date:      Quit date:      Years since quittin.3    Smokeless tobacco: Never   Substance Use Topics    Alcohol use: Not Currently     Comment: socially in the past    Drug use: Never       Review of Systems   Constitutional:  Negative for fatigue and fever.   HENT:  Negative for nasal congestion, hearing loss, mouth dryness, sinus pressure/congestion, sneezing and sore throat.    Eyes:  Negative for pain and discharge.   Respiratory:  Negative for cough and shortness of breath.    Cardiovascular:  Negative for chest pain and claudication.   Gastrointestinal:  Negative for abdominal distention, abdominal pain, anal bleeding, change in bowel habit, constipation and diarrhea.   Endocrine: Negative for cold intolerance and heat intolerance.   Genitourinary:  Negative for bladder incontinence, difficulty urinating, dyspareunia, dysuria, genital sores and hot flashes.   Musculoskeletal:  Negative for arthralgias, back pain, joint swelling, leg pain and joint deformity.   Integumentary:  Negative for rash and wound.   Allergic/Immunologic: Negative for environmental allergies, food allergies and immunocompromised state.   Neurological:  Negative for light-headedness, headaches and coordination difficulties.   Hematological: Negative.    Psychiatric/Behavioral: Negative.           Current Medications:   Medication List with Changes/Refills   New Medications    TRAZODONE (DESYREL) 50 MG TABLET    Take 1 tablet (50 mg total) by mouth every evening. for 14 days        Start Date: 5/13/2024 End Date: 5/27/2024   Current Medications    ABILIFY 2 MG TAB    Take 2 mg by mouth once daily.       Start Date: 4/29/2022 End Date: --    ALPRAZOLAM (XANAX) 1 MG TABLET    Take 1 mg by mouth 2 (two) times daily as needed.       Start Date: 9/14/2021 End Date: --    AMLODIPINE (NORVASC) 10 MG TABLET    Take 1 tablet (10 mg total) by mouth once daily.       Start Date: 2/10/2023 End Date: 2/10/2024    ASPIRIN 81 MG CHEW    Take 1 tablet (81 mg total) by mouth once daily.       Start Date: 4/28/2022 End Date: 12/4/2023    CETIRIZINE (ZYRTEC) 10 MG TABLET    Take 1 tablet (10 mg total) by mouth once daily.       Start Date: 2/22/2023 End Date: --    CHLORTHALIDONE (HYGROTEN) 25 MG TAB    Take 25 mg by mouth every morning.       Start Date: 1/21/2023 End Date: --    CYANOCOBALAMIN 1,000 MCG/ML INJECTION    Inject 1 mL (1,000 mcg total) into the muscle every 30 days.       Start Date: 4/22/2024 End Date: 8/20/2024    CYCLOSPORINE (RESTASIS) 0.05 % OPHTHALMIC EMULSION    Restasis 0.05 % eye drops in a dropperette   INSTILL 1 DROP INTO EACH EYE EVERY 12 HOURS       Start Date: --        End Date: --    DULOXETINE (CYMBALTA) 60 MG CAPSULE    Take 60 mg by mouth once daily.       Start Date: 9/13/2021 End Date: --    ESOMEPRAZOLE (NEXIUM) 40 MG CAPSULE    Take 40 mg by mouth once daily.       Start Date: --        End Date: --    FERROUS SULFATE 220 MG (44 MG IRON)/5 ML SOLUTION    7 ml drink with straw Orally daily for 30 days       Start Date: 10/1/2022 End Date: --    FLUTICASONE PROPIONATE (FLONASE) 50 MCG/ACTUATION NASAL SPRAY    fluticasone propionate 50 mcg/actuation nasal spray,suspension   USE 2 SPRAY(S) IN EACH NOSTRIL ONCE DAILY       Start Date: --        End Date: --    GALCANEZUMAB-GNLM (EMGALITY PEN) 120 MG/ML PNIJ    Inject 120 mg into the skin every 28 days.       Start Date: 11/9/2023 End Date: --    HYDROXYZINE PAMOATE (VISTARIL) 25 MG CAP    Take 1 capsule (25 mg total) by mouth  "4 (four) times daily.       Start Date: 3/7/2024  End Date: --    LORATADINE (CLARITIN) 10 MG TABLET    Take 10 mg by mouth once daily.       Start Date: --        End Date: --    METHENAMINE (HIPREX) 1 GRAM TAB    Take one tablet twice a day       Start Date: 12/21/2023End Date: --    METOPROLOL SUCCINATE (TOPROL-XL) 100 MG 24 HR TABLET    Take 2 tablets (200 mg total) by mouth once daily.       Start Date: 3/4/2024  End Date: 3/4/2025    MONTELUKAST (SINGULAIR) 10 MG TABLET    Take 10 mg by mouth once daily.       Start Date: 7/27/2021 End Date: --    ONDANSETRON (ZOFRAN) 4 MG TABLET    ondansetron HCl 4 mg tablet   TAKE 1 TABLET BY MOUTH AS NEEDED FOR NAUSEA       Start Date: --        End Date: --    OXYBUTYNIN (DITROPAN XL) 15 MG TR24    Take one tablet at bedtime       Start Date: 4/12/2024 End Date: --    SACUBITRIL-VALSARTAN (ENTRESTO) 24-26 MG PER TABLET    Take 1 tablet by mouth 2 (two) times daily.       Start Date: 2/1/2024  End Date: --    ZOLPIDEM (AMBIEN) 10 MG TAB    Take 10 mg by mouth nightly as needed.       Start Date: 2/10/2023 End Date: --   Changed and/or Refilled Medications    Modified Medication Previous Medication    EZETIMIBE (ZETIA) 10 MG TABLET ezetimibe (ZETIA) 10 mg tablet       Take 1 tablet (10 mg total) by mouth every evening.    Take 1 tablet (10 mg total) by mouth every evening.       Start Date: 5/13/2024 End Date: 5/13/2025    Start Date: 2/22/2023 End Date: 5/13/2024    SODIUM CHLORIDE 1,000 MG TBSO ORAL TABLET sodium chloride 1,000 mg TbSO oral tablet       Take 2 tablets (2,000 mg total) by mouth 3 (three) times daily.    Take 2,000 mg by mouth 3 (three) times daily.       Start Date: 5/13/2024 End Date: 8/11/2024    Start Date: 1/8/2024  End Date: 5/13/2024            Objective:        Vitals:    05/13/24 1323   BP: 110/70   BP Location: Left arm   Patient Position: Sitting   BP Method: Medium (Automatic)   Pulse: 84   Resp: 20   Weight: 67.1 kg (148 lb)   Height: 5' 3" " (1.6 m)       Physical Exam  Constitutional:       Appearance: She is obese.   HENT:      Head: Normocephalic and atraumatic.      Right Ear: Tympanic membrane normal.      Left Ear: Tympanic membrane normal.      Nose: Nose normal.      Mouth/Throat:      Mouth: Mucous membranes are moist.      Pharynx: Oropharynx is clear.   Eyes:      Conjunctiva/sclera: Conjunctivae normal.      Pupils: Pupils are equal, round, and reactive to light.   Cardiovascular:      Rate and Rhythm: Normal rate and regular rhythm.      Pulses: Normal pulses.      Heart sounds: Normal heart sounds.   Pulmonary:      Effort: Pulmonary effort is normal.      Breath sounds: Normal breath sounds.   Abdominal:      General: Abdomen is flat. Bowel sounds are normal.   Genitourinary:     General: Normal vulva.   Musculoskeletal:         General: Normal range of motion.      Cervical back: Normal range of motion.   Skin:     General: Skin is warm and dry.      Capillary Refill: Capillary refill takes less than 2 seconds.   Neurological:      General: No focal deficit present.      Mental Status: She is alert and oriented to person, place, and time.   Psychiatric:         Mood and Affect: Mood normal.               Lab Results   Component Value Date    WBC 9.08 07/21/2023    HGB 13.4 07/21/2023    HCT 42.2 07/21/2023     (H) 07/21/2023    CHOL 197 04/23/2022    TRIG 82 04/23/2022    HDL 85 (H) 04/23/2022    ALT 18 07/21/2023    AST 18 07/21/2023     07/21/2023    K 3.8 07/21/2023     07/21/2023    CREATININE 1.09 (H) 07/21/2023    BUN 8 07/21/2023    CO2 25 07/21/2023    TSH 0.520 08/21/2023    INR 0.96 01/31/2023    HGBA1C 5.3 04/26/2023      Assessment:       1. Insomnia, unspecified type    2. Mixed hyperlipidemia    3. Hyponatremia    4. Health care maintenance    5. Hypothyroidism, unspecified type    6. Anxiety disorder, unspecified type    7. Healthcare maintenance        Plan:         Problem List Items Addressed This  Visit          Psychiatric    Anxiety disorder, unspecified     Continue Current medication.  She followed by a psychiatrist            Endocrine    Hyponatremia     CMP pending         Relevant Medications    sodium chloride 1,000 mg TbSO oral tablet    Other Relevant Orders    Comprehensive Metabolic Panel       Other    Healthcare maintenance     Low Dose CT ordered for lung cancer screening         Insomnia - Primary     Patient c/o insomnia for the week  E: No new stressors. No new medication. Recently lost Job  A: Insomnia  T: Will order Trazodone for two weeks. Patient will see Psychiatrist soon for psych medication optimization.   CBC pending         Relevant Medications    traZODone (DESYREL) 50 MG tablet    Other Relevant Orders    CBC Auto Differential     Other Visit Diagnoses       Mixed hyperlipidemia        Relevant Medications    ezetimibe (ZETIA) 10 mg tablet    Other Relevant Orders    Lipid Panel    Health care maintenance        Relevant Orders    CT Chest Lung Screening Low Dose    Hypothyroidism, unspecified type        Relevant Orders    Thyroid Panel              Follow up in about 3 months (around 8/13/2024), or HTN f/u.    Saroj Pineda MD     Instructed patient that if symptoms fail to improve or worsen patient should seek immediate medical attention or report to the nearest emergency department. Patient expressed verbal agreement and understanding to this plan of care.

## 2024-05-20 ENCOUNTER — HOSPITAL ENCOUNTER (OUTPATIENT)
Dept: RADIOLOGY | Facility: HOSPITAL | Age: 59
Discharge: HOME | End: 2024-05-20
Attending: FAMILY MEDICINE
Payer: COMMERCIAL

## 2024-05-20 VITALS — BODY MASS INDEX: 26.58 KG/M2 | HEIGHT: 63 IN | WEIGHT: 150 LBS

## 2024-05-20 DIAGNOSIS — Z00.00 HEALTH CARE MAINTENANCE: ICD-10-CM

## 2024-05-20 PROCEDURE — 71271 CT THORAX LUNG CANCER SCR C-: CPT | Mod: 26,,, | Performed by: STUDENT IN AN ORGANIZED HEALTH CARE EDUCATION/TRAINING PROGRAM

## 2024-05-20 PROCEDURE — 71271 CT THORAX LUNG CANCER SCR C-: CPT | Mod: TC

## 2024-05-27 NOTE — NURSING
Problem: Adult Inpatient Plan of Care  Goal: Plan of Care Review  Outcome: Progressing  Goal: Patient-Specific Goal (Individualized)  Outcome: Progressing  Goal: Absence of Hospital-Acquired Illness or Injury  Outcome: Progressing  Goal: Optimal Comfort and Wellbeing  Outcome: Progressing  Goal: Readiness for Transition of Care  Outcome: Progressing     Problem: Acute Kidney Injury/Impairment  Goal: Fluid and Electrolyte Balance  Outcome: Progressing  Goal: Improved Oral Intake  Outcome: Progressing  Goal: Effective Renal Function  Outcome: Progressing     Problem: Hypertension Acute  Goal: Blood Pressure Within Desired Range  Outcome: Progressing      Patient taken down via stretcher to cath lab

## 2024-06-05 RX ORDER — SACUBITRIL AND VALSARTAN 24; 26 MG/1; MG/1
1 TABLET, FILM COATED ORAL 2 TIMES DAILY
Qty: 60 TABLET | Refills: 1 | Status: SHIPPED | OUTPATIENT
Start: 2024-06-05

## 2024-06-10 ENCOUNTER — OFFICE VISIT (OUTPATIENT)
Dept: NEUROLOGY | Facility: CLINIC | Age: 59
End: 2024-06-10
Payer: COMMERCIAL

## 2024-06-10 VITALS
DIASTOLIC BLOOD PRESSURE: 91 MMHG | WEIGHT: 149.19 LBS | HEIGHT: 63 IN | HEART RATE: 80 BPM | OXYGEN SATURATION: 98 % | BODY MASS INDEX: 26.43 KG/M2 | RESPIRATION RATE: 18 BRPM | SYSTOLIC BLOOD PRESSURE: 137 MMHG

## 2024-06-10 DIAGNOSIS — G43.119 INTRACTABLE MIGRAINE WITH AURA WITHOUT STATUS MIGRAINOSUS: Primary | ICD-10-CM

## 2024-06-10 DIAGNOSIS — G31.84 MILD NEUROCOGNITIVE DISORDER: ICD-10-CM

## 2024-06-10 PROCEDURE — 99212 OFFICE O/P EST SF 10 MIN: CPT | Mod: S$PBB,,, | Performed by: NURSE PRACTITIONER

## 2024-06-10 PROCEDURE — 99213 OFFICE O/P EST LOW 20 MIN: CPT | Mod: PBBFAC | Performed by: NURSE PRACTITIONER

## 2024-06-10 PROCEDURE — 4010F ACE/ARB THERAPY RXD/TAKEN: CPT | Mod: ,,, | Performed by: NURSE PRACTITIONER

## 2024-06-10 RX ORDER — ARIPIPRAZOLE 5 MG/1
TABLET ORAL
COMMUNITY
Start: 2024-05-16

## 2024-06-10 NOTE — PROGRESS NOTES
Subjective:       Patient ID: Flory Chacon is a 58 y.o. female     Chief Complaint:    No chief complaint on file.       Allergies:  Meloxicam, Egg, Eggs [egg derived], Statins-hmg-coa reductase inhibitors, and Penicillins    Current Medications:    Outpatient Encounter Medications as of 6/10/2024   Medication Sig Dispense Refill    ABILIFY 2 mg Tab Take 2 mg by mouth once daily.      ALPRAZolam (XANAX) 1 MG tablet Take 1 mg by mouth 2 (two) times daily as needed.      ARIPiprazole (ABILIFY) 5 MG Tab       cetirizine (ZYRTEC) 10 MG tablet Take 1 tablet (10 mg total) by mouth once daily. 90 tablet 1    chlorthalidone (HYGROTEN) 25 MG Tab Take 25 mg by mouth every morning.      cyanocobalamin 1,000 mcg/mL injection Inject 1 mL (1,000 mcg total) into the muscle every 30 days. 1 mL 3    cycloSPORINE (RESTASIS) 0.05 % ophthalmic emulsion Restasis 0.05 % eye drops in a dropperette   INSTILL 1 DROP INTO EACH EYE EVERY 12 HOURS      DULoxetine (CYMBALTA) 60 MG capsule Take 60 mg by mouth once daily.      esomeprazole (NEXIUM) 40 MG capsule Take 40 mg by mouth once daily.      ezetimibe (ZETIA) 10 mg tablet Take 1 tablet (10 mg total) by mouth every evening. 90 tablet 3    ferrous sulfate 220 mg (44 mg iron)/5 mL solution       fluticasone propionate (FLONASE) 50 mcg/actuation nasal spray fluticasone propionate 50 mcg/actuation nasal spray,suspension   USE 2 SPRAY(S) IN EACH NOSTRIL ONCE DAILY      galcanezumab-gnlm (EMGALITY PEN) 120 mg/mL PnIj Inject 120 mg into the skin every 28 days. 1 each 11    hydrOXYzine pamoate (VISTARIL) 25 MG Cap Take 1 capsule (25 mg total) by mouth 4 (four) times daily. 30 capsule 1    loratadine (CLARITIN) 10 mg tablet Take 10 mg by mouth once daily.      methenamine (HIPREX) 1 gram Tab Take one tablet twice a day 60 tablet 6    metoprolol succinate (TOPROL-XL) 100 MG 24 hr tablet Take 2 tablets (200 mg total) by mouth once daily. 60 tablet 11    montelukast (SINGULAIR) 10 mg tablet Take  10 mg by mouth once daily.      ondansetron (ZOFRAN) 4 MG tablet ondansetron HCl 4 mg tablet   TAKE 1 TABLET BY MOUTH AS NEEDED FOR NAUSEA      oxybutynin (DITROPAN XL) 15 MG TR24 Take one tablet at bedtime 90 tablet 3    sacubitriL-valsartan (ENTRESTO) 24-26 mg per tablet Take 1 tablet by mouth 2 (two) times daily. 60 tablet 1    sodium chloride 1,000 mg TbSO oral tablet Take 2 tablets (2,000 mg total) by mouth 3 (three) times daily. 180 tablet 2    zolpidem (AMBIEN) 10 mg Tab Take 10 mg by mouth nightly as needed.      amLODIPine (NORVASC) 10 MG tablet Take 1 tablet (10 mg total) by mouth once daily. 30 tablet 11    aspirin 81 MG Chew Take 1 tablet (81 mg total) by mouth once daily. 30 tablet 0    traZODone (DESYREL) 50 MG tablet Take 1 tablet (50 mg total) by mouth every evening. for 14 days 14 tablet 0    [DISCONTINUED] atenoloL (TENORMIN) 100 MG tablet Take 100 mg by mouth once daily. for 90 days      [DISCONTINUED] ezetimibe (ZETIA) 10 mg tablet Take 1 tablet (10 mg total) by mouth every evening. 90 tablet 3    [DISCONTINUED] propranoloL (INDERAL) 10 MG tablet       [DISCONTINUED] sacubitriL-valsartan (ENTRESTO) 24-26 mg per tablet Take 1 tablet by mouth 2 (two) times daily. 60 tablet 1    [DISCONTINUED] sodium chloride 1,000 mg TbSO oral tablet Take 2,000 mg by mouth 3 (three) times daily.       No facility-administered encounter medications on file as of 6/10/2024.       History of Present Illness  59 y/o female following in neurology for reported syncope, reporting memory impairment, and migraine headaches.    Was actually admitted to hospital in April 2022 with reported syncope, there is EEG noted done in April of 2022 which was negative.  MRI brain at that time did not indicate acute abnormalities, nor did carotid doppler report stenosis.  Her monitoring at that time was concerning for SVT, she was following with cardiology at [facility] at time of her initial visit with us.  She ended up having  pacemaker placed due to the SVT and cardiology felt that this was the cause of the syncope events.  She denied any further complications since that surgery.    She declined the in home VEEG at time of the initial visit.    She is prescribed abilify, cymbalta, xanax, and ambien, which too can very well be contributing to reported memory complications, likely the biggest ones.  She had CT head 2/3/23 which was negative for acute abnormalities.  She reports had prior sleep study done and was negative, but she was not able to give me date or where it was done.  MMSE here in clinic initially was 27/30.  I did obtain labs including UDS, which showed marijuana, which one of the biggest complications of its use is chronic memory impairment, so in conjunction with the listed medications is likely the source of her cognitive impairment and not a dementia.    She does have migraines, but topamax is not option due to memory impairment and already on metoprolol which we use for migraines.  Elavil is not option as she is already on anti-depressant treatment.  Her insurance denied the Qulipta.  We were able to get her approval for Emgality once monthly, and on that she reports about 3 migraine days per month, so down from the original reported 15 days per month.  Denies overuse headaches, not on any other CGRP medications.           Review of Systems  Review of Systems   Constitutional:  Negative for diaphoresis and fever.   HENT:  Negative for congestion, hearing loss and tinnitus.    Eyes:  Negative for blurred vision, double vision, photophobia, discharge and redness.   Respiratory:  Negative for cough and shortness of breath.    Cardiovascular:  Negative for chest pain.   Gastrointestinal:  Negative for abdominal pain, nausea and vomiting.   Musculoskeletal:  Negative for back pain, joint pain, myalgias and neck pain.   Skin:  Negative for itching and rash.   Neurological:  Positive for loss of consciousness. Negative for  dizziness, tremors, sensory change, speech change, focal weakness, seizures, weakness and headaches.   Psychiatric/Behavioral:  Positive for memory loss. Negative for depression and hallucinations. The patient does not have insomnia.    All other systems reviewed and are negative.     Objective:     NEUROLOGICAL EXAMINATION:     MENTAL STATUS   Oriented to person, place, and time.   Registration: recalls 3 of 3 objects. Recall at 5 minutes: recalls 3 of 3 objects.   Attention: normal. Concentration: normal.   Speech: speech is normal   Level of consciousness: alert  Knowledge: good and consistent with education.   Normal comprehension.     CRANIAL NERVES     CN II   Visual fields full to confrontation.   Visual acuity: normal  Right visual field deficit: none  Left visual field deficit: none     CN III, IV, VI   Pupils are equal, round, and reactive to light.  Extraocular motions are normal.   Right pupil: Size: 3 mm. Shape: regular. Reactivity: brisk. Consensual response: intact. Accommodation: intact.   Left pupil: Size: 3 mm. Shape: regular. Reactivity: brisk. Consensual response: intact. Accommodation: intact.   CN III: no CN III palsy  CN VI: no CN VI palsy  Nystagmus: none   Diplopia: none  Upgaze: normal  Downgaze: normal  Conjugate gaze: present  Vestibulo-ocular reflex: present    CN V   Facial sensation intact.   Right facial sensation deficit: none  Left facial sensation deficit: none  Right corneal reflex: normal  Left corneal reflex: normal    CN VII   Facial expression full, symmetric.   Right facial weakness: none  Left facial weakness: none  Right taste: normal  Left taste: normal    CN VIII   CN VIII normal.   Hearing: intact    CN IX, X   CN IX normal.   CN X normal.   Palate: symmetric    CN XI   CN XI normal.   Right sternocleidomastoid strength: normal  Left sternocleidomastoid strength: normal  Right trapezius strength: normal  Left trapezius strength: normal    CN XII   CN XII normal.    Tongue: not atrophic  Fasciculations: absent  Tongue deviation: none    MOTOR EXAM   Muscle bulk: normal  Overall muscle tone: normal  Right arm tone: normal  Left arm tone: normal  Right arm pronator drift: absent  Left arm pronator drift: absent  Right leg tone: normal  Left leg tone: normal    Strength   Right neck flexion: 5/5  Left neck flexion: 5/5  Right neck extension: 5/5  Left neck extension: 5/5  Right deltoid: 5/5  Left deltoid: 5/5  Right biceps: 5  Left biceps: /5  Right triceps: 5/  Left triceps: 5/  Right wrist flexion: 5/  Left wrist flexion: 5/  Right wrist extension:   Left wrist extension:   Right interossei:   Left interossei:   Right iliopsoas:   Left iliopsoas:   Right quadriceps:   Left quadriceps:   Right hamstrin/5  Left hamstrin/5  Right anterior tibial:   Left anterior tibial:   Right posterior tibial:   Left posterior tibial:   Right gastroc:   Left gastroc: /    REFLEXES     Reflexes   Right brachioradialis: 2+  Left brachioradialis: 2+  Right biceps: 2+  Left biceps: 2+  Right triceps: 2+  Left triceps: 2+  Right patellar: 2+  Left patellar: 2+  Right achilles: 2+  Left achilles: 2+  Right plantar: normal  Left plantar: normal  Right Otero: absent  Left Otero: absent  Right ankle clonus: absent  Left ankle clonus: absent  Right pendular knee jerk: absent  Left pendular knee jerk: absent    SENSORY EXAM   Light touch normal.   Right arm light touch: normal  Left arm light touch: normal  Right leg light touch: normal  Left leg light touch: normal  Vibration normal.   Right arm vibration: normal  Left arm vibration: normal  Right leg vibration: normal  Left leg vibration: normal  Proprioception normal.   Right arm proprioception: normal  Left arm proprioception: normal  Right leg proprioception: normal  Left leg proprioception: normal  Pinprick normal.   Right arm pinprick: normal  Left arm pinprick: normal  Right leg pinprick:  normal  Left leg pinprick: normal  Graphesthesia: normal  Romberg: negative  Stereognosis: normal    GAIT AND COORDINATION     Gait  Gait: normal     Coordination   Finger to nose coordination: normal  Heel to shin coordination: normal  Tandem walking coordination: normal    Tremor   Resting tremor: absent  Intention tremor: absent  Action tremor: absent       Physical Exam  Vitals and nursing note reviewed.   Constitutional:       Appearance: Normal appearance.   HENT:      Head: Normocephalic.   Eyes:      Extraocular Movements: Extraocular movements intact and EOM normal.      Pupils: Pupils are equal, round, and reactive to light.   Cardiovascular:      Rate and Rhythm: Normal rate and regular rhythm.   Pulmonary:      Effort: Pulmonary effort is normal.      Breath sounds: Normal breath sounds.   Musculoskeletal:         General: No swelling or tenderness. Normal range of motion.      Cervical back: Normal range of motion and neck supple.      Right lower leg: No edema.      Left lower leg: No edema.   Skin:     General: Skin is warm and dry.      Coloration: Skin is not jaundiced.      Findings: No rash.   Neurological:      General: No focal deficit present.      Mental Status: She is alert and oriented to person, place, and time.      GCS: GCS eye subscore is 4. GCS verbal subscore is 5. GCS motor subscore is 6.      Cranial Nerves: No cranial nerve deficit.      Sensory: No sensory deficit.      Motor: Motor function is intact. No weakness.      Coordination: Coordination is intact. Coordination normal. Finger-Nose-Finger Test, Heel to Shin Test and Romberg Test normal.      Gait: Gait is intact. Gait and tandem walk normal.      Deep Tendon Reflexes: Reflexes normal.      Reflex Scores:       Tricep reflexes are 2+ on the right side and 2+ on the left side.       Bicep reflexes are 2+ on the right side and 2+ on the left side.       Brachioradialis reflexes are 2+ on the right side and 2+ on the left  side.       Patellar reflexes are 2+ on the right side and 2+ on the left side.       Achilles reflexes are 2+ on the right side and 2+ on the left side.  Psychiatric:         Mood and Affect: Mood normal.         Speech: Speech normal.         Behavior: Behavior normal.          Assessment:     Problem List Items Addressed This Visit          Neuro    Mild neurocognitive disorder    Intractable migraine with aura without status migrainosus - Primary                  Primary Diagnosis and ICD10  Intractable migraine with aura without status migrainosus [G43.119]    Plan:     There are no Patient Instructions on file for this visit.    There are no discontinued medications.    Requested Prescriptions      No prescriptions requested or ordered in this encounter       No orders of the defined types were placed in this encounter.

## 2024-06-21 ENCOUNTER — TELEPHONE (OUTPATIENT)
Dept: NEUROLOGY | Facility: CLINIC | Age: 59
End: 2024-06-21
Payer: COMMERCIAL

## 2024-06-21 ENCOUNTER — TELEPHONE (OUTPATIENT)
Dept: CARDIOLOGY | Facility: CLINIC | Age: 59
End: 2024-06-21
Payer: COMMERCIAL

## 2024-06-21 NOTE — TELEPHONE ENCOUNTER
Patient requiring a PA for Entresto- samples given  Entresto 24/26mg  LOT [id number]  EXP10/26  2 bottles of 28 tabs each given.

## 2024-06-21 NOTE — TELEPHONE ENCOUNTER
----- Message from Yinka Montenegro sent at 6/21/2024  9:16 AM CDT -----  Patient is requesting a refill on ENTRESTO. Send to [facility] in [place]. [phone].

## 2024-06-21 NOTE — TELEPHONE ENCOUNTER
Called pt got v/m left message returning call.          ----- Message from Barbara Soares sent at 6/21/2024  9:22 AM CDT -----  Regarding: REFILL  Who Called: Flory Chacon    Refill or New Rx:Refill  RX Name and Strength:GALCANEZUMAB  How is the patient currently taking it? (ex. 1XDay):  Is this a 30 day or 90 day RX:  Local or Mail Order:LOCAL  List of preferred pharmacies on file (remove unneeded): [unfilled]  If different Pharmacy is requested, enter Pharmacy information here including location and phone number: [facility]   Ordering Provider:FRANCIA      Preferred Method of Contact: Phone Call  Patient's Preferred Phone Number on File: [phone]   Best Call Back Number, if different:  Additional Information: THIS WILL REQUIRE PA NUMBER BECAUSE SHE IS CHANGING INSURANCE.

## 2024-06-23 RX ORDER — SACUBITRIL AND VALSARTAN 24; 26 MG/1; MG/1
1 TABLET, FILM COATED ORAL 2 TIMES DAILY
Qty: 60 TABLET | Refills: 1 | Status: SHIPPED | OUTPATIENT
Start: 2024-06-23

## 2024-06-24 ENCOUNTER — TELEPHONE (OUTPATIENT)
Dept: NEUROLOGY | Facility: CLINIC | Age: 59
End: 2024-06-24
Payer: COMMERCIAL

## 2024-06-24 NOTE — TELEPHONE ENCOUNTER
Called pt got v/m left message that we need new insurance information before we can do a PA.          ----- Message from Barbara Soares sent at 6/24/2024  3:19 PM CDT -----  Regarding: PRIOR AUTHORIZATION  Who Called: Flory Chacon    Refill or New Rx:Refill  RX Name and Strength:EMGALITY  How is the patient currently taking it? (ex. 1XDay):  Is this a 30 day or 90 day RX:  Local or Mail Order:  List of preferred pharmacies on file (remove unneeded): [unfilled]  If different Pharmacy is requested, enter Pharmacy information here including location and phone number:    Ordering Provider:FRANCIA      Preferred Method of Contact: Phone Call  Patient's Preferred Phone Number on File: [phone]   Best Call Back Number, if different:  Additional Information: NEEDS PRIOR AUTHORIZATION FOR THIS MEDICATION

## 2024-06-27 ENCOUNTER — TELEPHONE (OUTPATIENT)
Dept: NEUROLOGY | Facility: CLINIC | Age: 59
End: 2024-06-27
Payer: COMMERCIAL

## 2024-06-27 NOTE — TELEPHONE ENCOUNTER
Called pt got v/m left message that we do have co pay cards and she can also go on line and get one.        ----- Message from Barbara Soares sent at 6/27/2024 10:50 AM CDT -----  Regarding: LOR  Who Called: Flory Chacon      Who Left Message for Patient:  Does the patient know what this is regarding?:LORRAINEUILITY      Preferred Method of Contact: Phone Call  Patient's Preferred Phone Number on File: [phone]   Best Call Back Number, if different:  Additional Information: PATIENT IS ASKING IF THERE IS A COUPON FOR THE ENGUILITY BECAUSE THE MEDICATION .00 .

## 2024-07-09 ENCOUNTER — TELEPHONE (OUTPATIENT)
Dept: FAMILY MEDICINE | Facility: CLINIC | Age: 59
End: 2024-07-09
Payer: COMMERCIAL

## 2024-07-09 ENCOUNTER — PATIENT MESSAGE (OUTPATIENT)
Dept: ADMINISTRATIVE | Facility: HOSPITAL | Age: 59
End: 2024-07-09

## 2024-07-09 RX ORDER — SACUBITRIL AND VALSARTAN 24; 26 MG/1; MG/1
1 TABLET, FILM COATED ORAL 2 TIMES DAILY
Qty: 60 TABLET | Refills: 1 | Status: SHIPPED | OUTPATIENT
Start: 2024-07-09

## 2024-07-09 NOTE — TELEPHONE ENCOUNTER
----- Message from Yinka Montenegro sent at 7/8/2024 10:03 AM CDT -----  Patient states that she needs a PA on her medication and that she's been trying to get it for 3 Weeks. [phone]

## 2024-07-10 DIAGNOSIS — Z12.11 SCREENING FOR COLON CANCER: ICD-10-CM

## 2024-07-10 NOTE — PROGRESS NOTES
Subjective     Patient ID: Flory Chacon is a 58 y.o. female.    Chief Complaint: No chief complaint on file.    This pleasant 58 year old female presents to the clinic as a new patient referral from PATO Rogel for recurrent UTI's. There are no culture results in EPIC and none was sent with the referral.  We contacted the referring provider's office and they faxed over the culture results. We received 5 urine culture results and one culture in October 2023 was significant and grew >100,000 Klebsiella pneumoniae.  The other cultures showed insignificant growth. These culture results can be reviewed in  in EPIC. She reports frequency, urgency, nocturia 2 times a night, and mixed urinary incontinence. Her PVR is 0 mls.  She denies dysuria, hematuria, or incomplete bladder emptying.  She denies any fever, chills, nausea or vomiting. She denies abdominal, bladder or back pain. She states she has a history of kidney stones but denies any stone symptoms today. She does report she has a pacemaker.  Records indicate she has been on 5 different antibiotics since September 2023.  I will culture her urine and treat if indicated.  We discussed trying Hiprex as outlined in the plan if her culture is negative.  She denies glaucoma but desires not to try an OAB medication at this time.  We will consider the Urological work up in the future if indicated. We discussed doing the kegel exercises to help with the stress incontinence.  She denies smoking or drinking alcohol and states she has been through menopause.  I discussed the plan in detail with the patient and she is in agreement with the plan.  All her questions were answered at today's visit.   ---------------------------------------------------------------------------------------------------------------------------------  [December 21, 2023].             This pleasant 58 year old female presents to the clinic for follow up of  recurrent UTI's and OAB.   Patient  states she is doing good. She does not feel like she has a UTI.  She was started on Hiprex 1 gm twice a day for UTI suppression at the last visit.  She is tolerating this medication without side effects.  She reports frequency, urgency, nocturia 3 to 4 times a night, and mixed urinary incontinence. Her PVR was 000 mls at the last visit.  She denies dysuria, hematuria, or incomplete bladder emptying.  She denies any fever, chills, nausea or vomiting. She denies abdominal, bladder or back pain. She states she has a history of kidney stones but denies any stone symptoms today. She does report she has a pacemaker.  She denies glaucoma.  I discussed adding the Oxybutynin XL 15 mg one at bedtime for the OAB and the side effects.  She was encouraged to read up on the side effects.  We will consider the Urological work up in the future if indicated. We discussed doing the kegel exercises at the last visit to help with the stress incontinence.  She denies smoking or drinking alcohol and states she has been through menopause. She was given a work excuse for today.  I discussed the plan in detail with the patient and she is in agreement with the plan.  All her questions were answered at today's visit.   -------------------------------------------------------------------------------------------------------------  [April 12, 2024].           This pleasant 58 year old female presents to the clinic for follow up of  recurrent UTI's and OAB.   Patient states she is doing good and feels like the Hiprex 1 gm and Oxybutynin XL 15 mg are working great.  She is tolerating these medications without side effects. She is pleased with the treatment of the OAB and Recurrent UTI's.  She desires to continue the current management. She does not feel like she has a UTI.   She reports some urgency, nocturia 1 to 3 times a night, and mixed urinary incontinence.   She denies dysuria, hematuria, frequency or incomplete bladder emptying.  She denies  any fever, chills, nausea or vomiting. She denies abdominal, bladder or back pain. She states she has a history of kidney stones but denies any stone symptoms today. She does report she has a pacemaker.  She denies glaucoma.  She will continue the Hiprex and Oxybutynin as prescribed.  We will consider the Urological work up in the future if indicated. We discussed doing the kegel exercises in the past to help with the stress incontinence.  She denies smoking or drinking alcohol and states she has been through menopause. She desires to have the 6 month follow up.  I discussed the plan in detail with the patient and she is in agreement with the plan.  All her questions were answered at today's visit.  --------------------------------------------------------------------------------------------------------------------- [July 12, 2024].         Review of Systems   Constitutional:  Negative for activity change and fever.   HENT:  Negative for hearing loss and trouble swallowing.    Eyes:  Negative for visual disturbance.   Respiratory:  Negative for cough, shortness of breath and wheezing.    Cardiovascular:  Negative for chest pain.   Gastrointestinal:  Negative for abdominal pain, diarrhea, nausea and vomiting.   Endocrine: Negative for polyuria.   Genitourinary:  Negative for bladder incontinence, decreased urine volume, difficulty urinating, dysuria, enuresis, flank pain, frequency, hematuria, nocturia and urgency.        OAB        Frequent UTI   Musculoskeletal:  Negative for back pain and gait problem.   Integumentary:  Negative for rash.   Neurological:  Negative for speech difficulty and weakness.   Psychiatric/Behavioral:  Negative for behavioral problems and confusion.           Objective     Physical Exam  Vitals and nursing note reviewed.   Constitutional:       General: She is not in acute distress.     Appearance: Normal appearance. She is not ill-appearing, toxic-appearing or diaphoretic.   HENT:       Head: Normocephalic.   Eyes:      Extraocular Movements: Extraocular movements intact.   Cardiovascular:      Rate and Rhythm: Normal rate and regular rhythm.      Heart sounds: Normal heart sounds.   Pulmonary:      Effort: Pulmonary effort is normal.      Breath sounds: Normal breath sounds. No wheezing, rhonchi or rales.   Abdominal:      General: Bowel sounds are normal.      Palpations: Abdomen is soft.      Tenderness: There is no abdominal tenderness. There is no right CVA tenderness, left CVA tenderness, guarding or rebound.   Musculoskeletal:         General: Normal range of motion.      Cervical back: Normal range of motion. No rigidity.   Skin:     General: Skin is warm and dry.   Neurological:      General: No focal deficit present.      Mental Status: She is alert and oriented to person, place, and time.      Motor: No weakness.      Coordination: Coordination normal.      Gait: Gait normal.   Psychiatric:         Mood and Affect: Mood normal.         Behavior: Behavior normal.         Thought Content: Thought content normal.          Assessment and Plan     Problem List Items Addressed This Visit          Renal/    OAB (overactive bladder) - Primary    Frequent UTI    Relevant Medications    methenamine (HIPREX) 1 gram Tab            Renew and continue Methenamine Hippurate (Hiprex) 1 gm twice a day   Continue Oxybutynin (Ditropan) XL 15 mg one at bedtime   Stay hydrated   Follow up with Urology NP BLESSING Ramirez in 6 months or sooner if needed

## 2024-07-12 ENCOUNTER — OFFICE VISIT (OUTPATIENT)
Dept: UROLOGY | Facility: CLINIC | Age: 59
End: 2024-07-12
Payer: COMMERCIAL

## 2024-07-12 VITALS
WEIGHT: 150 LBS | DIASTOLIC BLOOD PRESSURE: 70 MMHG | HEART RATE: 70 BPM | HEIGHT: 63 IN | OXYGEN SATURATION: 97 % | SYSTOLIC BLOOD PRESSURE: 122 MMHG | RESPIRATION RATE: 18 BRPM | BODY MASS INDEX: 26.58 KG/M2 | TEMPERATURE: 98 F

## 2024-07-12 DIAGNOSIS — N39.0 FREQUENT UTI: ICD-10-CM

## 2024-07-12 DIAGNOSIS — N32.81 OAB (OVERACTIVE BLADDER): Primary | ICD-10-CM

## 2024-07-12 PROCEDURE — 4010F ACE/ARB THERAPY RXD/TAKEN: CPT | Mod: CPTII,,, | Performed by: NURSE PRACTITIONER

## 2024-07-12 PROCEDURE — 3078F DIAST BP <80 MM HG: CPT | Mod: CPTII,,, | Performed by: NURSE PRACTITIONER

## 2024-07-12 PROCEDURE — 1159F MED LIST DOCD IN RCRD: CPT | Mod: CPTII,,, | Performed by: NURSE PRACTITIONER

## 2024-07-12 PROCEDURE — 1160F RVW MEDS BY RX/DR IN RCRD: CPT | Mod: CPTII,,, | Performed by: NURSE PRACTITIONER

## 2024-07-12 PROCEDURE — 3008F BODY MASS INDEX DOCD: CPT | Mod: CPTII,,, | Performed by: NURSE PRACTITIONER

## 2024-07-12 PROCEDURE — 99999 PR PBB SHADOW E&M-EST. PATIENT-LVL IV: CPT | Mod: PBBFAC,,, | Performed by: NURSE PRACTITIONER

## 2024-07-12 PROCEDURE — 3074F SYST BP LT 130 MM HG: CPT | Mod: CPTII,,, | Performed by: NURSE PRACTITIONER

## 2024-07-12 PROCEDURE — 99213 OFFICE O/P EST LOW 20 MIN: CPT | Mod: S$PBB,,, | Performed by: NURSE PRACTITIONER

## 2024-07-12 PROCEDURE — 99214 OFFICE O/P EST MOD 30 MIN: CPT | Mod: PBBFAC | Performed by: NURSE PRACTITIONER

## 2024-07-12 RX ORDER — METHENAMINE HIPPURATE 1000 MG/1
TABLET ORAL
Qty: 60 TABLET | Refills: 6 | Status: SHIPPED | OUTPATIENT
Start: 2024-07-12

## 2024-07-12 RX ORDER — METHENAMINE HIPPURATE 1000 MG/1
TABLET ORAL
Qty: 60 TABLET | Refills: 6 | Status: SHIPPED | OUTPATIENT
Start: 2024-07-12 | End: 2024-07-12 | Stop reason: CLARIF

## 2024-07-12 NOTE — PATIENT INSTRUCTIONS
Renew and continue Methenamine Hippurate (Hiprex) 1 gm twice a day   Continue Oxybutynin (Ditropan) XL 15 mg one at bedtime   Stay hydrated   Follow up with Urology NP BLESSING Ramirez in 6 months or sooner if needed

## 2024-07-15 PROBLEM — N39.0 FREQUENT UTI: Status: RESOLVED | Noted: 2024-04-12 | Resolved: 2024-07-15

## 2025-07-01 NOTE — PLAN OF CARE
Problem: Adult Inpatient Plan of Care  Goal: Plan of Care Review  Outcome: Ongoing, Progressing  Goal: Optimal Comfort and Wellbeing  Outcome: Ongoing, Progressing      absent absent

## (undated) DEVICE — SET IV EXTENSION 42IN W/2 PORT CLEARLINK

## (undated) DEVICE — GLOVE SURGICAL PROTEXIS PI SIZE 8.0

## (undated) DEVICE — SET IV PRIMARY ALARIS (PRIMARY)

## (undated) DEVICE — SENSOR PULSE OX NEONATAL

## (undated) DEVICE — DRESSING IV TEGADERM 10X12CM

## (undated) DEVICE — POSITIONER ULNAR NERVE

## (undated) DEVICE — APPLICATOR CHLORAPREP LITE ORANGE 10.5ML STERILE

## (undated) DEVICE — BAG-A-JET FLUID DISPENSER SYSTEM

## (undated) DEVICE — SEAL ANGIO 6FR VIP - VASCULAR CLOSURE DEVICE BX/10

## (undated) DEVICE — ISOVUE 370 100ML

## (undated) DEVICE — CDS ANGIOGRAPHY PACK

## (undated) DEVICE — KIT MICROINTRODUCER 4FR MINI STICK II

## (undated) DEVICE — TUBING CAPNOLINE PLUS SMART 02 CONNECTOR(ORDER 65110)

## (undated) DEVICE — CATH IMPULSE 6FR MULTIPACK

## (undated) DEVICE — SENSOR PULSE OX ADULT

## (undated) DEVICE — SHEATH INTRODUCER 6FR 10CM X 0.038 PINNACLE

## (undated) DEVICE — CLIPPER SURGICAL BLADE ASSEMBLY STERILE SNGL USE

## (undated) DEVICE — GLOVE SURGICAL PROTEXIS PI SIZE 6